# Patient Record
Sex: MALE | Race: OTHER | Employment: UNEMPLOYED | URBAN - METROPOLITAN AREA
[De-identification: names, ages, dates, MRNs, and addresses within clinical notes are randomized per-mention and may not be internally consistent; named-entity substitution may affect disease eponyms.]

---

## 2017-08-28 ENCOUNTER — OFFICE VISIT (OUTPATIENT)
Dept: SURGERY | Age: 52
End: 2017-08-28

## 2017-08-28 VITALS
WEIGHT: 205 LBS | HEIGHT: 67 IN | HEART RATE: 77 BPM | BODY MASS INDEX: 32.18 KG/M2 | SYSTOLIC BLOOD PRESSURE: 132 MMHG | DIASTOLIC BLOOD PRESSURE: 86 MMHG

## 2017-08-28 LAB
GLUCOSE BLD-MCNC: 153 MG/DL
HBA1C MFR BLD: 8.5 %

## 2017-08-28 PROCEDURE — 82962 GLUCOSE BLOOD TEST: CPT | Performed by: INTERNAL MEDICINE

## 2017-08-28 PROCEDURE — 99203 OFFICE O/P NEW LOW 30 MIN: CPT | Performed by: INTERNAL MEDICINE

## 2017-08-28 PROCEDURE — 83036 HEMOGLOBIN GLYCOSYLATED A1C: CPT | Performed by: INTERNAL MEDICINE

## 2017-08-28 RX ORDER — SIMVASTATIN 20 MG
20 TABLET ORAL NIGHTLY
Status: ON HOLD | COMMUNITY
End: 2020-05-27 | Stop reason: HOSPADM

## 2017-08-28 RX ORDER — LOSARTAN POTASSIUM 50 MG/1
50 TABLET ORAL DAILY
Status: ON HOLD | COMMUNITY
End: 2020-05-26

## 2017-08-28 RX ORDER — ALBUTEROL SULFATE 90 UG/1
2 AEROSOL, METERED RESPIRATORY (INHALATION) EVERY 6 HOURS PRN
COMMUNITY

## 2017-08-28 RX ORDER — FLUTICASONE PROPIONATE 50 MCG
1 SPRAY, SUSPENSION (ML) NASAL DAILY
Status: ON HOLD | COMMUNITY
End: 2022-06-01

## 2017-08-28 RX ORDER — GLUCOSAMINE HCL/CHONDROITIN SU 500-400 MG
CAPSULE ORAL
Qty: 50 STRIP | Refills: 6 | Status: SHIPPED | OUTPATIENT
Start: 2017-08-28 | End: 2019-02-26 | Stop reason: SDUPTHER

## 2017-08-28 RX ORDER — FLUOXETINE 10 MG/1
10 TABLET, FILM COATED ORAL 3 TIMES DAILY
COMMUNITY
End: 2021-10-07 | Stop reason: SDUPTHER

## 2017-08-28 RX ORDER — BLOOD-GLUCOSE METER
1 KIT MISCELLANEOUS DAILY PRN
Qty: 1 DEVICE | Refills: 0 | Status: SHIPPED | OUTPATIENT
Start: 2017-08-28 | End: 2017-08-28 | Stop reason: CLARIF

## 2017-08-28 RX ORDER — LANCETS 28 GAUGE
1 EACH MISCELLANEOUS DAILY
Qty: 100 EACH | Refills: 3 | Status: SHIPPED | OUTPATIENT
Start: 2017-08-28 | End: 2017-08-28 | Stop reason: CLARIF

## 2017-08-28 RX ORDER — CHOLECALCIFEROL (VITAMIN D3) 1250 MCG
CAPSULE ORAL WEEKLY
Status: ON HOLD | COMMUNITY
End: 2020-05-26

## 2017-08-28 RX ORDER — ALOGLIPTIN 25 MG/1
25 TABLET, FILM COATED ORAL DAILY
Qty: 30 TABLET | Refills: 3 | Status: SHIPPED | OUTPATIENT
Start: 2017-08-28 | End: 2017-12-29 | Stop reason: SDUPTHER

## 2017-08-28 RX ORDER — TIZANIDINE 4 MG/1
4 TABLET ORAL EVERY 6 HOURS PRN
COMMUNITY
End: 2020-09-10 | Stop reason: ALTCHOICE

## 2017-08-28 RX ORDER — LANCETS 30 GAUGE
EACH MISCELLANEOUS
Qty: 50 EACH | Refills: 6 | Status: SHIPPED | OUTPATIENT
Start: 2017-08-28 | End: 2019-02-26 | Stop reason: SDUPTHER

## 2017-09-04 ASSESSMENT — ENCOUNTER SYMPTOMS: EYES NEGATIVE: 1

## 2017-11-22 LAB
ANION GAP SERPL CALCULATED.3IONS-SCNC: 16 MEQ/L (ref 7–13)
BUN BLDV-MCNC: 16 MG/DL (ref 6–20)
CALCIUM SERPL-MCNC: 9.4 MG/DL (ref 8.6–10.2)
CHLORIDE BLD-SCNC: 98 MEQ/L (ref 98–107)
CO2: 24 MEQ/L (ref 22–29)
CREAT SERPL-MCNC: 0.69 MG/DL (ref 0.7–1.2)
CREATININE URINE: 106.2 MG/DL
GFR AFRICAN AMERICAN: >60
GFR NON-AFRICAN AMERICAN: >60
GLUCOSE BLD-MCNC: 99 MG/DL (ref 74–109)
HBA1C MFR BLD: 7.5 % (ref 4.8–5.9)
MICROALBUMIN UR-MCNC: <1.2 MG/DL
MICROALBUMIN/CREAT UR-RTO: NORMAL MG/G (ref 0–30)
POTASSIUM SERPL-SCNC: 4.3 MEQ/L (ref 3.5–5.1)
SODIUM BLD-SCNC: 138 MEQ/L (ref 132–144)

## 2017-11-28 ENCOUNTER — OFFICE VISIT (OUTPATIENT)
Dept: SURGERY | Age: 52
End: 2017-11-28

## 2017-11-28 VITALS
SYSTOLIC BLOOD PRESSURE: 124 MMHG | HEIGHT: 66 IN | DIASTOLIC BLOOD PRESSURE: 86 MMHG | WEIGHT: 213 LBS | BODY MASS INDEX: 34.23 KG/M2 | HEART RATE: 73 BPM

## 2017-11-28 DIAGNOSIS — E11.69 TYPE 2 DIABETES MELLITUS WITH OTHER SPECIFIED COMPLICATION, UNSPECIFIED LONG TERM INSULIN USE STATUS: Primary | Chronic | ICD-10-CM

## 2017-11-28 LAB — GLUCOSE BLD-MCNC: 137 MG/DL

## 2017-11-28 PROCEDURE — 3045F PR MOST RECENT HEMOGLOBIN A1C LEVEL 7.0-9.0%: CPT | Performed by: INTERNAL MEDICINE

## 2017-11-28 PROCEDURE — 3017F COLORECTAL CA SCREEN DOC REV: CPT | Performed by: INTERNAL MEDICINE

## 2017-11-28 PROCEDURE — 99213 OFFICE O/P EST LOW 20 MIN: CPT | Performed by: INTERNAL MEDICINE

## 2017-11-28 PROCEDURE — G8417 CALC BMI ABV UP PARAM F/U: HCPCS | Performed by: INTERNAL MEDICINE

## 2017-11-28 PROCEDURE — G8484 FLU IMMUNIZE NO ADMIN: HCPCS | Performed by: INTERNAL MEDICINE

## 2017-11-28 PROCEDURE — 4004F PT TOBACCO SCREEN RCVD TLK: CPT | Performed by: INTERNAL MEDICINE

## 2017-11-28 PROCEDURE — G8427 DOCREV CUR MEDS BY ELIG CLIN: HCPCS | Performed by: INTERNAL MEDICINE

## 2017-11-28 PROCEDURE — 82962 GLUCOSE BLOOD TEST: CPT | Performed by: INTERNAL MEDICINE

## 2017-11-28 NOTE — PROGRESS NOTES
(before breakfast), Disp: 30 tablet, Rfl: 5    Blood Glucose Monitoring Suppl SONDRA, check BG once daily, DX: E11.9, NIDDM, Disp: 1 Device, Rfl: 0    Glucose Blood (BLOOD GLUCOSE TEST STRIPS) STRP, Check BG once daily, DX: E11.65, NIDDM, Disp: 50 strip, Rfl: 6    Lancets MISC, Check BG once daily, DX: E11.65, NIDDM, Disp: 50 each, Rfl: 6    omeprazole (PRILOSEC) 20 MG capsule, Take 1 capsule by mouth daily. , Disp: 30 capsule, Rfl: 11    Review of Systems   Constitutional: Positive for fatigue. All other systems reviewed and are negative. Vitals:    11/28/17 0914   BP: 124/86   Site: Left Arm   Position: Sitting   Cuff Size: Large Adult   Pulse: 73   Weight: 213 lb (96.6 kg)   Height: 5' 6\" (1.676 m)       Objective:   Physical Exam   Constitutional: He appears well-developed and well-nourished. HENT:   Head: Normocephalic and atraumatic. Cardiovascular: Normal rate and normal heart sounds. Pulmonary/Chest: Effort normal and breath sounds normal. He has no wheezes. He has no rales. Abdominal:   Obese    Neurological: He is alert. Skin: Skin is warm and dry. Psychiatric: He has a normal mood and affect. Lab Results   Component Value Date     11/22/2017    K 4.3 11/22/2017    CL 98 11/22/2017    CO2 24 11/22/2017    BUN 16 11/22/2017    CREATININE 0.69 (L) 11/22/2017    GLUCOSE 137 11/28/2017    CALCIUM 9.4 11/22/2017    PROT 7.0 03/31/2015    LABALBU 4.2 03/31/2015    BILITOT 0.2 03/31/2015    ALKPHOS 62 03/31/2015    AST 16 03/31/2015    ALT 32 03/31/2015    LABGLOM >60.0 11/22/2017    GFRAA >60.0 11/22/2017    GLOB 2.8 03/31/2015         Assessment:      1.  Type 2 diabetes mellitus with other specified complication, unspecified long term insulin use status [facility]  POCT Glucose           Plan:      Orders Placed This Encounter   Procedures    Basic Metabolic Panel     Standing Status:   Future     Standing Expiration Date:   11/28/2018    Hemoglobin A1C     Standing Status:   Future

## 2017-12-29 RX ORDER — ALOGLIPTIN 25 MG/1
25 TABLET, FILM COATED ORAL DAILY
Qty: 30 TABLET | Refills: 3 | Status: SHIPPED | OUTPATIENT
Start: 2017-12-29 | End: 2018-03-26 | Stop reason: SDUPTHER

## 2018-02-22 DIAGNOSIS — E11.69 TYPE 2 DIABETES MELLITUS WITH OTHER SPECIFIED COMPLICATION, UNSPECIFIED LONG TERM INSULIN USE STATUS: Chronic | ICD-10-CM

## 2018-02-22 LAB
ANION GAP SERPL CALCULATED.3IONS-SCNC: 12 MEQ/L (ref 7–13)
BUN BLDV-MCNC: 13 MG/DL (ref 6–20)
CALCIUM SERPL-MCNC: 9.4 MG/DL (ref 8.6–10.2)
CHLORIDE BLD-SCNC: 97 MEQ/L (ref 98–107)
CO2: 29 MEQ/L (ref 22–29)
CREAT SERPL-MCNC: 0.66 MG/DL (ref 0.7–1.2)
GFR AFRICAN AMERICAN: >60
GFR NON-AFRICAN AMERICAN: >60
GLUCOSE BLD-MCNC: 132 MG/DL (ref 74–109)
HBA1C MFR BLD: 7.1 % (ref 4.8–5.9)
POTASSIUM SERPL-SCNC: 4.8 MEQ/L (ref 3.5–5.1)
SODIUM BLD-SCNC: 138 MEQ/L (ref 132–144)

## 2018-03-26 ENCOUNTER — OFFICE VISIT (OUTPATIENT)
Dept: ENDOCRINOLOGY | Age: 53
End: 2018-03-26
Payer: COMMERCIAL

## 2018-03-26 VITALS
WEIGHT: 221 LBS | SYSTOLIC BLOOD PRESSURE: 128 MMHG | HEART RATE: 80 BPM | DIASTOLIC BLOOD PRESSURE: 82 MMHG | HEIGHT: 66 IN | BODY MASS INDEX: 35.52 KG/M2

## 2018-03-26 DIAGNOSIS — E11.69 TYPE 2 DIABETES MELLITUS WITH OTHER SPECIFIED COMPLICATION, UNSPECIFIED LONG TERM INSULIN USE STATUS: Primary | Chronic | ICD-10-CM

## 2018-03-26 LAB — GLUCOSE BLD-MCNC: 189 MG/DL

## 2018-03-26 PROCEDURE — 99213 OFFICE O/P EST LOW 20 MIN: CPT | Performed by: INTERNAL MEDICINE

## 2018-03-26 PROCEDURE — G8484 FLU IMMUNIZE NO ADMIN: HCPCS | Performed by: INTERNAL MEDICINE

## 2018-03-26 PROCEDURE — G8427 DOCREV CUR MEDS BY ELIG CLIN: HCPCS | Performed by: INTERNAL MEDICINE

## 2018-03-26 PROCEDURE — 82962 GLUCOSE BLOOD TEST: CPT | Performed by: INTERNAL MEDICINE

## 2018-03-26 PROCEDURE — G8417 CALC BMI ABV UP PARAM F/U: HCPCS | Performed by: INTERNAL MEDICINE

## 2018-03-26 PROCEDURE — 4004F PT TOBACCO SCREEN RCVD TLK: CPT | Performed by: INTERNAL MEDICINE

## 2018-03-26 PROCEDURE — 3045F PR MOST RECENT HEMOGLOBIN A1C LEVEL 7.0-9.0%: CPT | Performed by: INTERNAL MEDICINE

## 2018-03-26 PROCEDURE — 3017F COLORECTAL CA SCREEN DOC REV: CPT | Performed by: INTERNAL MEDICINE

## 2018-03-26 RX ORDER — ALOGLIPTIN 25 MG/1
25 TABLET, FILM COATED ORAL DAILY
Qty: 30 TABLET | Refills: 3 | Status: SHIPPED | OUTPATIENT
Start: 2018-03-26 | End: 2018-06-25 | Stop reason: SDUPTHER

## 2018-03-26 NOTE — PROGRESS NOTES
Subjective:      Patient ID: Darius Sal is a 46 y.o. male. Diabetes   He presents for his follow-up diabetic visit. He has type 2 diabetes mellitus. His disease course has been improving. Diabetic complications include peripheral neuropathy. Risk factors for coronary artery disease include diabetes mellitus, obesity and male sex. Current diabetic treatment includes oral agent (dual therapy) (alogliptin plus invokana). He is compliant with treatment most of the time. He is following a generally healthy diet. His home blood glucose trend is fluctuating minimally. His overall blood glucose range is 130-140 mg/dl. (Lab Results       Component                Value               Date                       LABA1C                   7.1 (H)             02/22/2018              ) An ACE inhibitor/angiotensin II receptor blocker is being taken. Eye exam is current ([facility] ).        Patient Active Problem List   Diagnosis    Diabetes mellitus (HCC)       Allergies   Allergen Reactions    Metformin And Related      Diarrhea        Current Outpatient Prescriptions:     alogliptin (NESINA) 25 MG TABS tablet, Take 1 tablet by mouth daily, Disp: 30 tablet, Rfl: 3    canagliflozin (INVOKANA) 100 MG TABS tablet, Take 1 tablet by mouth every morning (before breakfast), Disp: 30 tablet, Rfl: 3    FLUoxetine (PROZAC) 10 MG tablet, Take 10 mg by mouth 3 times daily, Disp: , Rfl:     fluticasone (FLONASE) 50 MCG/ACT nasal spray, 1 spray by Nasal route daily, Disp: , Rfl:     tiZANidine (ZANAFLEX) 4 MG tablet, Take 4 mg by mouth every 6 hours as needed, Disp: , Rfl:     albuterol sulfate  (90 Base) MCG/ACT inhaler, Inhale 2 puffs into the lungs every 6 hours as needed for Wheezing, Disp: , Rfl:     Cholecalciferol (VITAMIN D3) 22408 units CAPS, Take by mouth once a week, Disp: , Rfl:     simvastatin (ZOCOR) 20 MG tablet, Take 20 mg by mouth nightly, Disp: , Rfl:     losartan (COZAAR) 50 MG tablet, Take 50 Future     Standing Expiration Date:   3/26/2019    Hemoglobin A1C     Standing Status:   Future     Standing Expiration Date:   3/26/2019    Microalbumin / Creatinine Urine Ratio     Standing Status:   Future     Standing Expiration Date:   3/26/2019    POCT Glucose     Orders Placed This Encounter   Medications    alogliptin (NESINA) 25 MG TABS tablet     Sig: Take 1 tablet by mouth daily     Dispense:  30 tablet     Refill:  3    canagliflozin (INVOKANA) 100 MG TABS tablet     Sig: Take 1 tablet by mouth every morning (before breakfast)     Dispense:  30 tablet     Refill:  3     f/u in 3-6 months

## 2018-06-01 DIAGNOSIS — E11.69 TYPE 2 DIABETES MELLITUS WITH OTHER SPECIFIED COMPLICATION, UNSPECIFIED LONG TERM INSULIN USE STATUS: Chronic | ICD-10-CM

## 2018-06-01 LAB
ANION GAP SERPL CALCULATED.3IONS-SCNC: 17 MEQ/L (ref 7–13)
BUN BLDV-MCNC: 13 MG/DL (ref 6–20)
CALCIUM SERPL-MCNC: 9.4 MG/DL (ref 8.6–10.2)
CHLORIDE BLD-SCNC: 98 MEQ/L (ref 98–107)
CO2: 21 MEQ/L (ref 22–29)
CREAT SERPL-MCNC: 0.69 MG/DL (ref 0.7–1.2)
CREATININE URINE: 63.1 MG/DL
GFR AFRICAN AMERICAN: >60
GFR NON-AFRICAN AMERICAN: >60
GLUCOSE BLD-MCNC: 243 MG/DL (ref 74–109)
HBA1C MFR BLD: 8.1 % (ref 4.8–5.9)
MICROALBUMIN UR-MCNC: <1.2 MG/DL
MICROALBUMIN/CREAT UR-RTO: NORMAL MG/G (ref 0–30)
POTASSIUM SERPL-SCNC: 4.3 MEQ/L (ref 3.5–5.1)
SODIUM BLD-SCNC: 136 MEQ/L (ref 132–144)

## 2018-06-25 ENCOUNTER — OFFICE VISIT (OUTPATIENT)
Dept: ENDOCRINOLOGY | Age: 53
End: 2018-06-25
Payer: COMMERCIAL

## 2018-06-25 VITALS
HEART RATE: 66 BPM | WEIGHT: 219 LBS | SYSTOLIC BLOOD PRESSURE: 126 MMHG | DIASTOLIC BLOOD PRESSURE: 86 MMHG | HEIGHT: 66 IN | BODY MASS INDEX: 35.2 KG/M2

## 2018-06-25 DIAGNOSIS — E66.9 OBESITY (BMI 30-39.9): ICD-10-CM

## 2018-06-25 DIAGNOSIS — E11.69 TYPE 2 DIABETES MELLITUS WITH OTHER SPECIFIED COMPLICATION, UNSPECIFIED LONG TERM INSULIN USE STATUS: Primary | Chronic | ICD-10-CM

## 2018-06-25 LAB — GLUCOSE BLD-MCNC: 173 MG/DL

## 2018-06-25 PROCEDURE — 3017F COLORECTAL CA SCREEN DOC REV: CPT | Performed by: INTERNAL MEDICINE

## 2018-06-25 PROCEDURE — G8427 DOCREV CUR MEDS BY ELIG CLIN: HCPCS | Performed by: INTERNAL MEDICINE

## 2018-06-25 PROCEDURE — 3045F PR MOST RECENT HEMOGLOBIN A1C LEVEL 7.0-9.0%: CPT | Performed by: INTERNAL MEDICINE

## 2018-06-25 PROCEDURE — 99213 OFFICE O/P EST LOW 20 MIN: CPT | Performed by: INTERNAL MEDICINE

## 2018-06-25 PROCEDURE — 82962 GLUCOSE BLOOD TEST: CPT | Performed by: INTERNAL MEDICINE

## 2018-06-25 PROCEDURE — G8417 CALC BMI ABV UP PARAM F/U: HCPCS | Performed by: INTERNAL MEDICINE

## 2018-06-25 PROCEDURE — 4004F PT TOBACCO SCREEN RCVD TLK: CPT | Performed by: INTERNAL MEDICINE

## 2018-06-25 PROCEDURE — 2022F DILAT RTA XM EVC RTNOPTHY: CPT | Performed by: INTERNAL MEDICINE

## 2018-06-25 RX ORDER — CELECOXIB 200 MG/1
200 CAPSULE ORAL 2 TIMES DAILY
Status: ON HOLD | COMMUNITY
End: 2020-05-26

## 2018-06-25 RX ORDER — ALOGLIPTIN 25 MG/1
25 TABLET, FILM COATED ORAL DAILY
Qty: 30 TABLET | Refills: 3 | Status: SHIPPED | OUTPATIENT
Start: 2018-06-25 | End: 2018-09-04 | Stop reason: SDUPTHER

## 2018-06-25 RX ORDER — GLIMEPIRIDE 2 MG/1
TABLET ORAL
Qty: 30 TABLET | Refills: 3 | Status: SHIPPED | OUTPATIENT
Start: 2018-06-25 | End: 2018-09-18 | Stop reason: SDUPTHER

## 2018-06-25 RX ORDER — SENNOSIDES 8.6 MG
650 CAPSULE ORAL 2 TIMES DAILY
Status: ON HOLD | COMMUNITY
End: 2022-06-01

## 2018-09-04 RX ORDER — ALOGLIPTIN 25 MG/1
25 TABLET, FILM COATED ORAL DAILY
Qty: 30 TABLET | Refills: 3 | Status: SHIPPED | OUTPATIENT
Start: 2018-09-04 | End: 2019-05-03 | Stop reason: SDUPTHER

## 2018-09-18 RX ORDER — GLIMEPIRIDE 2 MG/1
TABLET ORAL
Qty: 30 TABLET | Refills: 3 | Status: SHIPPED | OUTPATIENT
Start: 2018-09-18 | End: 2019-02-02 | Stop reason: SDUPTHER

## 2018-09-28 DIAGNOSIS — E11.69 TYPE 2 DIABETES MELLITUS WITH OTHER SPECIFIED COMPLICATION (HCC): Chronic | ICD-10-CM

## 2018-09-28 LAB
ANION GAP SERPL CALCULATED.3IONS-SCNC: 14 MEQ/L (ref 7–13)
BUN BLDV-MCNC: 11 MG/DL (ref 6–20)
CALCIUM SERPL-MCNC: 9.9 MG/DL (ref 8.6–10.2)
CHLORIDE BLD-SCNC: 100 MEQ/L (ref 98–107)
CHOLESTEROL, TOTAL: 146 MG/DL (ref 0–199)
CO2: 28 MEQ/L (ref 22–29)
CREAT SERPL-MCNC: 0.72 MG/DL (ref 0.7–1.2)
GFR AFRICAN AMERICAN: >60
GFR NON-AFRICAN AMERICAN: >60
GLUCOSE BLD-MCNC: 112 MG/DL (ref 74–109)
HBA1C MFR BLD: 7.5 % (ref 4.8–5.9)
HDLC SERPL-MCNC: 34 MG/DL (ref 40–59)
LDL CHOLESTEROL CALCULATED: 90 MG/DL (ref 0–129)
POTASSIUM SERPL-SCNC: 4.6 MEQ/L (ref 3.5–5.1)
SODIUM BLD-SCNC: 142 MEQ/L (ref 132–144)
TRIGL SERPL-MCNC: 109 MG/DL (ref 0–200)

## 2018-10-02 ENCOUNTER — OFFICE VISIT (OUTPATIENT)
Dept: ENDOCRINOLOGY | Age: 53
End: 2018-10-02
Payer: COMMERCIAL

## 2018-10-02 VITALS
SYSTOLIC BLOOD PRESSURE: 131 MMHG | DIASTOLIC BLOOD PRESSURE: 87 MMHG | HEIGHT: 66 IN | WEIGHT: 225 LBS | HEART RATE: 73 BPM | BODY MASS INDEX: 36.16 KG/M2

## 2018-10-02 DIAGNOSIS — E66.9 OBESITY (BMI 30-39.9): ICD-10-CM

## 2018-10-02 DIAGNOSIS — E11.69 TYPE 2 DIABETES MELLITUS WITH OTHER SPECIFIED COMPLICATION, UNSPECIFIED WHETHER LONG TERM INSULIN USE (HCC): Primary | Chronic | ICD-10-CM

## 2018-10-02 LAB — GLUCOSE BLD-MCNC: 148 MG/DL

## 2018-10-02 PROCEDURE — 2022F DILAT RTA XM EVC RTNOPTHY: CPT | Performed by: INTERNAL MEDICINE

## 2018-10-02 PROCEDURE — 82962 GLUCOSE BLOOD TEST: CPT | Performed by: INTERNAL MEDICINE

## 2018-10-02 PROCEDURE — 4004F PT TOBACCO SCREEN RCVD TLK: CPT | Performed by: INTERNAL MEDICINE

## 2018-10-02 PROCEDURE — 3045F PR MOST RECENT HEMOGLOBIN A1C LEVEL 7.0-9.0%: CPT | Performed by: INTERNAL MEDICINE

## 2018-10-02 PROCEDURE — G8484 FLU IMMUNIZE NO ADMIN: HCPCS | Performed by: INTERNAL MEDICINE

## 2018-10-02 PROCEDURE — G8417 CALC BMI ABV UP PARAM F/U: HCPCS | Performed by: INTERNAL MEDICINE

## 2018-10-02 PROCEDURE — 99213 OFFICE O/P EST LOW 20 MIN: CPT | Performed by: INTERNAL MEDICINE

## 2018-10-02 PROCEDURE — G8427 DOCREV CUR MEDS BY ELIG CLIN: HCPCS | Performed by: INTERNAL MEDICINE

## 2018-10-02 PROCEDURE — 3017F COLORECTAL CA SCREEN DOC REV: CPT | Performed by: INTERNAL MEDICINE

## 2018-10-08 NOTE — PROGRESS NOTES
Range: 40 - 59 mg/dL 34 (L)   LDL Calculated Latest Ref Range: 0 - 129 mg/dL 90   Triglycerides Latest Ref Range: 0 - 200 mg/dL 109   Hemoglobin A1C Latest Ref Range: 4.8 - 5.9 % 7.5 (H)     Assessment:       Diagnosis Orders   1. Type 2 diabetes mellitus with other specified complication, unspecified whether long term insulin use ([facility])  POCT Glucose    Basic Metabolic Panel    Hemoglobin A1C    Microalbumin / Creatinine Urine Ratio   2. Obesity (BMI 30-39.9)     3.  Uncontrolled type 2 diabetes mellitus with complication, without long-term current use of insulin ([facility])  canagliflozin (INVOKANA) 100 MG TABS tablet           Plan:      Orders Placed This Encounter   Procedures    Basic Metabolic Panel     Standing Status:   Future     Standing Expiration Date:   10/2/2019    Hemoglobin A1C     Standing Status:   Future     Standing Expiration Date:   10/2/2019    Microalbumin / Creatinine Urine Ratio     Standing Status:   Future     Standing Expiration Date:   10/2/2019    POCT Glucose     Orders Placed This Encounter   Medications    canagliflozin (INVOKANA) 100 MG TABS tablet     Sig: Take 1 tablet by mouth every morning (before breakfast)     Dispense:  30 tablet     Refill:  3     Continue Amaryl and nesina at current dose   F/u in 3 months          Luann Yee MD

## 2018-10-15 RX ORDER — CANAGLIFLOZIN 100 MG/1
100 TABLET, FILM COATED ORAL
Qty: 30 TABLET | Refills: 3 | Status: SHIPPED | OUTPATIENT
Start: 2018-10-15 | End: 2019-07-24 | Stop reason: SDUPTHER

## 2018-11-28 RX ORDER — ALOGLIPTIN 25 MG/1
25 TABLET, FILM COATED ORAL DAILY
Qty: 30 TABLET | Refills: 3 | Status: SHIPPED | OUTPATIENT
Start: 2018-11-28 | End: 2019-04-24 | Stop reason: SDUPTHER

## 2018-12-03 ENCOUNTER — TELEPHONE (OUTPATIENT)
Dept: ENDOCRINOLOGY | Age: 53
End: 2018-12-03

## 2019-01-04 DIAGNOSIS — E11.69 TYPE 2 DIABETES MELLITUS WITH OTHER SPECIFIED COMPLICATION, UNSPECIFIED WHETHER LONG TERM INSULIN USE (HCC): Chronic | ICD-10-CM

## 2019-01-04 LAB
ANION GAP SERPL CALCULATED.3IONS-SCNC: 14 MEQ/L (ref 7–13)
BUN BLDV-MCNC: 15 MG/DL (ref 6–20)
CALCIUM SERPL-MCNC: 9.4 MG/DL (ref 8.6–10.2)
CHLORIDE BLD-SCNC: 96 MEQ/L (ref 98–107)
CO2: 25 MEQ/L (ref 22–29)
CREAT SERPL-MCNC: 0.69 MG/DL (ref 0.7–1.2)
GFR AFRICAN AMERICAN: >60
GFR NON-AFRICAN AMERICAN: >60
GLUCOSE BLD-MCNC: 180 MG/DL (ref 74–109)
HBA1C MFR BLD: 7.7 % (ref 4.8–5.9)
POTASSIUM SERPL-SCNC: 4 MEQ/L (ref 3.5–5.1)
SODIUM BLD-SCNC: 135 MEQ/L (ref 132–144)

## 2019-01-18 ENCOUNTER — OFFICE VISIT (OUTPATIENT)
Dept: ENDOCRINOLOGY | Age: 54
End: 2019-01-18
Payer: COMMERCIAL

## 2019-01-18 VITALS
HEART RATE: 80 BPM | OXYGEN SATURATION: 92 % | BODY MASS INDEX: 36.1 KG/M2 | HEIGHT: 67 IN | DIASTOLIC BLOOD PRESSURE: 82 MMHG | SYSTOLIC BLOOD PRESSURE: 121 MMHG | WEIGHT: 230 LBS

## 2019-01-18 DIAGNOSIS — E66.9 OBESITY (BMI 30-39.9): ICD-10-CM

## 2019-01-18 DIAGNOSIS — E08.00 DIABETES MELLITUS DUE TO UNDERLYING CONDITION WITH HYPEROSMOLARITY WITHOUT COMA, WITH LONG-TERM CURRENT USE OF INSULIN (HCC): Primary | Chronic | ICD-10-CM

## 2019-01-18 DIAGNOSIS — Z79.4 DIABETES MELLITUS DUE TO UNDERLYING CONDITION WITH HYPEROSMOLARITY WITHOUT COMA, WITH LONG-TERM CURRENT USE OF INSULIN (HCC): Primary | Chronic | ICD-10-CM

## 2019-01-18 LAB — GLUCOSE BLD-MCNC: 150 MG/DL

## 2019-01-18 PROCEDURE — 4004F PT TOBACCO SCREEN RCVD TLK: CPT | Performed by: INTERNAL MEDICINE

## 2019-01-18 PROCEDURE — 99213 OFFICE O/P EST LOW 20 MIN: CPT | Performed by: INTERNAL MEDICINE

## 2019-01-18 PROCEDURE — 82962 GLUCOSE BLOOD TEST: CPT | Performed by: INTERNAL MEDICINE

## 2019-01-18 PROCEDURE — G8484 FLU IMMUNIZE NO ADMIN: HCPCS | Performed by: INTERNAL MEDICINE

## 2019-01-18 PROCEDURE — 3017F COLORECTAL CA SCREEN DOC REV: CPT | Performed by: INTERNAL MEDICINE

## 2019-01-18 PROCEDURE — G8427 DOCREV CUR MEDS BY ELIG CLIN: HCPCS | Performed by: INTERNAL MEDICINE

## 2019-01-18 PROCEDURE — G8417 CALC BMI ABV UP PARAM F/U: HCPCS | Performed by: INTERNAL MEDICINE

## 2019-02-04 RX ORDER — GLIMEPIRIDE 2 MG/1
TABLET ORAL
Qty: 30 TABLET | Refills: 3 | Status: SHIPPED | OUTPATIENT
Start: 2019-02-04 | End: 2019-04-24 | Stop reason: SDUPTHER

## 2019-02-09 ENCOUNTER — HOSPITAL ENCOUNTER (EMERGENCY)
Age: 54
Discharge: HOME | End: 2019-02-09
Payer: COMMERCIAL

## 2019-02-09 ENCOUNTER — APPOINTMENT (OUTPATIENT)
Dept: GENERAL RADIOLOGY | Age: 54
End: 2019-02-09
Payer: COMMERCIAL

## 2019-02-09 VITALS
TEMPERATURE: 97.7 F | OXYGEN SATURATION: 98 % | RESPIRATION RATE: 18 BRPM | SYSTOLIC BLOOD PRESSURE: 130 MMHG | HEIGHT: 66 IN | DIASTOLIC BLOOD PRESSURE: 77 MMHG | WEIGHT: 230 LBS | HEART RATE: 92 BPM | BODY MASS INDEX: 36.96 KG/M2

## 2019-02-09 DIAGNOSIS — R11.2 NON-INTRACTABLE VOMITING WITH NAUSEA, UNSPECIFIED VOMITING TYPE: Primary | ICD-10-CM

## 2019-02-09 DIAGNOSIS — R19.7 DIARRHEA, UNSPECIFIED TYPE: ICD-10-CM

## 2019-02-09 LAB
ALBUMIN SERPL-MCNC: 3.9 G/DL (ref 3.5–4.6)
ALP BLD-CCNC: 59 U/L (ref 35–104)
ALT SERPL-CCNC: 32 U/L (ref 0–41)
AMPHETAMINE SCREEN, URINE: ABNORMAL
AMYLASE: 71 U/L (ref 22–93)
ANION GAP SERPL CALCULATED.3IONS-SCNC: 18 MEQ/L (ref 9–15)
AST SERPL-CCNC: 17 U/L (ref 0–40)
BARBITURATE SCREEN URINE: ABNORMAL
BASOPHILS ABSOLUTE: 0 K/UL (ref 0–0.2)
BASOPHILS RELATIVE PERCENT: 0.1 %
BENZODIAZEPINE SCREEN, URINE: ABNORMAL
BILIRUB SERPL-MCNC: 0.4 MG/DL (ref 0.2–0.7)
BILIRUBIN URINE: NEGATIVE
BLOOD, URINE: NEGATIVE
BUN BLDV-MCNC: 10 MG/DL (ref 6–20)
CALCIUM SERPL-MCNC: 8.8 MG/DL (ref 8.5–9.9)
CANNABINOID SCREEN URINE: ABNORMAL
CHLORIDE BLD-SCNC: 94 MEQ/L (ref 95–107)
CLARITY: CLEAR
CO2: 22 MEQ/L (ref 20–31)
COCAINE METABOLITE SCREEN URINE: POSITIVE
COLOR: YELLOW
CREAT SERPL-MCNC: 0.69 MG/DL (ref 0.7–1.2)
EKG ATRIAL RATE: 86 BPM
EKG P AXIS: 37 DEGREES
EKG P-R INTERVAL: 148 MS
EKG Q-T INTERVAL: 392 MS
EKG QRS DURATION: 94 MS
EKG QTC CALCULATION (BAZETT): 469 MS
EKG R AXIS: -10 DEGREES
EKG T AXIS: 48 DEGREES
EKG VENTRICULAR RATE: 86 BPM
EOSINOPHILS ABSOLUTE: 0.1 K/UL (ref 0–0.7)
EOSINOPHILS RELATIVE PERCENT: 0.7 %
GFR AFRICAN AMERICAN: >60
GFR NON-AFRICAN AMERICAN: >60
GLOBULIN: 3.1 G/DL (ref 2.3–3.5)
GLUCOSE BLD-MCNC: 131 MG/DL (ref 70–99)
GLUCOSE URINE: 500 MG/DL
HCT VFR BLD CALC: 49.9 % (ref 42–52)
HEMOGLOBIN: 17 G/DL (ref 14–18)
KETONES, URINE: 15 MG/DL
LACTIC ACID: 3.4 MMOL/L (ref 0.5–2.2)
LEUKOCYTE ESTERASE, URINE: NEGATIVE
LIPASE: 34 U/L (ref 12–95)
LYMPHOCYTES ABSOLUTE: 0.7 K/UL (ref 1–4.8)
LYMPHOCYTES RELATIVE PERCENT: 7.8 %
Lab: ABNORMAL
MCH RBC QN AUTO: 31.7 PG (ref 27–31.3)
MCHC RBC AUTO-ENTMCNC: 34 % (ref 33–37)
MCV RBC AUTO: 93.3 FL (ref 80–100)
MONOCYTES ABSOLUTE: 0.6 K/UL (ref 0.2–0.8)
MONOCYTES RELATIVE PERCENT: 6.8 %
NEUTROPHILS ABSOLUTE: 7.2 K/UL (ref 1.4–6.5)
NEUTROPHILS RELATIVE PERCENT: 84.6 %
NITRITE, URINE: NEGATIVE
OPIATE SCREEN URINE: ABNORMAL
PDW BLD-RTO: 14.5 % (ref 11.5–14.5)
PH UA: 5.5 (ref 5–9)
PHENCYCLIDINE SCREEN URINE: ABNORMAL
PLATELET # BLD: 168 K/UL (ref 130–400)
POTASSIUM SERPL-SCNC: 4.4 MEQ/L (ref 3.4–4.9)
PROTEIN UA: NEGATIVE MG/DL
RBC # BLD: 5.35 M/UL (ref 4.7–6.1)
SODIUM BLD-SCNC: 134 MEQ/L (ref 135–144)
SPECIFIC GRAVITY UA: 1 (ref 1–1.03)
TOTAL CK: 84 U/L (ref 0–190)
TOTAL PROTEIN: 7 G/DL (ref 6.3–8)
TROPONIN: <0.01 NG/ML (ref 0–0.01)
URINE REFLEX TO CULTURE: ABNORMAL
UROBILINOGEN, URINE: 0.2 E.U./DL
WBC # BLD: 8.5 K/UL (ref 4.8–10.8)

## 2019-02-09 PROCEDURE — 80307 DRUG TEST PRSMV CHEM ANLYZR: CPT

## 2019-02-09 PROCEDURE — 93005 ELECTROCARDIOGRAM TRACING: CPT

## 2019-02-09 PROCEDURE — 83690 ASSAY OF LIPASE: CPT

## 2019-02-09 PROCEDURE — 84484 ASSAY OF TROPONIN QUANT: CPT

## 2019-02-09 PROCEDURE — 80053 COMPREHEN METABOLIC PANEL: CPT

## 2019-02-09 PROCEDURE — 81003 URINALYSIS AUTO W/O SCOPE: CPT

## 2019-02-09 PROCEDURE — 2580000003 HC RX 258

## 2019-02-09 PROCEDURE — 36415 COLL VENOUS BLD VENIPUNCTURE: CPT

## 2019-02-09 PROCEDURE — 85025 COMPLETE CBC W/AUTO DIFF WBC: CPT

## 2019-02-09 PROCEDURE — 71045 X-RAY EXAM CHEST 1 VIEW: CPT

## 2019-02-09 PROCEDURE — 6360000002 HC RX W HCPCS: Performed by: NURSE PRACTITIONER

## 2019-02-09 PROCEDURE — 2580000003 HC RX 258: Performed by: NURSE PRACTITIONER

## 2019-02-09 PROCEDURE — 74018 RADEX ABDOMEN 1 VIEW: CPT

## 2019-02-09 PROCEDURE — 82150 ASSAY OF AMYLASE: CPT

## 2019-02-09 PROCEDURE — 83605 ASSAY OF LACTIC ACID: CPT

## 2019-02-09 PROCEDURE — 99284 EMERGENCY DEPT VISIT MOD MDM: CPT

## 2019-02-09 PROCEDURE — 82550 ASSAY OF CK (CPK): CPT

## 2019-02-09 PROCEDURE — 96374 THER/PROPH/DIAG INJ IV PUSH: CPT

## 2019-02-09 PROCEDURE — 6370000000 HC RX 637 (ALT 250 FOR IP): Performed by: NURSE PRACTITIONER

## 2019-02-09 RX ORDER — SODIUM CHLORIDE 9 MG/ML
INJECTION, SOLUTION INTRAVENOUS
Status: COMPLETED
Start: 2019-02-09 | End: 2019-02-09

## 2019-02-09 RX ORDER — ONDANSETRON 2 MG/ML
4 INJECTION INTRAMUSCULAR; INTRAVENOUS ONCE
Status: COMPLETED | OUTPATIENT
Start: 2019-02-09 | End: 2019-02-09

## 2019-02-09 RX ORDER — DICYCLOMINE HYDROCHLORIDE 10 MG/1
10 CAPSULE ORAL
Qty: 30 CAPSULE | Refills: 0 | Status: ON HOLD | OUTPATIENT
Start: 2019-02-09 | End: 2020-05-26

## 2019-02-09 RX ORDER — 0.9 % SODIUM CHLORIDE 0.9 %
1000 INTRAVENOUS SOLUTION INTRAVENOUS ONCE
Status: COMPLETED | OUTPATIENT
Start: 2019-02-09 | End: 2019-02-09

## 2019-02-09 RX ORDER — ONDANSETRON 4 MG/1
4-8 TABLET, ORALLY DISINTEGRATING ORAL EVERY 12 HOURS PRN
Qty: 12 TABLET | Refills: 0 | Status: ON HOLD | OUTPATIENT
Start: 2019-02-09 | End: 2020-05-26

## 2019-02-09 RX ORDER — ACETAMINOPHEN 500 MG
1000 TABLET ORAL ONCE
Status: COMPLETED | OUTPATIENT
Start: 2019-02-09 | End: 2019-02-09

## 2019-02-09 RX ADMIN — Medication 1000 ML: at 04:05

## 2019-02-09 RX ADMIN — SODIUM CHLORIDE 1000 ML: 9 INJECTION, SOLUTION INTRAVENOUS at 02:20

## 2019-02-09 RX ADMIN — ONDANSETRON 4 MG: 2 INJECTION INTRAMUSCULAR; INTRAVENOUS at 02:21

## 2019-02-09 RX ADMIN — ACETAMINOPHEN 1000 MG: 500 TABLET ORAL at 04:37

## 2019-02-09 RX ADMIN — SODIUM CHLORIDE 1000 ML: 9 INJECTION, SOLUTION INTRAVENOUS at 04:05

## 2019-02-09 ASSESSMENT — ENCOUNTER SYMPTOMS
BACK PAIN: 0
SORE THROAT: 0
EYE REDNESS: 0
DIARRHEA: 1
SHORTNESS OF BREATH: 0
COUGH: 0
RHINORRHEA: 0
ABDOMINAL PAIN: 1
BLOOD IN STOOL: 0
WHEEZING: 0
EYE PAIN: 0
ABDOMINAL DISTENTION: 0
NAUSEA: 1
EYE ITCHING: 0
VOICE CHANGE: 0
TROUBLE SWALLOWING: 0
COLOR CHANGE: 0
VOMITING: 1
CONSTIPATION: 0

## 2019-02-09 ASSESSMENT — PAIN DESCRIPTION - PAIN TYPE
TYPE: ACUTE PAIN
TYPE: ACUTE PAIN

## 2019-02-09 ASSESSMENT — PAIN DESCRIPTION - DESCRIPTORS: DESCRIPTORS: PATIENT UNABLE TO DESCRIBE

## 2019-02-09 ASSESSMENT — PAIN DESCRIPTION - ORIENTATION: ORIENTATION: LEFT

## 2019-02-09 ASSESSMENT — PAIN SCALES - GENERAL: PAINLEVEL_OUTOF10: 9

## 2019-02-09 ASSESSMENT — PAIN DESCRIPTION - FREQUENCY: FREQUENCY: CONTINUOUS

## 2019-02-09 ASSESSMENT — PAIN DESCRIPTION - LOCATION: LOCATION: ABDOMEN

## 2019-02-11 PROCEDURE — 93010 ELECTROCARDIOGRAM REPORT: CPT | Performed by: INTERNAL MEDICINE

## 2019-02-26 RX ORDER — GLUCOSAMINE HCL/CHONDROITIN SU 500-400 MG
CAPSULE ORAL
Qty: 50 STRIP | Refills: 6 | Status: SHIPPED | OUTPATIENT
Start: 2019-02-26 | End: 2019-10-31 | Stop reason: SDUPTHER

## 2019-02-26 RX ORDER — LANCETS 30 GAUGE
EACH MISCELLANEOUS
Qty: 50 EACH | Refills: 6 | Status: SHIPPED | OUTPATIENT
Start: 2019-02-26 | End: 2019-10-31 | Stop reason: SDUPTHER

## 2019-04-16 DIAGNOSIS — Z79.4 DIABETES MELLITUS DUE TO UNDERLYING CONDITION WITH HYPEROSMOLARITY WITHOUT COMA, WITH LONG-TERM CURRENT USE OF INSULIN (HCC): Chronic | ICD-10-CM

## 2019-04-16 DIAGNOSIS — E08.00 DIABETES MELLITUS DUE TO UNDERLYING CONDITION WITH HYPEROSMOLARITY WITHOUT COMA, WITH LONG-TERM CURRENT USE OF INSULIN (HCC): Chronic | ICD-10-CM

## 2019-04-16 LAB
ANION GAP SERPL CALCULATED.3IONS-SCNC: 15 MEQ/L (ref 9–15)
BUN BLDV-MCNC: 16 MG/DL (ref 6–20)
CALCIUM SERPL-MCNC: 9 MG/DL (ref 8.5–9.9)
CHLORIDE BLD-SCNC: 97 MEQ/L (ref 95–107)
CO2: 23 MEQ/L (ref 20–31)
CREAT SERPL-MCNC: 0.86 MG/DL (ref 0.7–1.2)
CREATININE URINE: 68.6 MG/DL
GFR AFRICAN AMERICAN: >60
GFR NON-AFRICAN AMERICAN: >60
GLUCOSE BLD-MCNC: 184 MG/DL (ref 70–99)
HBA1C MFR BLD: 7.3 % (ref 4.8–5.9)
MICROALBUMIN UR-MCNC: <1.2 MG/DL
MICROALBUMIN/CREAT UR-RTO: NORMAL MG/G (ref 0–30)
POTASSIUM SERPL-SCNC: 4.2 MEQ/L (ref 3.4–4.9)
SODIUM BLD-SCNC: 135 MEQ/L (ref 135–144)

## 2019-04-24 ENCOUNTER — OFFICE VISIT (OUTPATIENT)
Dept: ENDOCRINOLOGY | Age: 54
End: 2019-04-24
Payer: COMMERCIAL

## 2019-04-24 VITALS
WEIGHT: 231 LBS | DIASTOLIC BLOOD PRESSURE: 85 MMHG | BODY MASS INDEX: 37.12 KG/M2 | HEIGHT: 66 IN | HEART RATE: 73 BPM | SYSTOLIC BLOOD PRESSURE: 128 MMHG

## 2019-04-24 DIAGNOSIS — E08.00 DIABETES MELLITUS DUE TO UNDERLYING CONDITION WITH HYPEROSMOLARITY WITHOUT COMA, WITH LONG-TERM CURRENT USE OF INSULIN (HCC): Primary | ICD-10-CM

## 2019-04-24 DIAGNOSIS — Z79.4 DIABETES MELLITUS DUE TO UNDERLYING CONDITION WITH HYPEROSMOLARITY WITHOUT COMA, WITH LONG-TERM CURRENT USE OF INSULIN (HCC): Primary | ICD-10-CM

## 2019-04-24 LAB
CHP ED QC CHECK: NORMAL
GLUCOSE BLD-MCNC: 200 MG/DL

## 2019-04-24 PROCEDURE — 4004F PT TOBACCO SCREEN RCVD TLK: CPT | Performed by: INTERNAL MEDICINE

## 2019-04-24 PROCEDURE — 99213 OFFICE O/P EST LOW 20 MIN: CPT | Performed by: INTERNAL MEDICINE

## 2019-04-24 PROCEDURE — 3017F COLORECTAL CA SCREEN DOC REV: CPT | Performed by: INTERNAL MEDICINE

## 2019-04-24 PROCEDURE — 82962 GLUCOSE BLOOD TEST: CPT | Performed by: INTERNAL MEDICINE

## 2019-04-24 PROCEDURE — 3045F PR MOST RECENT HEMOGLOBIN A1C LEVEL 7.0-9.0%: CPT | Performed by: INTERNAL MEDICINE

## 2019-04-24 PROCEDURE — 2022F DILAT RTA XM EVC RTNOPTHY: CPT | Performed by: INTERNAL MEDICINE

## 2019-04-24 PROCEDURE — G8417 CALC BMI ABV UP PARAM F/U: HCPCS | Performed by: INTERNAL MEDICINE

## 2019-04-24 PROCEDURE — G8427 DOCREV CUR MEDS BY ELIG CLIN: HCPCS | Performed by: INTERNAL MEDICINE

## 2019-04-24 RX ORDER — ALOGLIPTIN 25 MG/1
25 TABLET, FILM COATED ORAL DAILY
Qty: 30 TABLET | Refills: 3 | Status: SHIPPED | OUTPATIENT
Start: 2019-04-24 | End: 2019-07-24 | Stop reason: SDUPTHER

## 2019-04-24 RX ORDER — GLIMEPIRIDE 2 MG/1
TABLET ORAL
Qty: 30 TABLET | Refills: 3 | Status: SHIPPED | OUTPATIENT
Start: 2019-04-24 | End: 2019-07-24 | Stop reason: SDUPTHER

## 2019-04-24 NOTE — PROGRESS NOTES
Subjective:      Patient ID: Suzy Hernandez is a 53 y.o. male. 3 month f/u on diabetes     Diabetes   He presents for his follow-up diabetic visit. He has type 2 diabetes mellitus. Risk factors for coronary artery disease include obesity. Current diabetic treatments: nesina invokana  amaryl plus trulicity  His overall blood glucose range is 140-180 mg/dl. (Lab Results       Component                Value               Date                       LABA1C                   7.3 (H)             04/16/2019              ) An ACE inhibitor/angiotensin II receptor blocker is being taken. result reviewed       Results for Genaro Don (MRN [id number]) as of 4/24/2019 09:39   Ref.  Range 4/16/2019 08:35 4/16/2019 12:14   Sodium Latest Ref Range: 135 - 144 mEq/L 135    Potassium Latest Ref Range: 3.4 - 4.9 mEq/L 4.2    Chloride Latest Ref Range: 95 - 107 mEq/L 97    CO2 Latest Ref Range: 20 - 31 mEq/L 23    BUN Latest Ref Range: 6 - 20 mg/dL 16    Creatinine Latest Ref Range: 0.70 - 1.20 mg/dL 0.86    Anion Gap Latest Ref Range: 9 - 15 mEq/L 15    GFR Non- Latest Ref Range: >60  >60.0    GFR African American Latest Ref Range: >60  >60.0    Glucose Latest Ref Range: 70 - 99 mg/dL 184 (H)    Calcium Latest Ref Range: 8.5 - 9.9 mg/dL 9.0    Hemoglobin A1C Latest Ref Range: 4.8 - 5.9 % 7.3 (H)    Creatinine, Ur Latest Ref Range: Not Established mg/dL  68.6   Microalbumin Creatinine Ratio Latest Ref Range: 0.0 - 30.0 mg/G  see below   Microalbumin, Random Urine Latest Ref Range: Not Established mg/dL  <1.20       Patient Active Problem List   Diagnosis    Diabetes mellitus (HCC)     Allergies   Allergen Reactions    Metformin And Related      Diarrhea        Current Outpatient Medications:     Dulaglutide (TRULICITY) 2.76 CP/5.9JA SOPN, ONCE A WEEK, Disp: 2 mL, Rfl: 3    blood glucose monitor strips, Check BG once daily, DX: E11.65, NIDDM, Disp: 50 strip, Rfl: 6    Lancets MISC, Check BG once daily, DX: E11.65, NIDDM, Disp: 50 each, Rfl: 6    ondansetron (ZOFRAN ODT) 4 MG disintegrating tablet, Take 1-2 tablets by mouth every 12 hours as needed for Nausea May Sub regular tablet (non-ODT) if insurance does not cover ODT., Disp: 12 tablet, Rfl: 0    dicyclomine (BENTYL) 10 MG capsule, Take 1 capsule by mouth 4 times daily (before meals and nightly), Disp: 30 capsule, Rfl: 0    glimepiride (AMARYL) 2 MG tablet, TAKE 1 TABLET BY MOUTH AT DINNER, Disp: 30 tablet, Rfl: 3    alogliptin (NESINA) 25 MG TABS tablet, TAKE 1 TABLET BY MOUTH DAILY, Disp: 30 tablet, Rfl: 3    INVOKANA 100 MG TABS tablet, TAKE 1 TABLET BY MOUTH EVERY MORNING (BEFORE BREAKFAST), Disp: 30 tablet, Rfl: 3    canagliflozin (INVOKANA) 100 MG TABS tablet, Take 1 tablet by mouth every morning (before breakfast), Disp: 30 tablet, Rfl: 3    alogliptin (NESINA) 25 MG TABS tablet, TAKE 1 TABLET BY MOUTH DAILY, Disp: 30 tablet, Rfl: 3    celecoxib (CELEBREX) 200 MG capsule, Take 200 mg by mouth 2 times daily, Disp: , Rfl:     acetaminophen (TYLENOL ARTHRITIS PAIN) 650 MG extended release tablet, Take 650 mg by mouth 2 times daily, Disp: , Rfl:     FLUoxetine (PROZAC) 10 MG tablet, Take 10 mg by mouth 3 times daily, Disp: , Rfl:     fluticasone (FLONASE) 50 MCG/ACT nasal spray, 1 spray by Nasal route daily, Disp: , Rfl:     tiZANidine (ZANAFLEX) 4 MG tablet, Take 4 mg by mouth every 6 hours as needed, Disp: , Rfl:     albuterol sulfate  (90 Base) MCG/ACT inhaler, Inhale 2 puffs into the lungs every 6 hours as needed for Wheezing, Disp: , Rfl:     Cholecalciferol (VITAMIN D3) 06696 units CAPS, Take by mouth once a week, Disp: , Rfl:     simvastatin (ZOCOR) 20 MG tablet, Take 20 mg by mouth nightly, Disp: , Rfl:     losartan (COZAAR) 50 MG tablet, Take 50 mg by mouth daily, Disp: , Rfl:     Blood Glucose Monitoring Suppl SONDRA, check BG once daily, DX: E11.9, NIDDM, Disp: 1 Device, Rfl: 0    omeprazole (PRILOSEC) 20 MG capsule, Take 1 capsule by mouth daily. , Disp: 30 capsule, Rfl: 11    Review of Systems   Endocrine: Negative. All other systems reviewed and are negative. Vitals:    04/24/19 0931   BP: 128/85   Pulse: 73   Weight: 231 lb (104.8 kg)   Height: 5' 6\" (1.676 m)       Objective:   Physical Exam   Constitutional: He appears well-developed and well-nourished. HENT:   Head: Normocephalic and atraumatic. Neck: Neck supple. Cardiovascular: Normal rate. Pulmonary/Chest: Breath sounds normal.   Abdominal:   Obese    Musculoskeletal: Normal range of motion. Feet:    Neurological: He is alert. Psychiatric: He has a normal mood and affect. Assessment:       Diagnosis Orders   1.  Diabetes mellitus due to underlying condition with hyperosmolarity without coma, with long-term current use of insulin ([facility])  POCT Glucose    Basic Metabolic Panel    Hemoglobin A1C    Microalbumin / Creatinine Urine Ratio           Plan:      Orders Placed This Encounter   Procedures    POCT Glucose     Orders Placed This Encounter   Procedures    Basic Metabolic Panel     Standing Status:   Future     Standing Expiration Date:   4/24/2020    Hemoglobin A1C     Standing Status:   Future     Standing Expiration Date:   4/24/2020    POCT Glucose       Continue Amaryl invokana  and nesina and trulicity  current dose   Advised weight loss       Orion Melgar MD

## 2019-04-30 RX ORDER — GLIMEPIRIDE 2 MG/1
TABLET ORAL
Qty: 30 TABLET | Refills: 3 | Status: SHIPPED | OUTPATIENT
Start: 2019-04-30 | End: 2019-10-31 | Stop reason: SDUPTHER

## 2019-05-03 RX ORDER — ALOGLIPTIN 25 MG/1
25 TABLET, FILM COATED ORAL DAILY
Qty: 30 TABLET | Refills: 3 | Status: SHIPPED | OUTPATIENT
Start: 2019-05-03 | End: 2019-07-24 | Stop reason: SDUPTHER

## 2019-06-01 PROBLEM — G56.03 BILATERAL CARPAL TUNNEL SYNDROME: Status: ACTIVE | Noted: 2019-06-01

## 2019-07-08 DIAGNOSIS — E11.69 TYPE 2 DIABETES MELLITUS WITH OTHER SPECIFIED COMPLICATION, UNSPECIFIED WHETHER LONG TERM INSULIN USE (HCC): Chronic | ICD-10-CM

## 2019-07-08 DIAGNOSIS — Z79.4 DIABETES MELLITUS DUE TO UNDERLYING CONDITION WITH HYPEROSMOLARITY WITHOUT COMA, WITH LONG-TERM CURRENT USE OF INSULIN (HCC): ICD-10-CM

## 2019-07-08 DIAGNOSIS — E08.00 DIABETES MELLITUS DUE TO UNDERLYING CONDITION WITH HYPEROSMOLARITY WITHOUT COMA, WITH LONG-TERM CURRENT USE OF INSULIN (HCC): ICD-10-CM

## 2019-07-08 LAB
ANION GAP SERPL CALCULATED.3IONS-SCNC: 11 MEQ/L (ref 9–15)
BUN BLDV-MCNC: 15 MG/DL (ref 6–20)
CALCIUM SERPL-MCNC: 9.1 MG/DL (ref 8.5–9.9)
CHLORIDE BLD-SCNC: 98 MEQ/L (ref 95–107)
CO2: 25 MEQ/L (ref 20–31)
CREAT SERPL-MCNC: 0.6 MG/DL (ref 0.7–1.2)
CREATININE URINE: 112.4 MG/DL
GFR AFRICAN AMERICAN: >60
GFR NON-AFRICAN AMERICAN: >60
GLUCOSE BLD-MCNC: 187 MG/DL (ref 70–99)
HBA1C MFR BLD: 8.4 % (ref 4.8–5.9)
MICROALBUMIN UR-MCNC: <1.2 MG/DL
MICROALBUMIN/CREAT UR-RTO: NORMAL MG/G (ref 0–30)
POTASSIUM SERPL-SCNC: 4.2 MEQ/L (ref 3.4–4.9)
SODIUM BLD-SCNC: 134 MEQ/L (ref 135–144)

## 2019-07-18 RX ORDER — CANAGLIFLOZIN 100 MG/1
TABLET, FILM COATED ORAL
Qty: 30 TABLET | Refills: 3 | Status: SHIPPED | OUTPATIENT
Start: 2019-07-18 | End: 2019-10-31 | Stop reason: SDUPTHER

## 2019-07-24 ENCOUNTER — OFFICE VISIT (OUTPATIENT)
Dept: ENDOCRINOLOGY | Age: 54
End: 2019-07-24
Payer: COMMERCIAL

## 2019-07-24 ENCOUNTER — TELEPHONE (OUTPATIENT)
Dept: ENDOCRINOLOGY | Age: 54
End: 2019-07-24

## 2019-07-24 VITALS
BODY MASS INDEX: 37.06 KG/M2 | HEART RATE: 72 BPM | WEIGHT: 229.6 LBS | SYSTOLIC BLOOD PRESSURE: 145 MMHG | DIASTOLIC BLOOD PRESSURE: 94 MMHG

## 2019-07-24 DIAGNOSIS — E11.65 UNCONTROLLED TYPE 2 DIABETES MELLITUS WITH HYPERGLYCEMIA (HCC): Primary | ICD-10-CM

## 2019-07-24 LAB
CHP ED QC CHECK: NORMAL
GLUCOSE BLD-MCNC: 166 MG/DL

## 2019-07-24 PROCEDURE — 3017F COLORECTAL CA SCREEN DOC REV: CPT | Performed by: INTERNAL MEDICINE

## 2019-07-24 PROCEDURE — 2022F DILAT RTA XM EVC RTNOPTHY: CPT | Performed by: INTERNAL MEDICINE

## 2019-07-24 PROCEDURE — G8427 DOCREV CUR MEDS BY ELIG CLIN: HCPCS | Performed by: INTERNAL MEDICINE

## 2019-07-24 PROCEDURE — 3045F PR MOST RECENT HEMOGLOBIN A1C LEVEL 7.0-9.0%: CPT | Performed by: INTERNAL MEDICINE

## 2019-07-24 PROCEDURE — 4004F PT TOBACCO SCREEN RCVD TLK: CPT | Performed by: INTERNAL MEDICINE

## 2019-07-24 PROCEDURE — G8417 CALC BMI ABV UP PARAM F/U: HCPCS | Performed by: INTERNAL MEDICINE

## 2019-07-24 PROCEDURE — 82962 GLUCOSE BLOOD TEST: CPT | Performed by: INTERNAL MEDICINE

## 2019-07-24 PROCEDURE — 99213 OFFICE O/P EST LOW 20 MIN: CPT | Performed by: INTERNAL MEDICINE

## 2019-07-24 RX ORDER — HYDROCHLOROTHIAZIDE 12.5 MG/1
12.5 TABLET ORAL DAILY
Refills: 0 | COMMUNITY
Start: 2019-07-22

## 2019-07-24 RX ORDER — DICLOFENAC SODIUM 75 MG/1
TABLET, DELAYED RELEASE ORAL
Refills: 1 | COMMUNITY
Start: 2019-07-15 | End: 2020-07-15 | Stop reason: ALTCHOICE

## 2019-07-24 RX ORDER — IBUPROFEN 600 MG/1
TABLET ORAL
Refills: 0 | COMMUNITY
Start: 2019-07-22 | End: 2020-06-10 | Stop reason: ALTCHOICE

## 2019-07-24 RX ORDER — CYCLOBENZAPRINE HCL 10 MG
10 TABLET ORAL DAILY
Refills: 1 | COMMUNITY
Start: 2019-07-15

## 2019-07-24 RX ORDER — ALOGLIPTIN 25 MG/1
25 TABLET, FILM COATED ORAL DAILY
Qty: 30 TABLET | Refills: 3 | Status: SHIPPED | OUTPATIENT
Start: 2019-07-24 | End: 2019-07-25 | Stop reason: SDUPTHER

## 2019-07-24 RX ORDER — MOMETASONE FUROATE AND FORMOTEROL FUMARATE DIHYDRATE 200; 5 UG/1; UG/1
2 AEROSOL RESPIRATORY (INHALATION) DAILY
Refills: 2 | COMMUNITY
Start: 2019-07-22

## 2019-07-24 RX ORDER — FLUOXETINE 10 MG/1
CAPSULE ORAL
Refills: 2 | COMMUNITY
Start: 2019-07-22 | End: 2019-07-24 | Stop reason: SDUPTHER

## 2019-07-24 NOTE — TELEPHONE ENCOUNTER
Medication has been on back order (alogliptin) for some time. Patient  is asking if there is another medication that can be prescribed.

## 2019-07-25 RX ORDER — ALOGLIPTIN 25 MG/1
25 TABLET, FILM COATED ORAL DAILY
Qty: 30 TABLET | Refills: 3 | Status: SHIPPED | OUTPATIENT
Start: 2019-07-25 | End: 2019-10-31 | Stop reason: SDUPTHER

## 2019-07-26 ENCOUNTER — TELEPHONE (OUTPATIENT)
Dept: CARDIOLOGY CLINIC | Age: 54
End: 2019-07-26

## 2019-08-01 NOTE — PROGRESS NOTES
tiZANidine (ZANAFLEX) 4 MG tablet, Take 4 mg by mouth every 6 hours as needed, Disp: , Rfl:     albuterol sulfate  (90 Base) MCG/ACT inhaler, Inhale 2 puffs into the lungs every 6 hours as needed for Wheezing, Disp: , Rfl:     Cholecalciferol (VITAMIN D3) 30663 units CAPS, Take by mouth once a week, Disp: , Rfl:     simvastatin (ZOCOR) 20 MG tablet, Take 20 mg by mouth nightly, Disp: , Rfl:     losartan (COZAAR) 50 MG tablet, Take 50 mg by mouth daily, Disp: , Rfl:     Blood Glucose Monitoring Suppl SONDRA, check BG once daily, DX: E11.9, NIDDM, Disp: 1 Device, Rfl: 0    omeprazole (PRILOSEC) 20 MG capsule, Take 1 capsule by mouth daily. , Disp: 30 capsule, Rfl: 11    Review of Systems   Endocrine: Negative. All other systems reviewed and are negative. Vitals:    07/24/19 1033   BP: (!) 145/94   Site: Left Upper Arm   Position: Sitting   Cuff Size: Large Adult   Pulse: 72   Weight: 229 lb 9.6 oz (104.1 kg)       Objective:   Physical Exam   Constitutional: He appears well-developed and well-nourished. HENT:   Head: Normocephalic and atraumatic. Neck: Neck supple. Cardiovascular: Normal rate, regular rhythm and normal heart sounds. Pulmonary/Chest: Breath sounds normal.   Musculoskeletal: Normal range of motion. Feet:    Neurological: He is alert. Psychiatric: He has a normal mood and affect. Assessment:       Diagnosis Orders   1. Uncontrolled type 2 diabetes mellitus with hyperglycemia (HCC)  POCT Glucose    Basic Metabolic Panel    Hemoglobin A1C   2.  Uncontrolled type 2 diabetes mellitus with complication, without long-term current use of insulin (HCC)  DISCONTINUED: alogliptin (NESINA) 25 MG TABS tablet           Plan:      Orders Placed This Encounter   Procedures    Basic Metabolic Panel     Standing Status:   Future     Standing Expiration Date:   7/24/2020    Hemoglobin A1C     Standing Status:   Future     Standing Expiration Date:   7/24/2020    POCT Glucose

## 2019-08-09 ENCOUNTER — HOSPITAL ENCOUNTER (OUTPATIENT)
Dept: ULTRASOUND IMAGING | Age: 54
Discharge: HOME | End: 2019-08-11
Payer: COMMERCIAL

## 2019-08-09 DIAGNOSIS — I83.813 VARICOSE VEINS OF LEG WITH PAIN, BILATERAL: ICD-10-CM

## 2019-08-09 PROCEDURE — 93970 EXTREMITY STUDY: CPT

## 2019-08-16 ENCOUNTER — HOSPITAL ENCOUNTER (OUTPATIENT)
Dept: NEUROLOGY | Age: 54
Discharge: HOME | End: 2019-08-16
Payer: COMMERCIAL

## 2019-08-16 PROCEDURE — 95910 NRV CNDJ TEST 7-8 STUDIES: CPT

## 2019-08-16 PROCEDURE — 95886 MUSC TEST DONE W/N TEST COMP: CPT

## 2019-08-16 NOTE — PROCEDURES
[address]                      [address], [address] [facility]                             ELECTROMYOGRAM REPORT    PATIENT NAME: Aleena Isaacs                    :        1965  MED REC NO:   [id number]                            ROOM:  ACCOUNT NO:   [de-identified]                           ADMIT DATE: 2019  PROVIDER:     Ora Orr MD    DATE OF EM2019    REFERRING PROVIDER:  Ms. Harris Weston. She is with [facility]. REASON FOR STUDY:  The patient was having numbness in the left hand  mainly. He has a history of diabetes mellitus type 2 insulin dependent. Motor nerve conduction velocities and F-wave latencies are normal in all  the nerves are tested. Distal motor latencies are normal in all the nerves are tested. Distal sensory latencies are borderline in the left median nerve and  normal in all other nerves are tested. On the concentric needle electrode examination, mild denervation changes  are present in the thenar muscles. CLINICAL INTERPRETATION:  EMG studies are showing mild bilateral median  nerve compression neuropathy at the wrist consistent with diagnosis of  mild bilateral carpal tunnel syndrome, being symptomatic only on the  left side. This is mainly apparent by the concentric needle electrode examination. The patient could be tried on conservative management with hand therapy,  wrist splints, etc.    If clinically indicated, we could repeat the study in a year. Thank you Ms. Key Owen for allowing me to see the patient. Please feel  free to call me if I can be of any further assistance regarding this  patient's evaluation.       Lolis Paredes MD    D: 2019 14:09:43       T: 2019 15:32:48     [id number]  Job#: [id number]     Doc#: [id number]    CC:

## 2019-09-18 RX ORDER — GLIMEPIRIDE 2 MG/1
TABLET ORAL
Qty: 30 TABLET | Refills: 3 | Status: SHIPPED
Start: 2019-09-18 | End: 2020-04-27 | Stop reason: SDUPTHER

## 2019-10-29 DIAGNOSIS — E11.65 UNCONTROLLED TYPE 2 DIABETES MELLITUS WITH HYPERGLYCEMIA (HCC): ICD-10-CM

## 2019-10-29 LAB
ANION GAP SERPL CALCULATED.3IONS-SCNC: 11 MEQ/L (ref 9–15)
BUN BLDV-MCNC: 18 MG/DL (ref 6–20)
CALCIUM SERPL-MCNC: 9.2 MG/DL (ref 8.5–9.9)
CHLORIDE BLD-SCNC: 96 MEQ/L (ref 95–107)
CO2: 29 MEQ/L (ref 20–31)
CREAT SERPL-MCNC: 0.81 MG/DL (ref 0.7–1.2)
GFR AFRICAN AMERICAN: >60
GFR NON-AFRICAN AMERICAN: >60
GLUCOSE BLD-MCNC: 217 MG/DL (ref 70–99)
HBA1C MFR BLD: 7.6 % (ref 4.8–5.9)
POTASSIUM SERPL-SCNC: 4 MEQ/L (ref 3.4–4.9)
SODIUM BLD-SCNC: 136 MEQ/L (ref 135–144)

## 2019-10-31 ENCOUNTER — OFFICE VISIT (OUTPATIENT)
Dept: ENDOCRINOLOGY | Age: 54
End: 2019-10-31
Payer: COMMERCIAL

## 2019-10-31 VITALS
WEIGHT: 225.4 LBS | HEIGHT: 67 IN | OXYGEN SATURATION: 92 % | HEART RATE: 84 BPM | SYSTOLIC BLOOD PRESSURE: 122 MMHG | RESPIRATION RATE: 18 BRPM | BODY MASS INDEX: 35.38 KG/M2 | DIASTOLIC BLOOD PRESSURE: 77 MMHG

## 2019-10-31 DIAGNOSIS — E11.65 UNCONTROLLED TYPE 2 DIABETES MELLITUS WITH HYPERGLYCEMIA (HCC): Primary | ICD-10-CM

## 2019-10-31 LAB
CHP ED QC CHECK: NORMAL
GLUCOSE BLD-MCNC: 210 MG/DL

## 2019-10-31 PROCEDURE — G8484 FLU IMMUNIZE NO ADMIN: HCPCS | Performed by: INTERNAL MEDICINE

## 2019-10-31 PROCEDURE — G8417 CALC BMI ABV UP PARAM F/U: HCPCS | Performed by: INTERNAL MEDICINE

## 2019-10-31 PROCEDURE — G8427 DOCREV CUR MEDS BY ELIG CLIN: HCPCS | Performed by: INTERNAL MEDICINE

## 2019-10-31 PROCEDURE — 3017F COLORECTAL CA SCREEN DOC REV: CPT | Performed by: INTERNAL MEDICINE

## 2019-10-31 PROCEDURE — 2022F DILAT RTA XM EVC RTNOPTHY: CPT | Performed by: INTERNAL MEDICINE

## 2019-10-31 PROCEDURE — 82962 GLUCOSE BLOOD TEST: CPT | Performed by: INTERNAL MEDICINE

## 2019-10-31 PROCEDURE — 99213 OFFICE O/P EST LOW 20 MIN: CPT | Performed by: INTERNAL MEDICINE

## 2019-10-31 PROCEDURE — 4004F PT TOBACCO SCREEN RCVD TLK: CPT | Performed by: INTERNAL MEDICINE

## 2019-10-31 RX ORDER — LANCETS 30 GAUGE
EACH MISCELLANEOUS
Qty: 50 EACH | Refills: 6 | Status: SHIPPED | OUTPATIENT
Start: 2019-10-31 | End: 2020-06-08

## 2019-10-31 RX ORDER — VALSARTAN 160 MG/1
TABLET ORAL DAILY
Refills: 2 | COMMUNITY
Start: 2019-10-15

## 2019-10-31 RX ORDER — ALOGLIPTIN 25 MG/1
25 TABLET, FILM COATED ORAL DAILY
Qty: 30 TABLET | Refills: 3 | Status: SHIPPED | OUTPATIENT
Start: 2019-10-31 | End: 2020-02-17

## 2019-10-31 RX ORDER — GLIMEPIRIDE 2 MG/1
TABLET ORAL
Qty: 30 TABLET | Refills: 3 | Status: SHIPPED | OUTPATIENT
Start: 2019-10-31 | End: 2020-02-24

## 2019-10-31 RX ORDER — GLIMEPIRIDE 2 MG/1
TABLET ORAL
Qty: 30 TABLET | Refills: 3 | Status: CANCELLED | OUTPATIENT
Start: 2019-10-31

## 2019-10-31 RX ORDER — GLUCOSAMINE HCL/CHONDROITIN SU 500-400 MG
CAPSULE ORAL
Qty: 50 STRIP | Refills: 6 | Status: SHIPPED | OUTPATIENT
Start: 2019-10-31 | End: 2020-01-31 | Stop reason: SDUPTHER

## 2019-11-11 RX ORDER — LINAGLIPTIN 5 MG/1
TABLET, FILM COATED ORAL
Qty: 30 TABLET | Refills: 3 | Status: SHIPPED | OUTPATIENT
Start: 2019-11-11 | End: 2020-03-17

## 2020-01-24 LAB
ANION GAP SERPL CALCULATED.3IONS-SCNC: 12 MEQ/L (ref 9–15)
BUN BLDV-MCNC: 17 MG/DL (ref 6–20)
CALCIUM SERPL-MCNC: 9.7 MG/DL (ref 8.5–9.9)
CHLORIDE BLD-SCNC: 99 MEQ/L (ref 95–107)
CO2: 26 MEQ/L (ref 20–31)
CREAT SERPL-MCNC: 0.76 MG/DL (ref 0.7–1.2)
CREATININE URINE: 40 MG/DL
GFR AFRICAN AMERICAN: >60
GFR NON-AFRICAN AMERICAN: >60
GLUCOSE BLD-MCNC: 153 MG/DL (ref 70–99)
HBA1C MFR BLD: 8.3 % (ref 4.8–5.9)
MICROALBUMIN UR-MCNC: <1.2 MG/DL
MICROALBUMIN/CREAT UR-RTO: NORMAL MG/G (ref 0–30)
POTASSIUM SERPL-SCNC: 4.8 MEQ/L (ref 3.4–4.9)
SODIUM BLD-SCNC: 137 MEQ/L (ref 135–144)

## 2020-01-31 ENCOUNTER — OFFICE VISIT (OUTPATIENT)
Dept: ENDOCRINOLOGY | Age: 55
End: 2020-01-31
Payer: COMMERCIAL

## 2020-01-31 VITALS
SYSTOLIC BLOOD PRESSURE: 129 MMHG | HEIGHT: 67 IN | BODY MASS INDEX: 35.79 KG/M2 | HEART RATE: 74 BPM | WEIGHT: 228 LBS | DIASTOLIC BLOOD PRESSURE: 88 MMHG

## 2020-01-31 LAB
CHP ED QC CHECK: NORMAL
GLUCOSE BLD-MCNC: 116 MG/DL

## 2020-01-31 PROCEDURE — G8484 FLU IMMUNIZE NO ADMIN: HCPCS | Performed by: INTERNAL MEDICINE

## 2020-01-31 PROCEDURE — 82962 GLUCOSE BLOOD TEST: CPT | Performed by: INTERNAL MEDICINE

## 2020-01-31 PROCEDURE — 3017F COLORECTAL CA SCREEN DOC REV: CPT | Performed by: INTERNAL MEDICINE

## 2020-01-31 PROCEDURE — 4004F PT TOBACCO SCREEN RCVD TLK: CPT | Performed by: INTERNAL MEDICINE

## 2020-01-31 PROCEDURE — G8417 CALC BMI ABV UP PARAM F/U: HCPCS | Performed by: INTERNAL MEDICINE

## 2020-01-31 PROCEDURE — G8427 DOCREV CUR MEDS BY ELIG CLIN: HCPCS | Performed by: INTERNAL MEDICINE

## 2020-01-31 PROCEDURE — 99213 OFFICE O/P EST LOW 20 MIN: CPT | Performed by: INTERNAL MEDICINE

## 2020-01-31 RX ORDER — GLUCOSAMINE HCL/CHONDROITIN SU 500-400 MG
CAPSULE ORAL
Qty: 50 STRIP | Refills: 6 | Status: SHIPPED | OUTPATIENT
Start: 2020-01-31 | End: 2020-05-29 | Stop reason: SDUPTHER

## 2020-02-09 NOTE — PROGRESS NOTES
Nocturia    SDH (subdural hematoma) (HCC)    Urinary frequency    Bilateral carpal tunnel syndrome     Allergies   Allergen Reactions    Metformin And Related      Diarrhea        Current Outpatient Medications:     insulin glargine (BASAGLAR KWIKPEN) 100 UNIT/ML injection pen, 30 units at bedtime, Disp: 5 pen, Rfl: 3    blood glucose monitor strips, Check BG once daily, DX: E11.65, NIDDM, Disp: 50 strip, Rfl: 6    TRADJENTA 5 MG tablet, TAKE 1 TABLET BY MOUTH DAILY, Disp: 30 tablet, Rfl: 3    valsartan (DIOVAN) 160 MG tablet, , Disp: , Rfl: 2    Dulaglutide (TRULICITY) 0.70 WO/3.2QC SOPN, INJECT ONCE PER WEEK, Disp: 2 mL, Rfl: 3    linagliptin (TRADJENTA) 5 MG tablet, Take 1 tablet by mouth daily, Disp: 30 tablet, Rfl: 3    alogliptin (NESINA) 25 MG TABS tablet, Take 1 tablet by mouth daily, Disp: 30 tablet, Rfl: 3    Insulin Pen Needle (NOVOFINE) 32G X 6 MM MISC, qd, Disp: 100 each, Rfl: 3    canagliflozin (INVOKANA) 100 MG TABS tablet, TAKE 1 TABLET BY MOUTH EVERY MORNING (BEFORE BREAKFAST), Disp: 30 tablet, Rfl: 3    glimepiride (AMARYL) 2 MG tablet, TAKE 1 TABLET BY MOUTH AT DINNER, Disp: 30 tablet, Rfl: 3    Lancets MISC, Check BG once daily, DX: E11.65, NIDDM, Disp: 50 each, Rfl: 6    glimepiride (AMARYL) 2 MG tablet, TAKE 1 TABLET BY MOUTH AT DINNER, Disp: 30 tablet, Rfl: 3    cyclobenzaprine (FLEXERIL) 10 MG tablet, , Disp: , Rfl: 1    diclofenac (VOLTAREN) 75 MG EC tablet, , Disp: , Rfl: 1    hydrochlorothiazide (HYDRODIURIL) 12.5 MG tablet, , Disp: , Rfl: 0    ibuprofen (ADVIL;MOTRIN) 600 MG tablet, , Disp: , Rfl: 0    DULERA 200-5 MCG/ACT inhaler, , Disp: , Rfl: 2    ondansetron (ZOFRAN ODT) 4 MG disintegrating tablet, Take 1-2 tablets by mouth every 12 hours as needed for Nausea May Sub regular tablet (non-ODT) if insurance does not cover ODT., Disp: 12 tablet, Rfl: 0    dicyclomine (BENTYL) 10 MG capsule, Take 1 capsule by mouth 4 times daily (before meals and nightly), Disp: 30 capsule, Rfl: 0    celecoxib (CELEBREX) 200 MG capsule, Take 200 mg by mouth 2 times daily, Disp: , Rfl:     acetaminophen (TYLENOL ARTHRITIS PAIN) 650 MG extended release tablet, Take 650 mg by mouth 2 times daily, Disp: , Rfl:     FLUoxetine (PROZAC) 10 MG tablet, Take 10 mg by mouth 3 times daily, Disp: , Rfl:     fluticasone (FLONASE) 50 MCG/ACT nasal spray, 1 spray by Nasal route daily, Disp: , Rfl:     tiZANidine (ZANAFLEX) 4 MG tablet, Take 4 mg by mouth every 6 hours as needed, Disp: , Rfl:     albuterol sulfate  (90 Base) MCG/ACT inhaler, Inhale 2 puffs into the lungs every 6 hours as needed for Wheezing, Disp: , Rfl:     Cholecalciferol (VITAMIN D3) 43621 units CAPS, Take by mouth once a week, Disp: , Rfl:     simvastatin (ZOCOR) 20 MG tablet, Take 20 mg by mouth nightly, Disp: , Rfl:     losartan (COZAAR) 50 MG tablet, Take 50 mg by mouth daily, Disp: , Rfl:     Blood Glucose Monitoring Suppl SONDRA, check BG once daily, DX: E11.9, NIDDM, Disp: 1 Device, Rfl: 0    omeprazole (PRILOSEC) 20 MG capsule, Take 1 capsule by mouth daily. , Disp: 30 capsule, Rfl: 11        Review of Systems    Vitals:    01/31/20 1047   BP: 129/88   Pulse: 74   Weight: 228 lb (103.4 kg)   Height: 5' 7\" (1.702 m)       Objective:   Physical Exam  Constitutional:       Appearance: Normal appearance. He is well-developed. He is obese. HENT:      Head: Normocephalic and atraumatic. Neck:      Musculoskeletal: Normal range of motion and neck supple. Cardiovascular:      Rate and Rhythm: Normal rate. Musculoskeletal: Normal range of motion. Neurological:      Mental Status: He is alert. Assessment:       Diagnosis Orders   1.  Uncontrolled type 2 diabetes mellitus with complication, without long-term current use of insulin (HCC)  POCT Glucose    Basic Metabolic Panel    Hemoglobin A1C    Microalbumin / Creatinine Urine Ratio           Plan:      Orders Placed This Encounter   Procedures    Basic Metabolic Panel     Standing Status:   Future     Standing Expiration Date:   2021    Hemoglobin A1C     Standing Status:   Future     Standing Expiration Date:   2021    Microalbumin / Creatinine Urine Ratio     Standing Status:   Future     Standing Expiration Date:   2021    POCT Glucose     Increase basaglar dose   Orders Placed This Encounter   Medications    insulin glargine (BASAGLAR KWIKPEN) 100 UNIT/ML injection pen     Si units at bedtime     Dispense:  5 pen     Refill:  3    blood glucose monitor strips     Sig: Check BG once daily, DX: E11.65, NIDDM     Dispense:  50 strip     Refill:  6     Continue  nesina trulicity amaryl and invokana at current dose           Jo Springer MD

## 2020-02-14 ENCOUNTER — TELEPHONE (OUTPATIENT)
Dept: ENDOCRINOLOGY | Age: 55
End: 2020-02-14

## 2020-02-17 RX ORDER — ALOGLIPTIN 25 MG/1
TABLET, FILM COATED ORAL
Qty: 30 TABLET | Refills: 3 | Status: SHIPPED | OUTPATIENT
Start: 2020-02-17 | End: 2020-02-24

## 2020-02-24 RX ORDER — GLIMEPIRIDE 2 MG/1
TABLET ORAL
Qty: 30 TABLET | Refills: 3 | Status: SHIPPED | OUTPATIENT
Start: 2020-02-24 | End: 2020-03-02

## 2020-02-24 RX ORDER — ALOGLIPTIN 25 MG/1
TABLET, FILM COATED ORAL
Qty: 30 TABLET | Refills: 3 | Status: SHIPPED
Start: 2020-02-24 | End: 2020-04-27 | Stop reason: CLARIF

## 2020-03-02 RX ORDER — GLIMEPIRIDE 2 MG/1
TABLET ORAL
Qty: 30 TABLET | Refills: 3 | Status: SHIPPED | OUTPATIENT
Start: 2020-03-02 | End: 2020-08-17

## 2020-03-17 RX ORDER — LINAGLIPTIN 5 MG/1
TABLET, FILM COATED ORAL
Qty: 30 TABLET | Refills: 3 | Status: ON HOLD
Start: 2020-03-17 | End: 2020-05-26 | Stop reason: HOSPADM

## 2020-04-27 RX ORDER — GLIMEPIRIDE 2 MG/1
TABLET ORAL
Qty: 30 TABLET | Refills: 3 | Status: ON HOLD | OUTPATIENT
Start: 2020-04-27 | End: 2020-05-26

## 2020-05-26 ENCOUNTER — APPOINTMENT (OUTPATIENT)
Dept: CARDIAC CATH/INVASIVE PROCEDURES | Age: 55
DRG: 174 | End: 2020-05-26
Payer: COMMERCIAL

## 2020-05-26 ENCOUNTER — APPOINTMENT (OUTPATIENT)
Dept: GENERAL RADIOLOGY | Age: 55
DRG: 174 | End: 2020-05-26
Payer: COMMERCIAL

## 2020-05-26 ENCOUNTER — HOSPITAL ENCOUNTER (INPATIENT)
Age: 55
LOS: 1 days | Discharge: HOME | DRG: 174 | End: 2020-05-27
Attending: INTERNAL MEDICINE | Admitting: INTERNAL MEDICINE
Payer: COMMERCIAL

## 2020-05-26 PROBLEM — R07.9 CHEST PAIN: Status: ACTIVE | Noted: 2020-05-26

## 2020-05-26 LAB
ALBUMIN SERPL-MCNC: 3.7 G/DL (ref 3.5–4.6)
ALP BLD-CCNC: 68 U/L (ref 35–104)
ALT SERPL-CCNC: 27 U/L (ref 0–41)
ANION GAP SERPL CALCULATED.3IONS-SCNC: 13 MEQ/L (ref 9–15)
AST SERPL-CCNC: 17 U/L (ref 0–40)
BACTERIA: NEGATIVE /HPF
BASOPHILS ABSOLUTE: 0.1 K/UL (ref 0–0.2)
BASOPHILS RELATIVE PERCENT: 0.8 %
BILIRUB SERPL-MCNC: <0.2 MG/DL (ref 0.2–0.7)
BILIRUBIN URINE: NEGATIVE
BLOOD, URINE: ABNORMAL
BUN BLDV-MCNC: 13 MG/DL (ref 6–20)
CALCIUM SERPL-MCNC: 8.9 MG/DL (ref 8.5–9.9)
CHLORIDE BLD-SCNC: 97 MEQ/L (ref 95–107)
CLARITY: CLEAR
CO2: 23 MEQ/L (ref 20–31)
COLOR: YELLOW
CREAT SERPL-MCNC: 0.68 MG/DL (ref 0.7–1.2)
EKG ATRIAL RATE: 69 BPM
EKG ATRIAL RATE: 71 BPM
EKG P AXIS: 30 DEGREES
EKG P AXIS: 37 DEGREES
EKG P-R INTERVAL: 138 MS
EKG P-R INTERVAL: 144 MS
EKG Q-T INTERVAL: 414 MS
EKG Q-T INTERVAL: 418 MS
EKG QRS DURATION: 92 MS
EKG QRS DURATION: 92 MS
EKG QTC CALCULATION (BAZETT): 443 MS
EKG QTC CALCULATION (BAZETT): 454 MS
EKG R AXIS: 5 DEGREES
EKG R AXIS: 8 DEGREES
EKG T AXIS: 48 DEGREES
EKG T AXIS: 51 DEGREES
EKG VENTRICULAR RATE: 69 BPM
EKG VENTRICULAR RATE: 71 BPM
EOSINOPHILS ABSOLUTE: 0.1 K/UL (ref 0–0.7)
EOSINOPHILS RELATIVE PERCENT: 1.5 %
EPITHELIAL CELLS, UA: NORMAL /HPF (ref 0–5)
GFR AFRICAN AMERICAN: >60
GFR NON-AFRICAN AMERICAN: >60
GLOBULIN: 3 G/DL (ref 2.3–3.5)
GLUCOSE BLD-MCNC: 138 MG/DL (ref 60–115)
GLUCOSE BLD-MCNC: 151 MG/DL (ref 60–115)
GLUCOSE BLD-MCNC: 169 MG/DL (ref 60–115)
GLUCOSE BLD-MCNC: 183 MG/DL (ref 60–115)
GLUCOSE BLD-MCNC: 242 MG/DL (ref 70–99)
GLUCOSE BLD-MCNC: 243 MG/DL (ref 60–115)
GLUCOSE URINE: 250 MG/DL
HBA1C MFR BLD: 8 % (ref 4.8–5.9)
HCT VFR BLD CALC: 45.6 % (ref 42–52)
HEMOGLOBIN: 15.8 G/DL (ref 14–18)
HYALINE CASTS: NORMAL /HPF (ref 0–5)
KETONES, URINE: NEGATIVE MG/DL
LEUKOCYTE ESTERASE, URINE: NEGATIVE
LYMPHOCYTES ABSOLUTE: 2 K/UL (ref 1–4.8)
LYMPHOCYTES RELATIVE PERCENT: 27.3 %
MCH RBC QN AUTO: 31.5 PG (ref 27–31.3)
MCHC RBC AUTO-ENTMCNC: 34.6 % (ref 33–37)
MCV RBC AUTO: 90.9 FL (ref 80–100)
MONOCYTES ABSOLUTE: 0.5 K/UL (ref 0.2–0.8)
MONOCYTES RELATIVE PERCENT: 6.5 %
NEUTROPHILS ABSOLUTE: 4.7 K/UL (ref 1.4–6.5)
NEUTROPHILS RELATIVE PERCENT: 63.9 %
NITRITE, URINE: NEGATIVE
PDW BLD-RTO: 14.1 % (ref 11.5–14.5)
PERFORMED ON: ABNORMAL
PH UA: 7 (ref 5–9)
PLATELET # BLD: 154 K/UL (ref 130–400)
POC ACTIVATED CLOTTING TIME KAOLIN: 241 SEC (ref 82–152)
POC SAMPLE TYPE: ABNORMAL
POTASSIUM SERPL-SCNC: 4 MEQ/L (ref 3.4–4.9)
PROTEIN UA: NEGATIVE MG/DL
RBC # BLD: 5.02 M/UL (ref 4.7–6.1)
RBC UA: NORMAL /HPF (ref 0–5)
SODIUM BLD-SCNC: 133 MEQ/L (ref 135–144)
SPECIFIC GRAVITY UA: 1.03 (ref 1–1.03)
TOTAL PROTEIN: 6.7 G/DL (ref 6.3–8)
TROPONIN: <0.01 NG/ML (ref 0–0.01)
UROBILINOGEN, URINE: 0.2 E.U./DL
WBC # BLD: 7.3 K/UL (ref 4.8–10.8)
WBC UA: NORMAL /HPF (ref 0–5)

## 2020-05-26 PROCEDURE — 82948 REAGENT STRIP/BLOOD GLUCOSE: CPT

## 2020-05-26 PROCEDURE — 6370000000 HC RX 637 (ALT 250 FOR IP): Performed by: PERSONAL EMERGENCY RESPONSE ATTENDANT

## 2020-05-26 PROCEDURE — 81001 URINALYSIS AUTO W/SCOPE: CPT

## 2020-05-26 PROCEDURE — 99220 PR INITIAL OBSERVATION CARE/DAY 70 MINUTES: CPT | Performed by: INTERNAL MEDICINE

## 2020-05-26 PROCEDURE — 80053 COMPREHEN METABOLIC PANEL: CPT

## 2020-05-26 PROCEDURE — 6370000000 HC RX 637 (ALT 250 FOR IP): Performed by: INTERNAL MEDICINE

## 2020-05-26 PROCEDURE — C1894 INTRO/SHEATH, NON-LASER: HCPCS

## 2020-05-26 PROCEDURE — G0378 HOSPITAL OBSERVATION PER HR: HCPCS

## 2020-05-26 PROCEDURE — C1769 GUIDE WIRE: HCPCS

## 2020-05-26 PROCEDURE — 027034Z DILATION OF CORONARY ARTERY, ONE ARTERY WITH DRUG-ELUTING INTRALUMINAL DEVICE, PERCUTANEOUS APPROACH: ICD-10-PCS | Performed by: INTERNAL MEDICINE

## 2020-05-26 PROCEDURE — 71045 X-RAY EXAM CHEST 1 VIEW: CPT

## 2020-05-26 PROCEDURE — 2060000000 HC ICU INTERMEDIATE R&B

## 2020-05-26 PROCEDURE — 83036 HEMOGLOBIN GLYCOSYLATED A1C: CPT

## 2020-05-26 PROCEDURE — B2111ZZ FLUOROSCOPY OF MULTIPLE CORONARY ARTERIES USING LOW OSMOLAR CONTRAST: ICD-10-PCS | Performed by: INTERNAL MEDICINE

## 2020-05-26 PROCEDURE — 93005 ELECTROCARDIOGRAM TRACING: CPT | Performed by: EMERGENCY MEDICINE

## 2020-05-26 PROCEDURE — 85347 COAGULATION TIME ACTIVATED: CPT

## 2020-05-26 PROCEDURE — C1887 CATHETER, GUIDING: HCPCS

## 2020-05-26 PROCEDURE — 93010 ELECTROCARDIOGRAM REPORT: CPT | Performed by: INTERNAL MEDICINE

## 2020-05-26 PROCEDURE — 84484 ASSAY OF TROPONIN QUANT: CPT

## 2020-05-26 PROCEDURE — 2580000003 HC RX 258: Performed by: PERSONAL EMERGENCY RESPONSE ATTENDANT

## 2020-05-26 PROCEDURE — 99285 EMERGENCY DEPT VISIT HI MDM: CPT

## 2020-05-26 PROCEDURE — 2709999900 HC NON-CHARGEABLE SUPPLY

## 2020-05-26 PROCEDURE — 92928 PRQ TCAT PLMT NTRAC ST 1 LES: CPT | Performed by: INTERNAL MEDICINE

## 2020-05-26 PROCEDURE — 4A023N7 MEASUREMENT OF CARDIAC SAMPLING AND PRESSURE, LEFT HEART, PERCUTANEOUS APPROACH: ICD-10-PCS | Performed by: INTERNAL MEDICINE

## 2020-05-26 PROCEDURE — 93005 ELECTROCARDIOGRAM TRACING: CPT | Performed by: INTERNAL MEDICINE

## 2020-05-26 PROCEDURE — 2580000003 HC RX 258: Performed by: INTERNAL MEDICINE

## 2020-05-26 PROCEDURE — 93458 L HRT ARTERY/VENTRICLE ANGIO: CPT | Performed by: INTERNAL MEDICINE

## 2020-05-26 PROCEDURE — 94640 AIRWAY INHALATION TREATMENT: CPT

## 2020-05-26 PROCEDURE — 2580000003 HC RX 258

## 2020-05-26 PROCEDURE — 6370000000 HC RX 637 (ALT 250 FOR IP)

## 2020-05-26 PROCEDURE — C1874 STENT, COATED/COV W/DEL SYS: HCPCS

## 2020-05-26 PROCEDURE — 99222 1ST HOSP IP/OBS MODERATE 55: CPT | Performed by: INTERNAL MEDICINE

## 2020-05-26 PROCEDURE — 2500000003 HC RX 250 WO HCPCS

## 2020-05-26 PROCEDURE — 94761 N-INVAS EAR/PLS OXIMETRY MLT: CPT

## 2020-05-26 PROCEDURE — B41F1ZZ FLUOROSCOPY OF RIGHT LOWER EXTREMITY ARTERIES USING LOW OSMOLAR CONTRAST: ICD-10-PCS | Performed by: INTERNAL MEDICINE

## 2020-05-26 PROCEDURE — C1725 CATH, TRANSLUMIN NON-LASER: HCPCS

## 2020-05-26 PROCEDURE — 6360000002 HC RX W HCPCS

## 2020-05-26 PROCEDURE — C1760 CLOSURE DEV, VASC: HCPCS

## 2020-05-26 PROCEDURE — 85025 COMPLETE CBC W/AUTO DIFF WBC: CPT

## 2020-05-26 PROCEDURE — 36415 COLL VENOUS BLD VENIPUNCTURE: CPT

## 2020-05-26 PROCEDURE — 6360000004 HC RX CONTRAST MEDICATION: Performed by: INTERNAL MEDICINE

## 2020-05-26 PROCEDURE — 96374 THER/PROPH/DIAG INJ IV PUSH: CPT

## 2020-05-26 PROCEDURE — B2151ZZ FLUOROSCOPY OF LEFT HEART USING LOW OSMOLAR CONTRAST: ICD-10-PCS | Performed by: INTERNAL MEDICINE

## 2020-05-26 RX ORDER — HYDROCHLOROTHIAZIDE 12.5 MG/1
12.5 TABLET ORAL DAILY
Status: DISCONTINUED | OUTPATIENT
Start: 2020-05-26 | End: 2020-05-27 | Stop reason: HOSPADM

## 2020-05-26 RX ORDER — SODIUM CHLORIDE 0.9 % (FLUSH) 0.9 %
10 SYRINGE (ML) INJECTION EVERY 12 HOURS SCHEDULED
Status: DISCONTINUED | OUTPATIENT
Start: 2020-05-26 | End: 2020-05-26

## 2020-05-26 RX ORDER — ASPIRIN 81 MG/1
81 TABLET, CHEWABLE ORAL DAILY
Status: DISCONTINUED | OUTPATIENT
Start: 2020-05-26 | End: 2020-05-27 | Stop reason: HOSPADM

## 2020-05-26 RX ORDER — PROMETHAZINE HYDROCHLORIDE 12.5 MG/1
12.5 TABLET ORAL EVERY 6 HOURS PRN
Status: DISCONTINUED | OUTPATIENT
Start: 2020-05-26 | End: 2020-05-27 | Stop reason: HOSPADM

## 2020-05-26 RX ORDER — TIZANIDINE 4 MG/1
4 TABLET ORAL EVERY 6 HOURS PRN
Status: DISCONTINUED | OUTPATIENT
Start: 2020-05-26 | End: 2020-05-27 | Stop reason: HOSPADM

## 2020-05-26 RX ORDER — HEPARIN SODIUM 5000 [USP'U]/ML
4000 INJECTION, SOLUTION INTRAVENOUS; SUBCUTANEOUS ONCE
Status: DISCONTINUED | OUTPATIENT
Start: 2020-05-26 | End: 2020-05-27 | Stop reason: HOSPADM

## 2020-05-26 RX ORDER — DEXTROSE MONOHYDRATE 25 G/50ML
12.5 INJECTION, SOLUTION INTRAVENOUS PRN
Status: DISCONTINUED | OUTPATIENT
Start: 2020-05-26 | End: 2020-05-27 | Stop reason: HOSPADM

## 2020-05-26 RX ORDER — ACETAMINOPHEN 325 MG/1
650 TABLET ORAL EVERY 6 HOURS PRN
Status: DISCONTINUED | OUTPATIENT
Start: 2020-05-26 | End: 2020-05-27 | Stop reason: HOSPADM

## 2020-05-26 RX ORDER — VALSARTAN 80 MG/1
160 TABLET ORAL DAILY
Status: DISCONTINUED | OUTPATIENT
Start: 2020-05-26 | End: 2020-05-27 | Stop reason: HOSPADM

## 2020-05-26 RX ORDER — NITROGLYCERIN 0.4 MG/1
0.4 TABLET SUBLINGUAL EVERY 5 MIN PRN
Status: DISCONTINUED | OUTPATIENT
Start: 2020-05-26 | End: 2020-05-27 | Stop reason: HOSPADM

## 2020-05-26 RX ORDER — ALOGLIPTIN 6.25 MG/1
6.25 TABLET, FILM COATED ORAL DAILY
Status: DISCONTINUED | OUTPATIENT
Start: 2020-05-26 | End: 2020-05-27 | Stop reason: HOSPADM

## 2020-05-26 RX ORDER — CYCLOBENZAPRINE HCL 10 MG
10 TABLET ORAL 2 TIMES DAILY PRN
Status: DISCONTINUED | OUTPATIENT
Start: 2020-05-26 | End: 2020-05-27 | Stop reason: HOSPADM

## 2020-05-26 RX ORDER — ONDANSETRON 2 MG/ML
4 INJECTION INTRAMUSCULAR; INTRAVENOUS EVERY 6 HOURS PRN
Status: DISCONTINUED | OUTPATIENT
Start: 2020-05-26 | End: 2020-05-27 | Stop reason: HOSPADM

## 2020-05-26 RX ORDER — BUDESONIDE AND FORMOTEROL FUMARATE DIHYDRATE 80; 4.5 UG/1; UG/1
2 AEROSOL RESPIRATORY (INHALATION) 2 TIMES DAILY
Status: DISCONTINUED | OUTPATIENT
Start: 2020-05-26 | End: 2020-05-27 | Stop reason: HOSPADM

## 2020-05-26 RX ORDER — POLYETHYLENE GLYCOL 3350 17 G/17G
17 POWDER, FOR SOLUTION ORAL DAILY PRN
Status: DISCONTINUED | OUTPATIENT
Start: 2020-05-26 | End: 2020-05-27 | Stop reason: HOSPADM

## 2020-05-26 RX ORDER — ATORVASTATIN CALCIUM 20 MG/1
20 TABLET, FILM COATED ORAL NIGHTLY
Status: DISCONTINUED | OUTPATIENT
Start: 2020-05-26 | End: 2020-05-27 | Stop reason: HOSPADM

## 2020-05-26 RX ORDER — GLIMEPIRIDE 2 MG/1
TABLET ORAL
Qty: 30 TABLET | Refills: 3 | Status: SHIPPED
Start: 2020-05-26 | End: 2020-05-26 | Stop reason: HOSPADM

## 2020-05-26 RX ORDER — GLIMEPIRIDE 2 MG/1
2 TABLET ORAL
Status: DISCONTINUED | OUTPATIENT
Start: 2020-05-26 | End: 2020-05-26

## 2020-05-26 RX ORDER — FLUOXETINE 10 MG/1
10 CAPSULE ORAL 3 TIMES DAILY
Status: DISCONTINUED | OUTPATIENT
Start: 2020-05-26 | End: 2020-05-27 | Stop reason: HOSPADM

## 2020-05-26 RX ORDER — SODIUM CHLORIDE 9 MG/ML
INJECTION, SOLUTION INTRAVENOUS CONTINUOUS
Status: ACTIVE | OUTPATIENT
Start: 2020-05-26 | End: 2020-05-26

## 2020-05-26 RX ORDER — DEXTROSE MONOHYDRATE 50 MG/ML
100 INJECTION, SOLUTION INTRAVENOUS PRN
Status: DISCONTINUED | OUTPATIENT
Start: 2020-05-26 | End: 2020-05-27 | Stop reason: HOSPADM

## 2020-05-26 RX ORDER — ALBUTEROL SULFATE 90 UG/1
2 AEROSOL, METERED RESPIRATORY (INHALATION) EVERY 6 HOURS PRN
Status: DISCONTINUED | OUTPATIENT
Start: 2020-05-26 | End: 2020-05-27 | Stop reason: HOSPADM

## 2020-05-26 RX ORDER — INSULIN GLARGINE 100 [IU]/ML
30 INJECTION, SOLUTION SUBCUTANEOUS NIGHTLY
Status: DISCONTINUED | OUTPATIENT
Start: 2020-05-26 | End: 2020-05-27 | Stop reason: HOSPADM

## 2020-05-26 RX ORDER — ACETAMINOPHEN 650 MG/1
650 SUPPOSITORY RECTAL EVERY 6 HOURS PRN
Status: DISCONTINUED | OUTPATIENT
Start: 2020-05-26 | End: 2020-05-27 | Stop reason: HOSPADM

## 2020-05-26 RX ORDER — SODIUM CHLORIDE 0.9 % (FLUSH) 0.9 %
10 SYRINGE (ML) INJECTION PRN
Status: DISCONTINUED | OUTPATIENT
Start: 2020-05-26 | End: 2020-05-26

## 2020-05-26 RX ORDER — FLUTICASONE PROPIONATE 50 MCG
1 SPRAY, SUSPENSION (ML) NASAL DAILY
Status: DISCONTINUED | OUTPATIENT
Start: 2020-05-26 | End: 2020-05-27 | Stop reason: HOSPADM

## 2020-05-26 RX ORDER — NICOTINE POLACRILEX 4 MG
15 LOZENGE BUCCAL PRN
Status: DISCONTINUED | OUTPATIENT
Start: 2020-05-26 | End: 2020-05-27 | Stop reason: HOSPADM

## 2020-05-26 RX ADMIN — NITROGLYCERIN 0.4 MG: 0.4 TABLET, ORALLY DISINTEGRATING SUBLINGUAL at 12:15

## 2020-05-26 RX ADMIN — HYDROCHLOROTHIAZIDE 12.5 MG: 12.5 TABLET ORAL at 09:00

## 2020-05-26 RX ADMIN — FLUOXETINE 10 MG: 10 CAPSULE ORAL at 15:22

## 2020-05-26 RX ADMIN — BUDESONIDE AND FORMOTEROL FUMARATE DIHYDRATE 2 PUFF: 80; 4.5 AEROSOL RESPIRATORY (INHALATION) at 20:13

## 2020-05-26 RX ADMIN — VALSARTAN 160 MG: 80 TABLET, FILM COATED ORAL at 09:00

## 2020-05-26 RX ADMIN — ATORVASTATIN CALCIUM 20 MG: 20 TABLET, FILM COATED ORAL at 21:25

## 2020-05-26 RX ADMIN — ALOGLIPTIN 6.25 MG: 6.25 TABLET, FILM COATED ORAL at 15:22

## 2020-05-26 RX ADMIN — SODIUM CHLORIDE: 9 INJECTION, SOLUTION INTRAVENOUS at 15:22

## 2020-05-26 RX ADMIN — ASPIRIN 81 MG 81 MG: 81 TABLET ORAL at 09:00

## 2020-05-26 RX ADMIN — IOPAMIDOL 130 ML: 612 INJECTION, SOLUTION INTRATHECAL at 13:18

## 2020-05-26 RX ADMIN — ACETAMINOPHEN 650 MG: 325 TABLET ORAL at 21:25

## 2020-05-26 RX ADMIN — NITROGLYCERIN 0.4 MG: 0.4 TABLET, ORALLY DISINTEGRATING SUBLINGUAL at 12:23

## 2020-05-26 RX ADMIN — INSULIN LISPRO 2 UNITS: 100 INJECTION, SOLUTION INTRAVENOUS; SUBCUTANEOUS at 18:24

## 2020-05-26 RX ADMIN — INSULIN GLARGINE 30 UNITS: 100 INJECTION, SOLUTION SUBCUTANEOUS at 21:26

## 2020-05-26 RX ADMIN — FLUOXETINE 10 MG: 10 CAPSULE ORAL at 21:25

## 2020-05-26 RX ADMIN — NITROGLYCERIN 1 INCH: 20 OINTMENT TOPICAL at 03:35

## 2020-05-26 RX ADMIN — ACETAMINOPHEN 650 MG: 325 TABLET ORAL at 08:59

## 2020-05-26 RX ADMIN — FLUOXETINE 10 MG: 10 CAPSULE ORAL at 09:00

## 2020-05-26 RX ADMIN — INSULIN HUMAN 5 UNITS: 100 INJECTION, SOLUTION PARENTERAL at 03:35

## 2020-05-26 RX ADMIN — Medication 10 ML: at 09:00

## 2020-05-26 ASSESSMENT — ENCOUNTER SYMPTOMS
NAUSEA: 0
DIARRHEA: 0
SORE THROAT: 0
RHINORRHEA: 0
STRIDOR: 0
TROUBLE SWALLOWING: 0
BLOOD IN STOOL: 0
COLOR CHANGE: 0
WHEEZING: 0
EYE DISCHARGE: 0
COUGH: 0
ABDOMINAL DISTENTION: 0
APNEA: 0
EYE PAIN: 0
VOMITING: 0
ABDOMINAL PAIN: 0
SHORTNESS OF BREATH: 1
CHEST TIGHTNESS: 1
CHOKING: 0

## 2020-05-26 ASSESSMENT — PAIN DESCRIPTION - PAIN TYPE
TYPE: ACUTE PAIN
TYPE: ACUTE PAIN

## 2020-05-26 ASSESSMENT — PAIN SCALES - GENERAL
PAINLEVEL_OUTOF10: 5
PAINLEVEL_OUTOF10: 0
PAINLEVEL_OUTOF10: 5

## 2020-05-26 ASSESSMENT — PAIN DESCRIPTION - LOCATION
LOCATION: GROIN
LOCATION: CHEST

## 2020-05-26 ASSESSMENT — PAIN DESCRIPTION - DESCRIPTORS: DESCRIPTORS: CRUSHING

## 2020-05-26 NOTE — ED PROVIDER NOTES
for the following components:       Result Value    Sodium 133 (*)     Glucose 242 (*)     CREATININE 0.68 (*)     All other components within normal limits   CBC WITH AUTO DIFFERENTIAL - Abnormal; Notable for the following components:    MCH 31.5 (*)     All other components within normal limits   TROPONIN       All other labs were within normal range or not returned as of this dictation. EMERGENCY DEPARTMENT COURSE and DIFFERENTIAL DIAGNOSIS/MDM:   Vitals:    Vitals:    05/26/20 0156 05/26/20 0323   BP: (!) 139/92 122/86   Pulse: 75 69   Resp: 23 18   Temp: 98.7 °F (37.1 °C)    TempSrc: Oral    SpO2: 95% 96%   Weight: 225 lb (102.1 kg)    Height: 5' 7\" (1.702 m)          MDM    Chest x-ray shows no acute process. Blood work reveals sodium 133, glucose 242. Patient was given aspirin via ambulance and 1 sublingual nitro with some relief. I gave additional nitro which is further helped with his chest pain. He is no longer short of breath. Nitropaste will be applied and he will be admitted observation for repeat EKG and troponins. Patient was made aware that he needs to stop using cocaine as this can cause chest pain. CRITICAL CARE TIME   Total Critical Caretime was 0 minutes, excluding separately reportable procedures. There was a high probability of clinically significant/life threatening deterioration in the patient's condition which required my urgent intervention. Procedures    FINAL IMPRESSION      1. Chest pain, unspecified type    2.  Cocaine abuse [facility])          DISPOSITION/PLAN   DISPOSITION Admitted 05/26/2020 03:25:56 AM      PATIENT REFERRED TO:  Adalid Pitt, [facility]  [id number]  [place] [address]  [phone]            DISCHARGE MEDICATIONS:  New Prescriptions    No medications on file          (Please notethat portions of this note were completed with a voice recognition program.  Efforts were made to edit the dictations but occasionally words are

## 2020-05-26 NOTE — ED TRIAGE NOTES
Pt to Er with c/o chest pain and shortness of breath. Pt states he was sleeping and he woke up with \"crushing\" chest pain. Pt states the pain is in the center of his chest and does not radiate anywhere. Pt received 324mg of Asprin and 1 SL nitro tab in ambulance. Pt states the pain was around 9 before receiving nitro and after it went to 5/10. Pt denies feeling nauseated but states he was diaphoretic during event.

## 2020-05-26 NOTE — H&P
Active member of club or organization: None     Attends meetings of clubs or organizations: None     Relationship status: None    Intimate partner violence     Fear of current or ex partner: None     Emotionally abused: None     Physically abused: None     Forced sexual activity: None   Other Topics Concern    None   Social History Narrative    None       Family Hx:  History reviewed. No pertinent family history. Review of Systems:   Review of Systems   Constitutional: Negative. HENT: Negative for congestion and trouble swallowing. Eyes: Negative for pain, discharge and visual disturbance. Respiratory: Positive for chest tightness and shortness of breath. Negative for apnea, cough, choking, wheezing and stridor. Cardiovascular: Positive for chest pain. Negative for palpitations and leg swelling. Gastrointestinal: Negative for abdominal distention, abdominal pain and nausea. Endocrine: Negative for cold intolerance and heat intolerance. Genitourinary: Negative for difficulty urinating. Musculoskeletal: Negative for arthralgias and joint swelling. Skin: Negative for color change and pallor. Allergic/Immunologic: Negative for immunocompromised state. Neurological: Negative for dizziness, seizures, syncope, facial asymmetry, speech difficulty, weakness, light-headedness, numbness and headaches. Hematological: Negative for adenopathy. Psychiatric/Behavioral: Negative for agitation, behavioral problems and confusion. Allergies:   Allergies   Allergen Reactions    Metformin And Related      Diarrhea        Medications:  Current Facility-Administered Medications   Medication Dose Route Frequency Provider Last Rate Last Dose    nitroGLYCERIN (NITROSTAT) SL tablet 0.4 mg  0.4 mg Sublingual Q5 Min PRN SIVAN Mooney   0.4 mg at 05/26/20 1223    sodium chloride flush 0.9 % injection 10 mL  10 mL Intravenous 2 times per day SIVAN Mooney   10 mL at 05/26/20 0900    sodium g  15 g Oral PRN Renetta Lockhart MD        dextrose 50 % IV solution  12.5 g Intravenous PRN Renetta Lockhart MD        glucagon (rDNA) injection 1 mg  1 mg Intramuscular PRN Renetta Lockhart MD        dextrose 5 % solution  100 mL/hr Intravenous PRN Renetta Lockhart MD        heparin (porcine) injection 4,000 Units  4,000 Units Intravenous Once Joshua Bob MD        [facility]) tablet 180 mg  180 mg Oral Once Joshua Bob MD        insulin lispro (HUMALOG) injection vial 6 Units  6 Units Subcutaneous TID  Reuben Redman MD        insulin lispro (HUMALOG) injection vial 0-12 Units  0-12 Units Subcutaneous TID  Reuben Redman MD        insulin lispro (HUMALOG) injection vial 0-6 Units  0-6 Units Subcutaneous Nightly Reuben Redman MD           Physical Examination:    /71   Pulse 71   Temp 97.3 °F (36.3 °C) (Oral)   Resp 18   Ht 5' 7\" (1.702 m)   Wt 225 lb (102.1 kg)   SpO2 96%   BMI 35.24 kg/m²    Physical Exam   Constitutional: He appears healthy. No distress. HENT:   Mouth/Throat: Oropharynx is clear. Eyes: Pupils are equal, round, and reactive to light. Neck: Normal range of motion. No JVD present. Cardiovascular: Regular rhythm, S1 normal, S2 normal, normal heart sounds and intact distal pulses. Exam reveals no gallop. No murmur heard. Pulses:       Radial pulses are 2+ on the right side. Dorsalis pedis pulses are 2+ on the right side. Pulmonary/Chest: Effort normal and breath sounds normal. He has no wheezes. He has no rales. He exhibits no tenderness. Abdominal: Soft. Bowel sounds are normal.   Musculoskeletal: Normal range of motion. General: No edema. Neurological: He is alert and oriented to person, place, and time. He has intact cranial nerves. Skin: Skin is warm and dry. No rash noted.          LABS:  CBC:   Lab Results   Component Value Date    WBC 7.3 05/26/2020    RBC 5.02 05/26/2020    HGB 15.8 05/26/2020    HCT 45.6 05/26/2020    MCV 90.9 05/26/2020    MCH 31.5 05/26/2020    MCHC 34.6 05/26/2020    RDW 14.1 05/26/2020     05/26/2020    MPV 9.9 03/31/2015     CBC with Differential:    Lab Results   Component Value Date    WBC 7.3 05/26/2020    RBC 5.02 05/26/2020    HGB 15.8 05/26/2020    HCT 45.6 05/26/2020     05/26/2020    MCV 90.9 05/26/2020    MCH 31.5 05/26/2020    MCHC 34.6 05/26/2020    RDW 14.1 05/26/2020    LYMPHOPCT 27.3 05/26/2020    MONOPCT 6.5 05/26/2020    BASOPCT 0.8 05/26/2020    MONOSABS 0.5 05/26/2020    LYMPHSABS 2.0 05/26/2020    EOSABS 0.1 05/26/2020    BASOSABS 0.1 05/26/2020     CMP:    Lab Results   Component Value Date     05/26/2020    K 4.0 05/26/2020    CL 97 05/26/2020    CO2 23 05/26/2020    BUN 13 05/26/2020    CREATININE 0.68 05/26/2020    GFRAA >60.0 05/26/2020    LABGLOM >60.0 05/26/2020    GLUCOSE 242 05/26/2020    PROT 6.7 05/26/2020    LABALBU 3.7 05/26/2020    CALCIUM 8.9 05/26/2020    BILITOT <0.2 05/26/2020    ALKPHOS 68 05/26/2020    AST 17 05/26/2020    ALT 27 05/26/2020     BMP:    Lab Results   Component Value Date     05/26/2020    K 4.0 05/26/2020    CL 97 05/26/2020    CO2 23 05/26/2020    BUN 13 05/26/2020    LABALBU 3.7 05/26/2020    CREATININE 0.68 05/26/2020    CALCIUM 8.9 05/26/2020    GFRAA >60.0 05/26/2020    LABGLOM >60.0 05/26/2020    GLUCOSE 242 05/26/2020     Magnesium:    Lab Results   Component Value Date    MG 2.1 07/22/2014     Troponin:    Lab Results   Component Value Date    TROPONINI <0.010 05/26/2020       EKG: EKG: normal sinus rhythm, ST elevation in II, III, AVF. Assessment/Plan:    Active Hospital Problems    Diagnosis Date Noted    Chest pain [R07.9] 05/26/2020           Acute inferior MI with EKG changes, and chest pain. S/p PCI RCA but lesion was not completely occlusive, normal LV. DAPT, statin, b-blocker and RF modification        Electronically signed by Diallo Stack.  Samosn Milton MD [facility] Director of Cardiology

## 2020-05-27 VITALS
SYSTOLIC BLOOD PRESSURE: 144 MMHG | HEIGHT: 67 IN | TEMPERATURE: 97.7 F | WEIGHT: 225 LBS | RESPIRATION RATE: 18 BRPM | DIASTOLIC BLOOD PRESSURE: 95 MMHG | OXYGEN SATURATION: 97 % | BODY MASS INDEX: 35.31 KG/M2 | HEART RATE: 74 BPM

## 2020-05-27 PROBLEM — I24.9 ACS (ACUTE CORONARY SYNDROME) (HCC): Status: ACTIVE | Noted: 2020-05-27

## 2020-05-27 LAB
ANION GAP SERPL CALCULATED.3IONS-SCNC: 12 MEQ/L (ref 9–15)
BUN BLDV-MCNC: 10 MG/DL (ref 6–20)
CALCIUM SERPL-MCNC: 9.7 MG/DL (ref 8.5–9.9)
CHLORIDE BLD-SCNC: 99 MEQ/L (ref 95–107)
CO2: 27 MEQ/L (ref 20–31)
CREAT SERPL-MCNC: 0.61 MG/DL (ref 0.7–1.2)
GFR AFRICAN AMERICAN: >60
GFR NON-AFRICAN AMERICAN: >60
GLUCOSE BLD-MCNC: 151 MG/DL (ref 70–99)
GLUCOSE BLD-MCNC: 162 MG/DL (ref 60–115)
GLUCOSE BLD-MCNC: 306 MG/DL (ref 60–115)
HCT VFR BLD CALC: 46.2 % (ref 42–52)
HEMOGLOBIN: 15.8 G/DL (ref 14–18)
LV EF: 60 %
LVEF MODALITY: NORMAL
MCH RBC QN AUTO: 31.2 PG (ref 27–31.3)
MCHC RBC AUTO-ENTMCNC: 34.1 % (ref 33–37)
MCV RBC AUTO: 91.7 FL (ref 80–100)
PDW BLD-RTO: 14.5 % (ref 11.5–14.5)
PERFORMED ON: ABNORMAL
PERFORMED ON: ABNORMAL
PLATELET # BLD: 158 K/UL (ref 130–400)
POTASSIUM SERPL-SCNC: 4.2 MEQ/L (ref 3.4–4.9)
RBC # BLD: 5.04 M/UL (ref 4.7–6.1)
SODIUM BLD-SCNC: 138 MEQ/L (ref 135–144)
WBC # BLD: 8 K/UL (ref 4.8–10.8)

## 2020-05-27 PROCEDURE — 94640 AIRWAY INHALATION TREATMENT: CPT

## 2020-05-27 PROCEDURE — 93306 TTE W/DOPPLER COMPLETE: CPT

## 2020-05-27 PROCEDURE — 6370000000 HC RX 637 (ALT 250 FOR IP): Performed by: INTERNAL MEDICINE

## 2020-05-27 PROCEDURE — 94760 N-INVAS EAR/PLS OXIMETRY 1: CPT

## 2020-05-27 PROCEDURE — 80048 BASIC METABOLIC PNL TOTAL CA: CPT

## 2020-05-27 PROCEDURE — 99238 HOSP IP/OBS DSCHRG MGMT 30/<: CPT | Performed by: INTERNAL MEDICINE

## 2020-05-27 PROCEDURE — 2060000000 HC ICU INTERMEDIATE R&B

## 2020-05-27 PROCEDURE — 85027 COMPLETE CBC AUTOMATED: CPT

## 2020-05-27 PROCEDURE — 36415 COLL VENOUS BLD VENIPUNCTURE: CPT

## 2020-05-27 PROCEDURE — APPNB30 APP NON BILLABLE TIME 0-30 MINS: Performed by: PHYSICIAN ASSISTANT

## 2020-05-27 PROCEDURE — 6370000000 HC RX 637 (ALT 250 FOR IP): Performed by: PERSONAL EMERGENCY RESPONSE ATTENDANT

## 2020-05-27 PROCEDURE — 2700000000 HC OXYGEN THERAPY PER DAY

## 2020-05-27 RX ORDER — NITROGLYCERIN 0.4 MG/1
TABLET SUBLINGUAL
Qty: 25 TABLET | Refills: 3 | Status: SHIPPED | OUTPATIENT
Start: 2020-05-27 | End: 2020-06-10 | Stop reason: SDUPTHER

## 2020-05-27 RX ORDER — ATORVASTATIN CALCIUM 40 MG/1
40 TABLET, FILM COATED ORAL NIGHTLY
Qty: 30 TABLET | Refills: 3 | Status: SHIPPED | OUTPATIENT
Start: 2020-05-27 | End: 2020-09-29

## 2020-05-27 RX ORDER — ASPIRIN 81 MG/1
81 TABLET, CHEWABLE ORAL DAILY
Qty: 30 TABLET | Refills: 3 | Status: SHIPPED | OUTPATIENT
Start: 2020-05-27 | End: 2020-09-29

## 2020-05-27 RX ADMIN — ASPIRIN 81 MG 81 MG: 81 TABLET ORAL at 10:20

## 2020-05-27 RX ADMIN — HYDROCHLOROTHIAZIDE 12.5 MG: 12.5 TABLET ORAL at 10:42

## 2020-05-27 RX ADMIN — VALSARTAN 160 MG: 80 TABLET, FILM COATED ORAL at 10:20

## 2020-05-27 RX ADMIN — FLUOXETINE 10 MG: 10 CAPSULE ORAL at 13:17

## 2020-05-27 RX ADMIN — INSULIN LISPRO 2 UNITS: 100 INJECTION, SOLUTION INTRAVENOUS; SUBCUTANEOUS at 10:23

## 2020-05-27 RX ADMIN — ALOGLIPTIN 6.25 MG: 6.25 TABLET, FILM COATED ORAL at 10:20

## 2020-05-27 RX ADMIN — FLUOXETINE 10 MG: 10 CAPSULE ORAL at 13:18

## 2020-05-27 RX ADMIN — INSULIN LISPRO 8 UNITS: 100 INJECTION, SOLUTION INTRAVENOUS; SUBCUTANEOUS at 12:41

## 2020-05-27 RX ADMIN — BUDESONIDE AND FORMOTEROL FUMARATE DIHYDRATE 2 PUFF: 80; 4.5 AEROSOL RESPIRATORY (INHALATION) at 07:42

## 2020-05-27 RX ADMIN — TICAGRELOR 90 MG: 90 TABLET ORAL at 10:20

## 2020-05-27 RX ADMIN — FLUOXETINE 10 MG: 10 CAPSULE ORAL at 10:20

## 2020-05-27 ASSESSMENT — PAIN SCALES - GENERAL: PAINLEVEL_OUTOF10: 0

## 2020-05-27 NOTE — CONSULTS
Hospital Medicine  History and Physical    Patient:  Bulmaro Desai  MRN: [id number]    CHIEF COMPLAINT:    Chief Complaint   Patient presents with    Chest Pain     woke pt up out of sleep        History Obtained From:  Patient, EMR  Primary Care Physician: Batsheva Seo MD    HISTORY OF PRESENT ILLNESS:   The patient is a 63 y.o. male who presents with chest pain. Duration of symptoms: 1 hour. Timing: Acute in onset. Had some relief with nitroglycerin. Associated with diaphoresis and shortness of breath. Past Medical History:      Diagnosis Date    COPD (chronic obstructive pulmonary disease) (HCC)     SDH (subdural hematoma) (HCC)     syncope, coughing    Type 2 diabetes mellitus without complication ([facility] [address]        Past Surgical History:      Procedure Laterality Date    COLONOSCOPY  10/17/2014       Medications Prior to Admission:    Prior to Admission medications    Medication Sig Start Date End Date Taking?  Authorizing Provider   TRADJENTA 5 MG tablet NAKE 1 TABLET BY MOUTH DAILY 3/17/20  Yes Sarah Hebert MD   glimepiride (AMARYL) 2 MG tablet TAKE 1 TABLET BY MOUTH AT [facility] 3/2/20  Yes Sarah Hebert MD   Dulaglutide (TRULICITY) 9.81 MZ/3.7GM SOPN INJECT SUBCUTANEOSLY ONCE PER WEEK 2/24/20  Yes Sarah Hebert MD   insulin glargine (LANTUS SOLOSTAR) 100 UNIT/ML injection pen 30 units at bedtime 2/20/20  Yes Sarah Hebert MD   Ertugliflozin L-PyroglutamicAc (STEGLATRO) 5 MG TABS Once a day 2/20/20  Yes Sarah Hebert MD   valsartan (DIOVAN) 160 MG tablet  10/15/19  Yes Historical Provider, MD   cyclobenzaprine (FLEXERIL) 10 MG tablet  7/15/19  Yes Historical Provider, MD Antwan Elmore 200-5 MCG/ACT inhaler  7/22/19  Yes Historical Provider, MD   FLUoxetine (PROZAC) 10 MG tablet Take 10 mg by mouth 3 times daily   Yes Historical Provider, MD   fluticasone (FLONASE) 50 MCG/ACT nasal spray 1 spray by Nasal route daily   Yes Historical Provider, MD   tiZANidine (ZANAFLEX) 4 MG tablet Take 4 mg by mouth every 6 hours
pulse rate was 73, respiratory rate  was 20, temperature was 97.9. HEENT:  Normocephalic atraumatic. Pupils equal and reacting to light. Oral mucosa was moist.  NECK:  Supple. Trachea in the midline. CHEST:  Lungs were clear to auscultation bilaterally. No wheezing or  crackles were heard. CARDIOVASCULAR:  Heart sounds were normal.  No murmurs or thrills were  present. ABDOMEN:  Soft, slightly obese. Bowel sounds are present. No  organomegaly or tenderness. EXTREMITIES:  Lower extremities reveal no edema. MUSCULOSKELETAL:  No joint swelling. SKIN:  Intact. NEUROLOGIC:  Cranial nerves I through XII was intact. LABORATORY DATA:  As above. ASSESSMENT:  Type 2 diabetes with fair control. Acute inferior wall MI  with EKG changes status post PCI. Obesity, substance abuse, cocaine. PLAN:  We would continue the patient on Steglatro 5 mg daily, Nesina  6.25 mg daily. Hold glimepiride. Continue Lantus 30 at night. Humalog  6 with each meals. Okay to discharge the patient from Endocrine service  tomorrow. The patient is to resume his home medications and I advised  compliance with diet. A1c goal of 6.5. Advised to not use cocaine in  view of recent MI, verbalized understanding. The patient to follow up  in the office in the next month or so. Thank you for the consult.         Kaelyn Franco MD    D: 05/26/2020 21:33:18       T: 05/26/2020 22:32:04     [email or web address]  Job#: [id number]     Doc#: [id number]    CC:

## 2020-05-27 NOTE — DISCHARGE SUMMARY
30 tablet, Refills: 3         CONTINUE these medications which have CHANGED    Details   linagliptin (TRADJENTA) 5 MG tablet Take 1 tablet by mouth daily  Qty: 30 tablet, Refills: 3         CONTINUE these medications which have NOT CHANGED    Details   glimepiride (AMARYL) 2 MG tablet TAKE 1 TABLET BY MOUTH AT DINNER  Qty: 30 tablet, Refills: 3      Dulaglutide (TRULICITY) 0.13 GS/2.7YV SOPN INJECT SUBCUTANEOSLY ONCE PER WEEK  Qty: 2 mL, Refills: 3      insulin glargine (LANTUS SOLOSTAR) 100 UNIT/ML injection pen 30 units at bedtime  Qty: 5 pen, Refills: 3      Ertugliflozin L-PyroglutamicAc (STEGLATRO) 5 MG TABS Once a day  Qty: 30 tablet, Refills: 3      valsartan (DIOVAN) 160 MG tablet Refills: 2      cyclobenzaprine (FLEXERIL) 10 MG tablet Refills: 1      DULERA 200-5 MCG/ACT inhaler Refills: 2      FLUoxetine (PROZAC) 10 MG tablet Take 10 mg by mouth 3 times daily    Associated Diagnoses: Uncontrolled type 2 diabetes mellitus with complication, without long-term current use of insulin ([facility])      fluticasone (FLONASE) 50 MCG/ACT nasal spray 1 spray by Nasal route daily    Associated Diagnoses: Uncontrolled type 2 diabetes mellitus with complication, without long-term current use of insulin ([facility])      tiZANidine (ZANAFLEX) 4 MG tablet Take 4 mg by mouth every 6 hours as needed    Associated Diagnoses: Uncontrolled type 2 diabetes mellitus with complication, without long-term current use of insulin ([facility])      albuterol sulfate  (90 Base) MCG/ACT inhaler Inhale 2 puffs into the lungs every 6 hours as needed for Wheezing    Associated Diagnoses: Uncontrolled type 2 diabetes mellitus with complication, without long-term current use of insulin ([facility])      omeprazole (PRILOSEC) 20 MG capsule Take 1 capsule by mouth daily.   Qty: 30 capsule, Refills: 11    Associated Diagnoses: GERD (gastroesophageal reflux disease)      blood glucose monitor strips Check BG once daily, DX: E11.65, NIDDM  Qty: 50 strip, Refills: 6 60 - 115 mg/dl    Performed on ACCU-CHEK    Troponin    Collection Time: 05/26/20  9:10 AM   Result Value Ref Range    Troponin <0.010 0.000 - 0.010 ng/mL   POCT Glucose    Collection Time: 05/26/20 11:16 AM   Result Value Ref Range    POC Glucose 243 (H) 60 - 115 mg/dl    Performed on ACCU-CHEK    EKG 12 Lead    Collection Time: 05/26/20 12:16 PM   Result Value Ref Range    Ventricular Rate 69 BPM    Atrial Rate 69 BPM    P-R Interval 144 ms    QRS Duration 92 ms    Q-T Interval 414 ms    QTc Calculation (Bazett) 443 ms    P Axis 37 degrees    R Axis 8 degrees    T Axis 51 degrees   POCT Arterial    Collection Time: 05/26/20  1:15 PM   Result Value Ref Range    ACT-K 241 (H) 82 - 152 sec    Sample Type ART     Performed on SEE BELOW    Urinalysis    Collection Time: 05/26/20  3:12 PM   Result Value Ref Range    Color, UA Yellow Straw/Yellow    Clarity, UA Clear Clear    Glucose, Ur 250 (A) Negative mg/dL    Bilirubin Urine Negative Negative    Ketones, Urine Negative Negative mg/dL    Specific Gravity, UA 1.030 1.005 - 1.030    Blood, Urine TRACE (A) Negative    pH, UA 7.0 5.0 - 9.0    Protein, UA Negative Negative mg/dL    Urobilinogen, Urine 0.2 <2.0 E.U./dL    Nitrite, Urine Negative Negative    Leukocyte Esterase, Urine Negative Negative   Microscopic Urinalysis    Collection Time: 05/26/20  3:12 PM   Result Value Ref Range    Bacteria, UA Negative Negative /HPF    Hyaline Casts, UA 0-1 0 - 5 /HPF    WBC, UA 0-2 0 - 5 /HPF    RBC, UA 0-2 0 - 5 /HPF    Epithelial Cells, UA 0-2 0 - 5 /HPF   POCT Glucose    Collection Time: 05/26/20  3:24 PM   Result Value Ref Range    POC Glucose 169 (H) 60 - 115 mg/dl    Performed on ACCU-CHEK    POCT Glucose    Collection Time: 05/26/20  5:03 PM   Result Value Ref Range    POC Glucose 151 (H) 60 - 115 mg/dl    Performed on ACCU-CHEK    POCT Glucose    Collection Time: 05/26/20  8:46 PM   Result Value Ref Range    POC Glucose 138 (H) 60 - 115 mg/dl    Performed on ACCU-CHEK in future as outpatient if patient abstains from cocaine, discontinue Zocor and start Lipitor 40 mg tablet p.o. daily at at bedtime, valsartan 160 mg p.o. daily, chlorothiazide 12.5 mg p.o. daily, sublingual nitroglycerin PRN for chest pain  2. Diabetic medications per endocrinology  3. Cardiac/diabetic diet recommended  4. Educated patient on abstaining from future cocaine use  5. Maintain potassium greater than 4, magnesium greater than 2  6. Stable for discharge home today once echocardiogram completed.   Follow-up with Dr. Ras Fairchild in 2 weeks        Electronically signed by Luis Alberto Collins 5/27/2020 at 12:45 PM

## 2020-05-27 NOTE — PROGRESS NOTES
[facility] Respiratory Therapy Evaluation   Current Order:  Albuterol q4 prn     Home Regimen: prn     Ordering Physician:   Re-evaluation Date:       Diagnosis: chest pain    Patient Status: Stable    The following MDI Criteria must be met in order to convert aerosol to MDI with spacer. If unable to meet, MDI will be converted to aerosol:  []  Patient able to demonstrate the ability to use MDI effectively  []  Patient alert and cooperative  []  Patient able to take deep breath with 5-10 second hold  []  Medication(s) available in this delivery method   []  Peak flow greater than or equal to 200 ml/min            Current Order Substituted To  (same drug, same frequency)   Aerosol to MDI [] Albuterol Sulfate 0.083% unit dose by aerosol Albuterol Sulfate MDI 2 puffs by inhalation with spacer    [] Levalbuterol 1.25 mg unit dose by aerosol Levalbuterol MDI 2 puffs by inhalation with spacer    [] Levalbuterol 0.63 mg unit dose by aerosol Levalbuterol MDI 2 puffs by inhalation with spacer    [] Ipratropium Bromide 0.02% unit dose by aerosol Ipratropium Bromide MDI 2 puffs by inhalation with spacer    [] Duoneb (Ipratropium + Albuterol) unit dose by aerosol Ipratropium MDI + Albuterol MDI 2 puffs by inhalation w/spacer   MDI to Aerosol [] Albuterol Sulfate MDI Albuterol Sulfate 0.083% unit dose by aerosol    [] Levalbuterol MDI 2 puffs by inhalation Levalbuterol 1.25 mg unit dose by aerosol    [] Ipratropium Bromide MDI by inhalation Ipratropium Bromide 0.02% unit dose by aerosol    [] Combivent (Ipratropium + Albuterol) MDI by inhalation Duoneb (Ipratropium + Albuterol) unit dose by aerosol   Treatment Assessment [Frequency/Schedule]:  Change frequency to: __no change________________________________________________per Protocol, P&T, MEC      Points 0 1 2 3 4   Pulmonary Status  Non-Smoker  []   Smoking history   < 20 pack years  []   Smoking history  ?  20 pack years  [x]   Pulmonary Disorder  (acute or chronic)  []
No results for input(s): INR in the last 72 hours. Recent Labs     05/26/20  0215 05/26/20  0545 05/26/20  0910   TROPONINI <0.010 <0.010 <0.010     Radiology:  XR CHEST PORTABLE   Final Result      No acute radiographic abnormality or significant interval change. Emphysema. Assessment/Plan:    acs with pci stent yesterday. Dm: now being addressed by endo consult    Htn: stable on current regimen.      25 minutes total care time, >1/2 in unit/floor time and care coordination     Electronically signed by Kylah Rogers MD on 5/27/2020 at 10:21 AM

## 2020-05-27 NOTE — DISCHARGE INSTR - DIET

## 2020-05-29 RX ORDER — GLUCOSAMINE HCL/CHONDROITIN SU 500-400 MG
CAPSULE ORAL
Qty: 150 STRIP | Refills: 6 | Status: SHIPPED | OUTPATIENT
Start: 2020-05-29 | End: 2021-10-07 | Stop reason: SDUPTHER

## 2020-05-29 NOTE — TELEPHONE ENCOUNTER
Requested Prescriptions     Pending Prescriptions Disp Refills    blood glucose monitor strips 150 strip 6     Sig: Check BG four times daily , DX: E11.65,

## 2020-06-02 NOTE — PROCEDURES
[address]                      [address], [address] [facility]                            CARDIAC CATHETERIZATION    PATIENT NAME: Leslie Perez                    :        1965  MED REC NO:   [id number]                            ROOM:       L905  ACCOUNT NO:   [de-identified]                           ADMIT DATE: 2020  PROVIDER:     Rafa Pruitt MD    DATE OF PROCEDURE:  2020    PROCEDURE PERFORMED:  1. Coronary angiography. 2.  Left ventriculography. 3.  Hemodynamic measurement of left ventricle. 4.  Percutaneous coronary intervention of the RCA with drug-eluting  stent. REFERRING PROVIDER:  Dr. Jesse Juan. INDICATIONS:  Inferior MI. PROCEDURE DETAILS:  Following full informed consent, the patient was  brought to the cath lab where sterile prep and drape were administered  in the usual fashion. Anesthesia was obtained in the right groin with  lidocaine after administration of conscious sedation. Right femoral  6-Tanzanian arterial sheath was placed without complication. Heparin was  given intravenously. Eventual ACT measured greater than 250.  6-Tanzanian  3DRC guide catheter was advanced and selectively engaged in the right  coronary artery following which multiple doses of contrast given in the  right coronary artery for cineangiography. This catheter was removed  and a 6-Tanzanian XB 3.5 guide catheter was advanced and selectively  engaged in the left main coronary artery following which multiple doses  of contrast given in the left main for cineangiography. This catheter  was removed and pigtail catheter was placed in the left ventricle where  a bolus dose of contrast was given for left ventriculography. Hemodynamic measurements were made and pullback gradient was obtained. This catheter was removed and the [facility] guide was re-advanced and  selectively engaged in the right coronary artery.   Wire was advanced  across the lesion of the

## 2020-06-08 RX ORDER — GLUCOSAM/CHON-MSM1/C/MANG/BOSW 500-416.6
TABLET ORAL
Qty: 100 EACH | Refills: 2 | Status: SHIPPED | OUTPATIENT
Start: 2020-06-08 | End: 2021-03-01

## 2020-06-10 ENCOUNTER — OFFICE VISIT (OUTPATIENT)
Dept: CARDIOLOGY CLINIC | Age: 55
End: 2020-06-10
Payer: COMMERCIAL

## 2020-06-10 ENCOUNTER — OFFICE VISIT (OUTPATIENT)
Dept: ENDOCRINOLOGY | Age: 55
End: 2020-06-10
Payer: COMMERCIAL

## 2020-06-10 VITALS
HEIGHT: 66 IN | HEART RATE: 88 BPM | WEIGHT: 221 LBS | BODY MASS INDEX: 35.52 KG/M2 | SYSTOLIC BLOOD PRESSURE: 129 MMHG | DIASTOLIC BLOOD PRESSURE: 87 MMHG

## 2020-06-10 VITALS
WEIGHT: 222 LBS | HEART RATE: 82 BPM | RESPIRATION RATE: 18 BRPM | OXYGEN SATURATION: 98 % | DIASTOLIC BLOOD PRESSURE: 83 MMHG | SYSTOLIC BLOOD PRESSURE: 117 MMHG | HEIGHT: 67 IN | BODY MASS INDEX: 34.84 KG/M2

## 2020-06-10 LAB
CHP ED QC CHECK: NORMAL
GLUCOSE BLD-MCNC: 148 MG/DL

## 2020-06-10 PROCEDURE — G8417 CALC BMI ABV UP PARAM F/U: HCPCS | Performed by: INTERNAL MEDICINE

## 2020-06-10 PROCEDURE — 3017F COLORECTAL CA SCREEN DOC REV: CPT | Performed by: PHYSICIAN ASSISTANT

## 2020-06-10 PROCEDURE — 3017F COLORECTAL CA SCREEN DOC REV: CPT | Performed by: INTERNAL MEDICINE

## 2020-06-10 PROCEDURE — 3052F HG A1C>EQUAL 8.0%<EQUAL 9.0%: CPT | Performed by: PHYSICIAN ASSISTANT

## 2020-06-10 PROCEDURE — G8427 DOCREV CUR MEDS BY ELIG CLIN: HCPCS | Performed by: PHYSICIAN ASSISTANT

## 2020-06-10 PROCEDURE — 1111F DSCHRG MED/CURRENT MED MERGE: CPT | Performed by: INTERNAL MEDICINE

## 2020-06-10 PROCEDURE — 4004F PT TOBACCO SCREEN RCVD TLK: CPT | Performed by: PHYSICIAN ASSISTANT

## 2020-06-10 PROCEDURE — 2022F DILAT RTA XM EVC RTNOPTHY: CPT | Performed by: PHYSICIAN ASSISTANT

## 2020-06-10 PROCEDURE — 99214 OFFICE O/P EST MOD 30 MIN: CPT | Performed by: PHYSICIAN ASSISTANT

## 2020-06-10 PROCEDURE — G8417 CALC BMI ABV UP PARAM F/U: HCPCS | Performed by: PHYSICIAN ASSISTANT

## 2020-06-10 PROCEDURE — 4004F PT TOBACCO SCREEN RCVD TLK: CPT | Performed by: INTERNAL MEDICINE

## 2020-06-10 PROCEDURE — 82962 GLUCOSE BLOOD TEST: CPT | Performed by: PHYSICIAN ASSISTANT

## 2020-06-10 PROCEDURE — 1111F DSCHRG MED/CURRENT MED MERGE: CPT | Performed by: PHYSICIAN ASSISTANT

## 2020-06-10 PROCEDURE — 99213 OFFICE O/P EST LOW 20 MIN: CPT | Performed by: INTERNAL MEDICINE

## 2020-06-10 PROCEDURE — G8427 DOCREV CUR MEDS BY ELIG CLIN: HCPCS | Performed by: INTERNAL MEDICINE

## 2020-06-10 RX ORDER — NITROGLYCERIN 0.4 MG/1
TABLET SUBLINGUAL
Qty: 25 TABLET | Refills: 3 | Status: SHIPPED | OUTPATIENT
Start: 2020-06-10

## 2020-06-10 RX ORDER — NITROGLYCERIN 0.4 MG/1
TABLET SUBLINGUAL
Qty: 25 TABLET | Refills: 3 | Status: SHIPPED | OUTPATIENT
Start: 2020-06-10 | End: 2020-06-10 | Stop reason: SDUPTHER

## 2020-06-10 RX ORDER — EMPAGLIFLOZIN 25 MG/1
25 TABLET, FILM COATED ORAL DAILY
Qty: 30 TABLET | Refills: 3 | Status: SHIPPED | OUTPATIENT
Start: 2020-06-10 | End: 2020-09-29

## 2020-06-10 RX ORDER — DULAGLUTIDE 1.5 MG/.5ML
1.5 INJECTION, SOLUTION SUBCUTANEOUS WEEKLY
Qty: 4 PEN | Refills: 3 | Status: SHIPPED | OUTPATIENT
Start: 2020-06-10 | End: 2020-06-10

## 2020-06-10 ASSESSMENT — ENCOUNTER SYMPTOMS
EYE PAIN: 0
VOMITING: 0
ABDOMINAL PAIN: 0
NAUSEA: 0
EYE REDNESS: 0
SHORTNESS OF BREATH: 0
SORE THROAT: 0
COUGH: 0
DIARRHEA: 0
RHINORRHEA: 0
WHEEZING: 0
SINUS PRESSURE: 0

## 2020-06-10 NOTE — PROGRESS NOTES
[facility] CARDIOLOGY OFFICE FOLLOW-UP      Patient: Inocente Alejandra  YOB: 1965  MRN: [id number]    Chief Complaint:  Chief Complaint   Patient presents with    Follow-Up from Hospital     POST PCI 5-    Coronary Artery Disease    Hypertension       Subjective/HPI    The patient is followed in the cardiology office chronically for the following problems: CAD, HTN, HPL    The last encounter focused on the following: NSTEMI, PCI    Testing/hospitalizations/procedures performed since last encounter: none    Since the last encounter, the patient has no pain, groin healed well, has not had new symptoms/events. Past Medical History:   Diagnosis Date    COPD (chronic obstructive pulmonary disease) ([facility] [address]     SDH (subdural hematoma) ([facility])     syncope, coughing    Type 2 diabetes mellitus without complication [facility])        Past Surgical History:   Procedure Laterality Date    COLONOSCOPY  10/17/2014       No family history on file.     Social History     Socioeconomic History    Marital status:      Spouse name: None    Number of children: None    Years of education: None    Highest education level: None   Occupational History    None   Social Needs    Financial resource strain: None    Food insecurity     Worry: None     Inability: None    Transportation needs     Medical: None     Non-medical: None   Tobacco Use    Smoking status: Current Some Day Smoker    Smokeless tobacco: Never Used    Tobacco comment: actively trying to quit   Substance and Sexual Activity    Alcohol use: Yes     Comment: weekends     Drug use: Yes     Frequency: 1.0 times per week     Types: Cocaine     Comment: last used 3 days ago     Sexual activity: None   Lifestyle    Physical activity     Days per week: None     Minutes per session: None    Stress: None   Relationships    Social connections     Talks on phone: None     Gets together: None     Attends Mormonism service: None     Active member of club or organization: None     Attends meetings of clubs or organizations: None     Relationship status: None    Intimate partner violence     Fear of current or ex partner: None     Emotionally abused: None     Physically abused: None     Forced sexual activity: None   Other Topics Concern    None   Social History Narrative    None       Allergies   Allergen Reactions    Metformin And Related      Diarrhea        Current Outpatient Medications   Medication Sig Dispense Refill    empagliflozin (JARDIANCE) 25 MG tablet Take 25 mg by mouth daily 30 tablet 3    nitroGLYCERIN (NITROSTAT) 0.4 MG SL tablet up to max of 3 total doses.  If no relief after 1 dose, call 911. 25 tablet 3    TRUEplus Lancets 33G MISC INJECT ONCE ADAY 100 each 2    blood glucose monitor strips Check BG four times daily , DX: E11.65, 150 strip 6    atorvastatin (LIPITOR) 40 MG tablet Take 1 tablet by mouth nightly 30 tablet 3    ticagrelor (BRILINTA) 90 MG TABS tablet Take 1 tablet by mouth 2 times daily 60 tablet 3    aspirin 81 MG chewable tablet Take 1 tablet by mouth daily 30 tablet 3    glimepiride (AMARYL) 2 MG tablet TAKE 1 TABLET BY MOUTH AT DINNER 30 tablet 3    insulin glargine (LANTUS SOLOSTAR) 100 UNIT/ML injection pen 30 units at bedtime 5 pen 3    Ertugliflozin L-PyroglutamicAc (STEGLATRO) 5 MG TABS Once a day 30 tablet 3    valsartan (DIOVAN) 160 MG tablet   2    Insulin Pen Needle (NOVOFINE) 32G X 6 MM MISC qd 100 each 3    cyclobenzaprine (FLEXERIL) 10 MG tablet   1    diclofenac (VOLTAREN) 75 MG EC tablet   1    hydrochlorothiazide (HYDRODIURIL) 12.5 MG tablet   0    DULERA 200-5 MCG/ACT inhaler   2    acetaminophen (TYLENOL ARTHRITIS PAIN) 650 MG extended release tablet Take 650 mg by mouth 2 times daily      FLUoxetine (PROZAC) 10 MG tablet Take 10 mg by mouth 3 times daily      fluticasone (FLONASE) 50 MCG/ACT nasal spray 1 spray by Nasal route daily      tiZANidine (ZANAFLEX) 4 MG tablet Take 4 mg by mouth every 6 hours as needed      albuterol sulfate  (90 Base) MCG/ACT inhaler Inhale 2 puffs into the lungs every 6 hours as needed for Wheezing      Blood Glucose Monitoring Suppl SONDRA check BG once daily, DX: E11.9, NIDDM 1 Device 0    omeprazole (PRILOSEC) 20 MG capsule Take 1 capsule by mouth daily. 30 capsule 11     No current facility-administered medications for this visit. Review of Systems:   Review of Systems   Constitutional: Negative for activity change and appetite change. HENT: Negative for congestion. Respiratory: Negative for apnea, choking and chest tightness. Cardiovascular: Negative for chest pain. Gastrointestinal: Negative for abdominal distention and abdominal pain. Endocrine: Negative for cold intolerance and heat intolerance. Genitourinary: Negative for dysuria and enuresis. Musculoskeletal: Negative for arthralgias and back pain. Skin: Negative for color change. Allergic/Immunologic: Negative. Neurological: Negative for dizziness, seizures, syncope and light-headedness. Psychiatric/Behavioral: Negative for agitation, behavioral problems and confusion. Physical Examination:    /83 (Site: Left Upper Arm, Position: Sitting, Cuff Size: Large Adult)   Pulse 82   Resp 18   Ht 5' 7\" (1.702 m)   Wt 222 lb (100.7 kg)   SpO2 98%   BMI 34.77 kg/m²    Physical Exam   Constitutional: The patient appears healthy. No distress. HENT: Mouth/Throat: Oropharynx is clear. Eyes: Pupils are equal, round, and reactive to light. Neck: Normal range of motion. No JVD present. Cardiovascular: Regular rhythm, S1 normal, S2 normal, normal heart sounds and intact distal pulses. Exam reveals no gallop. No murmur heard. Pulses:       Radial pulses are 2+ on the right side. Dorsalis pedis pulses are 2+ on the right side. Pulmonary/Chest: Effort normal and breath sounds normal. No wheezes. No rales. No tenderness. Abdominal: Soft. Bowel sounds are normal.   Musculoskeletal: Normal range of motion. No edema. Neurological: The patient is alert and oriented to person, place, and time. Intact cranial nerves. Skin: Skin is warm and dry. No rash noted.        LABS:  CBC:   Lab Results   Component Value Date    WBC 8.0 05/27/2020    RBC 5.04 05/27/2020    HGB 15.8 05/27/2020    HCT 46.2 05/27/2020    MCV 91.7 05/27/2020    MCH 31.2 05/27/2020    MCHC 34.1 05/27/2020    RDW 14.5 05/27/2020     05/27/2020    MPV 9.9 03/31/2015     Lipids:  Lab Results   Component Value Date    CHOL 146 09/28/2018    CHOL 170 03/31/2015    CHOL 140 07/22/2014     Lab Results   Component Value Date    TRIG 109 09/28/2018    TRIG 136 03/31/2015    TRIG 122 07/22/2014     Lab Results   Component Value Date    HDL 34 (L) 09/28/2018    HDL 31 (L) 03/31/2015    HDL 36 (L) 07/22/2014     Lab Results   Component Value Date    LDLCALC 90 09/28/2018    LDLCALC 112 03/31/2015    LDLCALC 80 07/22/2014     No results found for: LABVLDL, VLDL  No results found for: CHOLHDLRATIO  CMP:    Lab Results   Component Value Date     05/27/2020    K 4.2 05/27/2020    CL 99 05/27/2020    CO2 27 05/27/2020    BUN 10 05/27/2020    CREATININE 0.61 05/27/2020    GFRAA >60.0 05/27/2020    LABGLOM >60.0 05/27/2020    GLUCOSE 148 06/10/2020    PROT 6.7 05/26/2020    LABALBU 3.7 05/26/2020    CALCIUM 9.7 05/27/2020    BILITOT <0.2 05/26/2020    ALKPHOS 68 05/26/2020    AST 17 05/26/2020    ALT 27 05/26/2020     BMP:    Lab Results   Component Value Date     05/27/2020    K 4.2 05/27/2020    CL 99 05/27/2020    CO2 27 05/27/2020    BUN 10 05/27/2020    LABALBU 3.7 05/26/2020    CREATININE 0.61 05/27/2020    CALCIUM 9.7 05/27/2020    GFRAA >60.0 05/27/2020    LABGLOM >60.0 05/27/2020    GLUCOSE 148 06/10/2020     Magnesium:    Lab Results   Component Value Date    MG 2.1 07/22/2014     TSH:  Lab Results   Component Value Date    TSH 0.928 07/22/2014       Patient Active Problem List   Diagnosis    Diabetes mellitus ([facility] [address]    ED (erectile dysfunction)    Hypertrophy of prostate with urinary obstruction and other lower urinary tract symptoms (LUTS)    Incomplete bladder emptying    Nocturia    SD[facility]subdural hematoma) ([facility])    Urinary frequency    Bilateral carpal tunnel syndrome    Chest pain    Uncontrolled type 2 diabetes mellitus with hyperglycemia ([facility])    ACS (acute coronary syndrome) ([facility])       Medications:  Current Outpatient Medications   Medication Sig Dispense Refill    empagliflozin (JARDIANCE) 25 MG tablet Take 25 mg by mouth daily 30 tablet 3    nitroGLYCERIN (NITROSTAT) 0.4 MG SL tablet up to max of 3 total doses.  If no relief after 1 dose, call 911. 25 tablet 3    TRUEplus Lancets 33G MISC INJECT ONCE ADAY 100 each 2    blood glucose monitor strips Check BG four times daily , DX: E11.65, 150 strip 6    atorvastatin (LIPITOR) 40 MG tablet Take 1 tablet by mouth nightly 30 tablet 3    ticagrelor (BRILINTA) 90 MG TABS tablet Take 1 tablet by mouth 2 times daily 60 tablet 3    aspirin 81 MG chewable tablet Take 1 tablet by mouth daily 30 tablet 3    glimepiride (AMARYL) 2 MG tablet TAKE 1 TABLET BY MOUTH AT DINNER 30 tablet 3    insulin glargine (LANTUS SOLOSTAR) 100 UNIT/ML injection pen 30 units at bedtime 5 pen 3    Ertugliflozin L-PyroglutamicAc (STEGLATRO) 5 MG TABS Once a day 30 tablet 3    valsartan (DIOVAN) 160 MG tablet   2    Insulin Pen Needle (NOVOFINE) 32G X 6 MM MISC qd 100 each 3    cyclobenzaprine (FLEXERIL) 10 MG tablet   1    diclofenac (VOLTAREN) 75 MG EC tablet   1    hydrochlorothiazide (HYDRODIURIL) 12.5 MG tablet   0    DULERA 200-5 MCG/ACT inhaler   2    acetaminophen (TYLENOL ARTHRITIS PAIN) 650 MG extended release tablet Take 650 mg by mouth 2 times daily      FLUoxetine (PROZAC) 10 MG tablet Take 10 mg by mouth 3 times daily      fluticasone (FLONASE) 50 MCG/ACT nasal spray 1 spray by Nasal route daily      tiZANidine

## 2020-06-10 NOTE — PROGRESS NOTES
Not on file    Food insecurity     Worry: Not on file     Inability: Not on file    Transportation needs     Medical: Not on file     Non-medical: Not on file   Tobacco Use    Smoking status: Current Some Day Smoker    Smokeless tobacco: Never Used    Tobacco comment: actively trying to quit   Substance and Sexual Activity    Alcohol use: Yes     Comment: weekends     Drug use: Yes     Frequency: 1.0 times per week     Types: Cocaine     Comment: last used 3 days ago     Sexual activity: Not on file   Lifestyle    Physical activity     Days per week: Not on file     Minutes per session: Not on file    Stress: Not on file   Relationships    Social connections     Talks on phone: Not on file     Gets together: Not on file     Attends Jain service: Not on file     Active member of club or organization: Not on file     Attends meetings of clubs or organizations: Not on file     Relationship status: Not on file    Intimate partner violence     Fear of current or ex partner: Not on file     Emotionally abused: Not on file     Physically abused: Not on file     Forced sexual activity: Not on file   Other Topics Concern    Not on file   Social History Narrative    Not on file     History reviewed. No pertinent family history. Allergies   Allergen Reactions    Metformin And Related      Diarrhea        Current Outpatient Medications:     empagliflozin (JARDIANCE) 25 MG tablet, Take 25 mg by mouth daily, Disp: 30 tablet, Rfl: 3    TRUEplus Lancets 33G MISC, INJECT ONCE ADAY, Disp: 100 each, Rfl: 2    blood glucose monitor strips, Check BG four times daily , DX: E11.65,, Disp: 150 strip, Rfl: 6    nitroGLYCERIN (NITROSTAT) 0.4 MG SL tablet, up to max of 3 total doses.  If no relief after 1 dose, call 911., Disp: 25 tablet, Rfl: 3    atorvastatin (LIPITOR) 40 MG tablet, Take 1 tablet by mouth nightly, Disp: 30 tablet, Rfl: 3    ticagrelor (BRILINTA) 90 MG TABS tablet, Take 1 tablet by mouth 2 times Mouth/Throat:      Mouth: Mucous membranes are moist.   Eyes:      Conjunctiva/sclera: Conjunctivae normal.   Neck:      Musculoskeletal: Normal range of motion and neck supple. Cardiovascular:      Rate and Rhythm: Normal rate and regular rhythm. Heart sounds: Normal heart sounds. Pulmonary:      Effort: Pulmonary effort is normal.      Breath sounds: Normal breath sounds. Abdominal:      General: Bowel sounds are normal.      Palpations: Abdomen is soft. Musculoskeletal: Normal range of motion. Skin:     General: Skin is warm and dry. Neurological:      Mental Status: He is alert and oriented to person, place, and time.    Psychiatric:         Mood and Affect: Mood normal.

## 2020-06-15 ENCOUNTER — TELEPHONE (OUTPATIENT)
Dept: CARDIOLOGY CLINIC | Age: 55
End: 2020-06-15

## 2020-07-06 RX ORDER — LINAGLIPTIN 5 MG/1
TABLET, FILM COATED ORAL
Qty: 30 TABLET | Refills: 3 | OUTPATIENT
Start: 2020-07-06

## 2020-07-15 ENCOUNTER — OFFICE VISIT (OUTPATIENT)
Dept: CARDIOLOGY CLINIC | Age: 55
End: 2020-07-15
Payer: COMMERCIAL

## 2020-07-15 VITALS
DIASTOLIC BLOOD PRESSURE: 80 MMHG | OXYGEN SATURATION: 98 % | SYSTOLIC BLOOD PRESSURE: 110 MMHG | BODY MASS INDEX: 34.46 KG/M2 | HEART RATE: 80 BPM | WEIGHT: 220 LBS

## 2020-07-15 PROCEDURE — 4004F PT TOBACCO SCREEN RCVD TLK: CPT | Performed by: INTERNAL MEDICINE

## 2020-07-15 PROCEDURE — G8417 CALC BMI ABV UP PARAM F/U: HCPCS | Performed by: INTERNAL MEDICINE

## 2020-07-15 PROCEDURE — 99213 OFFICE O/P EST LOW 20 MIN: CPT | Performed by: INTERNAL MEDICINE

## 2020-07-15 PROCEDURE — G8427 DOCREV CUR MEDS BY ELIG CLIN: HCPCS | Performed by: INTERNAL MEDICINE

## 2020-07-15 PROCEDURE — 3017F COLORECTAL CA SCREEN DOC REV: CPT | Performed by: INTERNAL MEDICINE

## 2020-07-15 NOTE — PROGRESS NOTES
[facility] CARDIOLOGY OFFICE FOLLOW-UP      Patient: Ulises Younger  YOB: 1965  MRN: [id number]    Chief Complaint:  Chief Complaint   Patient presents with    Bleeding/Bruising     WIFE CONCERNED. PATIENT IS ON BLOOD THINNERS       Subjective/HPI    The patient is followed in the cardiology office chronically for the following problems: CAD, HTN, HPL    The last encounter focused on the following:     Testing/hospitalizations/procedures performed since last encounter:     Since the last encounter, the patient has bruising is concerned on arms. not had new symptoms/events. Past Medical History:   Diagnosis Date    COPD (chronic obstructive pulmonary disease) ([facility] [address]     SDH (subdural hematoma) ([facility])     syncope, coughing    Type 2 diabetes mellitus without complication [facility])        Past Surgical History:   Procedure Laterality Date    COLONOSCOPY  10/17/2014       No family history on file.     Social History     Socioeconomic History    Marital status:      Spouse name: Not on file    Number of children: Not on file    Years of education: Not on file    Highest education level: Not on file   Occupational History    Not on file   Social Needs    Financial resource strain: Not on file    Food insecurity     Worry: Not on file     Inability: Not on file    Transportation needs     Medical: Not on file     Non-medical: Not on file   Tobacco Use    Smoking status: Current Some Day Smoker    Smokeless tobacco: Never Used    Tobacco comment: actively trying to quit   Substance and Sexual Activity    Alcohol use: Yes     Comment: weekends     Drug use: Yes     Frequency: 1.0 times per week     Types: Cocaine     Comment: last used 3 days ago     Sexual activity: Not on file   Lifestyle    Physical activity     Days per week: Not on file     Minutes per session: Not on file    Stress: Not on file   Relationships    Social connections     Talks on phone: Not on file     Gets together: Not on file     Attends Latter day service: Not on file     Active member of club or organization: Not on file     Attends meetings of clubs or organizations: Not on file     Relationship status: Not on file    Intimate partner violence     Fear of current or ex partner: Not on file     Emotionally abused: Not on file     Physically abused: Not on file     Forced sexual activity: Not on file   Other Topics Concern    Not on file   Social History Narrative    Not on file       Allergies   Allergen Reactions    Metformin And Related      Diarrhea        Current Outpatient Medications   Medication Sig Dispense Refill    ticagrelor (BRILINTA) 90 MG TABS tablet Take 1 tablet by mouth 2 times daily 60 tablet 3    empagliflozin (JARDIANCE) 25 MG tablet Take 25 mg by mouth daily 30 tablet 3    nitroGLYCERIN (NITROSTAT) 0.4 MG SL tablet up to max of 3 total doses.  If no relief after 1 dose, call 911. 25 tablet 3    TRUEplus Lancets 33G MISC INJECT ONCE ADAY 100 each 2    blood glucose monitor strips Check BG four times daily , DX: E11.65, 150 strip 6    atorvastatin (LIPITOR) 40 MG tablet Take 1 tablet by mouth nightly 30 tablet 3    aspirin 81 MG chewable tablet Take 1 tablet by mouth daily 30 tablet 3    glimepiride (AMARYL) 2 MG tablet TAKE 1 TABLET BY MOUTH AT DINNER 30 tablet 3    insulin glargine (LANTUS SOLOSTAR) 100 UNIT/ML injection pen 30 units at bedtime 5 pen 3    Ertugliflozin L-PyroglutamicAc (STEGLATRO) 5 MG TABS Once a day 30 tablet 3    valsartan (DIOVAN) 160 MG tablet   2    Insulin Pen Needle (NOVOFINE) 32G X 6 MM MISC qd 100 each 3    cyclobenzaprine (FLEXERIL) 10 MG tablet   1    hydrochlorothiazide (HYDRODIURIL) 12.5 MG tablet   0    DULERA 200-5 MCG/ACT inhaler   2    acetaminophen (TYLENOL ARTHRITIS PAIN) 650 MG extended release tablet Take 650 mg by mouth 2 times daily      FLUoxetine (PROZAC) 10 MG tablet Take 10 mg by mouth 3 times daily      fluticasone (FLONASE) 50 MCG/ACT nasal spray 1 spray by Nasal route daily      tiZANidine (ZANAFLEX) 4 MG tablet Take 4 mg by mouth every 6 hours as needed      albuterol sulfate  (90 Base) MCG/ACT inhaler Inhale 2 puffs into the lungs every 6 hours as needed for Wheezing      Blood Glucose Monitoring Suppl SONDRA check BG once daily, DX: E11.9, NIDDM 1 Device 0    omeprazole (PRILOSEC) 20 MG capsule Take 1 capsule by mouth daily. 30 capsule 11     No current facility-administered medications for this visit. Review of Systems:   Review of Systems   Constitutional: Negative for activity change and appetite change. HENT: Negative for congestion. Respiratory: Negative for apnea, choking and chest tightness. Cardiovascular: Negative for chest pain. Gastrointestinal: Negative for abdominal distention and abdominal pain. Endocrine: Negative for cold intolerance and heat intolerance. Genitourinary: Negative for dysuria and enuresis. Musculoskeletal: Negative for arthralgias and back pain. Skin: Negative for color change. Allergic/Immunologic: Negative. Neurological: Negative for dizziness, seizures, syncope and light-headedness. Psychiatric/Behavioral: Negative for agitation, behavioral problems and confusion. Physical Examination:    /80 (Site: Left Upper Arm, Position: Sitting, Cuff Size: Large Adult)   Pulse 80   Wt 220 lb (99.8 kg)   SpO2 98%   BMI 34.46 kg/m²    Physical Exam   Constitutional: The patient appears healthy. No distress. HENT: Mouth/Throat: Oropharynx is clear. Eyes: Pupils are equal, round, and reactive to light. Neck: Normal range of motion. No JVD present. Cardiovascular: Regular rhythm, S1 normal, S2 normal, normal heart sounds and intact distal pulses. Exam reveals no gallop. No murmur heard. Pulses:       Radial pulses are 2+ on the right side. Dorsalis pedis pulses are 2+ on the right side.    Pulmonary/Chest: Effort normal and breath sounds normal. No wheezes. No rales. No tenderness. Abdominal: Soft. Bowel sounds are normal.   Musculoskeletal: Normal range of motion. No edema. Neurological: The patient is alert and oriented to person, place, and time. Intact cranial nerves. Skin: Skin is warm and dry. No rash noted.        LABS:  CBC:   Lab Results   Component Value Date    WBC 8.0 05/27/2020    RBC 5.04 05/27/2020    HGB 15.8 05/27/2020    HCT 46.2 05/27/2020    MCV 91.7 05/27/2020    MCH 31.2 05/27/2020    MCHC 34.1 05/27/2020    RDW 14.5 05/27/2020     05/27/2020    MPV 9.9 03/31/2015     Lipids:  Lab Results   Component Value Date    CHOL 146 09/28/2018    CHOL 170 03/31/2015    CHOL 140 07/22/2014     Lab Results   Component Value Date    TRIG 109 09/28/2018    TRIG 136 03/31/2015    TRIG 122 07/22/2014     Lab Results   Component Value Date    HDL 34 (L) 09/28/2018    HDL 31 (L) 03/31/2015    HDL 36 (L) 07/22/2014     Lab Results   Component Value Date    LDLCALC 90 09/28/2018    LDLCALC 112 03/31/2015    LDLCALC 80 07/22/2014     No results found for: LABVLDL, VLDL  No results found for: CHOLHDLRATIO  CMP:    Lab Results   Component Value Date     05/27/2020    K 4.2 05/27/2020    CL 99 05/27/2020    CO2 27 05/27/2020    BUN 10 05/27/2020    CREATININE 0.61 05/27/2020    GFRAA >60.0 05/27/2020    LABGLOM >60.0 05/27/2020    GLUCOSE 148 06/10/2020    PROT 6.7 05/26/2020    LABALBU 3.7 05/26/2020    CALCIUM 9.7 05/27/2020    BILITOT <0.2 05/26/2020    ALKPHOS 68 05/26/2020    AST 17 05/26/2020    ALT 27 05/26/2020     BMP:    Lab Results   Component Value Date     05/27/2020    K 4.2 05/27/2020    CL 99 05/27/2020    CO2 27 05/27/2020    BUN 10 05/27/2020    LABALBU 3.7 05/26/2020    CREATININE 0.61 05/27/2020    CALCIUM 9.7 05/27/2020    GFRAA >60.0 05/27/2020    LABGLOM >60.0 05/27/2020    GLUCOSE 148 06/10/2020     Magnesium:    Lab Results   Component Value Date    MG 2.1 07/22/2014     TSH:  Lab Results   Component Value Date    TSH 0.928 07/22/2014       Patient Active Problem List   Diagnosis    Diabetes mellitus ([facility] [address]    ED (erectile dysfunction)    Hypertrophy of prostate with urinary obstruction and other lower urinary tract symptoms (LUTS)    Incomplete bladder emptying    Nocturia    [facility]oma) ([facility])    Urinary frequency    Bilateral carpal tunnel syndrome    Chest pain    Uncontrolled type 2 diabetes mellitus with hyperglycemia ([facility])    ACS (acute coronary syndrome) ([facility])       Medications:  Current Outpatient Medications   Medication Sig Dispense Refill    ticagrelor (BRILINTA) 90 MG TABS tablet Take 1 tablet by mouth 2 times daily 60 tablet 3    empagliflozin (JARDIANCE) 25 MG tablet Take 25 mg by mouth daily 30 tablet 3    nitroGLYCERIN (NITROSTAT) 0.4 MG SL tablet up to max of 3 total doses.  If no relief after 1 dose, call 911. 25 tablet 3    TRUEplus Lancets 33G MISC INJECT ONCE ADAY 100 each 2    blood glucose monitor strips Check BG four times daily , DX: E11.65, 150 strip 6    atorvastatin (LIPITOR) 40 MG tablet Take 1 tablet by mouth nightly 30 tablet 3    aspirin 81 MG chewable tablet Take 1 tablet by mouth daily 30 tablet 3    glimepiride (AMARYL) 2 MG tablet TAKE 1 TABLET BY MOUTH AT DINNER 30 tablet 3    insulin glargine (LANTUS SOLOSTAR) 100 UNIT/ML injection pen 30 units at bedtime 5 pen 3    Ertugliflozin L-PyroglutamicAc (STEGLATRO) 5 MG TABS Once a day 30 tablet 3    valsartan (DIOVAN) 160 MG tablet   2    Insulin Pen Needle (NOVOFINE) 32G X 6 MM MISC qd 100 each 3    cyclobenzaprine (FLEXERIL) 10 MG tablet   1    hydrochlorothiazide (HYDRODIURIL) 12.5 MG tablet   0    DULERA 200-5 MCG/ACT inhaler   2    acetaminophen (TYLENOL ARTHRITIS PAIN) 650 MG extended release tablet Take 650 mg by mouth 2 times daily      FLUoxetine (PROZAC) 10 MG tablet Take 10 mg by mouth 3 times daily      fluticasone (FLONASE) 50 MCG/ACT nasal spray 1 spray by Nasal route daily  tiZANidine (ZANAFLEX) 4 MG tablet Take 4 mg by mouth every 6 hours as needed      albuterol sulfate  (90 Base) MCG/ACT inhaler Inhale 2 puffs into the lungs every 6 hours as needed for Wheezing      Blood Glucose Monitoring Suppl SONDRA check BG once daily, DX: E11.9, NIDDM 1 Device 0    omeprazole (PRILOSEC) 20 MG capsule Take 1 capsule by mouth daily. 30 capsule 11     No current facility-administered medications for this visit. Assessment/Plan:    1. Coronary artery disease involving native coronary artery of native heart without angina pectoris  No significant bleeding continue antiplatelet    2. Mixed hyperlipidemia      3. Essential hypertension         Counseling: the patient was counseled regarding diet, exercise, weight control and heart healthy lifestyle. Return in about 3 months (around 10/15/2020). Electronically signed by   Jag Meneses.  Peyton Zarco MD [facility] Director of Cardiology Services and Cardiac Catheterization Laboratory  [facility], [place]    on 7/27/2020 at 8:45 AM

## 2020-08-07 DIAGNOSIS — Z79.4 DIABETES MELLITUS DUE TO UNDERLYING CONDITION WITH HYPEROSMOLARITY WITHOUT COMA, WITH LONG-TERM CURRENT USE OF INSULIN (HCC): Chronic | ICD-10-CM

## 2020-08-07 DIAGNOSIS — E08.00 DIABETES MELLITUS DUE TO UNDERLYING CONDITION WITH HYPEROSMOLARITY WITHOUT COMA, WITH LONG-TERM CURRENT USE OF INSULIN (HCC): Chronic | ICD-10-CM

## 2020-08-07 DIAGNOSIS — E11.65 UNCONTROLLED TYPE 2 DIABETES MELLITUS WITH HYPERGLYCEMIA (HCC): ICD-10-CM

## 2020-08-07 LAB
ALBUMIN SERPL-MCNC: 3.7 G/DL (ref 3.5–4.6)
ALP BLD-CCNC: 69 U/L (ref 35–104)
ALT SERPL-CCNC: 56 U/L (ref 0–41)
ANION GAP SERPL CALCULATED.3IONS-SCNC: 13 MEQ/L (ref 9–15)
AST SERPL-CCNC: 30 U/L (ref 0–40)
BILIRUB SERPL-MCNC: <0.2 MG/DL (ref 0.2–0.7)
BUN BLDV-MCNC: 14 MG/DL (ref 6–20)
CALCIUM SERPL-MCNC: 9.4 MG/DL (ref 8.5–9.9)
CHLORIDE BLD-SCNC: 99 MEQ/L (ref 95–107)
CHOLESTEROL, TOTAL: 100 MG/DL (ref 0–199)
CO2: 25 MEQ/L (ref 20–31)
CREAT SERPL-MCNC: 0.88 MG/DL (ref 0.7–1.2)
GFR AFRICAN AMERICAN: >60
GFR NON-AFRICAN AMERICAN: >60
GLOBULIN: 3.6 G/DL (ref 2.3–3.5)
GLUCOSE BLD-MCNC: 154 MG/DL (ref 70–99)
HBA1C MFR BLD: 8.4 % (ref 4.8–5.9)
HDLC SERPL-MCNC: 25 MG/DL (ref 40–59)
LDL CHOLESTEROL CALCULATED: 50 MG/DL (ref 0–129)
POTASSIUM SERPL-SCNC: 4.4 MEQ/L (ref 3.4–4.9)
SODIUM BLD-SCNC: 137 MEQ/L (ref 135–144)
TOTAL PROTEIN: 7.3 G/DL (ref 6.3–8)
TRIGL SERPL-MCNC: 123 MG/DL (ref 0–150)

## 2020-08-17 RX ORDER — GLIMEPIRIDE 2 MG/1
TABLET ORAL
Qty: 30 TABLET | Refills: 2 | Status: SHIPPED | OUTPATIENT
Start: 2020-08-17 | End: 2021-01-13

## 2020-08-17 RX ORDER — INSULIN GLARGINE 100 [IU]/ML
INJECTION, SOLUTION SUBCUTANEOUS
Qty: 5 PEN | Refills: 3 | Status: SHIPPED | OUTPATIENT
Start: 2020-08-17 | End: 2021-01-13

## 2020-09-10 ENCOUNTER — OFFICE VISIT (OUTPATIENT)
Dept: ENDOCRINOLOGY | Age: 55
End: 2020-09-10
Payer: COMMERCIAL

## 2020-09-10 VITALS
BODY MASS INDEX: 34.53 KG/M2 | SYSTOLIC BLOOD PRESSURE: 119 MMHG | HEART RATE: 87 BPM | DIASTOLIC BLOOD PRESSURE: 80 MMHG | HEIGHT: 67 IN | WEIGHT: 220 LBS

## 2020-09-10 LAB
CHP ED QC CHECK: NORMAL
GLUCOSE BLD-MCNC: 235 MG/DL

## 2020-09-10 PROCEDURE — G8427 DOCREV CUR MEDS BY ELIG CLIN: HCPCS | Performed by: PHYSICIAN ASSISTANT

## 2020-09-10 PROCEDURE — 2022F DILAT RTA XM EVC RTNOPTHY: CPT | Performed by: PHYSICIAN ASSISTANT

## 2020-09-10 PROCEDURE — 3052F HG A1C>EQUAL 8.0%<EQUAL 9.0%: CPT | Performed by: PHYSICIAN ASSISTANT

## 2020-09-10 PROCEDURE — G8417 CALC BMI ABV UP PARAM F/U: HCPCS | Performed by: PHYSICIAN ASSISTANT

## 2020-09-10 PROCEDURE — 99214 OFFICE O/P EST MOD 30 MIN: CPT | Performed by: PHYSICIAN ASSISTANT

## 2020-09-10 PROCEDURE — 3017F COLORECTAL CA SCREEN DOC REV: CPT | Performed by: PHYSICIAN ASSISTANT

## 2020-09-10 PROCEDURE — 82962 GLUCOSE BLOOD TEST: CPT | Performed by: PHYSICIAN ASSISTANT

## 2020-09-10 PROCEDURE — 4004F PT TOBACCO SCREEN RCVD TLK: CPT | Performed by: PHYSICIAN ASSISTANT

## 2020-09-10 RX ORDER — DULAGLUTIDE 1.5 MG/.5ML
1.5 INJECTION, SOLUTION SUBCUTANEOUS WEEKLY
Qty: 4 PEN | Refills: 3 | Status: SHIPPED | OUTPATIENT
Start: 2020-09-10 | End: 2021-01-06 | Stop reason: ALTCHOICE

## 2020-09-10 RX ORDER — GABAPENTIN 100 MG/1
100 CAPSULE ORAL 3 TIMES DAILY
Status: ON HOLD | COMMUNITY
End: 2022-06-01

## 2020-09-10 ASSESSMENT — ENCOUNTER SYMPTOMS
SHORTNESS OF BREATH: 0
RHINORRHEA: 0
VOMITING: 0
SORE THROAT: 0
NAUSEA: 0
EYE REDNESS: 0
SINUS PRESSURE: 0
COUGH: 0
WHEEZING: 0
EYE PAIN: 0
DIARRHEA: 0
ABDOMINAL PAIN: 0

## 2020-09-10 NOTE — PROGRESS NOTES
There are no hypoglycemic complications. Diabetic complications include heart disease. Risk factors for coronary artery disease include dyslipidemia, diabetes mellitus, male sex and obesity. Current diabetic treatment includes insulin injections and oral agent (triple therapy). He is compliant with treatment all of the time. He is currently taking insulin at bedtime. Rotation sites for injection include the abdominal wall. He is following a generally healthy diet. Meal planning includes avoidance of concentrated sweets. He participates in exercise three times a week. He monitors blood glucose at home 5+ x per day. His overall blood glucose range is 180-200 mg/dl. An ACE inhibitor/angiotensin II receptor blocker is being taken. He does not see a podiatrist.Eye exam is current.      Past Medical History:   Diagnosis Date    COPD (chronic obstructive pulmonary disease) (HCC)     SDH (subdural hematoma) (HCC)     syncope, coughing    Type 2 diabetes mellitus without complication (HCC)      Past Surgical History:   Procedure Laterality Date    COLONOSCOPY  10/17/2014     Social History     Socioeconomic History    Marital status:      Spouse name: Not on file    Number of children: Not on file    Years of education: Not on file    Highest education level: Not on file   Occupational History    Not on file   Social Needs    Financial resource strain: Not on file    Food insecurity     Worry: Not on file     Inability: Not on file    Transportation needs     Medical: Not on file     Non-medical: Not on file   Tobacco Use    Smoking status: Current Some Day Smoker    Smokeless tobacco: Never Used    Tobacco comment: actively trying to quit   Substance and Sexual Activity    Alcohol use: Yes     Comment: weekends     Drug use: Yes     Frequency: 1.0 times per week     Types: Cocaine     Comment: last used 3 days ago     Sexual activity: Not on file   Lifestyle    Physical activity     Days per week: Not on file     Minutes per session: Not on file    Stress: Not on file   Relationships    Social connections     Talks on phone: Not on file     Gets together: Not on file     Attends [employer] service: Not on file     Active member of club or organization: Not on file     Attends meetings of clubs or organizations: Not on file     Relationship status: Not on file    Intimate partner violence     Fear of current or ex partner: Not on file     Emotionally abused: Not on file     Physically abused: Not on file     Forced sexual activity: Not on file   Other Topics Concern    Not on file   Social History Narrative    Not on file     No family history on file. Allergies   Allergen Reactions    Metformin And Related      Diarrhea        Current Outpatient Medications:     gabapentin (NEURONTIN) 100 MG capsule, Take 100 mg by mouth 3 times daily. Take 2 caps #x daily, Disp: , Rfl:     Dulaglutide (TRULICITY) 1.5 NR/9.3HT SOPN, Inject 1.5 mg into the skin once a week, Disp: 4 pen, Rfl: 3    glimepiride (AMARYL) 2 MG tablet, TAKE 1 TABLET BY MOUTH AT DINNER, Disp: 30 tablet, Rfl: 2    insulin glargine (LANTUS SOLOSTAR) 100 UNIT/ML injection pen, 30 units at bedtime, Disp: 5 pen, Rfl: 3    ticagrelor (BRILINTA) 90 MG TABS tablet, Take 1 tablet by mouth 2 times daily, Disp: 60 tablet, Rfl: 3    empagliflozin (JARDIANCE) 25 MG tablet, Take 25 mg by mouth daily, Disp: 30 tablet, Rfl: 3    nitroGLYCERIN (NITROSTAT) 0.4 MG SL tablet, up to max of 3 total doses.  If no relief after 1 dose, call 911., Disp: 25 tablet, Rfl: 3    TRUEplus Lancets 33G MISC, INJECT ONCE ADAY, Disp: 100 each, Rfl: 2    blood glucose monitor strips, Check BG four times daily , DX: E11.65,, Disp: 150 strip, Rfl: 6    atorvastatin (LIPITOR) 40 MG tablet, Take 1 tablet by mouth nightly, Disp: 30 tablet, Rfl: 3    aspirin 81 MG chewable tablet, Take 1 tablet by mouth daily, Disp: 30 tablet, Rfl: 3    valsartan (DIOVAN) 160 MG tablet, , Disp: , Rfl: 2    Insulin Pen Needle (NOVOFINE) 32G X 6 MM MISC, qd, Disp: 100 each, Rfl: 3    cyclobenzaprine (FLEXERIL) 10 MG tablet, , Disp: , Rfl: 1    hydrochlorothiazide (HYDRODIURIL) 12.5 MG tablet, , Disp: , Rfl: 0    DULERA 200-5 MCG/ACT inhaler, , Disp: , Rfl: 2    acetaminophen (TYLENOL ARTHRITIS PAIN) 650 MG extended release tablet, Take 650 mg by mouth 2 times daily, Disp: , Rfl:     FLUoxetine (PROZAC) 10 MG tablet, Take 10 mg by mouth 3 times daily, Disp: , Rfl:     fluticasone (FLONASE) 50 MCG/ACT nasal spray, 1 spray by Nasal route daily, Disp: , Rfl:     albuterol sulfate  (90 Base) MCG/ACT inhaler, Inhale 2 puffs into the lungs every 6 hours as needed for Wheezing, Disp: , Rfl:     Blood Glucose Monitoring Suppl SONDRA, check BG once daily, DX: E11.9, NIDDM, Disp: 1 Device, Rfl: 0    omeprazole (PRILOSEC) 20 MG capsule, Take 1 capsule by mouth daily. , Disp: 30 capsule, Rfl: 11  Lab Results   Component Value Date     08/07/2020    K 4.4 08/07/2020    CL 99 08/07/2020    CO2 25 08/07/2020    BUN 14 08/07/2020    CREATININE 0.88 08/07/2020    GLUCOSE 235 09/10/2020    CALCIUM 9.4 08/07/2020    PROT 7.3 08/07/2020    LABALBU 3.7 08/07/2020    BILITOT <0.2 08/07/2020    ALKPHOS 69 08/07/2020    AST 30 08/07/2020    ALT 56 (H) 08/07/2020    LABGLOM >60.0 08/07/2020    GFRAA >60.0 08/07/2020    GLOB 3.6 (H) 08/07/2020     Lab Results   Component Value Date    WBC 8.0 05/27/2020    HGB 15.8 05/27/2020    HCT 46.2 05/27/2020    MCV 91.7 05/27/2020     05/27/2020     Lab Results   Component Value Date    LABA1C 8.4 (H) 08/07/2020    LABA1C 8.0 (H) 05/26/2020    LABA1C 8.3 (H) 01/24/2020     Lab Results   Component Value Date    CHOL 100 08/07/2020    CHOL 146 09/28/2018    CHOL 170 03/31/2015     Lab Results   Component Value Date    TRIG 123 08/07/2020    TRIG 109 09/28/2018    TRIG 136 03/31/2015     Lab Results   Component Value Date    HDL 25 (L) 08/07/2020    HDL 34 (L) and oriented to person, place, and time.    Psychiatric:         Mood and Affect: Mood normal.

## 2020-09-23 ENCOUNTER — OFFICE VISIT (OUTPATIENT)
Dept: CARDIOLOGY CLINIC | Age: 55
End: 2020-09-23
Payer: COMMERCIAL

## 2020-09-23 VITALS
SYSTOLIC BLOOD PRESSURE: 123 MMHG | WEIGHT: 223 LBS | HEART RATE: 74 BPM | HEIGHT: 67 IN | RESPIRATION RATE: 18 BRPM | DIASTOLIC BLOOD PRESSURE: 89 MMHG | BODY MASS INDEX: 35 KG/M2 | OXYGEN SATURATION: 98 %

## 2020-09-23 PROCEDURE — G8427 DOCREV CUR MEDS BY ELIG CLIN: HCPCS | Performed by: INTERNAL MEDICINE

## 2020-09-23 PROCEDURE — 4004F PT TOBACCO SCREEN RCVD TLK: CPT | Performed by: INTERNAL MEDICINE

## 2020-09-23 PROCEDURE — 3017F COLORECTAL CA SCREEN DOC REV: CPT | Performed by: INTERNAL MEDICINE

## 2020-09-23 PROCEDURE — 99213 OFFICE O/P EST LOW 20 MIN: CPT | Performed by: INTERNAL MEDICINE

## 2020-09-23 PROCEDURE — G8417 CALC BMI ABV UP PARAM F/U: HCPCS | Performed by: INTERNAL MEDICINE

## 2020-09-23 NOTE — PROGRESS NOTES
[facility] CARDIOLOGY OFFICE FOLLOW-UP      Patient: Atiya Tabares  YOB: 1965  MRN: [id number]    Chief Complaint:  Chief Complaint   Patient presents with    3 Month Follow-Up    Coronary Artery Disease    Hypertension       Subjective/HPI    The patient is followed in the cardiology office chronically for the following problems: CAD, HTn, HPL, PCI    The last encounter focused on the following: followup    Testing/hospitalizations/procedures performed since last encounter: none    Since the last encounter, the patient has not had new symptoms/events. Past Medical History:   Diagnosis Date    CAD (coronary artery disease)     COPD (chronic obstructive pulmonary disease) ([address]     Hypertension     [facility]oma) ([facility])     syncope, coughing    Type 2 diabetes mellitus without complication [facility])        Past Surgical History:   Procedure Laterality Date    COLONOSCOPY  10/17/2014    CORONARY ANGIOPLASTY WITH STENT PLACEMENT  05/26/2020    PTCA         No family history on file.     Social History     Socioeconomic History    Marital status:      Spouse name: None    Number of children: None    Years of education: None    Highest education level: None   Occupational History    None   Social Needs    Financial resource strain: None    Food insecurity     Worry: None     Inability: None    Transportation needs     Medical: None     Non-medical: None   Tobacco Use    Smoking status: Current Some Day Smoker    Smokeless tobacco: Never Used    Tobacco comment: actively trying to quit   Substance and Sexual Activity    Alcohol use: Yes     Comment: weekends     Drug use: Yes     Frequency: 1.0 times per week     Types: Cocaine     Comment: last used 3 days ago     Sexual activity: None   Lifestyle    Physical activity     Days per week: None     Minutes per session: None    Stress: None   Relationships    Social connections     Talks on phone: None     Gets together: None     Attends Jew service: None     Active member of club or organization: None     Attends meetings of clubs or organizations: None     Relationship status: None    Intimate partner violence     Fear of current or ex partner: None     Emotionally abused: None     Physically abused: None     Forced sexual activity: None   Other Topics Concern    None   Social History Narrative    None       Allergies   Allergen Reactions    Metformin And Related      Diarrhea        Current Outpatient Medications   Medication Sig Dispense Refill    gabapentin (NEURONTIN) 100 MG capsule Take 100 mg by mouth 3 times daily. Take 2 caps #x daily      Dulaglutide (TRULICITY) 1.5 TE/1.4ZR SOPN Inject 1.5 mg into the skin once a week (Patient taking differently: Inject 1.5 mg into the skin Twice a Week ) 4 pen 3    glimepiride (AMARYL) 2 MG tablet TAKE 1 TABLET BY MOUTH AT DINNER 30 tablet 2    insulin glargine (LANTUS SOLOSTAR) 100 UNIT/ML injection pen 30 units at bedtime 5 pen 3    ticagrelor (BRILINTA) 90 MG TABS tablet Take 1 tablet by mouth 2 times daily 60 tablet 3    empagliflozin (JARDIANCE) 25 MG tablet Take 25 mg by mouth daily 30 tablet 3    nitroGLYCERIN (NITROSTAT) 0.4 MG SL tablet up to max of 3 total doses.  If no relief after 1 dose, call 911. 25 tablet 3    TRUEplus Lancets 33G MISC INJECT ONCE ADAY 100 each 2    blood glucose monitor strips Check BG four times daily , DX: E11.65, 150 strip 6    atorvastatin (LIPITOR) 40 MG tablet Take 1 tablet by mouth nightly 30 tablet 3    aspirin 81 MG chewable tablet Take 1 tablet by mouth daily 30 tablet 3    valsartan (DIOVAN) 160 MG tablet Take by mouth daily   2    Insulin Pen Needle (NOVOFINE) 32G X 6 MM MISC qd 100 each 3    cyclobenzaprine (FLEXERIL) 10 MG tablet   1    hydrochlorothiazide (HYDRODIURIL) 12.5 MG tablet daily   0    DULERA 200-5 MCG/ACT inhaler   2    acetaminophen (TYLENOL ARTHRITIS PAIN) 650 MG extended release tablet Take 650 mg by mouth 2 times daily      FLUoxetine (PROZAC) 10 MG tablet Take 10 mg by mouth 3 times daily      fluticasone (FLONASE) 50 MCG/ACT nasal spray 1 spray by Nasal route daily      albuterol sulfate  (90 Base) MCG/ACT inhaler Inhale 2 puffs into the lungs every 6 hours as needed for Wheezing      Blood Glucose Monitoring Suppl SONDRA check BG once daily, DX: E11.9, NIDDM 1 Device 0    omeprazole (PRILOSEC) 20 MG capsule Take 1 capsule by mouth daily. 30 capsule 11     No current facility-administered medications for this visit. Review of Systems:   Review of Systems   Constitutional: Negative for activity change and appetite change. HENT: Negative for congestion. Respiratory: Negative for apnea, choking and chest tightness. Cardiovascular: Negative for chest pain. Gastrointestinal: Negative for abdominal distention and abdominal pain. Endocrine: Negative for cold intolerance and heat intolerance. Genitourinary: Negative for dysuria and enuresis. Musculoskeletal: Negative for arthralgias and back pain. Skin: Negative for color change. Allergic/Immunologic: Negative. Neurological: Negative for dizziness, seizures, syncope and light-headedness. Psychiatric/Behavioral: Negative for agitation, behavioral problems and confusion. Physical Examination:    /89 (Site: Left Upper Arm, Position: Sitting, Cuff Size: Large Adult)   Pulse 74   Resp 18   Ht 5' 7\" (1.702 m)   Wt 223 lb (101.2 kg)   SpO2 98%   BMI 34.93 kg/m²    Physical Exam   Constitutional: The patient appears healthy. No distress. HENT: Mouth/Throat: Oropharynx is clear. Eyes: Pupils are equal, round, and reactive to light. Neck: Normal range of motion. No JVD present. Cardiovascular: Regular rhythm, S1 normal, S2 normal, normal heart sounds and intact distal pulses. Exam reveals no gallop. No murmur heard. Pulses:       Radial pulses are 2+ on the right side.         Dorsalis pedis Results   Component Value Date    MG 2.1 07/22/2014     TSH:  Lab Results   Component Value Date    TSH 0.928 07/22/2014       Patient Active Problem List   Diagnosis    Diabetes mellitus ([facility]    ED (erectile dysfunction)    Hypertrophy of prostate with urinary obstruction and other lower urinary tract symptoms (LUTS)    Incomplete bladder emptying    Nocturia    [facility]    Urinary frequency    Bilateral carpal tunnel syndrome    Chest pain    Uncontrolled type 2 diabetes mellitus with hyperglycemia ([facility])    ACS (acute coronary syndrome) ([facility])       Medications:  Current Outpatient Medications   Medication Sig Dispense Refill    gabapentin (NEURONTIN) 100 MG capsule Take 100 mg by mouth 3 times daily. Take 2 caps #x daily      Dulaglutide (TRULICITY) 1.5 NW/3.9YW SOPN Inject 1.5 mg into the skin once a week (Patient taking differently: Inject 1.5 mg into the skin Twice a Week ) 4 pen 3    glimepiride (AMARYL) 2 MG tablet TAKE 1 TABLET BY MOUTH AT DINNER 30 tablet 2    insulin glargine (LANTUS SOLOSTAR) 100 UNIT/ML injection pen 30 units at bedtime 5 pen 3    ticagrelor (BRILINTA) 90 MG TABS tablet Take 1 tablet by mouth 2 times daily 60 tablet 3    empagliflozin (JARDIANCE) 25 MG tablet Take 25 mg by mouth daily 30 tablet 3    nitroGLYCERIN (NITROSTAT) 0.4 MG SL tablet up to max of 3 total doses.  If no relief after 1 dose, call 911. 25 tablet 3    TRUEplus Lancets 33G MISC INJECT ONCE ADAY 100 each 2    blood glucose monitor strips Check BG four times daily , DX: E11.65, 150 strip 6    atorvastatin (LIPITOR) 40 MG tablet Take 1 tablet by mouth nightly 30 tablet 3    aspirin 81 MG chewable tablet Take 1 tablet by mouth daily 30 tablet 3    valsartan (DIOVAN) 160 MG tablet Take by mouth daily   2    Insulin Pen Needle (NOVOFINE) 32G X 6 MM MISC qd 100 each 3    cyclobenzaprine (FLEXERIL) 10 MG tablet   1    hydrochlorothiazide (HYDRODIURIL) 12.5 MG tablet daily   0    DULERA 200-5 MCG/ACT inhaler   2    acetaminophen (TYLENOL ARTHRITIS PAIN) 650 MG extended release tablet Take 650 mg by mouth 2 times daily      FLUoxetine (PROZAC) 10 MG tablet Take 10 mg by mouth 3 times daily      fluticasone (FLONASE) 50 MCG/ACT nasal spray 1 spray by Nasal route daily      albuterol sulfate  (90 Base) MCG/ACT inhaler Inhale 2 puffs into the lungs every 6 hours as needed for Wheezing      Blood Glucose Monitoring Suppl SONDRA check BG once daily, DX: E11.9, NIDDM 1 Device 0    omeprazole (PRILOSEC) 20 MG capsule Take 1 capsule by mouth daily. 30 capsule 11     No current facility-administered medications for this visit. Assessment/Plan:    1. Coronary artery disease involving native coronary artery of native heart without angina pectoris  DAPT, statin, b-blocker and RF modification      2. Essential hypertension  Patient has essential hypertension on BP medications. The guideline-directed target for BP in this patient is <130/80. In order to reach our target BP we will continue current BP medications, increasing the dose of current meds or adding new to reach target. Counseling: the patient was counseled regarding diet, exercise, weight control and heart healthy lifestyle. Return in about 6 months (around 3/23/2021) for followup cv disease. Electronically signed by   Anuj Aguilar.  Ijeoma Mercer MD [facility] Director of Cardiology Services and Cardiac Catheterization Laboratory  [facility], [place]    on 9/23/2020 at 12:22 PM

## 2020-09-28 RX ORDER — PEN NEEDLE, DIABETIC 29 G X1/2"
NEEDLE, DISPOSABLE MISCELLANEOUS
Qty: 100 EACH | Refills: 3 | Status: SHIPPED | OUTPATIENT
Start: 2020-09-28 | End: 2020-10-05

## 2020-09-29 RX ORDER — EMPAGLIFLOZIN 25 MG/1
TABLET, FILM COATED ORAL
Qty: 2 TABLET | Refills: 3 | Status: SHIPPED | OUTPATIENT
Start: 2020-09-29 | End: 2021-04-12

## 2020-10-05 RX ORDER — PEN NEEDLE, DIABETIC 29 G X1/2"
NEEDLE, DISPOSABLE MISCELLANEOUS
Qty: 100 EACH | Refills: 3 | Status: SHIPPED | OUTPATIENT
Start: 2020-10-05 | End: 2021-04-07 | Stop reason: SDUPTHER

## 2020-11-23 RX ORDER — TICAGRELOR 90 MG/1
TABLET ORAL
Qty: 60 TABLET | Refills: 5 | Status: SHIPPED | OUTPATIENT
Start: 2020-11-23 | End: 2021-07-07 | Stop reason: ALTCHOICE

## 2020-12-04 RX ORDER — TICAGRELOR 90 MG/1
TABLET ORAL
Qty: 12 TABLET | Refills: 3 | OUTPATIENT
Start: 2020-12-04

## 2020-12-10 DIAGNOSIS — E11.65 UNCONTROLLED TYPE 2 DIABETES MELLITUS WITH HYPERGLYCEMIA (HCC): ICD-10-CM

## 2020-12-10 LAB
ALBUMIN SERPL-MCNC: 4.3 G/DL (ref 3.5–4.6)
ALP BLD-CCNC: 74 U/L (ref 35–104)
ALT SERPL-CCNC: 29 U/L (ref 0–41)
ANION GAP SERPL CALCULATED.3IONS-SCNC: 14 MEQ/L (ref 9–15)
AST SERPL-CCNC: 18 U/L (ref 0–40)
BILIRUB SERPL-MCNC: 0.4 MG/DL (ref 0.2–0.7)
BUN BLDV-MCNC: 18 MG/DL (ref 6–20)
CALCIUM SERPL-MCNC: 9.4 MG/DL (ref 8.5–9.9)
CHLORIDE BLD-SCNC: 100 MEQ/L (ref 95–107)
CO2: 23 MEQ/L (ref 20–31)
CREAT SERPL-MCNC: 0.8 MG/DL (ref 0.7–1.2)
GFR AFRICAN AMERICAN: >60
GFR NON-AFRICAN AMERICAN: >60
GLOBULIN: 2.9 G/DL (ref 2.3–3.5)
GLUCOSE BLD-MCNC: 222 MG/DL (ref 70–99)
HBA1C MFR BLD: 7.6 % (ref 4.8–5.9)
POTASSIUM SERPL-SCNC: 4.1 MEQ/L (ref 3.4–4.9)
SODIUM BLD-SCNC: 137 MEQ/L (ref 135–144)
TOTAL PROTEIN: 7.2 G/DL (ref 6.3–8)

## 2021-01-04 RX ORDER — DULAGLUTIDE 0.75 MG/.5ML
INJECTION, SOLUTION SUBCUTANEOUS
Qty: 2 ML | Refills: 3 | OUTPATIENT
Start: 2021-01-04

## 2021-01-06 ENCOUNTER — OFFICE VISIT (OUTPATIENT)
Dept: ENDOCRINOLOGY | Age: 56
End: 2021-01-06
Payer: COMMERCIAL

## 2021-01-06 VITALS
SYSTOLIC BLOOD PRESSURE: 122 MMHG | HEIGHT: 67 IN | OXYGEN SATURATION: 94 % | HEART RATE: 76 BPM | TEMPERATURE: 96.4 F | DIASTOLIC BLOOD PRESSURE: 85 MMHG | BODY MASS INDEX: 35 KG/M2 | WEIGHT: 223 LBS

## 2021-01-06 DIAGNOSIS — E11.65 UNCONTROLLED TYPE 2 DIABETES MELLITUS WITH HYPERGLYCEMIA (HCC): Primary | ICD-10-CM

## 2021-01-06 LAB
CHP ED QC CHECK: NORMAL
GLUCOSE BLD-MCNC: 152 MG/DL

## 2021-01-06 PROCEDURE — 82962 GLUCOSE BLOOD TEST: CPT | Performed by: PHYSICIAN ASSISTANT

## 2021-01-06 PROCEDURE — G8484 FLU IMMUNIZE NO ADMIN: HCPCS | Performed by: PHYSICIAN ASSISTANT

## 2021-01-06 PROCEDURE — 2022F DILAT RTA XM EVC RTNOPTHY: CPT | Performed by: PHYSICIAN ASSISTANT

## 2021-01-06 PROCEDURE — G8427 DOCREV CUR MEDS BY ELIG CLIN: HCPCS | Performed by: PHYSICIAN ASSISTANT

## 2021-01-06 PROCEDURE — 4004F PT TOBACCO SCREEN RCVD TLK: CPT | Performed by: PHYSICIAN ASSISTANT

## 2021-01-06 PROCEDURE — 99213 OFFICE O/P EST LOW 20 MIN: CPT | Performed by: PHYSICIAN ASSISTANT

## 2021-01-06 PROCEDURE — 3017F COLORECTAL CA SCREEN DOC REV: CPT | Performed by: PHYSICIAN ASSISTANT

## 2021-01-06 PROCEDURE — G8417 CALC BMI ABV UP PARAM F/U: HCPCS | Performed by: PHYSICIAN ASSISTANT

## 2021-01-06 PROCEDURE — 3046F HEMOGLOBIN A1C LEVEL >9.0%: CPT | Performed by: PHYSICIAN ASSISTANT

## 2021-01-06 RX ORDER — FLASH GLUCOSE SENSOR
2 KIT MISCELLANEOUS
Qty: 2 EACH | Refills: 3 | Status: SHIPPED | OUTPATIENT
Start: 2021-01-06 | End: 2021-04-07

## 2021-01-06 RX ORDER — FLASH GLUCOSE SCANNING READER
1 EACH MISCELLANEOUS
Qty: 1 DEVICE | Refills: 0 | Status: SHIPPED | OUTPATIENT
Start: 2021-01-06 | End: 2021-04-07

## 2021-01-06 RX ORDER — DULAGLUTIDE 3 MG/.5ML
3 INJECTION, SOLUTION SUBCUTANEOUS WEEKLY
Qty: 4 PEN | Refills: 3 | Status: SHIPPED | OUTPATIENT
Start: 2021-01-06 | End: 2021-04-07 | Stop reason: ALTCHOICE

## 2021-01-06 ASSESSMENT — ENCOUNTER SYMPTOMS
WHEEZING: 0
VOMITING: 0
ABDOMINAL PAIN: 0
NAUSEA: 0
COUGH: 0
SHORTNESS OF BREATH: 0
RHINORRHEA: 0
SINUS PRESSURE: 0
SORE THROAT: 0
EYE PAIN: 0
EYE REDNESS: 0
DIARRHEA: 0

## 2021-01-06 NOTE — PATIENT INSTRUCTIONS
Endocrinology-diabetes    1. Check your blood sugars 4 times a day, before meals and at night  2. Document these numbers and a blood glucose log and bring them with you to your follow-up appointment. 3. Do not take your mealtime insulin if your blood sugars less than 100  4. Call our office if you have blood sugars less than 80 or greater then 200 on two or more occasions  5. Call our office if you have any questions regarding your blood sugars or insulin dosing regiment  6. Signs of low blood sugar include sweating , heart racing, dizziness and weakness. Check your blood sugar if you have any of these symptoms. Medications-  1. Continue Lantus 30 units at bedtime  2. Increase Trulicity 3.0 mg weekly  3. Continue Glimepiride 2 mg p.o. daily at dinner  4. Continue Jardiance 25 mg daily  5. Freestyle Chioma ordered  6.  Follow-up in 3 months

## 2021-01-06 NOTE — PROGRESS NOTES
healthy diet. Meal planning includes avoidance of concentrated sweets. He participates in exercise three times a week. He monitors blood glucose at home 5+ x per day. His overall blood glucose range is 180-200 mg/dl. An ACE inhibitor/angiotensin II receptor blocker is being taken. He does not see a podiatrist.Eye exam is current.      Past Medical History:   Diagnosis Date    CAD (coronary artery disease)     COPD [facility]chronic obstructive pulmonary disease) ([facility] [address]     Hypertension     [facility]     syncope, coughing    Type 2 diabetes mellitus without complication ([facility])      Past Surgical History:   Procedure Laterality Date    COLONOSCOPY  10/17/2014    CORONARY ANGIOPLASTY WITH STENT PLACEMENT  05/26/2020    PTCA       Social History     Socioeconomic History    Marital status:      Spouse name: Not on file    Number of children: Not on file    Years of education: Not on file    Highest education level: Not on file   Occupational History    Not on file   Social Needs    Financial resource strain: Not on file    Food insecurity     Worry: Not on file     Inability: Not on file    Transportation needs     Medical: Not on file     Non-medical: Not on file   Tobacco Use    Smoking status: Current Some Day Smoker    Smokeless tobacco: Never Used    Tobacco comment: actively trying to quit   Substance and Sexual Activity    Alcohol use: Yes     Comment: weekends     Drug use: Yes     Frequency: 1.0 times per week     Types: Cocaine     Comment: last used 3 days ago     Sexual activity: Not on file   Lifestyle    Physical activity     Days per week: Not on file     Minutes per session: Not on file    Stress: Not on file   Relationships    Social connections     Talks on phone: Not on file     Gets together: Not on file     Attends Alevism service: Not on file     Active member of club or organization: Not on file     Attends meetings of clubs or organizations: Not on file Relationship status: Not on file    Intimate partner violence     Fear of current or ex partner: Not on file     Emotionally abused: Not on file     Physically abused: Not on file     Forced sexual activity: Not on file   Other Topics Concern    Not on file   Social History Narrative    Not on file     No family history on file. Allergies   Allergen Reactions    Metformin And Related      Diarrhea        Current Outpatient Medications:     BRILINTA 90 MG TABS tablet, TAKE 1 TABLET BY MOUTH 2 TIMES DAILY (AM,PM), Disp: 60 tablet, Rfl: 5    ULTICARE MINI PEN NEEDLES 31G X 6 MM MISC, USE ONCE A DAY AS DIRECTED, Disp: 100 each, Rfl: 3    atorvastatin (LIPITOR) 40 MG tablet, TAKE 1 TABLET BY MOUTH NIGHTLY (HS), Disp: 30 tablet, Rfl: 3    aspirin 81 MG EC tablet, TAKE 1 TABLET BY MOUTH DAILY (AM), Disp: 30 tablet, Rfl: 3    JARDIANCE 25 MG tablet, TAKE 1 TABLET BY MOUTH ONCE A DAY (AM), Disp: 2 tablet, Rfl: 3    gabapentin (NEURONTIN) 100 MG capsule, Take 100 mg by mouth 3 times daily. Take 2 caps #x daily, Disp: , Rfl:     Dulaglutide (TRULICITY) 1.5 TA/9.8CQ SOPN, Inject 1.5 mg into the skin once a week (Patient taking differently: Inject 1.5 mg into the skin Twice a Week ), Disp: 4 pen, Rfl: 3    glimepiride (AMARYL) 2 MG tablet, TAKE 1 TABLET BY MOUTH AT DINNER, Disp: 30 tablet, Rfl: 2    insulin glargine (LANTUS SOLOSTAR) 100 UNIT/ML injection pen, 30 units at bedtime, Disp: 5 pen, Rfl: 3    nitroGLYCERIN (NITROSTAT) 0.4 MG SL tablet, up to max of 3 total doses.  If no relief after 1 dose, call 911., Disp: 25 tablet, Rfl: 3    TRUEplus Lancets 33G MISC, INJECT ONCE ADAY, Disp: 100 each, Rfl: 2    blood glucose monitor strips, Check BG four times daily , DX: E11.65,, Disp: 150 strip, Rfl: 6    valsartan (DIOVAN) 160 MG tablet, Take by mouth daily , Disp: , Rfl: 2    Insulin Pen Needle (NOVOFINE) 32G X 6 MM MISC, qd, Disp: 100 each, Rfl: 3    cyclobenzaprine (FLEXERIL) 10 MG tablet, , Disp: , Rfl: 1    hydrochlorothiazide (HYDRODIURIL) 12.5 MG tablet, daily , Disp: , Rfl: 0    DULERA 200-5 MCG/ACT inhaler, , Disp: , Rfl: 2    acetaminophen (TYLENOL ARTHRITIS PAIN) 650 MG extended release tablet, Take 650 mg by mouth 2 times daily, Disp: , Rfl:     FLUoxetine (PROZAC) 10 MG tablet, Take 10 mg by mouth 3 times daily, Disp: , Rfl:     fluticasone (FLONASE) 50 MCG/ACT nasal spray, 1 spray by Nasal route daily, Disp: , Rfl:     albuterol sulfate  (90 Base) MCG/ACT inhaler, Inhale 2 puffs into the lungs every 6 hours as needed for Wheezing, Disp: , Rfl:     Blood Glucose Monitoring Suppl SONDRA, check BG once daily, DX: E11.9, NIDDM, Disp: 1 Device, Rfl: 0    omeprazole (PRILOSEC) 20 MG capsule, Take 1 capsule by mouth daily. , Disp: 30 capsule, Rfl: 11  Lab Results   Component Value Date     12/10/2020    K 4.1 12/10/2020     12/10/2020    CO2 23 12/10/2020    BUN 18 12/10/2020    CREATININE 0.80 12/10/2020    GLUCOSE 152 01/06/2021    CALCIUM 9.4 12/10/2020    PROT 7.2 12/10/2020    LABALBU 4.3 12/10/2020    BILITOT 0.4 12/10/2020    ALKPHOS 74 12/10/2020    AST 18 12/10/2020    ALT 29 12/10/2020    LABGLOM >60.0 12/10/2020    GFRAA >60.0 12/10/2020    GLOB 2.9 12/10/2020     Lab Results   Component Value Date    WBC 8.0 05/27/2020    HGB 15.8 05/27/2020    HCT 46.2 05/27/2020    MCV 91.7 05/27/2020     05/27/2020     Lab Results   Component Value Date    LABA1C 7.6 (H) 12/10/2020    LABA1C 8.4 (H) 08/07/2020    LABA1C 8.0 (H) 05/26/2020     Lab Results   Component Value Date    CHOL 100 08/07/2020    CHOL 146 09/28/2018    CHOL 170 03/31/2015     Lab Results   Component Value Date    TRIG 123 08/07/2020    TRIG 109 09/28/2018    TRIG 136 03/31/2015     Lab Results   Component Value Date    HDL 25 (L) 08/07/2020    HDL 34 (L) 09/28/2018    HDL 31 (L) 03/31/2015     Lab Results   Component Value Date    LDLCALC 50 08/07/2020    LDLCALC 90 09/28/2018    LDLCALC 112 03/31/2015     No results found for: LABVLDL, VLDL  No results found for: CHOLHDLRATIO  No results found for: TESTM  Lab Results   Component Value Date    TSH 0.928 07/22/2014       Review of Systems   Constitutional: Negative for chills, fatigue and fever. HENT: Negative for congestion, ear pain, postnasal drip, rhinorrhea, sinus pressure and sore throat. Eyes: Negative for pain and redness. Respiratory: Negative for cough, shortness of breath and wheezing. Cardiovascular: Negative for chest pain, palpitations and leg swelling. Gastrointestinal: Negative for abdominal pain, diarrhea, nausea and vomiting. Endocrine: Negative for polydipsia and polyuria. Genitourinary: Negative for difficulty urinating. Musculoskeletal: Negative for arthralgias. Skin: Negative for rash. Allergic/Immunologic: Negative for environmental allergies. Neurological: Negative for dizziness and headaches. Hematological: Negative for adenopathy. Psychiatric/Behavioral: Negative for confusion. Objective:   Physical Exam  Vitals signs reviewed. Constitutional:       Appearance: He is well-developed. HENT:      Head: Normocephalic and atraumatic. Nose: No congestion. Mouth/Throat:      Mouth: Mucous membranes are moist.   Eyes:      Conjunctiva/sclera: Conjunctivae normal.   Neck:      Musculoskeletal: Normal range of motion and neck supple. Cardiovascular:      Rate and Rhythm: Normal rate and regular rhythm. Heart sounds: Normal heart sounds. Pulmonary:      Effort: Pulmonary effort is normal.      Breath sounds: Normal breath sounds. Abdominal:      General: Bowel sounds are normal.      Palpations: Abdomen is soft. Musculoskeletal: Normal range of motion. Skin:     General: Skin is warm and dry. Neurological:      Mental Status: He is alert and oriented to person, place, and time.    Psychiatric:         Mood and Affect: Mood normal.

## 2021-01-13 RX ORDER — GLIMEPIRIDE 2 MG/1
TABLET ORAL
Qty: 8 TABLET | Refills: 3 | Status: SHIPPED | OUTPATIENT
Start: 2021-01-13 | End: 2021-07-07 | Stop reason: ALTCHOICE

## 2021-01-13 RX ORDER — INSULIN GLARGINE 100 [IU]/ML
INJECTION, SOLUTION SUBCUTANEOUS
Qty: 15 ML | Refills: 3 | Status: SHIPPED | OUTPATIENT
Start: 2021-01-13 | End: 2021-02-23

## 2021-01-22 RX ORDER — ASPIRIN 81 MG/1
TABLET ORAL
Qty: 30 TABLET | Refills: 5 | Status: SHIPPED | OUTPATIENT
Start: 2021-01-22 | End: 2021-08-09

## 2021-02-08 RX ORDER — INSULIN GLARGINE 100 [IU]/ML
INJECTION, SOLUTION SUBCUTANEOUS
Qty: 15 ML | Refills: 3 | OUTPATIENT
Start: 2021-02-08

## 2021-03-01 RX ORDER — GLUCOSAM/CHON-MSM1/C/MANG/BOSW 500-416.6
TABLET ORAL
Qty: 100 EACH | Refills: 5 | Status: SHIPPED | OUTPATIENT
Start: 2021-03-01 | End: 2021-10-07 | Stop reason: SDUPTHER

## 2021-04-05 DIAGNOSIS — E11.65 UNCONTROLLED TYPE 2 DIABETES MELLITUS WITH HYPERGLYCEMIA (HCC): ICD-10-CM

## 2021-04-05 LAB
ALBUMIN SERPL-MCNC: 4.1 G/DL (ref 3.5–4.6)
ALP BLD-CCNC: 77 U/L (ref 35–104)
ALT SERPL-CCNC: 36 U/L (ref 0–41)
ANION GAP SERPL CALCULATED.3IONS-SCNC: 12 MEQ/L (ref 9–15)
AST SERPL-CCNC: 21 U/L (ref 0–40)
BILIRUB SERPL-MCNC: 0.4 MG/DL (ref 0.2–0.7)
BUN BLDV-MCNC: 16 MG/DL (ref 6–20)
CALCIUM SERPL-MCNC: 9.7 MG/DL (ref 8.5–9.9)
CHLORIDE BLD-SCNC: 98 MEQ/L (ref 95–107)
CO2: 26 MEQ/L (ref 20–31)
CREAT SERPL-MCNC: 0.67 MG/DL (ref 0.7–1.2)
GFR AFRICAN AMERICAN: >60
GFR NON-AFRICAN AMERICAN: >60
GLOBULIN: 3.4 G/DL (ref 2.3–3.5)
GLUCOSE BLD-MCNC: 158 MG/DL (ref 70–99)
HBA1C MFR BLD: 7.1 % (ref 4.8–5.9)
POTASSIUM SERPL-SCNC: 3.7 MEQ/L (ref 3.4–4.9)
SODIUM BLD-SCNC: 136 MEQ/L (ref 135–144)
TOTAL PROTEIN: 7.5 G/DL (ref 6.3–8)

## 2021-04-07 ENCOUNTER — OFFICE VISIT (OUTPATIENT)
Dept: ENDOCRINOLOGY | Age: 56
End: 2021-04-07
Payer: COMMERCIAL

## 2021-04-07 VITALS
DIASTOLIC BLOOD PRESSURE: 81 MMHG | HEART RATE: 92 BPM | SYSTOLIC BLOOD PRESSURE: 119 MMHG | BODY MASS INDEX: 35.16 KG/M2 | HEIGHT: 67 IN | WEIGHT: 224 LBS

## 2021-04-07 DIAGNOSIS — E11.65 UNCONTROLLED TYPE 2 DIABETES MELLITUS WITH HYPERGLYCEMIA (HCC): Primary | ICD-10-CM

## 2021-04-07 LAB
CHP ED QC CHECK: NORMAL
GLUCOSE BLD-MCNC: 127 MG/DL

## 2021-04-07 PROCEDURE — 99213 OFFICE O/P EST LOW 20 MIN: CPT | Performed by: PHYSICIAN ASSISTANT

## 2021-04-07 PROCEDURE — 3051F HG A1C>EQUAL 7.0%<8.0%: CPT | Performed by: PHYSICIAN ASSISTANT

## 2021-04-07 PROCEDURE — G8427 DOCREV CUR MEDS BY ELIG CLIN: HCPCS | Performed by: PHYSICIAN ASSISTANT

## 2021-04-07 PROCEDURE — G8417 CALC BMI ABV UP PARAM F/U: HCPCS | Performed by: PHYSICIAN ASSISTANT

## 2021-04-07 PROCEDURE — 4004F PT TOBACCO SCREEN RCVD TLK: CPT | Performed by: PHYSICIAN ASSISTANT

## 2021-04-07 PROCEDURE — 2022F DILAT RTA XM EVC RTNOPTHY: CPT | Performed by: PHYSICIAN ASSISTANT

## 2021-04-07 PROCEDURE — 3017F COLORECTAL CA SCREEN DOC REV: CPT | Performed by: PHYSICIAN ASSISTANT

## 2021-04-07 PROCEDURE — 82962 GLUCOSE BLOOD TEST: CPT | Performed by: PHYSICIAN ASSISTANT

## 2021-04-07 RX ORDER — FLASH GLUCOSE SCANNING READER
1 EACH MISCELLANEOUS
Qty: 1 DEVICE | Refills: 0 | Status: SHIPPED | OUTPATIENT
Start: 2021-04-07 | End: 2021-07-07 | Stop reason: SDUPTHER

## 2021-04-07 RX ORDER — DULAGLUTIDE 4.5 MG/.5ML
4.5 INJECTION, SOLUTION SUBCUTANEOUS WEEKLY
Qty: 4 PEN | Refills: 3 | Status: SHIPPED | OUTPATIENT
Start: 2021-04-07 | End: 2021-07-26

## 2021-04-07 RX ORDER — FLASH GLUCOSE SENSOR
2 KIT MISCELLANEOUS
Qty: 2 EACH | Refills: 3 | Status: SHIPPED | OUTPATIENT
Start: 2021-04-07 | End: 2021-07-07 | Stop reason: SDUPTHER

## 2021-04-07 ASSESSMENT — ENCOUNTER SYMPTOMS
EYE PAIN: 0
WHEEZING: 0
COUGH: 0
SORE THROAT: 0
RHINORRHEA: 0
DIARRHEA: 0
SINUS PRESSURE: 0
VOMITING: 0
NAUSEA: 0
SHORTNESS OF BREATH: 0
EYE REDNESS: 0
ABDOMINAL PAIN: 0

## 2021-04-07 NOTE — PROGRESS NOTES
4/7/2021    Assessment:       Diagnosis Orders   1. Uncontrolled type 2 diabetes mellitus with hyperglycemia (HCC)  POCT Glucose    Hemoglobin A1C    Comprehensive Metabolic Panel    Microalbumin / Creatinine Urine Ratio     DIABETES FOOT EXAM     AVG am glucose 110-140     PLAN:     1. Continue Lantus 30 units at bedtime  2. Increase Trulicity 4.5 mg weekly  3. Stop Glimepiride 2 mg p.o. daily at dinner  4. Continue Jardiance 25 mg daily  5. Freestyle Chi[place] ordered  6. Follow-up in 3 months    Orders Placed This Encounter   Procedures    POCT Glucose     No orders of the defined types were placed in this encounter. No follow-ups on file. Subjective:     Chief Complaint   Patient presents with    Diabetes     Vitals:    04/07/21 1352   BP: 119/81   Site: Left Upper Arm   Position: Sitting   Cuff Size: Large Adult   Pulse: 92   Weight: 224 lb (101.6 kg)   Height: 5' 7\" (1.702 m)     Wt Readings from Last 3 Encounters:   04/07/21 224 lb (101.6 kg)   01/06/21 223 lb (101.2 kg)   09/23/20 223 lb (101.2 kg)     BP Readings from Last 3 Encounters:   04/07/21 119/81   01/06/21 122/85   09/23/20 123/89     Jose M Dye is a 54-year-old  man with hospital admission 6/2020, for chest pain. Status post PCI's. Has a history of COPD and type 2 insulin-dependent diabetes. Diabetes  He presents for his follow-up diabetic visit. He has type 2 diabetes mellitus. The initial diagnosis of diabetes was made 5 years ago. Pertinent negatives for hypoglycemia include no confusion, dizziness or headaches. Pertinent negatives for diabetes include no chest pain, no fatigue, no polydipsia and no polyuria. There are no hypoglycemic complications. Diabetic complications include heart disease. Risk factors for coronary artery disease include dyslipidemia, diabetes mellitus, male sex and obesity. Current diabetic treatment includes insulin injections and oral agent (triple therapy). He is compliant with treatment all of the time.  He is currently taking insulin at bedtime. Rotation sites for injection include the abdominal wall. He is following a generally healthy diet. Meal planning includes avoidance of concentrated sweets. He participates in exercise three times a week. He monitors blood glucose at home 5+ x per day. His overall blood glucose range is 180-200 mg/dl. An ACE inhibitor/angiotensin II receptor blocker is being taken. He does not see a podiatrist.Eye exam is current.      Past Medical History:   Diagnosis Date    CAD (coronary artery disease)     [facility]ch[facility] obstructive [facility] disease) ([facility] [address]     Hypertension     [facility]oma[facility] ([facility])     syncope, coughing    Type 2 diabetes mellitus without complication ([facility])      Past Surgical History:   Procedure Laterality Date    COLONOSCOPY  10/17/2014    CORONARY ANGIOPLASTY WITH STENT PLACEMENT  05/26/2020    PTCA       Social History     Socioeconomic History    Marital status:      Spouse name: Not on file    Number of children: Not on file    Years of education: Not on file    Highest education level: Not on file   Occupational History    Not on file   Social Needs    Financial resource strain: Not on file    Food insecurity     Worry: Not on file     Inability: Not on file    Transportation needs     Medical: Not on file     Non-medical: Not on file   Tobacco Use    Smoking status: Current Some Day Smoker    Smokeless tobacco: Never Used    Tobacco comment: actively trying to quit   Substance and Sexual Activity    Alcohol use: Yes     Comment: weekends     Drug use: Yes     Frequency: 1.0 times per week     Types: Cocaine     Comment: last used 3 days ago     Sexual activity: Not on file   Lifestyle    Physical activity     Days per week: Not on file     Minutes per session: Not on file    Stress: Not on file   Relationships    Social connections     Talks on phone: Not on file     Gets together: Not on file     Attends Temple service: Not on file     Active member of club or organization: Not on file     Attends meetings of clubs or organizations: Not on file     Relationship status: Not on file    Intimate partner violence     Fear of current or ex partner: Not on file     Emotionally abused: Not on file     Physically abused: Not on file     Forced sexual activity: Not on file   Other Topics Concern    Not on file   Social History Narrative    Not on file     History reviewed. No pertinent family history. Allergies   Allergen Reactions    Metformin And Related      Diarrhea        Current Outpatient Medications:     TRUEplus Lancets 33G MISC, INJECT ONCE ADAY, Disp: 100 each, Rfl: 5    LANTUS SOLOSTAR 100 UNIT/ML injection pen, 30 UNITS AT BEDTIME, Disp: 15 mL, Rfl: 0    aspirin 81 MG EC tablet, TAKE 1 TABLET BY MOUTH DAILY (AM), Disp: 30 tablet, Rfl: 5    glimepiride (AMARYL) 2 MG tablet, TAKE 1 TABLET BY MOUTH AT DINNER (PM), Disp: 8 tablet, Rfl: 3    Dulaglutide (TRULICITY) 3 ZR/7.9JY SOPN, Inject 3 mg into the skin once a week, Disp: 4 pen, Rfl: 3    BRILINTA 90 MG TABS tablet, TAKE 1 TABLET BY MOUTH 2 TIMES DAILY (AM,PM), Disp: 60 tablet, Rfl: 5    atorvastatin (LIPITOR) 40 MG tablet, TAKE 1 TABLET BY MOUTH NIGHTLY (HS), Disp: 30 tablet, Rfl: 3    JARDIANCE 25 MG tablet, TAKE 1 TABLET BY MOUTH ONCE A DAY (AM), Disp: 2 tablet, Rfl: 3    gabapentin (NEURONTIN) 100 MG capsule, Take 100 mg by mouth 3 times daily. Take 2 caps #x daily, Disp: , Rfl:     nitroGLYCERIN (NITROSTAT) 0.4 MG SL tablet, up to max of 3 total doses.  If no relief after 1 dose, call 911., Disp: 25 tablet, Rfl: 3    blood glucose monitor strips, Check BG four times daily , DX: E11.65,, Disp: 150 strip, Rfl: 6    valsartan (DIOVAN) 160 MG tablet, Take by mouth daily , Disp: , Rfl: 2    Insulin Pen Needle (NOVOFINE) 32G X 6 MM MISC, qd, Disp: 100 each, Rfl: 3    cyclobenzaprine (FLEXERIL) 10 MG tablet, , Disp: , Rfl: 1    hydrochlorothiazide (HYDRODIURIL) results found for: CHOLHDLRATIO  No results found for: TESTM  Lab Results   Component Value Date    TSH 0.928 07/22/2014       Review of Systems   Constitutional: Negative for chills, fatigue and fever. HENT: Negative for congestion, ear pain, postnasal drip, rhinorrhea, sinus pressure and sore throat. Eyes: Negative for pain and redness. Respiratory: Negative for cough, shortness of breath and wheezing. Cardiovascular: Negative for chest pain, palpitations and leg swelling. Gastrointestinal: Negative for abdominal pain, diarrhea, nausea and vomiting. Endocrine: Negative for polydipsia and polyuria. Genitourinary: Negative for difficulty urinating. Musculoskeletal: Negative for arthralgias. Skin: Negative for rash. Allergic/Immunologic: Negative for environmental allergies. Neurological: Negative for dizziness and headaches. Hematological: Negative for adenopathy. Psychiatric/Behavioral: Negative for confusion. Objective:   Physical Exam  Vitals signs reviewed. Constitutional:       Appearance: He is well-developed. HENT:      Head: Normocephalic and atraumatic. Nose: No congestion. Mouth/Throat:      Mouth: Mucous membranes are moist.   Eyes:      Conjunctiva/sclera: Conjunctivae normal.   Neck:      Musculoskeletal: Normal range of motion and neck supple. Cardiovascular:      Rate and Rhythm: Normal rate and regular rhythm. Heart sounds: Normal heart sounds. Pulmonary:      Effort: Pulmonary effort is normal.      Breath sounds: Normal breath sounds. Abdominal:      General: Bowel sounds are normal.      Palpations: Abdomen is soft. Musculoskeletal: Normal range of motion. Skin:     General: Skin is warm and dry. Neurological:      Mental Status: He is alert and oriented to person, place, and time.    Psychiatric:         Mood and Affect: Mood normal.

## 2021-04-12 RX ORDER — EMPAGLIFLOZIN 25 MG/1
TABLET, FILM COATED ORAL
Qty: 28 TABLET | Refills: 3 | Status: SHIPPED | OUTPATIENT
Start: 2021-04-12 | End: 2021-08-25

## 2021-04-27 ENCOUNTER — OFFICE VISIT (OUTPATIENT)
Dept: CARDIOLOGY CLINIC | Age: 56
End: 2021-04-27
Payer: COMMERCIAL

## 2021-04-27 VITALS
DIASTOLIC BLOOD PRESSURE: 82 MMHG | BODY MASS INDEX: 35.79 KG/M2 | HEART RATE: 82 BPM | OXYGEN SATURATION: 97 % | SYSTOLIC BLOOD PRESSURE: 132 MMHG | WEIGHT: 228 LBS | HEIGHT: 67 IN | RESPIRATION RATE: 16 BRPM

## 2021-04-27 DIAGNOSIS — I10 ESSENTIAL HYPERTENSION: ICD-10-CM

## 2021-04-27 DIAGNOSIS — I25.10 CORONARY ARTERY DISEASE INVOLVING NATIVE CORONARY ARTERY OF NATIVE HEART WITHOUT ANGINA PECTORIS: Primary | ICD-10-CM

## 2021-04-27 DIAGNOSIS — E78.2 MIXED HYPERLIPIDEMIA: ICD-10-CM

## 2021-04-27 PROCEDURE — G8427 DOCREV CUR MEDS BY ELIG CLIN: HCPCS | Performed by: INTERNAL MEDICINE

## 2021-04-27 PROCEDURE — G8417 CALC BMI ABV UP PARAM F/U: HCPCS | Performed by: INTERNAL MEDICINE

## 2021-04-27 PROCEDURE — 99214 OFFICE O/P EST MOD 30 MIN: CPT | Performed by: INTERNAL MEDICINE

## 2021-04-27 PROCEDURE — 4004F PT TOBACCO SCREEN RCVD TLK: CPT | Performed by: INTERNAL MEDICINE

## 2021-04-27 PROCEDURE — 3017F COLORECTAL CA SCREEN DOC REV: CPT | Performed by: INTERNAL MEDICINE

## 2021-04-27 ASSESSMENT — ENCOUNTER SYMPTOMS
EYES NEGATIVE: 1
SHORTNESS OF BREATH: 0
GASTROINTESTINAL NEGATIVE: 1
COUGH: 0
NAUSEA: 0
CHEST TIGHTNESS: 0
WHEEZING: 0
BLOOD IN STOOL: 0
STRIDOR: 0
RESPIRATORY NEGATIVE: 1

## 2021-04-27 NOTE — PROGRESS NOTES
Subsequent Progress Note  Patient: Jan Solorio  YOB: 1965  MRN: [id number]    Chief Complaint:  CAD HTN HPL   Chief Complaint   Patient presents with    6 Month Follow-Up    Coronary Artery Disease    Hypertension       CV Data:  5/26/20 RCA ASHWIN  5/21 Echo 60    Subjective/HPI: no cp no sob no flaa no bleed takes meds. occ smker  occ etoh  Work- disabled. +FH      EKG:    Past Medical History:   Diagnosis Date    CAD (coronary artery disease)     COPD (chronic obstructive pulmonary disease) ([address]     Hypertension     [facility]H [facility]oma) ([facility])     syncope, coughing    Type 2 diabetes mellitus without complication [facility])        Past Surgical History:   Procedure Laterality Date    COLONOSCOPY  10/17/2014    CORONARY ANGIOPLASTY WITH STENT PLACEMENT  05/26/2020    PTCA         History reviewed. No pertinent family history.     Social History     Socioeconomic History    Marital status:      Spouse name: None    Number of children: None    Years of education: None    Highest education level: None   Occupational History    None   Social Needs    Financial resource strain: None    Food insecurity     Worry: None     Inability: None    Transportation needs     Medical: None     Non-medical: None   Tobacco Use    Smoking status: Current Some Day Smoker    Smokeless tobacco: Never Used    Tobacco comment: actively trying to quit   Substance and Sexual Activity    Alcohol use: Yes     Comment: weekends     Drug use: Yes     Frequency: 1.0 times per week     Types: Cocaine     Comment: last used 3 days ago     Sexual activity: None   Lifestyle    Physical activity     Days per week: None     Minutes per session: None    Stress: None   Relationships    Social connections     Talks on phone: None     Gets together: None     Attends Faith service: None     Active member of club or organization: None     Attends meetings of clubs or organizations: None Relationship status: None    Intimate partner violence     Fear of current or ex partner: None     Emotionally abused: None     Physically abused: None     Forced sexual activity: None   Other Topics Concern    None   Social History Narrative    None       Allergies   Allergen Reactions    Metformin And Related      Diarrhea        Current Outpatient Medications   Medication Sig Dispense Refill    JARDIANCE 25 MG tablet TAKE 1 TABLET BY MOUTH ONCE A DAY (AM) 28 tablet 3    Continuous Blood Gluc Sensor (FREESTYLE CHEIKH 14 DAY SENSOR) MISC 2 Devices by Does not apply route every 14 days 2 each 3    Continuous Blood Gluc  (FREESTYLE CHEIKH 14 DAY READER) SONDRA 1 Device by Does not apply route 4 times daily (before meals and nightly) 1 Device 0    Dulaglutide (TRULICITY) 4.5 KZ/3.8NI SOPN Inject 4.5 mg into the skin once a week 4 pen 3    TRUEplus Lancets 33G MISC INJECT ONCE ADAY 100 each 5    LANTUS SOLOSTAR 100 UNIT/ML injection pen 30 UNITS AT BEDTIME 15 mL 0    aspirin 81 MG EC tablet TAKE 1 TABLET BY MOUTH DAILY (AM) 30 tablet 5    glimepiride (AMARYL) 2 MG tablet TAKE 1 TABLET BY MOUTH AT DINNER (PM) 8 tablet 3    atorvastatin (LIPITOR) 40 MG tablet TAKE 1 TABLET BY MOUTH NIGHTLY (HS) 30 tablet 3    gabapentin (NEURONTIN) 100 MG capsule Take 100 mg by mouth 3 times daily. Take 2 caps #x daily      nitroGLYCERIN (NITROSTAT) 0.4 MG SL tablet up to max of 3 total doses.  If no relief after 1 dose, call 911. 25 tablet 3    blood glucose monitor strips Check BG four times daily , DX: E11.65, 150 strip 6    valsartan (DIOVAN) 160 MG tablet Take by mouth daily   2    Insulin Pen Needle (NOVOFINE) 32G X 6 MM MISC qd 100 each 3    cyclobenzaprine (FLEXERIL) 10 MG tablet   1    hydrochlorothiazide (HYDRODIURIL) 12.5 MG tablet daily   0    DULERA 200-5 MCG/ACT inhaler   2    acetaminophen (TYLENOL ARTHRITIS PAIN) 650 MG extended release tablet Take 650 mg by mouth 2 times daily      FLUoxetine (PROZAC) 10 MG tablet Take 10 mg by mouth 3 times daily      fluticasone (FLONASE) 50 MCG/ACT nasal spray 1 spray by Nasal route daily      albuterol sulfate  (90 Base) MCG/ACT inhaler Inhale 2 puffs into the lungs every 6 hours as needed for Wheezing      Blood Glucose Monitoring Suppl SONDRA check BG once daily, DX: E11.9, NIDDM 1 Device 0    omeprazole (PRILOSEC) 20 MG capsule Take 1 capsule by mouth daily. 30 capsule 11    BRILINTA 90 MG TABS tablet TAKE 1 TABLET BY MOUTH 2 TIMES DAILY (AM,PM) 60 tablet 5     No current facility-administered medications for this visit. Review of Systems:   Review of Systems   Constitutional: Negative. Negative for diaphoresis and fatigue. HENT: Negative. Eyes: Negative. Respiratory: Negative. Negative for cough, chest tightness, shortness of breath, wheezing and stridor. Cardiovascular: Negative. Negative for chest pain, palpitations and leg swelling. Gastrointestinal: Negative. Negative for blood in stool and nausea. Genitourinary: Negative. Musculoskeletal: Negative. Skin: Negative. Neurological: Negative. Negative for dizziness, syncope, weakness and light-headedness. Hematological: Negative. Psychiatric/Behavioral: Negative. Physical Examination:    /82 (Site: Right Upper Arm, Position: Sitting, Cuff Size: Large Adult)   Pulse 82   Resp 16   Ht 5' 7\" (1.702 m)   Wt 228 lb (103.4 kg)   SpO2 97%   BMI 35.71 kg/m²    Physical Exam   Constitutional: He appears healthy. No distress. HENT:   Normal cephalic and Atraumatic   Eyes: Pupils are equal, round, and reactive to light. Neck: Normal range of motion and thyroid normal. Neck supple. No JVD present. No neck adenopathy. No thyromegaly present. Cardiovascular: Normal rate, regular rhythm, normal heart sounds, intact distal pulses and normal pulses. Pulmonary/Chest: Effort normal and breath sounds normal. He has no wheezes. He has no rales.  He exhibits no tenderness. Abdominal: Soft. Bowel sounds are normal. There is no abdominal tenderness. Musculoskeletal: Normal range of motion. General: No tenderness or edema. Neurological: He is alert and oriented to person, place, and time. Skin: Skin is warm. No cyanosis. Nails show no clubbing.        LABS:  CBC:   Lab Results   Component Value Date    WBC 8.0 05/27/2020    RBC 5.04 05/27/2020    HGB 15.8 05/27/2020    HCT 46.2 05/27/2020    MCV 91.7 05/27/2020    MCH 31.2 05/27/2020    MCHC 34.1 05/27/2020    RDW 14.5 05/27/2020     05/27/2020    MPV 9.9 03/31/2015     Lipids:  Lab Results   Component Value Date    CHOL 100 08/07/2020    CHOL 146 09/28/2018    CHOL 170 03/31/2015     Lab Results   Component Value Date    TRIG 123 08/07/2020    TRIG 109 09/28/2018    TRIG 136 03/31/2015     Lab Results   Component Value Date    HDL 25 (L) 08/07/2020    HDL 34 (L) 09/28/2018    HDL 31 (L) 03/31/2015     Lab Results   Component Value Date    LDLCALC 50 08/07/2020    LDLCALC 90 09/28/2018    LDLCALC 112 03/31/2015     No results found for: LABVLDL, VLDL  No results found for: CHOLHDLRATIO  CMP:    Lab Results   Component Value Date     04/05/2021    K 3.7 04/05/2021    CL 98 04/05/2021    CO2 26 04/05/2021    BUN 16 04/05/2021    CREATININE 0.67 04/05/2021    GFRAA >60.0 04/05/2021    LABGLOM >60.0 04/05/2021    GLUCOSE 127 04/07/2021    PROT 7.5 04/05/2021    LABALBU 4.1 04/05/2021    CALCIUM 9.7 04/05/2021    BILITOT 0.4 04/05/2021    ALKPHOS 77 04/05/2021    AST 21 04/05/2021    ALT 36 04/05/2021     BMP:    Lab Results   Component Value Date     04/05/2021    K 3.7 04/05/2021    CL 98 04/05/2021    CO2 26 04/05/2021    BUN 16 04/05/2021    LABALBU 4.1 04/05/2021    CREATININE 0.67 04/05/2021    CALCIUM 9.7 04/05/2021    GFRAA >60.0 04/05/2021    LABGLOM >60.0 04/05/2021    GLUCOSE 127 04/07/2021     Magnesium:    Lab Results   Component Value Date    MG 2.1 07/22/2014

## 2021-05-10 RX ORDER — EMPAGLIFLOZIN 25 MG/1
TABLET, FILM COATED ORAL
Qty: 28 TABLET | Refills: 3 | OUTPATIENT
Start: 2021-05-10

## 2021-06-28 DIAGNOSIS — E11.65 UNCONTROLLED TYPE 2 DIABETES MELLITUS WITH HYPERGLYCEMIA (HCC): ICD-10-CM

## 2021-06-28 LAB
ALBUMIN SERPL-MCNC: 4 G/DL (ref 3.5–4.6)
ALP BLD-CCNC: 69 U/L (ref 35–104)
ALT SERPL-CCNC: 27 U/L (ref 0–41)
ANION GAP SERPL CALCULATED.3IONS-SCNC: 11 MEQ/L (ref 9–15)
AST SERPL-CCNC: 15 U/L (ref 0–40)
BILIRUB SERPL-MCNC: <0.2 MG/DL (ref 0.2–0.7)
BUN BLDV-MCNC: 13 MG/DL (ref 6–20)
CALCIUM SERPL-MCNC: 9.6 MG/DL (ref 8.5–9.9)
CHLORIDE BLD-SCNC: 100 MEQ/L (ref 95–107)
CO2: 26 MEQ/L (ref 20–31)
CREAT SERPL-MCNC: 0.8 MG/DL (ref 0.7–1.2)
GFR AFRICAN AMERICAN: >60
GFR NON-AFRICAN AMERICAN: >60
GLOBULIN: 3.5 G/DL (ref 2.3–3.5)
GLUCOSE BLD-MCNC: 140 MG/DL (ref 70–99)
HBA1C MFR BLD: 7 % (ref 4.8–5.9)
POTASSIUM SERPL-SCNC: 4.4 MEQ/L (ref 3.4–4.9)
SODIUM BLD-SCNC: 137 MEQ/L (ref 135–144)
TOTAL PROTEIN: 7.5 G/DL (ref 6.3–8)

## 2021-07-07 ENCOUNTER — OFFICE VISIT (OUTPATIENT)
Dept: ENDOCRINOLOGY | Age: 56
End: 2021-07-07
Payer: COMMERCIAL

## 2021-07-07 VITALS
BODY MASS INDEX: 34.93 KG/M2 | DIASTOLIC BLOOD PRESSURE: 89 MMHG | SYSTOLIC BLOOD PRESSURE: 138 MMHG | WEIGHT: 223 LBS | OXYGEN SATURATION: 93 % | HEART RATE: 78 BPM

## 2021-07-07 DIAGNOSIS — J30.2 SEASONAL ALLERGIES: ICD-10-CM

## 2021-07-07 DIAGNOSIS — E11.65 UNCONTROLLED TYPE 2 DIABETES MELLITUS WITH HYPERGLYCEMIA (HCC): Primary | ICD-10-CM

## 2021-07-07 LAB
CHP ED QC CHECK: NORMAL
GLUCOSE BLD-MCNC: 115 MG/DL

## 2021-07-07 PROCEDURE — 4004F PT TOBACCO SCREEN RCVD TLK: CPT | Performed by: PHYSICIAN ASSISTANT

## 2021-07-07 PROCEDURE — 3017F COLORECTAL CA SCREEN DOC REV: CPT | Performed by: PHYSICIAN ASSISTANT

## 2021-07-07 PROCEDURE — 3051F HG A1C>EQUAL 7.0%<8.0%: CPT | Performed by: PHYSICIAN ASSISTANT

## 2021-07-07 PROCEDURE — G8417 CALC BMI ABV UP PARAM F/U: HCPCS | Performed by: PHYSICIAN ASSISTANT

## 2021-07-07 PROCEDURE — 2022F DILAT RTA XM EVC RTNOPTHY: CPT | Performed by: PHYSICIAN ASSISTANT

## 2021-07-07 PROCEDURE — 82962 GLUCOSE BLOOD TEST: CPT | Performed by: PHYSICIAN ASSISTANT

## 2021-07-07 PROCEDURE — G8427 DOCREV CUR MEDS BY ELIG CLIN: HCPCS | Performed by: PHYSICIAN ASSISTANT

## 2021-07-07 PROCEDURE — 99213 OFFICE O/P EST LOW 20 MIN: CPT | Performed by: PHYSICIAN ASSISTANT

## 2021-07-07 RX ORDER — FLASH GLUCOSE SCANNING READER
1 EACH MISCELLANEOUS
Qty: 1 DEVICE | Refills: 0 | Status: SHIPPED | OUTPATIENT
Start: 2021-07-07 | End: 2021-10-07 | Stop reason: ALTCHOICE

## 2021-07-07 RX ORDER — LORATADINE 10 MG/1
10 TABLET ORAL DAILY
Qty: 30 TABLET | Refills: 5 | Status: SHIPPED | OUTPATIENT
Start: 2021-07-07 | End: 2021-12-29

## 2021-07-07 RX ORDER — FLASH GLUCOSE SENSOR
2 KIT MISCELLANEOUS
Qty: 2 EACH | Refills: 3 | Status: SHIPPED | OUTPATIENT
Start: 2021-07-07 | End: 2021-10-07 | Stop reason: ALTCHOICE

## 2021-07-07 ASSESSMENT — ENCOUNTER SYMPTOMS
SORE THROAT: 0
VOMITING: 0
EYE REDNESS: 0
SHORTNESS OF BREATH: 0
SINUS PRESSURE: 0
EYE PAIN: 0
WHEEZING: 0
RHINORRHEA: 0
COUGH: 0
ABDOMINAL PAIN: 0
NAUSEA: 0
DIARRHEA: 0

## 2021-07-07 NOTE — PROGRESS NOTES
7/7/2021    Assessment:       Diagnosis Orders   1. Uncontrolled type 2 diabetes mellitus with hyperglycemia (HCC)  POCT Glucose    Comprehensive Metabolic Panel    Hemoglobin A1C   2. Seasonal allergies       AVG am glucose 110-140     PLAN:     1. Continue Lantus 30 units at bedtime  2. Increase Trulicity 4.5 mg weekly  3. Continue Jardiance 25 mg daily  4. Start loratadine 10 mg daily   5. Freestyle Chioma ordered  6. Follow-up in 3 months    Orders Placed This Encounter   Procedures    POCT Glucose     No orders of the defined types were placed in this encounter. Return in about 3 months (around 10/7/2021) for Diabetes. Subjective:     Chief Complaint   Patient presents with    Diabetes     Vitals:    07/07/21 1422 07/07/21 1425   BP: (!) 143/94 138/89   Pulse: 78    SpO2: 93%    Weight: 223 lb (101.2 kg)      Wt Readings from Last 3 Encounters:   07/07/21 223 lb (101.2 kg)   04/27/21 228 lb (103.4 kg)   04/07/21 224 lb (101.6 kg)     BP Readings from Last 3 Encounters:   07/07/21 138/89   04/27/21 132/82   04/07/21 119/81     Thalia Noble is a 13-year-old  man with hospital admission 6/2020, for chest pain. Status post PCI's. Has a history of COPD and type 2 insulin-dependent diabetes. Diabetes  He presents for his follow-up diabetic visit. He has type 2 diabetes mellitus. The initial diagnosis of diabetes was made 5 years ago. Pertinent negatives for hypoglycemia include no confusion, dizziness or headaches. Pertinent negatives for diabetes include no chest pain, no fatigue, no polydipsia and no polyuria. There are no hypoglycemic complications. Diabetic complications include heart disease. Risk factors for coronary artery disease include dyslipidemia, diabetes mellitus, male sex and obesity. Current diabetic treatment includes insulin injections and oral agent (triple therapy). He is compliant with treatment all of the time. He is currently taking insulin at bedtime.  Rotation sites for injection include the abdominal wall. He is following a generally healthy diet. Meal planning includes avoidance of concentrated sweets. He participates in exercise three times a week. He monitors blood glucose at home 5+ x per day. His overall blood glucose range is 180-200 mg/dl. An ACE inhibitor/angiotensin II receptor blocker is being taken. He does not see a podiatrist.Eye exam is current. Past Medical History:   Diagnosis Date    CAD (coronary artery disease)     COPD [facility]chronic obstructive pulmonary disease) [facility] [address]     Hypertension     SDH [facility]subdural hematoma) ([facility])     syncope, coughing    Type 2 diabetes mellitus without complication ([facility])      Past Surgical History:   Procedure Laterality Date    COLONOSCOPY  10/17/2014    CORONARY ANGIOPLASTY WITH STENT PLACEMENT  05/26/2020    PTCA       Social History     Socioeconomic History    Marital status:      Spouse name: Not on file    Number of children: Not on file    Years of education: Not on file    Highest education level: Not on file   Occupational History    Not on file   Tobacco Use    Smoking status: Current Some Day Smoker    Smokeless tobacco: Never Used    Tobacco comment: actively trying to quit   Substance and Sexual Activity    Alcohol use: Yes     Comment: weekends     Drug use: Yes     Frequency: 1.0 times per week     Types: Cocaine     Comment: last used 3 days ago     Sexual activity: Not on file   Other Topics Concern    Not on file   Social History Narrative    Not on file     Social Determinants of Health     Financial Resource Strain:     Difficulty of Paying Living Expenses:    Food Insecurity:     Worried About Running Out of Food in the Last Year:     Ran Out of Food in the Last Year:    Transportation Needs:     Lack of Transportation (Medical):      Lack of Transportation (Non-Medical):    Physical Activity:     Days of Exercise per Week:     Minutes of Exercise per Session:    Stress:     Feeling of Stress :    Social Connections:     Frequency of Communication with Friends and Family:     Frequency of Social Gatherings with Friends and Family:     Attends Mu-ism Services:     Active Member of Clubs or Organizations:     Attends Club or Organization Meetings:     Marital Status:    Intimate Partner Violence:     Fear of Current or Ex-Partner:     Emotionally Abused:     Physically Abused:     Sexually Abused:      No family history on file. Allergies   Allergen Reactions    Metformin And Related      Diarrhea        Current Outpatient Medications:     JARDIANCE 25 MG tablet, TAKE 1 TABLET BY MOUTH ONCE A DAY (AM), Disp: 28 tablet, Rfl: 3    Continuous Blood Gluc Sensor (FREESTYLE CHEIKH 14 DAY SENSOR) MISC, 2 Devices by Does not apply route every 14 days, Disp: 2 each, Rfl: 3    Continuous Blood Gluc  (FREESTYLE CHEIKH 14 DAY READER) SONDRA, 1 Device by Does not apply route 4 times daily (before meals and nightly), Disp: 1 Device, Rfl: 0    Dulaglutide (TRULICITY) 4.5 SK/6.4ES SOPN, Inject 4.5 mg into the skin once a week, Disp: 4 pen, Rfl: 3    TRUEplus Lancets 33G MISC, INJECT ONCE ADAY, Disp: 100 each, Rfl: 5    LANTUS SOLOSTAR 100 UNIT/ML injection pen, 30 UNITS AT BEDTIME, Disp: 15 mL, Rfl: 0    aspirin 81 MG EC tablet, TAKE 1 TABLET BY MOUTH DAILY (AM), Disp: 30 tablet, Rfl: 5    atorvastatin (LIPITOR) 40 MG tablet, TAKE 1 TABLET BY MOUTH NIGHTLY (HS), Disp: 30 tablet, Rfl: 3    gabapentin (NEURONTIN) 100 MG capsule, Take 100 mg by mouth 3 times daily. Take 2 caps #x daily, Disp: , Rfl:     nitroGLYCERIN (NITROSTAT) 0.4 MG SL tablet, up to max of 3 total doses.  If no relief after 1 dose, call 911., Disp: 25 tablet, Rfl: 3    blood glucose monitor strips, Check BG four times daily , DX: E11.65,, Disp: 150 strip, Rfl: 6    valsartan (DIOVAN) 160 MG tablet, Take by mouth daily , Disp: , Rfl: 2    Insulin Pen Needle (NOVOFINE) 32G X 6 MM MISC, qd, Disp: 100 each, Rfl: 3   [address] 09/28/2018    LDLCALC 112 03/31/2015     No results found for: LABVLDL, VLDL  No results found for: CHOLHDLRATIO  No results found for: TESTM  Lab Results   Component Value Date    TSH 0.928 07/22/2014       Review of Systems   Constitutional: Negative for chills, fatigue and fever. HENT: Negative for congestion, ear pain, postnasal drip, rhinorrhea, sinus pressure and sore throat. Eyes: Negative for pain and redness. Respiratory: Negative for cough, shortness of breath and wheezing. Cardiovascular: Negative for chest pain, palpitations and leg swelling. Gastrointestinal: Negative for abdominal pain, diarrhea, nausea and vomiting. Endocrine: Negative for polydipsia and polyuria. Genitourinary: Negative for difficulty urinating. Musculoskeletal: Negative for arthralgias. Skin: Negative for rash. Allergic/Immunologic: Negative for environmental allergies. Neurological: Negative for dizziness and headaches. Hematological: Negative for adenopathy. Psychiatric/Behavioral: Negative for confusion. Objective:   Physical Exam  Vitals reviewed. Constitutional:       Appearance: He is well-developed. HENT:      Head: Normocephalic and atraumatic. Nose: No congestion. Mouth/Throat:      Mouth: Mucous membranes are moist.   Eyes:      Conjunctiva/sclera: Conjunctivae normal.   Cardiovascular:      Rate and Rhythm: Normal rate and regular rhythm. Heart sounds: Normal heart sounds. Pulmonary:      Effort: Pulmonary effort is normal.      Breath sounds: Normal breath sounds. Abdominal:      General: Bowel sounds are normal.      Palpations: Abdomen is soft. Musculoskeletal:         General: Normal range of motion. Cervical back: Normal range of motion and neck supple. Skin:     General: Skin is warm and dry. Neurological:      Mental Status: He is alert and oriented to person, place, and time.    Psychiatric:         Mood and Affect: Mood normal.

## 2021-07-07 NOTE — PATIENT INSTRUCTIONS
Endocrinology-diabetes    1. Check your blood sugars 4 times a day, before meals and at night  2. Document these numbers and a blood glucose log and bring them with you to your follow-up appointment. 3. Do not take your mealtime insulin if your blood sugars less than 100  4. Call our office if you have blood sugars less than 80 or greater then 200 on two or more occasions  5. Call our office if you have any questions regarding your blood sugars or insulin dosing regiment  6. Signs of low blood sugar include sweating , heart racing, dizziness and weakness. Check your blood sugar if you have any of these symptoms. Medications-  1. Continue Lantus 30 units at bedtime  2. Increase Trulicity 4.5 mg weekly  3. Continue Jardiance 25 mg daily  4. Freestyle Chioma ordered  5.  Follow-up in 3 months

## 2021-07-26 RX ORDER — DULAGLUTIDE 4.5 MG/.5ML
4.5 INJECTION, SOLUTION SUBCUTANEOUS WEEKLY
Qty: 2 ML | Refills: 3 | Status: SHIPPED | OUTPATIENT
Start: 2021-07-26 | End: 2021-08-04

## 2021-08-04 RX ORDER — DULAGLUTIDE 4.5 MG/.5ML
4.5 INJECTION, SOLUTION SUBCUTANEOUS WEEKLY
Qty: 2 ML | Refills: 0 | Status: SHIPPED | OUTPATIENT
Start: 2021-08-04 | End: 2021-09-07

## 2021-08-07 DIAGNOSIS — I24.9 ACS (ACUTE CORONARY SYNDROME) (HCC): Primary | ICD-10-CM

## 2021-08-09 RX ORDER — ASPIRIN 81 MG/1
TABLET ORAL
Qty: 90 TABLET | Refills: 3 | Status: SHIPPED | OUTPATIENT
Start: 2021-08-09 | End: 2022-08-11 | Stop reason: SDUPTHER

## 2021-08-24 RX ORDER — GLIMEPIRIDE 2 MG/1
TABLET ORAL
Qty: 28 TABLET | Refills: 3 | OUTPATIENT
Start: 2021-08-24

## 2021-08-25 RX ORDER — EMPAGLIFLOZIN 25 MG/1
TABLET, FILM COATED ORAL
Qty: 28 TABLET | Refills: 3 | Status: SHIPPED | OUTPATIENT
Start: 2021-08-25 | End: 2022-01-14 | Stop reason: SDUPTHER

## 2021-09-07 RX ORDER — DULAGLUTIDE 4.5 MG/.5ML
4.5 INJECTION, SOLUTION SUBCUTANEOUS WEEKLY
Qty: 2 ML | Refills: 3 | Status: SHIPPED | OUTPATIENT
Start: 2021-09-07 | End: 2022-01-14 | Stop reason: SDUPTHER

## 2021-09-08 ENCOUNTER — HOSPITAL ENCOUNTER (OUTPATIENT)
Dept: GENERAL RADIOLOGY | Age: 56
Discharge: HOME | End: 2021-09-10
Payer: COMMERCIAL

## 2021-09-08 DIAGNOSIS — M54.50 LOW BACK PAIN, UNSPECIFIED BACK PAIN LATERALITY, UNSPECIFIED CHRONICITY, UNSPECIFIED WHETHER SCIATICA PRESENT: ICD-10-CM

## 2021-09-08 PROCEDURE — 72110 X-RAY EXAM L-2 SPINE 4/>VWS: CPT

## 2021-09-14 RX ORDER — GLIMEPIRIDE 2 MG/1
TABLET ORAL
Qty: 28 TABLET | Refills: 3 | Status: SHIPPED | OUTPATIENT
Start: 2021-09-14 | End: 2022-01-04

## 2021-10-07 ENCOUNTER — OFFICE VISIT (OUTPATIENT)
Dept: ENDOCRINOLOGY | Age: 56
End: 2021-10-07
Payer: COMMERCIAL

## 2021-10-07 VITALS
SYSTOLIC BLOOD PRESSURE: 122 MMHG | HEART RATE: 83 BPM | DIASTOLIC BLOOD PRESSURE: 85 MMHG | WEIGHT: 222 LBS | HEIGHT: 67 IN | OXYGEN SATURATION: 94 % | BODY MASS INDEX: 34.84 KG/M2

## 2021-10-07 DIAGNOSIS — Z79.4 DIABETES MELLITUS DUE TO UNDERLYING CONDITION WITH HYPEROSMOLARITY WITHOUT COMA, WITH LONG-TERM CURRENT USE OF INSULIN (HCC): ICD-10-CM

## 2021-10-07 DIAGNOSIS — E11.65 UNCONTROLLED TYPE 2 DIABETES MELLITUS WITH HYPERGLYCEMIA (HCC): Primary | ICD-10-CM

## 2021-10-07 DIAGNOSIS — E08.00 DIABETES MELLITUS DUE TO UNDERLYING CONDITION WITH HYPEROSMOLARITY WITHOUT COMA, WITH LONG-TERM CURRENT USE OF INSULIN (HCC): ICD-10-CM

## 2021-10-07 LAB
CHP ED QC CHECK: NORMAL
GLUCOSE BLD-MCNC: 128 MG/DL

## 2021-10-07 PROCEDURE — 82962 GLUCOSE BLOOD TEST: CPT | Performed by: PHYSICIAN ASSISTANT

## 2021-10-07 PROCEDURE — 3051F HG A1C>EQUAL 7.0%<8.0%: CPT | Performed by: PHYSICIAN ASSISTANT

## 2021-10-07 PROCEDURE — G8484 FLU IMMUNIZE NO ADMIN: HCPCS | Performed by: PHYSICIAN ASSISTANT

## 2021-10-07 PROCEDURE — G8427 DOCREV CUR MEDS BY ELIG CLIN: HCPCS | Performed by: PHYSICIAN ASSISTANT

## 2021-10-07 PROCEDURE — 3017F COLORECTAL CA SCREEN DOC REV: CPT | Performed by: PHYSICIAN ASSISTANT

## 2021-10-07 PROCEDURE — 4004F PT TOBACCO SCREEN RCVD TLK: CPT | Performed by: PHYSICIAN ASSISTANT

## 2021-10-07 PROCEDURE — G8417 CALC BMI ABV UP PARAM F/U: HCPCS | Performed by: PHYSICIAN ASSISTANT

## 2021-10-07 PROCEDURE — 99213 OFFICE O/P EST LOW 20 MIN: CPT | Performed by: PHYSICIAN ASSISTANT

## 2021-10-07 PROCEDURE — 2022F DILAT RTA XM EVC RTNOPTHY: CPT | Performed by: PHYSICIAN ASSISTANT

## 2021-10-07 RX ORDER — PREGABALIN 50 MG/1
CAPSULE ORAL
Status: ON HOLD | COMMUNITY
Start: 2021-09-30 | End: 2022-06-01

## 2021-10-07 RX ORDER — GLUCOSAMINE HCL/CHONDROITIN SU 500-400 MG
CAPSULE ORAL
Qty: 150 STRIP | Refills: 6 | Status: SHIPPED | OUTPATIENT
Start: 2021-10-07 | End: 2022-04-18 | Stop reason: SDUPTHER

## 2021-10-07 RX ORDER — FLUOXETINE 10 MG/1
10 CAPSULE ORAL DAILY
COMMUNITY
Start: 2021-09-18

## 2021-10-07 RX ORDER — FLASH GLUCOSE SENSOR
1 KIT MISCELLANEOUS
Qty: 2 EACH | Refills: 3 | Status: SHIPPED | OUTPATIENT
Start: 2021-10-07 | End: 2021-11-29

## 2021-10-07 RX ORDER — GLUCOSAM/CHON-MSM1/C/MANG/BOSW 500-416.6
1 TABLET ORAL
Qty: 100 EACH | Refills: 5 | Status: SHIPPED | OUTPATIENT
Start: 2021-10-07 | End: 2022-03-29

## 2021-10-07 RX ORDER — INSULIN GLARGINE 100 [IU]/ML
INJECTION, SOLUTION SUBCUTANEOUS
Qty: 15 ML | Refills: 3 | Status: SHIPPED | OUTPATIENT
Start: 2021-10-07 | End: 2022-05-17

## 2021-10-07 RX ORDER — CARBOXYMETHYLCELLULOSE SODIUM 5 MG/ML
SOLUTION/ DROPS OPHTHALMIC
Status: ON HOLD | COMMUNITY
Start: 2021-09-27 | End: 2022-06-01

## 2021-10-07 RX ORDER — FLASH GLUCOSE SCANNING READER
1 EACH MISCELLANEOUS
Qty: 1 EACH | Refills: 0 | Status: SHIPPED | OUTPATIENT
Start: 2021-10-07

## 2021-10-07 ASSESSMENT — ENCOUNTER SYMPTOMS
NAUSEA: 0
WHEEZING: 0
VOMITING: 0
ABDOMINAL PAIN: 0
SINUS PRESSURE: 0
RHINORRHEA: 0
SHORTNESS OF BREATH: 0
EYE PAIN: 0
DIARRHEA: 0
SORE THROAT: 0
EYE REDNESS: 0
COUGH: 0

## 2021-10-07 NOTE — PROGRESS NOTES
10/7/2021    Assessment:       Diagnosis Orders   1. Uncontrolled type 2 diabetes mellitus with hyperglycemia ([facility])  POCT Glucose    Comprehensive Metabolic Panel    Hemoglobin A1C    Lipid Panel   2. Diabetes mellitus due to underlying condition with hyperosmolarity without coma, with long-term current use of insulin ([facility])  blood glucose monitor strips     AVG am glucose 110-140     PLAN:     1. Continue Lantus 30 units at bedtime  2. Increase Trulicity 4.5 mg weekly  3. Continue Jardiance 25 mg daily  4. Freestyle Chioma 2 ordered  5. Follow-up in 3 months    Orders Placed This Encounter   Procedures    POCT Glucose     No orders of the defined types were placed in this encounter. No follow-ups on file. Subjective:     Chief Complaint   Patient presents with    Diabetes     Vitals:    10/07/21 1436   BP: 122/85   Pulse: 83   SpO2: 94%   Weight: 222 lb (100.7 kg)   Height: 5' 7\" (1.702 m)     Wt Readings from Last 3 Encounters:   10/07/21 222 lb (100.7 kg)   07/07/21 223 lb (101.2 kg)   04/27/21 228 lb (103.4 kg)     BP Readings from Last 3 Encounters:   10/07/21 122/85   07/07/21 138/89   04/27/21 132/82     Rusty Muñiz is a 58-year-old  man with hospital admission 6/2020, for chest pain. Status post PCI's. Has a history of COPD and type 2 insulin-dependent diabetes. Diabetes  He presents for his follow-up diabetic visit. He has type 2 diabetes mellitus. The initial diagnosis of diabetes was made 5 years ago. Pertinent negatives for hypoglycemia include no confusion, dizziness or headaches. Pertinent negatives for diabetes include no chest pain, no fatigue, no polydipsia and no polyuria. There are no hypoglycemic complications. Diabetic complications include heart disease. Risk factors for coronary artery disease include dyslipidemia, diabetes mellitus, male sex and obesity. Current diabetic treatment includes insulin injections and oral agent (triple therapy).  He is compliant with treatment all of the time. He is currently taking insulin at bedtime. Rotation sites for injection include the abdominal wall. He is following a generally healthy diet. Meal planning includes avoidance of concentrated sweets. He participates in exercise three times a week. He monitors blood glucose at home 5+ x per day. His overall blood glucose range is 180-200 mg/dl. An ACE inhibitor/angiotensin II receptor blocker is being taken. He does not see a podiatrist.Eye exam is current. Past Medical History:   Diagnosis Date    CAD (coronary artery disease)     [facility] obstructive [facility]) [facility] [address]     Hypertension     SDH (subdural hematoma) ([facility])     syncope, coughing    Type 2 diabetes mellitus without complication ([facility])      Past Surgical History:   Procedure Laterality Date    COLONOSCOPY  10/17/2014    CORONARY ANGIOPLASTY WITH STENT PLACEMENT  05/26/2020    PTCA       Social History     Socioeconomic History    Marital status:      Spouse name: Not on file    Number of children: Not on file    Years of education: Not on file    Highest education level: Not on file   Occupational History    Not on file   Tobacco Use    Smoking status: Current Some Day Smoker    Smokeless tobacco: Never Used    Tobacco comment: actively trying to quit   Substance and Sexual Activity    Alcohol use: Yes     Comment: weekends     Drug use: Yes     Frequency: 1.0 times per week     Types: Cocaine     Comment: last used 3 days ago     Sexual activity: Not on file   Other Topics Concern    Not on file   Social History Narrative    Not on file     Social Determinants of Health     Financial Resource Strain:     Difficulty of Paying Living Expenses:    Food Insecurity:     Worried About Running Out of Food in the Last Year:     Ran Out of Food in the Last Year:    Transportation Needs:     Lack of Transportation (Medical):      Lack of Transportation (Non-Medical):    Physical Activity:     Days of Exercise per Week:     Minutes of Exercise per Session:    Stress:     Feeling of Stress :    Social Connections:     Frequency of Communication with Friends and Family:     Frequency of Social Gatherings with Friends and Family:     Attends Adventism Services:     Active Member of Clubs or Organizations:     Attends Club or Organization Meetings:     Marital Status:    Intimate Partner Violence:     Fear of Current or Ex-Partner:     Emotionally Abused:     Physically Abused:     Sexually Abused:      No family history on file. Allergies   Allergen Reactions    Metformin And Related      Diarrhea        Current Outpatient Medications:     pregabalin (LYRICA) 50 MG capsule, , Disp: , Rfl:     FLUoxetine (PROZAC) 10 MG capsule, , Disp: , Rfl:     REFRESH TEARS 0.5 % SOLN ophthalmic solution, , Disp: , Rfl:     glimepiride (AMARYL) 2 MG tablet, TAKE 1 TABLET BY MOUTH AT DINNER (PM), Disp: 28 tablet, Rfl: 3    TRULICITY 4.5 BD/3.5YO SOPN, INJECT 4.5 MG INTO THE SKIN ONCE A WEEK, Disp: 2 mL, Rfl: 3    JARDIANCE 25 MG tablet, TAKE 1 TABLET BY MOUTH ONCE A DAY (AM), Disp: 28 tablet, Rfl: 3    aspirin (ASPIRIN LOW DOSE) 81 MG EC tablet, TAKE 1 TABLET BY MOUTH DAILY (AM), Disp: 90 tablet, Rfl: 3    loratadine (CLARITIN) 10 MG tablet, Take 1 tablet by mouth daily, Disp: 30 tablet, Rfl: 5    TRUEplus Lancets 33G MISC, INJECT ONCE ADAY, Disp: 100 each, Rfl: 5    LANTUS SOLOSTAR 100 UNIT/ML injection pen, 30 UNITS AT BEDTIME, Disp: 15 mL, Rfl: 0    atorvastatin (LIPITOR) 40 MG tablet, TAKE 1 TABLET BY MOUTH NIGHTLY (HS), Disp: 30 tablet, Rfl: 3    gabapentin (NEURONTIN) 100 MG capsule, Take 100 mg by mouth 3 times daily. Take 2 caps #x daily, Disp: , Rfl:     nitroGLYCERIN (NITROSTAT) 0.4 MG SL tablet, up to max of 3 total doses.  If no relief after 1 dose, call 911., Disp: 25 tablet, Rfl: 3    blood glucose monitor strips, Check BG four times daily , DX: E11.65,, Disp: 150 strip, Rfl: 6    valsartan (DIOVAN) 160 MG tablet, Take by mouth daily , Disp: , Rfl: 2    Insulin Pen Needle (NOVOFINE) 32G X 6 MM MISC, qd, Disp: 100 each, Rfl: 3    cyclobenzaprine (FLEXERIL) 10 MG tablet, , Disp: , Rfl: 1    hydrochlorothiazide (HYDRODIURIL) 12.5 MG tablet, daily , Disp: , Rfl: 0    DULERA 200-5 MCG/ACT inhaler, , Disp: , Rfl: 2    acetaminophen (TYLENOL ARTHRITIS PAIN) 650 MG extended release tablet, Take 650 mg by mouth 2 times daily, Disp: , Rfl:     fluticasone (FLONASE) 50 MCG/ACT nasal spray, 1 spray by Nasal route daily, Disp: , Rfl:     albuterol sulfate  (90 Base) MCG/ACT inhaler, Inhale 2 puffs into the lungs every 6 hours as needed for Wheezing, Disp: , Rfl:     Blood Glucose Monitoring Suppl SONDRA, check BG once daily, DX: E11.9, NIDDM, Disp: 1 Device, Rfl: 0    omeprazole (PRILOSEC) 20 MG capsule, Take 1 capsule by mouth daily. , Disp: 30 capsule, Rfl: 11    Continuous Blood Gluc  (FREESTYLE CHEIKH 14 DAY READER) SONDRA, 1 Device by Does not apply route 4 times daily (before meals and nightly) (Patient not taking: Reported on 10/7/2021), Disp: 1 Device, Rfl: 0    Continuous Blood Gluc Sensor (FREESTYLE CHEIKH 14 DAY SENSOR) MISC, 2 Devices by Does not apply route every 14 days (Patient not taking: Reported on 10/7/2021), Disp: 2 each, Rfl: 3  Lab Results   Component Value Date     10/05/2021    K 4.0 10/05/2021    CL 98 10/05/2021    CO2 24 10/05/2021    BUN 15 10/05/2021    CREATININE 0.64 (L) 10/05/2021    GLUCOSE 128 10/07/2021    CALCIUM 9.8 10/05/2021    PROT 7.6 10/05/2021    LABALBU 4.5 10/05/2021    BILITOT 0.4 10/05/2021    ALKPHOS 81 10/05/2021    AST 20 10/05/2021    ALT 30 10/05/2021    LABGLOM >60.0 10/05/2021    GFRAA >60.0 10/05/2021    GLOB 3.1 10/05/2021     Lab Results   Component Value Date    WBC 8.0 05/27/2020    HGB 15.8 05/27/2020    HCT 46.2 05/27/2020    MCV 91.7 05/27/2020     05/27/2020     Lab Results   Component Value Date    LABA1C 7.3 (H) 10/05/2021 LABA1C 7.0 (H) 06/28/2021    LABA1C 7.1 (H) 04/05/2021     Lab Results   Component Value Date    CHOL 100 08/07/2020    CHOL 146 09/28/2018    CHOL 170 03/31/2015     Lab Results   Component Value Date    TRIG 123 08/07/2020    TRIG 109 09/28/2018    TRIG 136 03/31/2015     Lab Results   Component Value Date    HDL 25 (L) 08/07/2020    HDL 34 (L) 09/28/2018    HDL 31 (L) 03/31/2015     Lab Results   Component Value Date    LDLCALC 50 08/07/2020    [address] 90 09/28/2018    [address] 112 03/31/2015     No results found for: LABVLDL, VLDL  No results found for: CHOLHDLRATIO  No results found for: TESTM  Lab Results   Component Value Date    TSH 0.928 07/22/2014       Review of Systems   Constitutional: Negative for chills, fatigue and fever. HENT: Negative for congestion, ear pain, postnasal drip, rhinorrhea, sinus pressure and sore throat. Eyes: Negative for pain and redness. Respiratory: Negative for cough, shortness of breath and wheezing. Cardiovascular: Negative for chest pain, palpitations and leg swelling. Gastrointestinal: Negative for abdominal pain, diarrhea, nausea and vomiting. Endocrine: Negative for polydipsia and polyuria. Genitourinary: Negative for difficulty urinating. Musculoskeletal: Negative for arthralgias. Skin: Negative for rash. Allergic/Immunologic: Negative for environmental allergies. Neurological: Negative for dizziness and headaches. Hematological: Negative for adenopathy. Psychiatric/Behavioral: Negative for confusion. Objective:   Physical Exam  Vitals reviewed. Constitutional:       Appearance: He is well-developed. HENT:      Head: Normocephalic and atraumatic. Nose: No congestion. Mouth/Throat:      Mouth: Mucous membranes are moist.   Eyes:      Conjunctiva/sclera: Conjunctivae normal.   Cardiovascular:      Rate and Rhythm: Normal rate and regular rhythm. Heart sounds: Normal heart sounds.    Pulmonary:      Effort: Pulmonary effort is normal.      Breath sounds: Normal breath sounds. Abdominal:      General: Bowel sounds are normal.      Palpations: Abdomen is soft. Musculoskeletal:         General: Normal range of motion. Cervical back: Normal range of motion and neck supple. Skin:     General: Skin is warm and dry. Neurological:      Mental Status: He is alert and oriented to person, place, and time.    Psychiatric:         Mood and Affect: Mood normal.

## 2021-10-27 ENCOUNTER — OFFICE VISIT (OUTPATIENT)
Dept: CARDIOLOGY CLINIC | Age: 56
End: 2021-10-27
Payer: COMMERCIAL

## 2021-10-27 VITALS
BODY MASS INDEX: 35.19 KG/M2 | HEART RATE: 80 BPM | OXYGEN SATURATION: 97 % | RESPIRATION RATE: 16 BRPM | SYSTOLIC BLOOD PRESSURE: 128 MMHG | WEIGHT: 224.2 LBS | DIASTOLIC BLOOD PRESSURE: 70 MMHG | HEIGHT: 67 IN

## 2021-10-27 DIAGNOSIS — R07.9 CHEST PAIN, UNSPECIFIED TYPE: ICD-10-CM

## 2021-10-27 DIAGNOSIS — I25.10 CORONARY ARTERY DISEASE INVOLVING NATIVE CORONARY ARTERY OF NATIVE HEART WITHOUT ANGINA PECTORIS: Primary | ICD-10-CM

## 2021-10-27 DIAGNOSIS — E78.2 MIXED HYPERLIPIDEMIA: ICD-10-CM

## 2021-10-27 DIAGNOSIS — I10 ESSENTIAL HYPERTENSION: ICD-10-CM

## 2021-10-27 PROCEDURE — 3017F COLORECTAL CA SCREEN DOC REV: CPT | Performed by: INTERNAL MEDICINE

## 2021-10-27 PROCEDURE — 99214 OFFICE O/P EST MOD 30 MIN: CPT | Performed by: INTERNAL MEDICINE

## 2021-10-27 PROCEDURE — G8427 DOCREV CUR MEDS BY ELIG CLIN: HCPCS | Performed by: INTERNAL MEDICINE

## 2021-10-27 PROCEDURE — 4004F PT TOBACCO SCREEN RCVD TLK: CPT | Performed by: INTERNAL MEDICINE

## 2021-10-27 PROCEDURE — G8484 FLU IMMUNIZE NO ADMIN: HCPCS | Performed by: INTERNAL MEDICINE

## 2021-10-27 PROCEDURE — 93000 ELECTROCARDIOGRAM COMPLETE: CPT | Performed by: INTERNAL MEDICINE

## 2021-10-27 PROCEDURE — G8417 CALC BMI ABV UP PARAM F/U: HCPCS | Performed by: INTERNAL MEDICINE

## 2021-10-27 RX ORDER — METOPROLOL SUCCINATE 25 MG/1
25 TABLET, EXTENDED RELEASE ORAL DAILY
Qty: 90 TABLET | Refills: 3 | Status: ON HOLD | OUTPATIENT
Start: 2021-10-27 | End: 2022-06-03 | Stop reason: HOSPADM

## 2021-10-27 ASSESSMENT — ENCOUNTER SYMPTOMS
GASTROINTESTINAL NEGATIVE: 1
BLOOD IN STOOL: 0
WHEEZING: 0
STRIDOR: 0
SHORTNESS OF BREATH: 0
EYES NEGATIVE: 1
CHEST TIGHTNESS: 0
COUGH: 0
NAUSEA: 0
RESPIRATORY NEGATIVE: 1

## 2021-10-27 NOTE — PROGRESS NOTES
Subsequent Progress Note  Patient: Manasa Yanes  YOB: 1965  MRN: [id number]    Chief Complaint:  CAD HTN HPL   Chief Complaint   Patient presents with    6 Month Follow-Up    Coronary Artery Disease    Hypertension    Chest Pain     tightness, constant        CV Data:  5/26/20 RCA ASHWIN  5/21 Echo 60    Subjective/HPI: no cp no sob no flaa no bleed takes meds. 10/27/21 had couple chest pains relieved with NTG some SOB involved. Takes meds. Otherwise active. No bleed. occ smker  occ etoh  Work- disabled. +FH      EKG: SR 66    Past Medical History:   Diagnosis Date    CAD (coronary artery disease)     COPD (chronic obstructive pulmonary disease) (HCC)     Hypertension     SDH (subdural hematoma) ([facility])     syncope, coughing    Type 2 diabetes mellitus without complication [facility])        Past Surgical History:   Procedure Laterality Date    COLONOSCOPY  10/17/2014    CORONARY ANGIOPLASTY WITH STENT PLACEMENT  05/26/2020    PTCA         History reviewed. No pertinent family history.     Social History     Socioeconomic History    Marital status:      Spouse name: None    Number of children: None    Years of education: None    Highest education level: None   Occupational History    None   Tobacco Use    Smoking status: Current Some Day Smoker    Smokeless tobacco: Never Used    Tobacco comment: actively trying to quit   Substance and Sexual Activity    Alcohol use: Yes     Comment: weekends     Drug use: Yes     Frequency: 1.0 times per week     Types: Cocaine     Comment: last used 3 days ago     Sexual activity: None   Other Topics Concern    None   Social History Narrative    None     Social Determinants of Health     Financial Resource Strain:     Difficulty of Paying Living Expenses:    Food Insecurity:     Worried About Running Out of Food in the Last Year:     Ran Out of Food in the Last Year:    Transportation Needs:     Lack of Transportation (Medical):  Lack of Transportation (Non-Medical):    Physical Activity:     Days of Exercise per Week:     Minutes of Exercise per Session:    Stress:     Feeling of Stress :    Social Connections:     Frequency of Communication with Friends and Family:     Frequency of Social Gatherings with Friends and Family:     Attends Yarsani Services:     Active Member of Clubs or Organizations:     Attends Club or Organization Meetings:     Marital Status:    Intimate Partner Violence:     Fear of Current or Ex-Partner:     Emotionally Abused:     Physically Abused:     Sexually Abused:         Allergies   Allergen Reactions    Metformin And Related      Diarrhea        Current Outpatient Medications   Medication Sig Dispense Refill    metoprolol succinate (TOPROL XL) 25 MG extended release tablet Take 1 tablet by mouth daily 90 tablet 3    pregabalin (LYRICA) 50 MG capsule       FLUoxetine (PROZAC) 10 MG capsule       REFRESH TEARS 0.5 % SOLN ophthalmic solution       blood glucose monitor strips Check BG four times daily , DX: E11.65, 150 strip 6    Insulin Pen Needle (NOVOFINE) 32G X 6 MM MISC qd 100 each 3    insulin glargine (LANTUS SOLOSTAR) 100 UNIT/ML injection pen 30 UNITS AT BEDTIME 15 mL 3    TRUEplus Lancets 33G MISC 1 Device by Does not apply route 4 times daily (before meals and nightly) 100 each 5    Continuous Blood Gluc Sensor (FREESTYLE CHEIKH 2 SENSOR) MISC 1 Device by Does not apply route every 14 days 2 each 3    Continuous Blood Gluc  (FREESTYLE CHEIKH 2 READER) SONDRA 1 Device by Does not apply route 4 times daily (before meals and nightly) 1 each 0    glimepiride (AMARYL) 2 MG tablet TAKE 1 TABLET BY MOUTH AT DINNER (PM) 28 tablet 3    TRULICITY 4.5 QJ/1.5XA SOPN INJECT 4.5 MG INTO THE SKIN ONCE A WEEK 2 mL 3    JARDIANCE 25 MG tablet TAKE 1 TABLET BY MOUTH ONCE A DAY (AM) 28 tablet 3    aspirin (ASPIRIN LOW DOSE) 81 MG EC tablet TAKE 1 TABLET BY MOUTH DAILY (AM) 90 tablet 3  loratadine (CLARITIN) 10 MG tablet Take 1 tablet by mouth daily 30 tablet 5    atorvastatin (LIPITOR) 40 MG tablet TAKE 1 TABLET BY MOUTH NIGHTLY (HS) 30 tablet 3    gabapentin (NEURONTIN) 100 MG capsule Take 100 mg by mouth 3 times daily. Take 2 caps #x daily      nitroGLYCERIN (NITROSTAT) 0.4 MG SL tablet up to max of 3 total doses. If no relief after 1 dose, call 911. 25 tablet 3    valsartan (DIOVAN) 160 MG tablet Take by mouth daily   2    cyclobenzaprine (FLEXERIL) 10 MG tablet   1    hydrochlorothiazide (HYDRODIURIL) 12.5 MG tablet daily   0    DULERA 200-5 MCG/ACT inhaler   2    acetaminophen (TYLENOL ARTHRITIS PAIN) 650 MG extended release tablet Take 650 mg by mouth 2 times daily      fluticasone (FLONASE) 50 MCG/ACT nasal spray 1 spray by Nasal route daily      albuterol sulfate  (90 Base) MCG/ACT inhaler Inhale 2 puffs into the lungs every 6 hours as needed for Wheezing      Blood Glucose Monitoring Suppl SONDRA check BG once daily, DX: E11.9, NIDDM 1 Device 0    omeprazole (PRILOSEC) 20 MG capsule Take 1 capsule by mouth daily. 30 capsule 11     No current facility-administered medications for this visit. Review of Systems:   Review of Systems   Constitutional: Negative. Negative for diaphoresis and fatigue. HENT: Negative. Eyes: Negative. Respiratory: Negative. Negative for cough, chest tightness, shortness of breath, wheezing and stridor. Cardiovascular: Negative. Negative for chest pain, palpitations and leg swelling. Gastrointestinal: Negative. Negative for blood in stool and nausea. Genitourinary: Negative. Musculoskeletal: Negative. Skin: Negative. Neurological: Negative. Negative for dizziness, syncope, weakness and light-headedness. Hematological: Negative. Psychiatric/Behavioral: Negative.           Physical Examination:    /70 (Site: Left Upper Arm, Position: Sitting, Cuff Size: Large Adult)   Pulse 80   Resp 16   Ht 5' 7\" (1.702 m)   Wt 224 lb 3.2 oz (101.7 kg)   SpO2 97%   BMI 35.11 kg/m²    Physical Exam   Constitutional: He appears healthy. No distress. HENT:   Normal cephalic and Atraumatic   Eyes: Pupils are equal, round, and reactive to light. Neck: Thyroid normal. No JVD present. No neck adenopathy. No thyromegaly present. Cardiovascular: Normal rate, regular rhythm, normal heart sounds, intact distal pulses and normal pulses. Pulmonary/Chest: Effort normal and breath sounds normal. He has no wheezes. He has no rales. He exhibits no tenderness. Abdominal: Soft. Bowel sounds are normal. There is no abdominal tenderness. Musculoskeletal:         General: No tenderness or edema. Normal range of motion. Cervical back: Normal range of motion and neck supple. Neurological: He is alert and oriented to person, place, and time. Skin: Skin is warm. No cyanosis. Nails show no clubbing.        LABS:  CBC:   Lab Results   Component Value Date    WBC 8.0 05/27/2020    RBC 5.04 05/27/2020    HGB 15.8 05/27/2020    HCT 46.2 05/27/2020    MCV 91.7 05/27/2020    MCH 31.2 05/27/2020    MCHC 34.1 05/27/2020    RDW 14.5 05/27/2020     05/27/2020    MPV 9.9 03/31/2015     Lipids:  Lab Results   Component Value Date    CHOL 100 08/07/2020    CHOL 146 09/28/2018    CHOL 170 03/31/2015     Lab Results   Component Value Date    TRIG 123 08/07/2020    TRIG 109 09/28/2018    TRIG 136 03/31/2015     Lab Results   Component Value Date    HDL 25 (L) 08/07/2020    HDL 34 (L) 09/28/2018    HDL 31 (L) 03/31/2015     Lab Results   Component Value Date    LDLCALC 50 08/07/2020    LDLCALC 90 09/28/2018    LDLCALC 112 03/31/2015     No results found for: LABVLDL, VLDL  No results found for: CHOLHDLRATIO  CMP:    Lab Results   Component Value Date     10/05/2021    K 4.0 10/05/2021    CL 98 10/05/2021    CO2 24 10/05/2021    BUN 15 10/05/2021    CREATININE 0.64 10/05/2021    GFRAA >60.0 10/05/2021    LABGLOM >60.0 10/05/2021 GLUCOSE 128 10/07/2021    PROT 7.6 10/05/2021    LABALBU 4.5 10/05/2021    CALCIUM 9.8 10/05/2021    BILITOT 0.4 10/05/2021    ALKPHOS 81 10/05/2021    AST 20 10/05/2021    ALT 30 10/05/2021     BMP:    Lab Results   Component Value Date     10/05/2021    K 4.0 10/05/2021    CL 98 10/05/2021    CO2 24 10/05/2021    BUN 15 10/05/2021    LABALBU 4.5 10/05/2021    CREATININE 0.64 10/05/2021    CALCIUM 9.8 10/05/2021    GFRAA >60.0 10/05/2021    LABGLOM >60.0 10/05/2021    GLUCOSE 128 10/07/2021     Magnesium:    Lab Results   Component Value Date    MG 2.1 07/22/2014     TSH:  Lab Results   Component Value Date    TSH 0.928 07/22/2014       Patient Active Problem List   Diagnosis    [facility]    ED (erectile dysfunction)    Hypertrophy of prostate with urinary obstruction and other lower urinary tract symptoms (LUTS)    Incomplete bladder emptying    Nocturia    [facility]    Urinary frequency    Bilateral carpal tunnel syndrome    Chest pain    Uncontrolled type 2 diabetes mellitus with hyperglycemia ([facility])    ACS [facility]acute coronary syndrome) ([facility]    Seasonal allergies       There are no discontinued medications. Modified Medications    No medications on file       Orders Placed This Encounter   Medications    metoprolol succinate (TOPROL XL) 25 MG extended release tablet     Sig: Take 1 tablet by mouth daily     Dispense:  90 tablet     Refill:  3       Assessment/Plan:    1. Coronary artery disease involving native coronary artery of native heart without angina pectoris    CP - SPect then RTO. Start Toprol XL/    2. Essential hypertension  Stable - continue meds. Low salt diet. 3. Mixed hyperlipidemia  Statin - continue meds. Low fat diet. Fasting labs. Counseling:  Heart Healthy Lifestyle, Improve BMI, Stop Smoking, Low Salt Diet, Take Precautions to Prevent Falls and Walk Daily    Return for AFTER TESTS.       Electronically signed by Sherry Zazueta MD on 10/27/2021 at 1:07 PM

## 2021-11-30 RX ORDER — FLASH GLUCOSE SENSOR
KIT MISCELLANEOUS
Qty: 2 EACH | Refills: 3 | Status: SHIPPED | OUTPATIENT
Start: 2021-11-30 | End: 2022-04-18 | Stop reason: SDUPTHER

## 2021-12-02 ENCOUNTER — HOSPITAL ENCOUNTER (OUTPATIENT)
Dept: NUCLEAR MEDICINE | Age: 56
Discharge: HOME | End: 2021-12-04
Payer: COMMERCIAL

## 2021-12-02 ENCOUNTER — HOSPITAL ENCOUNTER (OUTPATIENT)
Dept: NON INVASIVE DIAGNOSTICS | Age: 56
Discharge: HOME | End: 2021-12-02
Payer: COMMERCIAL

## 2021-12-02 DIAGNOSIS — R07.9 CHEST PAIN, UNSPECIFIED TYPE: ICD-10-CM

## 2021-12-02 PROCEDURE — 78452 HT MUSCLE IMAGE SPECT MULT: CPT

## 2021-12-02 PROCEDURE — A9502 TC99M TETROFOSMIN: HCPCS | Performed by: INTERNAL MEDICINE

## 2021-12-02 PROCEDURE — 93017 CV STRESS TEST TRACING ONLY: CPT

## 2021-12-02 PROCEDURE — 3430000000 HC RX DIAGNOSTIC RADIOPHARMACEUTICAL: Performed by: INTERNAL MEDICINE

## 2021-12-02 PROCEDURE — 6360000002 HC RX W HCPCS: Performed by: INTERNAL MEDICINE

## 2021-12-02 PROCEDURE — 2580000003 HC RX 258: Performed by: INTERNAL MEDICINE

## 2021-12-02 RX ORDER — SODIUM CHLORIDE 0.9 % (FLUSH) 0.9 %
10 SYRINGE (ML) INJECTION PRN
Status: COMPLETED | OUTPATIENT
Start: 2021-12-02 | End: 2021-12-02

## 2021-12-02 RX ADMIN — Medication 10 ML: at 09:27

## 2021-12-02 RX ADMIN — Medication 10 ML: at 08:10

## 2021-12-02 RX ADMIN — REGADENOSON 0.4 MG: 0.08 INJECTION, SOLUTION INTRAVENOUS at 09:26

## 2021-12-02 RX ADMIN — TETROFOSMIN 32.6 MILLICURIE: 1.38 INJECTION, POWDER, LYOPHILIZED, FOR SOLUTION INTRAVENOUS at 09:26

## 2021-12-02 RX ADMIN — Medication 10 ML: at 09:26

## 2021-12-02 RX ADMIN — TETROFOSMIN 11.8 MILLICURIE: 1.38 INJECTION, POWDER, LYOPHILIZED, FOR SOLUTION INTRAVENOUS at 08:10

## 2021-12-02 NOTE — PROGRESS NOTES
Reviewed history, allergies, and medications. Patient held his home medications prior to testing. Consent confirmed. Lexiscan exam explained. Placed patient on monitor. @ [phone] Nuclear Medicine tech here to inject Ye Supriya. SOB noted during recovery phase. Denied chest pain. No ectopy noted. Patient off monitor and instructed to eat, will have last part of exam in 1 hour.

## 2021-12-07 NOTE — PROCEDURES
[address]                      [address], [address] [facility]                              CARDIAC STRESS TEST    PATIENT NAME: Cy Greenfield                    :        1965  MED REC NO:   [id number]                            ROOM:  ACCOUNT NO:   [de-identified]                           ADMIT DATE: 2021  PROVIDER:     Kianna Belcher DO    CARDIOVASCULAR DIAGNOSTIC DEPARTMENT    DATE OF STUDY:  2021    Danica Magana MYOCARDIAL PERFUSION STRESS TEST    ORDERING PROVIDER:  Smith Serrano MD    EXAM TYPE:  Stress Myocardial Perfusion Regadenosin 1-day. REASON FOR EXAM:  Chest pain. PROCEDURE DESCRIPTION:  The patient was injected intravenously at rest  with technetium-99m tetrofosmin followed by resting SPECT myocardial  perfusion imaging and then underwent stress protocol using regadenoson  0.4 mg injected intravenously. At that time, technetium-99m tetrofosmin  stress dose was injected intravenously and SPECT myocardial perfusion  and gated imaging were repeated. Rest dose:  11.8 mCi  Stress dose:  32.6 mCi    FINDINGS:  The stress and rest images exhibit homogeneous uptake of  tracer throughout the left ventricular myocardium. There is no evidence  of stress-induced reversible perfusion abnormalities to suggest  myocardial ischemia. Gated imaging exhibits normal left ventricular  size and normal wall motion and myocardial thickening. EKG analysis did  not demonstrate ST-segment changes nor arrhythmias concerning for  myocardial ischemia. LVEF:  65%  TID ratio:  1.04    IMPRESSION:  1. No evidence of stress-induced myocardial ischemia. 2.  Normal left ventricular systolic function.         Rahel Marie DO    D: 2021 #7:44:35       T: 2021 8:37:57     [id number]  Job#: [id number]     Doc#: [id number]    CC:

## 2021-12-15 RX ORDER — POLYETHYLENE GLYCOL 3350, SODIUM CHLORIDE, SODIUM BICARBONATE, POTASSIUM CHLORIDE 420; 11.2; 5.72; 1.48 G/4L; G/4L; G/4L; G/4L
4000 POWDER, FOR SOLUTION ORAL ONCE
Qty: 4000 ML | Refills: 0 | Status: SHIPPED | OUTPATIENT
Start: 2021-12-15 | End: 2021-12-15

## 2021-12-29 RX ORDER — LORATADINE 10 MG/1
TABLET ORAL
Qty: 28 TABLET | Refills: 5 | Status: SHIPPED | OUTPATIENT
Start: 2021-12-29 | End: 2022-06-27

## 2021-12-31 NOTE — BRIEF OP NOTE
INTERIM HISTORY:  Mr. Fajardo follows up 4 month(s) post op.  He is having quite a bit of pain and feels like his motion is limited.  Main area of pain is the posterior tuberosity region as well as at the musculotendinous junction region where he points.  This pain is with activity and particularly with push-off.  He denies numbness or repeat injury.    He has been weight-bearing as tolerated in regular shoes and is back to going to classes and work.  He has a job that is mostly sit-down.    Date of Service:   8/27/2021     SURGEON:   Xiang Velasco MD.      SURGICAL ASSISTANT:   Wilian Glynn SA     PREOPERATIVE DIAGNOSIS:   Right Achilles tendon rupture.      POSTOPERATIVE DIAGNOSIS:   Right Achilles tendon rupture.      PROCEDURE:   Right Achilles tendon repair    PHYSICAL EXAMINATION:  Resp. rate 16, height 6' 1\" (1.854 m), weight 93.9 kg (207 lb 0.2 oz).  Incision is Healed  Passive dorsiflexion 0 plantar flexion 35.  He has tenderness to palpation at the repair site as well as the tuberosity and some degree the musculotendinous junction as well.    IMAGING  None.    PLAN:  He does not have any signs of rerupture such as abnormally high degree of dorsiflexion or palpable defect.  I would not expect his walking to be normal at this point in time 4 months out from the surgery and a somewhat wonder this will just resolve with time but he is having more pain than I would expect for this point is recovery.  I definitely think ongoing therapy is recommended.  He feels his plantar flexion is somewhat limited.  Given the locations of his pain I wonder if the load is being transmitted from the gastroc muscle to the anchors through the suture rather than through the tendon.  We will obtain an MRI given the degree of pain he is having.  Another possibility would be an AFO in the short term to make sure activity transition is gradual.             Brief OP Note [facility]    Access: R femoral 5/6 sheath  Anticoagulation: heparin IV ACT   Hemostasis: angioseal  Indications: inferior MI  Referring: Zaheer Gonsales    Findings    LMT: normal  LAD: normal  LCX: normal  RCA: 80% prox/mid, TRINH 3 flow    LV: 60%  LVEDP: 10    Dominance: right    PCI note: Successful PCI prox/mid RCA 80 TRINH 3 flow reduced to 0% TRINH 3 flow with resolute kennedy 4.5/22 postdil 5.0 NC. Conclusions: DAPT, maximal medical therapy      Juanito Flores MD [facility] Director of Cardiology Services and Cardiac Catheterization Laboratory  [facility], [facility]

## 2022-01-04 RX ORDER — GLIMEPIRIDE 2 MG/1
TABLET ORAL
Qty: 28 TABLET | Refills: 3 | Status: SHIPPED | OUTPATIENT
Start: 2022-01-04 | End: 2022-04-18 | Stop reason: ALTCHOICE

## 2022-01-06 ENCOUNTER — ANESTHESIA EVENT (OUTPATIENT)
Dept: ENDOSCOPY | Age: 57
End: 2022-01-06
Payer: COMMERCIAL

## 2022-01-06 ENCOUNTER — ANCILLARY PROCEDURE (OUTPATIENT)
Dept: ENDOSCOPY | Age: 57
End: 2022-01-06
Attending: SPECIALIST
Payer: COMMERCIAL

## 2022-01-06 ENCOUNTER — HOSPITAL ENCOUNTER (OUTPATIENT)
Age: 57
Setting detail: OUTPATIENT SURGERY
Discharge: HOME | End: 2022-01-06
Attending: SPECIALIST | Admitting: SPECIALIST
Payer: COMMERCIAL

## 2022-01-06 ENCOUNTER — ANESTHESIA (OUTPATIENT)
Dept: ENDOSCOPY | Age: 57
End: 2022-01-06
Payer: COMMERCIAL

## 2022-01-06 VITALS
BODY MASS INDEX: 35.31 KG/M2 | TEMPERATURE: 97.1 F | DIASTOLIC BLOOD PRESSURE: 83 MMHG | HEIGHT: 67 IN | SYSTOLIC BLOOD PRESSURE: 150 MMHG | WEIGHT: 225 LBS | RESPIRATION RATE: 18 BRPM | OXYGEN SATURATION: 98 % | HEART RATE: 75 BPM

## 2022-01-06 VITALS
RESPIRATION RATE: 16 BRPM | SYSTOLIC BLOOD PRESSURE: 116 MMHG | DIASTOLIC BLOOD PRESSURE: 66 MMHG | OXYGEN SATURATION: 96 %

## 2022-01-06 LAB
GLUCOSE BLD-MCNC: 122 MG/DL (ref 60–115)
PERFORMED ON: ABNORMAL

## 2022-01-06 PROCEDURE — 2580000003 HC RX 258: Performed by: SPECIALIST

## 2022-01-06 PROCEDURE — 3609027000 HC COLONOSCOPY: Performed by: SPECIALIST

## 2022-01-06 PROCEDURE — 2580000003 HC RX 258

## 2022-01-06 PROCEDURE — 45380 COLONOSCOPY AND BIOPSY: CPT | Performed by: SPECIALIST

## 2022-01-06 PROCEDURE — 7100000011 HC PHASE II RECOVERY - ADDTL 15 MIN: Performed by: SPECIALIST

## 2022-01-06 PROCEDURE — 45385 COLONOSCOPY W/LESION REMOVAL: CPT | Performed by: SPECIALIST

## 2022-01-06 PROCEDURE — 7100000010 HC PHASE II RECOVERY - FIRST 15 MIN: Performed by: SPECIALIST

## 2022-01-06 PROCEDURE — 3700000000 HC ANESTHESIA ATTENDED CARE: Performed by: SPECIALIST

## 2022-01-06 PROCEDURE — 3700000001 HC ADD 15 MINUTES (ANESTHESIA): Performed by: SPECIALIST

## 2022-01-06 PROCEDURE — 2709999900 HC NON-CHARGEABLE SUPPLY: Performed by: SPECIALIST

## 2022-01-06 PROCEDURE — 6360000002 HC RX W HCPCS: Performed by: NURSE ANESTHETIST, CERTIFIED REGISTERED

## 2022-01-06 PROCEDURE — 6370000000 HC RX 637 (ALT 250 FOR IP): Performed by: SPECIALIST

## 2022-01-06 PROCEDURE — 88305 TISSUE EXAM BY PATHOLOGIST: CPT

## 2022-01-06 RX ORDER — SODIUM CHLORIDE 9 MG/ML
INJECTION, SOLUTION INTRAVENOUS CONTINUOUS
Status: DISCONTINUED | OUTPATIENT
Start: 2022-01-06 | End: 2022-01-06 | Stop reason: HOSPADM

## 2022-01-06 RX ORDER — SODIUM CHLORIDE 9 MG/ML
INJECTION, SOLUTION INTRAVENOUS
Status: COMPLETED
Start: 2022-01-06 | End: 2022-01-06

## 2022-01-06 RX ORDER — MAGNESIUM HYDROXIDE 1200 MG/15ML
LIQUID ORAL PRN
Status: DISCONTINUED | OUTPATIENT
Start: 2022-01-06 | End: 2022-01-06 | Stop reason: ALTCHOICE

## 2022-01-06 RX ORDER — SIMETHICONE 20 MG/.3ML
EMULSION ORAL PRN
Status: DISCONTINUED | OUTPATIENT
Start: 2022-01-06 | End: 2022-01-06 | Stop reason: ALTCHOICE

## 2022-01-06 RX ORDER — PROPOFOL 10 MG/ML
INJECTION, EMULSION INTRAVENOUS PRN
Status: DISCONTINUED | OUTPATIENT
Start: 2022-01-06 | End: 2022-01-06 | Stop reason: SDUPTHER

## 2022-01-06 RX ADMIN — SODIUM CHLORIDE: 9 INJECTION, SOLUTION INTRAVENOUS at 09:40

## 2022-01-06 RX ADMIN — PROPOFOL 200 MG: 10 INJECTION, EMULSION INTRAVENOUS at 10:48

## 2022-01-06 RX ADMIN — PROPOFOL 200 MG: 10 INJECTION, EMULSION INTRAVENOUS at 10:34

## 2022-01-06 RX ADMIN — PROPOFOL 200 MG: 10 INJECTION, EMULSION INTRAVENOUS at 10:37

## 2022-01-06 ASSESSMENT — PAIN - FUNCTIONAL ASSESSMENT: PAIN_FUNCTIONAL_ASSESSMENT: 0-10

## 2022-01-06 NOTE — H&P
Patient Name: Nelly Rosenbaum  : 1965  MRN: [id number]  DATE: 22      ENDOSCOPY  History and Physical    Procedure:    [] Diagnostic Colonoscopy       [x] Screening Colonoscopy  [] EGD      [] ERCP      [] EUS       [] Other    [x] Previous office notes/History and Physical reviewed from the patients chart. Please see EMR for further details of HPI. I have examined the patient's status immediately prior to the procedure and:      Indications/HPI:    []Abdominal Pain  []Cancer- GI/Lung  []Fhx of colon CA/polyps  []History of Polyps  []Sharmas   []Melena  []Abnormal Imaging  []Dysphagia    []Persistent Pneumonia  []Anemia  []Food Impaction  []History of Polyps  []GI Bleed  []Pulmonary nodule/Mass  []Change in bowel habits []Heartburn/Reflux  []Rectal Bleed (BRBPR)  []Chest Pain - Non Cardiac []Heme (+) Stoo  l[]Ulcers  []Constipation  []Hemoptysis   []Varices  []Diarrhea  []Hypoxemia  []Nausea/Vomiting  []Screening   []Crohns/Colitis  []Other:   Anesthesia:   [x] MAC [] Moderate Sedation   [] General   [] None     ROS: 12 pt Review of Symptoms was negative unless mentioned above    Medications:   Prior to Admission medications    Medication Sig Start Date End Date Taking?  Authorizing Provider   glimepiride (AMARYL) 2 MG tablet TAKE 1 TABLET BY MOUTH AT DINNER (PM) 22  Yes SIVAN Wang   metoprolol succinate (TOPROL XL) 25 MG extended release tablet Take 1 tablet by mouth daily 10/27/21  Yes Nerissa Holliday MD   FLUoxetine (PROZAC) 10 MG capsule  21  Yes Historical Provider, MD   TRULICITY 4.5 EV/0.7BC SOPN INJECT 4.5 MG INTO THE SKIN ONCE A WEEK 21  Yes Vicki Lopez MD   JARDIANCE 25 MG tablet TAKE 1 TABLET BY MOUTH ONCE A DAY (AM) 21  Yes SIVAN aWng   aspirin (ASPIRIN LOW DOSE) 81 MG EC tablet TAKE 1 TABLET BY MOUTH DAILY (AM) 21  Yes Nerissa Holliday MD   atorvastatin (LIPITOR) 40 MG tablet TAKE 1 TABLET BY MOUTH NIGHTLY (HS) 20  Yes Amber Baron MD valsartan (DIOVAN) 160 MG tablet Take by mouth daily  10/15/19  Yes Historical Provider, MD   cyclobenzaprine (FLEXERIL) 10 MG tablet  7/15/19  Yes Historical Provider, MD   hydrochlorothiazide (HYDRODIURIL) 12.5 MG tablet daily  7/22/19  Yes Historical Provider, MD Dionne Simms 200-5 MCG/ACT inhaler  7/22/19  Yes Historical Provider, MD   albuterol sulfate  (90 Base) MCG/ACT inhaler Inhale 2 puffs into the lungs every 6 hours as needed for Wheezing   Yes Historical Provider, MD   omeprazole (PRILOSEC) 20 MG capsule Take 1 capsule by mouth daily. 12/23/14  Yes Jacqueline Cevallos MD   ALLERGY RELIEF 10 MG tablet TAKE 1 TABLET BY MOUTH DAILY (AM) 12/29/21   SIVAN Barillas   Continuous Blood Gluc Sensor (FREESTYLE CHEIKH 2 SENSOR) MISC USE AS DIRECTED FO BLOOD GLUCOSE TESTING FOUR TIMES A DAY 11/30/21   SIVAN Barillas   pregabalin (LYRICA) 50 MG capsule  9/30/21   Historical Provider, MD   REFRESH TEARS 0.5 % SOLN ophthalmic solution  9/27/21   Historical Provider, MD   blood glucose monitor strips Check BG four times daily , DX: E11.65, 10/7/21   SIVAN Barillas   Insulin Pen Needle (NOVOFINE) 32G X 6 MM MISC qd 10/7/21   SIVAN Barillas   insulin glargine (LANTUS SOLOSTAR) 100 UNIT/ML injection pen 30 UNITS AT BEDTIME 10/7/21   SIVAN Barillas   TRUEplus Lancets 33G MISC 1 Device by Does not apply route 4 times daily (before meals and nightly) 10/7/21   SIVAN Barillas   Continuous Blood Gluc  (FREESTYLE CHEIKH 2 READER) SONDRA 1 Device by Does not apply route 4 times daily (before meals and nightly) 10/7/21   SIVAN Barillas   gabapentin (NEURONTIN) 100 MG capsule Take 100 mg by mouth 3 times daily. Take 2 caps #x daily    Historical Provider, MD   nitroGLYCERIN (NITROSTAT) 0.4 MG SL tablet up to max of 3 total doses.  If no relief after 1 dose, call 911. 6/10/20   Jael Zafar MD   acetaminophen (TYLENOL ARTHRITIS PAIN) 650 MG extended release tablet Take 650 mg by mouth 2 times daily    Historical Provider, MD   fluticasone (FLONASE) 50 MCG/ACT nasal spray 1 spray by Nasal route daily    Historical Provider, MD   Blood Glucose Monitoring Suppl SONDRA check BG once daily, DX: E11.9, NIDDM 8/28/17   Hung Fink MD       Allergies: Allergies   Allergen Reactions    Metformin And Related      Diarrhea         History of allergic reaction to anesthesia:  No    Past Medical History:  Past Medical History:   Diagnosis Date    CAD (coronary artery disease)     [facility]     Hyperlipidemia     Hypertension     [facility]oma[facility] ([facility])     syncope, coughing    Type 2 diabetes mellitus without complication ([facility])        Past Surgical History:  Past Surgical History:   Procedure Laterality Date    COLONOSCOPY  10/17/2014    CORONARY ANGIOPLASTY WITH STENT PLACEMENT  05/26/2020    PTCA         Social History:  Social History     Tobacco Use    Smoking status: Current Some Day Smoker    Smokeless tobacco: Never Used    Tobacco comment: actively trying to quit   Substance Use Topics    Alcohol use: Yes     Comment: weekends     Drug use: Yes     Frequency: 1.0 times per week     Types: Cocaine     Comment: last used 3 days ago        Vital Signs:   Vitals:    01/06/22 0939   BP: (!) 157/83   Pulse: 71   Resp: 18   Temp: 97.1 °F (36.2 °C)   SpO2: 95%        Physical Exam:  Cardiac:  [x]WNL  []Comments:  Pulmonary:  [x]WNL   []Comments:   Neuro/Mental Status:  [x]WNL  []Comments:  Abdominal:  [x]WNL    []Comments:  Other:   []WNL  []Comments:    Informed Consent:  The risks and benefits of the procedure have been discussed with either the patient or if they cannot consent, their representative. Assessment:  Patient examined and appropriate for planned sedation and procedure. Plan:  Proceed with planned sedation and procedure as above.     Delmi Kumar MD  10:29 AM

## 2022-01-06 NOTE — ANESTHESIA POSTPROCEDURE EVALUATION
Department of Anesthesiology  Postprocedure Note    Patient: Lisa Delcid  MRN: [id number]  YOB: 1965  Date of evaluation: 1/6/2022  Time:  10:59 AM     Procedure Summary     Date: 01/06/22 Room / Location: [facility] OR  / [facility]    Anesthesia Start: 0695 Anesthesia Stop: 0405    Procedure: COLONOSCOPY WITH POLYPECTOMY (N/A ) Diagnosis: (Colon cancer screening Z12.11)    Surgeons: Griselda Nguyễn MD Responsible Provider: BOYD Hurtado CRNA    Anesthesia Type: Not recorded ASA Status: Not recorded          Anesthesia Type: No value filed. Allison Phase I:      Allison Phase II:      Last vitals: Reviewed and per EMR flowsheets.        Anesthesia Post Evaluation    Patient location during evaluation: bedside  Patient participation: complete - patient participated  Level of consciousness: awake and awake and alert  Pain score: 0  Airway patency: patent  Nausea & Vomiting: no nausea and no vomiting  Complications: no  Cardiovascular status: blood pressure returned to baseline and hemodynamically stable  Respiratory status: acceptable  Hydration status: euvolemic

## 2022-01-06 NOTE — ANESTHESIA PRE PROCEDURE
obstruction and other lower urinary tract symptoms (LUTS) N40.1, N13.8    Incomplete bladder emptying R33.9    Nocturia R35.1    SDH [facility]subdural hematoma) ([facility] S06.5X9A    Urinary frequency R35.0    Bilateral carpal tunnel syndrome G56.03    Chest pain R07.9    Uncontrolled type 2 diabetes mellitus with hyperglycemia ([facility]) E11.65    ACS (acute coronary syndrome) ([facility]) I24.9    Seasonal allergies J30.2       Past Medical History:        Diagnosis Date    CAD (coronary artery disease)     COPD [facility]ronic obstructive [facility] disease) [facility]     Hyperlipidemia     Hypertension     SDH (subdural hematoma) ([facility])     syncope, coughing    Type 2 diabetes mellitus without complication ([facility]        Past Surgical History:        Procedure Laterality Date    COLONOSCOPY  10/17/2014    CORONARY ANGIOPLASTY WITH STENT PLACEMENT  05/26/2020    PTCA         Social History:    Social History     Tobacco Use    Smoking status: Current Some Day Smoker    Smokeless tobacco: Never Used    Tobacco comment: actively trying to quit   Substance Use Topics    Alcohol use: Yes     Comment: weekends                                 Ready to quit: Not Answered  Counseling given: Not Answered  Comment: actively trying to quit      Vital Signs (Current):   Vitals:    01/06/22 0939   BP: (!) 157/83   Pulse: 71   Resp: 18   Temp: 36.2 °C (97.1 °F)   TempSrc: Temporal   SpO2: 95%   Weight: 225 lb (102.1 kg)   Height: 5' 7\" (1.702 m)                                              BP Readings from Last 3 Encounters:   01/06/22 (!) 157/83   10/27/21 128/70   10/07/21 122/85       NPO Status: Time of last liquid consumption: 2100                        Time of last solid consumption: 0000                        Date of last liquid consumption: 01/05/22                        Date of last solid food consumption: 01/04/22    BMI:   Wt Readings from Last 3 Encounters:   01/06/22 225 lb (102.1 kg)   10/27/21 224 lb 3.2 oz (101.7 kg) 10/07/21 222 lb (100.7 kg)     Body mass index is 35.24 kg/m². CBC:   Lab Results   Component Value Date    WBC 8.0 05/27/2020    RBC 5.04 05/27/2020    HGB 15.8 05/27/2020    HCT 46.2 05/27/2020    MCV 91.7 05/27/2020    RDW 14.5 05/27/2020     05/27/2020       CMP:   Lab Results   Component Value Date     10/05/2021    K 4.0 10/05/2021    CL 98 10/05/2021    CO2 24 10/05/2021    BUN 15 10/05/2021    CREATININE 0.64 10/05/2021    GFRAA >60.0 10/05/2021    LABGLOM >60.0 10/05/2021    GLUCOSE 128 10/07/2021    PROT 7.6 10/05/2021    CALCIUM 9.8 10/05/2021    BILITOT 0.4 10/05/2021    ALKPHOS 81 10/05/2021    AST 20 10/05/2021    ALT 30 10/05/2021       POC Tests: No results for input(s): POCGLU, POCNA, POCK, POCCL, POCBUN, POCHEMO, POCHCT in the last 72 hours. Coags:   Lab Results   Component Value Date    PROTIME 10.3 07/22/2014    INR 1.0 07/22/2014    APTT 27.5 07/22/2014       HCG (If Applicable): No results found for: PREGTESTUR, PREGSERUM, HCG, HCGQUANT     ABGs: No results found for: PHART, PO2ART, OIL8XZN, FSI3VZU, BEART, S3LTRPSD     Type & Screen (If Applicable):  No results found for: LABABO, LABRH    Drug/Infectious Status (If Applicable):  No results found for: HIV, HEPCAB    COVID-19 Screening (If Applicable): No results found for: COVID19        Anesthesia Evaluation  Patient summary reviewed and Nursing notes reviewed  Airway: Mallampati: II        Dental:          Pulmonary: breath sounds clear to auscultation  (+) COPD:                             Cardiovascular:    (+) hypertension:, CAD:,         Rhythm: regular  Rate: normal                    Neuro/Psych:   (+) neuromuscular disease:,             GI/Hepatic/Renal:             Endo/Other:    (+) Diabetes, . Abdominal:       Abdomen: soft. Vascular: Other Findings:             Anesthesia Plan      MAC     ASA 3       Induction: intravenous.       Anesthetic plan and risks discussed with

## 2022-01-14 ENCOUNTER — OFFICE VISIT (OUTPATIENT)
Dept: ENDOCRINOLOGY | Age: 57
End: 2022-01-14
Payer: COMMERCIAL

## 2022-01-14 VITALS
BODY MASS INDEX: 35.53 KG/M2 | HEART RATE: 82 BPM | RESPIRATION RATE: 12 BRPM | OXYGEN SATURATION: 94 % | DIASTOLIC BLOOD PRESSURE: 78 MMHG | WEIGHT: 226.4 LBS | HEIGHT: 67 IN | SYSTOLIC BLOOD PRESSURE: 124 MMHG

## 2022-01-14 DIAGNOSIS — E11.65 UNCONTROLLED TYPE 2 DIABETES MELLITUS WITH HYPERGLYCEMIA (HCC): Primary | ICD-10-CM

## 2022-01-14 LAB
CHP ED QC CHECK: ABNORMAL
GLUCOSE BLD-MCNC: 168 MG/DL

## 2022-01-14 PROCEDURE — G8417 CALC BMI ABV UP PARAM F/U: HCPCS | Performed by: PHYSICIAN ASSISTANT

## 2022-01-14 PROCEDURE — 2022F DILAT RTA XM EVC RTNOPTHY: CPT | Performed by: PHYSICIAN ASSISTANT

## 2022-01-14 PROCEDURE — 3017F COLORECTAL CA SCREEN DOC REV: CPT | Performed by: PHYSICIAN ASSISTANT

## 2022-01-14 PROCEDURE — G8484 FLU IMMUNIZE NO ADMIN: HCPCS | Performed by: PHYSICIAN ASSISTANT

## 2022-01-14 PROCEDURE — 99213 OFFICE O/P EST LOW 20 MIN: CPT | Performed by: PHYSICIAN ASSISTANT

## 2022-01-14 PROCEDURE — 3046F HEMOGLOBIN A1C LEVEL >9.0%: CPT | Performed by: PHYSICIAN ASSISTANT

## 2022-01-14 PROCEDURE — G8427 DOCREV CUR MEDS BY ELIG CLIN: HCPCS | Performed by: PHYSICIAN ASSISTANT

## 2022-01-14 PROCEDURE — 82962 GLUCOSE BLOOD TEST: CPT | Performed by: PHYSICIAN ASSISTANT

## 2022-01-14 PROCEDURE — 4004F PT TOBACCO SCREEN RCVD TLK: CPT | Performed by: PHYSICIAN ASSISTANT

## 2022-01-14 PROCEDURE — 83036 HEMOGLOBIN GLYCOSYLATED A1C: CPT | Performed by: PHYSICIAN ASSISTANT

## 2022-01-14 RX ORDER — ERGOCALCIFEROL 1.25 MG/1
50000 CAPSULE ORAL WEEKLY
COMMUNITY
Start: 2021-11-30

## 2022-01-14 RX ORDER — DULAGLUTIDE 4.5 MG/.5ML
4.5 INJECTION, SOLUTION SUBCUTANEOUS WEEKLY
Qty: 2 ML | Refills: 3 | Status: SHIPPED | OUTPATIENT
Start: 2022-01-14 | End: 2022-04-18 | Stop reason: SDUPTHER

## 2022-01-14 RX ORDER — EMPAGLIFLOZIN 25 MG/1
25 TABLET, FILM COATED ORAL EVERY MORNING
Qty: 30 TABLET | Refills: 3 | Status: SHIPPED | OUTPATIENT
Start: 2022-01-14 | End: 2022-04-18 | Stop reason: SDUPTHER

## 2022-01-14 RX ORDER — BLOOD-GLUCOSE METER
EACH MISCELLANEOUS
COMMUNITY
Start: 2021-12-30

## 2022-01-14 RX ORDER — HYDROCODONE BITARTRATE AND ACETAMINOPHEN 5; 325 MG/1; MG/1
TABLET ORAL
Status: ON HOLD | COMMUNITY
Start: 2021-10-12 | End: 2022-06-01

## 2022-01-14 RX ORDER — GLIMEPIRIDE 2 MG/1
TABLET ORAL
Qty: 28 TABLET | Refills: 3 | Status: CANCELLED | OUTPATIENT
Start: 2022-01-14

## 2022-01-14 RX ORDER — PREGABALIN 50 MG/1
50 CAPSULE ORAL 3 TIMES DAILY
Status: ON HOLD | COMMUNITY
End: 2022-06-01

## 2022-01-14 ASSESSMENT — ENCOUNTER SYMPTOMS
SORE THROAT: 0
RHINORRHEA: 0
WHEEZING: 0
COUGH: 0
DIARRHEA: 0
EYE REDNESS: 0
NAUSEA: 0
EYE PAIN: 0
VOMITING: 0
ABDOMINAL PAIN: 0
SHORTNESS OF BREATH: 0
SINUS PRESSURE: 0

## 2022-01-14 NOTE — PATIENT INSTRUCTIONS
Endocrinology-diabetes    1. Check your blood sugars 4 times a day, before meals and at night  2. Document these numbers and a blood glucose log and bring them with you to your follow-up appointment. 3. Do not take your mealtime insulin if your blood sugars less than 120  4. Call our office if you have blood sugars less than 80 or greater then 300 on two or more occasions  5. Call our office if you have any questions regarding your blood sugars or insulin dosing regiment  6. Signs of low blood sugar include sweating , heart racing, dizziness and weakness. Check your blood sugar if you have any of these symptoms. Medications-  1. Continue Lantus 30 units at bedtime  2. Increase Trulicity 4.5 mg weekly  3. Continue Jardiance 25 mg daily  4. Freestyle Chioma 2 being used  5.  Follow-up in 3 months

## 2022-01-14 NOTE — PROGRESS NOTES
1/14/2022    Assessment:       Diagnosis Orders   1. Uncontrolled type 2 diabetes mellitus with hyperglycemia (HCC)  POCT Glucose    POCT glycosylated hemoglobin (Hb A1C)     AVG am glucose 110-140     PLAN:     1. Continue Lantus 30 units at bedtime  2. Continue Trulicity 4.5 mg weekly  3. Continue Jardiance 25 mg daily  4. Freestyle Chioma 2 being used  5. Follow-up in 3 months    Orders Placed This Encounter   Procedures    POCT Glucose    POCT glycosylated hemoglobin (Hb A1C)     No orders of the defined types were placed in this encounter. No follow-ups on file. Subjective:     Chief Complaint   Patient presents with    Diabetes    3 Month Follow-Up     Vitals:    01/14/22 1500   BP: 124/78   Pulse: 82   Resp: 12   SpO2: 94%   Weight: 226 lb 6.4 oz (102.7 kg)   Height: 5' 7\" (1.702 m)     Wt Readings from Last 3 Encounters:   01/14/22 226 lb 6.4 oz (102.7 kg)   01/06/22 225 lb (102.1 kg)   10/27/21 224 lb 3.2 oz (101.7 kg)     BP Readings from Last 3 Encounters:   01/14/22 124/78   01/06/22 (!) 150/83   01/06/22 116/66     Missael Riggs is a 58-year-old  man with hospital admission 6/2020, for chest pain. Status post PCI's. Has a history of COPD and type 2 insulin-dependent diabetes. Diabetes  He presents for his follow-up diabetic visit. He has type 2 diabetes mellitus. The initial diagnosis of diabetes was made 5 years ago. Pertinent negatives for hypoglycemia include no confusion, dizziness or headaches. Pertinent negatives for diabetes include no chest pain, no fatigue, no polydipsia and no polyuria. There are no hypoglycemic complications. Diabetic complications include heart disease. Risk factors for coronary artery disease include dyslipidemia, diabetes mellitus, male sex and obesity. Current diabetic treatment includes insulin injections and oral agent (triple therapy). He is compliant with treatment all of the time. He is currently taking insulin at bedtime.  Rotation sites for injection include the abdominal wall. He is following a generally healthy diet. Meal planning includes avoidance of concentrated sweets. He participates in exercise three times a week. He monitors blood glucose at home 5+ x per day. His overall blood glucose range is 180-200 mg/dl. An ACE inhibitor/angiotensin II receptor blocker is being taken. He does not see a podiatrist.Eye exam is current.      Past Medical History:   Diagnosis Date    CAD (coronary artery disease)     [facility]     Hyperlipidemia     Hypertension     [facility]     syncope, coughing    Type 2 diabetes mellitus without complication [facility])      Past Surgical History:   Procedure Laterality Date    COLONOSCOPY  10/17/2014    COLONOSCOPY N/A 1/6/2022    COLONOSCOPY WITH POLYPECTOMY performed by Kalyani Badillo MD at [address] [place] [address]  05/26/2020    PTCA       Social History     Socioeconomic History    Marital status:      Spouse name: Not on file    Number of children: Not on file    Years of education: Not on file    Highest education level: Not on file   Occupational History    Not on file   Tobacco Use    Smoking status: Current Some Day Smoker     Start date: 1982    Smokeless tobacco: Never Used    Tobacco comment: actively trying to quit   Substance and Sexual Activity    Alcohol use: Yes     Comment: weekends     Drug use: Yes     Frequency: 1.0 times per week     Types: Cocaine     Comment: last used 3 days ago     Sexual activity: Not on file   Other Topics Concern    Not on file   Social History Narrative    Not on file     Social Determinants of Health     Financial Resource Strain:     Difficulty of Paying Living Expenses: Not on file   Food Insecurity:     Worried About Running Out of Food in the Last Year: Not on file    Parag of Food in the Last Year: Not on file   Transportation Needs:     Lack of Transportation (Medical): Not on file    Lack of Transportation (Non-Medical): Not on file   Physical Activity:     Days of Exercise per Week: Not on file    Minutes of Exercise per Session: Not on file   Stress:     Feeling of Stress : Not on file   Social Connections:     Frequency of Communication with Friends and Family: Not on file    Frequency of Social Gatherings with Friends and Family: Not on file    Attends [employer] Services: Not on file    Active Member of [address] [place] [address] or Organizations: Not on file    Attends Club or Organization Meetings: Not on file    Marital Status: Not on file   Intimate Partner Violence:     Fear of Current or Ex-Partner: Not on file    Emotionally Abused: Not on file    Physically Abused: Not on file    Sexually Abused: Not on file   Housing Stability:     Unable to Pay for Housing in the Last Year: Not on file    Number of [place] in the Last Year: Not on file    Unstable Housing in the Last Year: Not on file     Family History   Problem Relation Age of Onset    Cancer Neg Hx      Allergies   Allergen Reactions    Metformin And Related      Diarrhea        Current Outpatient Medications:     Easy Comfort Lancets MISC, , Disp: , Rfl:     vitamin D (ERGOCALCIFEROL) 1.25 MG (48599 UT) CAPS capsule, , Disp: , Rfl:     HYDROcodone-acetaminophen (NORCO) 5-325 MG per tablet, , Disp: , Rfl:     pregabalin (LYRICA) 50 MG capsule, Take 50 mg by mouth 3 times daily. , Disp: , Rfl:     glimepiride (AMARYL) 2 MG tablet, TAKE 1 TABLET BY MOUTH AT DINNER (PM), Disp: 28 tablet, Rfl: 3    ALLERGY RELIEF 10 MG tablet, TAKE 1 TABLET BY MOUTH DAILY (AM), Disp: 28 tablet, Rfl: 5    Continuous Blood Gluc Sensor (FREESTYLE CHEIKH 2 SENSOR) [facility], USE AS DIRECTED FO BLOOD GLUCOSE TESTING FOUR TIMES A DAY, Disp: 2 each, Rfl: 3    metoprolol succinate (TOPROL XL) 25 MG extended release tablet, Take 1 tablet by mouth daily, Disp: 90 tablet, Rfl: 3    pregabalin (LYRICA) 50 MG capsule, , Disp: , Rfl:     FLUoxetine (PROZAC) 10 MG capsule, , Disp: , Rfl:     REFRESH TEARS 0.5 % SOLN ophthalmic solution, , Disp: , Rfl:     blood glucose monitor strips, Check BG four times daily , DX: E11.65,, Disp: 150 strip, Rfl: 6    Insulin Pen Needle (NOVOFINE) 32G X 6 MM MISC, qd, Disp: 100 each, Rfl: 3    insulin glargine (LANTUS SOLOSTAR) 100 UNIT/ML injection pen, 30 UNITS AT BEDTIME, Disp: 15 mL, Rfl: 3    TRUEplus Lancets 33G MISC, 1 Device by Does not apply route 4 times daily (before meals and nightly), Disp: 100 each, Rfl: 5    Continuous Blood Gluc  (FREESTYLE CHEIKH 2 READER) SONDRA, 1 Device by Does not apply route 4 times daily (before meals and nightly), Disp: 1 each, Rfl: 0    TRULICITY 4.5 YC/8.3MW SOPN, INJECT 4.5 MG INTO THE SKIN ONCE A WEEK, Disp: 2 mL, Rfl: 3    JARDIANCE 25 MG tablet, TAKE 1 TABLET BY MOUTH ONCE A DAY (AM), Disp: 28 tablet, Rfl: 3    aspirin (ASPIRIN LOW DOSE) 81 MG EC tablet, TAKE 1 TABLET BY MOUTH DAILY (AM), Disp: 90 tablet, Rfl: 3    atorvastatin (LIPITOR) 40 MG tablet, TAKE 1 TABLET BY MOUTH NIGHTLY (HS), Disp: 30 tablet, Rfl: 3    gabapentin (NEURONTIN) 100 MG capsule, Take 100 mg by mouth 3 times daily. Take 2 caps #x daily, Disp: , Rfl:     nitroGLYCERIN (NITROSTAT) 0.4 MG SL tablet, up to max of 3 total doses.  If no relief after 1 dose, call 911., Disp: 25 tablet, Rfl: 3    valsartan (DIOVAN) 160 MG tablet, Take by mouth daily , Disp: , Rfl: 2    cyclobenzaprine (FLEXERIL) 10 MG tablet, , Disp: , Rfl: 1    hydrochlorothiazide (HYDRODIURIL) 12.5 MG tablet, daily , Disp: , Rfl: 0    DULERA 200-5 MCG/ACT inhaler, , Disp: , Rfl: 2    acetaminophen (TYLENOL ARTHRITIS PAIN) 650 MG extended release tablet, Take 650 mg by mouth 2 times daily, Disp: , Rfl:     fluticasone (FLONASE) 50 MCG/ACT nasal spray, 1 spray by Nasal route daily, Disp: , Rfl:     albuterol sulfate  (90 Base) MCG/ACT inhaler, Inhale 2 puffs into the lungs every 6 hours as needed for Wheezing, Disp: , Rfl:     Blood Glucose Monitoring Suppl SONDRA, check BG once daily, DX: E11.9, NIDDM, Disp: 1 Device, Rfl: 0    omeprazole (PRILOSEC) 20 MG capsule, Take 1 capsule by mouth daily. , Disp: 30 capsule, Rfl: 11  Lab Results   Component Value Date     10/05/2021    K 4.0 10/05/2021    CL 98 10/05/2021    CO2 24 10/05/2021    BUN 15 10/05/2021    CREATININE 0.64 (L) 10/05/2021    GLUCOSE 168 (A) 01/14/2022    CALCIUM 9.8 10/05/2021    PROT 7.6 10/05/2021    LABALBU 4.5 10/05/2021    BILITOT 0.4 10/05/2021    ALKPHOS 81 10/05/2021    AST 20 10/05/2021    ALT 30 10/05/2021    LABGLOM >60.0 10/05/2021    GFRAA >60.0 10/05/2021    GLOB 3.1 10/05/2021     Lab Results   Component Value Date    WBC 8.0 05/27/2020    HGB 15.8 05/27/2020    HCT 46.2 05/27/2020    MCV 91.7 05/27/2020     05/27/2020     Lab Results   Component Value Date    LABA1C 7.3 (H) 10/05/2021    LABA1C 7.0 (H) 06/28/2021    LABA1C 7.1 (H) 04/05/2021   Results for Darin Padilla (MRN [id number]) as of 1/14/2022 15:24   Ref.  Range 10/5/2021 12:38   Creatinine, Ur Latest Ref Range: Not Established mg/dL 40.8   Microalbumin Creatinine Ratio Latest Ref Range: 0.0 - 30.0 mg/G see below   Microalbumin, Random Urine Latest Ref Range: Not Established mg/dL <1.20         Lab Results   Component Value Date    CHOL 100 08/07/2020    CHOL 146 09/28/2018    CHOL 170 03/31/2015     Lab Results   Component Value Date    TRIG 123 08/07/2020    TRIG 109 09/28/2018    TRIG 136 03/31/2015     Lab Results   Component Value Date    HDL 25 (L) 08/07/2020    HDL 34 (L) 09/28/2018    HDL 31 (L) 03/31/2015     Lab Results   Component Value Date    LDLCALC 50 08/07/2020 [address] 09/28/2018 [address] 03/31/2015     No results found for: LABVLDL, VLDL  No results found for: CHOLHDLRATIO  No results found for: TESTM  Lab Results   Component Value Date    TSH 0.928 07/22/2014       Review of Systems   Constitutional: Negative

## 2022-02-09 DIAGNOSIS — E11.65 UNCONTROLLED TYPE 2 DIABETES MELLITUS WITH HYPERGLYCEMIA (HCC): ICD-10-CM

## 2022-02-09 LAB
ALBUMIN SERPL-MCNC: 4 G/DL (ref 3.5–4.6)
ALP BLD-CCNC: 67 U/L (ref 35–104)
ALT SERPL-CCNC: 33 U/L (ref 0–41)
ANION GAP SERPL CALCULATED.3IONS-SCNC: 12 MEQ/L (ref 9–15)
AST SERPL-CCNC: 19 U/L (ref 0–40)
BILIRUB SERPL-MCNC: 0.3 MG/DL (ref 0.2–0.7)
BUN BLDV-MCNC: 15 MG/DL (ref 6–20)
CALCIUM SERPL-MCNC: 9.2 MG/DL (ref 8.5–9.9)
CHLORIDE BLD-SCNC: 97 MEQ/L (ref 95–107)
CHOLESTEROL, FASTING: 119 MG/DL (ref 0–199)
CO2: 24 MEQ/L (ref 20–31)
CREAT SERPL-MCNC: 0.68 MG/DL (ref 0.7–1.2)
GFR AFRICAN AMERICAN: >60
GFR NON-AFRICAN AMERICAN: >60
GLOBULIN: 3.4 G/DL (ref 2.3–3.5)
GLUCOSE BLD-MCNC: 146 MG/DL (ref 70–99)
HBA1C MFR BLD: 7.1 % (ref 4.8–5.9)
HDLC SERPL-MCNC: 30 MG/DL (ref 40–59)
LDL CHOLESTEROL CALCULATED: 59 MG/DL (ref 0–129)
POTASSIUM SERPL-SCNC: 4 MEQ/L (ref 3.4–4.9)
SODIUM BLD-SCNC: 133 MEQ/L (ref 135–144)
TOTAL PROTEIN: 7.4 G/DL (ref 6.3–8)
TRIGLYCERIDE, FASTING: 152 MG/DL (ref 0–150)

## 2022-02-11 ENCOUNTER — OFFICE VISIT (OUTPATIENT)
Dept: CARDIOLOGY CLINIC | Age: 57
End: 2022-02-11
Payer: COMMERCIAL

## 2022-02-11 VITALS
SYSTOLIC BLOOD PRESSURE: 138 MMHG | BODY MASS INDEX: 35.79 KG/M2 | OXYGEN SATURATION: 99 % | HEART RATE: 76 BPM | HEIGHT: 67 IN | RESPIRATION RATE: 16 BRPM | DIASTOLIC BLOOD PRESSURE: 88 MMHG | WEIGHT: 228 LBS

## 2022-02-11 DIAGNOSIS — E78.2 MIXED HYPERLIPIDEMIA: ICD-10-CM

## 2022-02-11 DIAGNOSIS — I10 ESSENTIAL HYPERTENSION: ICD-10-CM

## 2022-02-11 DIAGNOSIS — I25.10 CORONARY ARTERY DISEASE INVOLVING NATIVE CORONARY ARTERY OF NATIVE HEART WITHOUT ANGINA PECTORIS: Primary | ICD-10-CM

## 2022-02-11 PROCEDURE — G8417 CALC BMI ABV UP PARAM F/U: HCPCS | Performed by: INTERNAL MEDICINE

## 2022-02-11 PROCEDURE — 3017F COLORECTAL CA SCREEN DOC REV: CPT | Performed by: INTERNAL MEDICINE

## 2022-02-11 PROCEDURE — G8484 FLU IMMUNIZE NO ADMIN: HCPCS | Performed by: INTERNAL MEDICINE

## 2022-02-11 PROCEDURE — G8427 DOCREV CUR MEDS BY ELIG CLIN: HCPCS | Performed by: INTERNAL MEDICINE

## 2022-02-11 PROCEDURE — 99214 OFFICE O/P EST MOD 30 MIN: CPT | Performed by: INTERNAL MEDICINE

## 2022-02-11 PROCEDURE — 4004F PT TOBACCO SCREEN RCVD TLK: CPT | Performed by: INTERNAL MEDICINE

## 2022-02-11 ASSESSMENT — ENCOUNTER SYMPTOMS
COUGH: 0
STRIDOR: 0
RESPIRATORY NEGATIVE: 1
BLOOD IN STOOL: 0
WHEEZING: 0
EYES NEGATIVE: 1
NAUSEA: 0
SHORTNESS OF BREATH: 0
CHEST TIGHTNESS: 0
GASTROINTESTINAL NEGATIVE: 1

## 2022-02-11 NOTE — PROGRESS NOTES
Subsequent Progress Note  Patient: Yesenia Villafana  YOB: 1965  MRN: [id number]    Chief Complaint:  CAD HTN HPL   Chief Complaint   Patient presents with    Results     Stress Test    Coronary Artery Disease       CV Data:  5/26/20 RCA ASHWIN  5/21 Echo 60  12/21 spect negative     Subjective/HPI: no cp no sob no flaa no bleed takes meds. 10/27/21 had couple chest pains relieved with NTG some SOB involved. Takes meds. Otherwise active. No bleed. 2/11/22 doing well no further cp sob. Take smeds. occ smker  occ etoh  Work- disabled.    +FH      EKG: SR 66    Past Medical History:   Diagnosis Date    CAD (coronary artery disease)     COPD (chronic obstructive pulmonary disease) (HCC)     Hyperlipidemia     Hypertension     SDH (subdural hematoma) (HCC)     syncope, coughing    Type 2 diabetes mellitus without complication [facility])        Past Surgical History:   Procedure Laterality Date    COLONOSCOPY  10/17/2014    COLONOSCOPY N/A 1/6/2022    COLONOSCOPY WITH POLYPECTOMY performed by Lupe Mari MD at [place] [address]  05/26/2020    PTCA         Family History   Problem Relation Age of Onset    Cancer Neg Hx        Social History     Socioeconomic History    Marital status:      Spouse name: None    Number of children: None    Years of education: None    Highest education level: None   Occupational History    None   Tobacco Use    Smoking status: Current Some Day Smoker     Start date: 17    Smokeless tobacco: Never Used    Tobacco comment: actively trying to quit   Substance and Sexual Activity    Alcohol use: Yes     Comment: weekends     Drug use: Yes     Frequency: 1.0 times per week     Types: Cocaine     Comment: last used 3 days ago     Sexual activity: None   Other Topics Concern    None   Social History Narrative    None     Social Determinants of Health     Financial Resource Strain:     Difficulty of (FREESTYLE CHEIKH 2 SENSOR) [facility] USE AS DIRECTED FO BLOOD GLUCOSE TESTING FOUR TIMES A DAY 2 each 3    metoprolol succinate (TOPROL XL) 25 MG extended release tablet Take 1 tablet by mouth daily 90 tablet 3    pregabalin (LYRICA) 50 MG capsule       FLUoxetine (PROZAC) 10 MG capsule       REFRESH TEARS 0.5 % SOLN ophthalmic solution       blood glucose monitor strips Check BG four times daily , DX: E11.65, 150 strip 6    Insulin Pen Needle (NOVOFINE) 32G X 6 MM MISC qd 100 each 3    insulin glargine (LANTUS SOLOSTAR) 100 UNIT/ML injection pen 30 UNITS AT BEDTIME 15 mL 3    TRUEplus Lancets 33G MISC 1 Device by Does not apply route 4 times daily (before meals and nightly) 100 each 5    Continuous Blood Gluc  (FREESTYLE CHEIKH 2 READER) SONDRA 1 Device by Does not apply route 4 times daily (before meals and nightly) 1 each 0    aspirin (ASPIRIN LOW DOSE) 81 MG EC tablet TAKE 1 TABLET BY MOUTH DAILY (AM) 90 tablet 3    atorvastatin (LIPITOR) 40 MG tablet TAKE 1 TABLET BY MOUTH NIGHTLY (HS) 30 tablet 3    gabapentin (NEURONTIN) 100 MG capsule Take 100 mg by mouth 3 times daily. Take 2 caps #x daily      nitroGLYCERIN (NITROSTAT) 0.4 MG SL tablet up to max of 3 total doses. If no relief after 1 dose, call 911. 25 tablet 3    valsartan (DIOVAN) 160 MG tablet Take by mouth daily   2    cyclobenzaprine (FLEXERIL) 10 MG tablet   1    hydrochlorothiazide (HYDRODIURIL) 12.5 MG tablet daily   0    DULERA 200-5 MCG/ACT inhaler   2    acetaminophen (TYLENOL ARTHRITIS PAIN) 650 MG extended release tablet Take 650 mg by mouth 2 times daily      fluticasone (FLONASE) 50 MCG/ACT nasal spray 1 spray by Nasal route daily      albuterol sulfate  (90 Base) MCG/ACT inhaler Inhale 2 puffs into the lungs every 6 hours as needed for Wheezing      Blood Glucose Monitoring Suppl SONDRA check BG once daily, DX: E11.9, NIDDM 1 Device 0    omeprazole (PRILOSEC) 20 MG capsule Take 1 capsule by mouth daily.  30 capsule 11     No current facility-administered medications for this visit. Review of Systems:   Review of Systems   Constitutional: Negative. Negative for diaphoresis and fatigue. HENT: Negative. Eyes: Negative. Respiratory: Negative. Negative for cough, chest tightness, shortness of breath, wheezing and stridor. Cardiovascular: Negative. Negative for chest pain, palpitations and leg swelling. Gastrointestinal: Negative. Negative for blood in stool and nausea. Genitourinary: Negative. Musculoskeletal: Negative. Skin: Negative. Neurological: Negative. Negative for dizziness, syncope, weakness and light-headedness. Hematological: Negative. Psychiatric/Behavioral: Negative. Physical Examination:    /88 (Site: Right Upper Arm, Position: Sitting, Cuff Size: Large Adult)   Pulse 76   Resp 16   Ht 5' 7\" (1.702 m)   Wt 228 lb (103.4 kg)   SpO2 99%   BMI 35.71 kg/m²    Physical Exam   Constitutional: He appears healthy. No distress. HENT:   Normal cephalic and Atraumatic   Eyes: Pupils are equal, round, and reactive to light. Neck: Thyroid normal. No JVD present. No neck adenopathy. No thyromegaly present. Cardiovascular: Normal rate, regular rhythm, normal heart sounds, intact distal pulses and normal pulses. Pulmonary/Chest: Effort normal and breath sounds normal. He has no wheezes. He has no rales. He exhibits no tenderness. Abdominal: Soft. Bowel sounds are normal. There is no abdominal tenderness. Musculoskeletal:         General: No tenderness or edema. Normal range of motion. Cervical back: Normal range of motion and neck supple. Neurological: He is alert and oriented to person, place, and time. Skin: Skin is warm. No cyanosis. Nails show no clubbing.        LABS:  CBC:   Lab Results   Component Value Date    WBC 8.0 05/27/2020    RBC 5.04 05/27/2020    HGB 15.8 05/27/2020    HCT 46.2 05/27/2020    MCV 91.7 05/27/2020    MCH 31.2 05/27/2020 MCHC 34.1 05/27/2020    RDW 14.5 05/27/2020     05/27/2020    MPV 9.9 03/31/2015     Lipids:  Lab Results   Component Value Date    CHOL 100 08/07/2020    CHOL 146 09/28/2018    CHOL 170 03/31/2015     Lab Results   Component Value Date    TRIG 123 08/07/2020    TRIG 109 09/28/2018    TRIG 136 03/31/2015     Lab Results   Component Value Date    HDL 30 (L) 02/09/2022    HDL 25 (L) 08/07/2020    HDL 34 (L) 09/28/2018     Lab Results   Component Value Date    LDLCALC 59 02/09/2022    LDLCALC 50 08/07/2020    LDLCALC 90 09/28/2018     No results found for: LABVLDL, VLDL  No results found for: CHOLHDLRATIO  CMP:    Lab Results   Component Value Date     02/09/2022    K 4.0 02/09/2022    CL 97 02/09/2022    CO2 24 02/09/2022    BUN 15 02/09/2022    CREATININE 0.68 02/09/2022    GFRAA >60.0 02/09/2022    LABGLOM >60.0 02/09/2022    GLUCOSE 146 02/09/2022    PROT 7.4 02/09/2022    LABALBU 4.0 02/09/2022    CALCIUM 9.2 02/09/2022    BILITOT 0.3 02/09/2022    ALKPHOS 67 02/09/2022    AST 19 02/09/2022    ALT 33 02/09/2022     BMP:    Lab Results   Component Value Date     02/09/2022    K 4.0 02/09/2022    CL 97 02/09/2022    CO2 24 02/09/2022    BUN 15 02/09/2022    LABALBU 4.0 02/09/2022    CREATININE 0.68 02/09/2022    CALCIUM 9.2 02/09/2022    GFRAA >60.0 02/09/2022    LABGLOM >60.0 02/09/2022    GLUCOSE 146 02/09/2022     Magnesium:    Lab Results   Component Value Date    MG 2.1 07/22/2014     TSH:  Lab Results   Component Value Date    TSH 0.928 07/22/2014       Patient Active Problem List   Diagnosis    Diabetes mellitus ([address]    ED (erectile dysfunction)    Hypertrophy of prostate with urinary obstruction and other lower urinary tract symptoms (LUTS)    Incomplete bladder emptying    Nocturia    SDH (subdural hematoma) (HCC)    Urinary frequency    Bilateral carpal tunnel syndrome    Chest pain    [facility]controlled type 2 diabetes mellitus with hypergly[facility]    [facility]acute [facility] syndrome) [facility]    Seasonal allergies    Adenomatous polyp of colon    Adenomatous polyp of descending colon    Adenomatous polyp of sigmoid colon       There are no discontinued medications. Modified Medications    No medications on file       No orders of the defined types were placed in this encounter. Assessment/Plan:    1. Coronary artery disease involving native coronary artery of native heart without angina pectoris    CP - SPect then RTO. Start Toprol XL/    2. Essential hypertension  Stable - continue meds. Low salt diet. - today little elevatged. Nervous about stress resutls. Last OV BP normal.     3. Mixed hyperlipidemia  Statin - continue meds. Low fat diet. Fasting labs. Counseling:  Heart Healthy Lifestyle, Improve BMI, Stop Smoking, Low Salt Diet, Take Precautions to Prevent Falls and Walk Daily    Return in about 6 months (around 8/11/2022).       Electronically signed by Sergio Sanches MD on 2/11/2022 at 3:35 PM

## 2022-03-29 RX ORDER — LANCETS 33 GAUGE
EACH MISCELLANEOUS
Qty: 100 EACH | Refills: 5 | Status: SHIPPED | OUTPATIENT
Start: 2022-03-29 | End: 2022-09-12

## 2022-04-18 ENCOUNTER — OFFICE VISIT (OUTPATIENT)
Dept: ENDOCRINOLOGY | Age: 57
End: 2022-04-18
Payer: COMMERCIAL

## 2022-04-18 VITALS
SYSTOLIC BLOOD PRESSURE: 134 MMHG | WEIGHT: 227 LBS | BODY MASS INDEX: 35.63 KG/M2 | HEART RATE: 82 BPM | OXYGEN SATURATION: 94 % | HEIGHT: 67 IN | DIASTOLIC BLOOD PRESSURE: 68 MMHG

## 2022-04-18 DIAGNOSIS — E11.65 UNCONTROLLED TYPE 2 DIABETES MELLITUS WITH HYPERGLYCEMIA (HCC): Primary | ICD-10-CM

## 2022-04-18 DIAGNOSIS — E08.00 DIABETES MELLITUS DUE TO UNDERLYING CONDITION WITH HYPEROSMOLARITY WITHOUT COMA, WITH LONG-TERM CURRENT USE OF INSULIN (HCC): ICD-10-CM

## 2022-04-18 DIAGNOSIS — Z79.4 DIABETES MELLITUS DUE TO UNDERLYING CONDITION WITH HYPEROSMOLARITY WITHOUT COMA, WITH LONG-TERM CURRENT USE OF INSULIN (HCC): ICD-10-CM

## 2022-04-18 LAB
CHP ED QC CHECK: NORMAL
GLUCOSE BLD-MCNC: 159 MG/DL

## 2022-04-18 PROCEDURE — 82962 GLUCOSE BLOOD TEST: CPT | Performed by: PHYSICIAN ASSISTANT

## 2022-04-18 PROCEDURE — 3051F HG A1C>EQUAL 7.0%<8.0%: CPT | Performed by: PHYSICIAN ASSISTANT

## 2022-04-18 PROCEDURE — 2022F DILAT RTA XM EVC RTNOPTHY: CPT | Performed by: PHYSICIAN ASSISTANT

## 2022-04-18 PROCEDURE — G8427 DOCREV CUR MEDS BY ELIG CLIN: HCPCS | Performed by: PHYSICIAN ASSISTANT

## 2022-04-18 PROCEDURE — G8417 CALC BMI ABV UP PARAM F/U: HCPCS | Performed by: PHYSICIAN ASSISTANT

## 2022-04-18 PROCEDURE — 4004F PT TOBACCO SCREEN RCVD TLK: CPT | Performed by: PHYSICIAN ASSISTANT

## 2022-04-18 PROCEDURE — 3017F COLORECTAL CA SCREEN DOC REV: CPT | Performed by: PHYSICIAN ASSISTANT

## 2022-04-18 PROCEDURE — 99213 OFFICE O/P EST LOW 20 MIN: CPT | Performed by: PHYSICIAN ASSISTANT

## 2022-04-18 RX ORDER — EMPAGLIFLOZIN 25 MG/1
25 TABLET, FILM COATED ORAL EVERY MORNING
Qty: 30 TABLET | Refills: 3 | Status: SHIPPED | OUTPATIENT
Start: 2022-04-18 | End: 2022-06-16 | Stop reason: SDUPTHER

## 2022-04-18 RX ORDER — TRIAMCINOLONE ACETONIDE 0.25 MG/G
CREAM TOPICAL
Status: ON HOLD | COMMUNITY
Start: 2022-03-08 | End: 2022-06-01

## 2022-04-18 RX ORDER — GLUCOSAMINE HCL/CHONDROITIN SU 500-400 MG
CAPSULE ORAL
Qty: 150 STRIP | Refills: 6 | Status: SHIPPED | OUTPATIENT
Start: 2022-04-18 | End: 2022-09-22

## 2022-04-18 RX ORDER — FLASH GLUCOSE SENSOR
KIT MISCELLANEOUS
Qty: 2 EACH | Refills: 3 | Status: SHIPPED | OUTPATIENT
Start: 2022-04-18 | End: 2022-05-09

## 2022-04-18 RX ORDER — GLIMEPIRIDE 2 MG/1
TABLET ORAL
Qty: 28 TABLET | Refills: 3 | Status: CANCELLED | OUTPATIENT
Start: 2022-04-18

## 2022-04-18 RX ORDER — DULAGLUTIDE 4.5 MG/.5ML
4.5 INJECTION, SOLUTION SUBCUTANEOUS WEEKLY
Qty: 2 ML | Refills: 3 | Status: ON HOLD | OUTPATIENT
Start: 2022-04-18 | End: 2022-06-01

## 2022-04-18 RX ORDER — PEN NEEDLE, DIABETIC
NEEDLE, DISPOSABLE MISCELLANEOUS
COMMUNITY
Start: 2022-04-14 | End: 2022-10-03

## 2022-04-18 RX ORDER — ACYCLOVIR 800 MG/1
TABLET ORAL
Status: ON HOLD | COMMUNITY
Start: 2022-03-08 | End: 2022-06-01

## 2022-04-18 RX ORDER — ALBUTEROL SULFATE 2.5 MG/3ML
2.5 SOLUTION RESPIRATORY (INHALATION)
COMMUNITY
Start: 2022-03-19

## 2022-04-18 ASSESSMENT — ENCOUNTER SYMPTOMS
VOMITING: 0
COUGH: 0
DIARRHEA: 0
EYE REDNESS: 0
RHINORRHEA: 0
EYE PAIN: 0
SORE THROAT: 0
SHORTNESS OF BREATH: 0
NAUSEA: 0
WHEEZING: 0
ABDOMINAL PAIN: 0
SINUS PRESSURE: 0

## 2022-04-18 NOTE — PATIENT INSTRUCTIONS
Endocrinology-diabetes    1. Check your blood sugars 4 times a day, before meals and at night  2. Document these numbers and a blood glucose log and bring them with you to your follow-up appointment. 3. If you are prescribed insulin, Do not take your mealtime insulin if your blood sugars less than 120   4. Call our office if you have blood sugars less than 80 or greater then 300 on two or more occasions  5. Call our office if you have any questions regarding your blood sugars or insulin dosing regiment  6. Signs of low blood sugar include sweating , heart racing, dizziness and weakness. Check your blood sugar if you have any of these symptoms.      Medications-

## 2022-04-18 NOTE — PROGRESS NOTES
4/18/2022    Assessment:       Diagnosis Orders   1. Uncontrolled type 2 diabetes mellitus with hyperglycemia ([facility])  POCT Glucose    Comprehensive Metabolic Panel    Hemoglobin A1C     DIABETES FOOT EXAM    Dulaglutide (TRULICITY) 4.5 LF/7.3PH SOPN    empagliflozin (JARDIANCE) 25 MG tablet   2. Diabetes mellitus due to underlying condition with hyperosmolarity without coma, with long-term current use of insulin ([facility])  blood glucose monitor strips     AVG am glucose 110-140     PLAN:     1. Continue Lantus 30 units at bedtime  2. Continue Trulicity 4.5 mg weekly  3. Continue Jardiance 25 mg daily  4. Freestyle Chioma 2 being used  5. Follow-up in 3 months    Orders Placed This Encounter   Procedures    Comprehensive Metabolic Panel     Standing Status:   Future     Standing Expiration Date:   4/18/2023    Hemoglobin A1C     Standing Status:   Future     Standing Expiration Date:   4/18/2023    POCT Glucose     DIABETES FOOT EXAM     Orders Placed This Encounter   Medications    blood glucose monitor strips     Sig: Check BG four times daily , DX: E11.65,     Dispense:  150 strip     Refill:  6    Dulaglutide (TRULICITY) 4.5 EN/2.0QE SOPN     Sig: Inject 4.5 mg into the skin once a week     Dispense:  2 mL     Refill:  3    empagliflozin (JARDIANCE) 25 MG tablet     Sig: Take 25 mg by mouth every morning     Dispense:  30 tablet     Refill:  3    Continuous Blood Gluc Sensor (FREESTYLE CHIOMA 2 SENSOR) [facility]     Sig: USE AS DIRECTED FO BLOOD GLUCOSE TESTING FOUR TIMES A DAY     Dispense:  2 each     Refill:  3     Return in about 3 months (around 7/18/2022) for Diabetes.   Subjective:     Chief Complaint   Patient presents with    Follow-up    Diabetes     Vitals:    04/18/22 1354   BP: 134/68   Pulse: 82   SpO2: 94%   Weight: 227 lb (103 kg)   Height: 5' 7\" (1.702 m)     Wt Readings from Last 3 Encounters:   04/18/22 227 lb (103 kg)   02/11/22 228 lb (103.4 kg)   01/14/22 226 lb 6.4 oz (102.7 kg)     BP Readings from Last 3 Encounters:   04/18/22 134/68   02/11/22 138/88   01/14/22 124/78     Valencia Noriega is a 77-year-old  man with hospital admission 6/2020, for chest pain. Status post PCI's. Has a history of COPD and type 2 insulin-dependent diabetes. Diabetes  He presents for his follow-up diabetic visit. He has type 2 diabetes mellitus. The initial diagnosis of diabetes was made 5 years ago. Pertinent negatives for hypoglycemia include no confusion, dizziness or headaches. Pertinent negatives for diabetes include no chest pain, no fatigue, no polydipsia and no polyuria. There are no hypoglycemic complications. Diabetic complications include heart disease. Risk factors for coronary artery disease include dyslipidemia, diabetes mellitus, male sex and obesity. Current diabetic treatment includes insulin injections and oral agent (triple therapy). He is compliant with treatment all of the time. He is currently taking insulin at bedtime. Rotation sites for injection include the abdominal wall. He is following a generally healthy diet. Meal planning includes avoidance of concentrated sweets. He participates in exercise three times a week. He monitors blood glucose at home 5+ x per day. His overall blood glucose range is 180-200 mg/dl. An ACE inhibitor/angiotensin II receptor blocker is being taken. He does not see a podiatrist.Eye exam is current.      Past Medical History:   Diagnosis Date    CAD (coronary artery disease)     COPD (chronic obstructive pulmonary disease) ([address]     Hyperlipidemia     Hypertension     SDH (subdural hematoma) ([facility])     syncope, coughing    Type 2 diabetes mellitus without complication [facility])      Past Surgical History:   Procedure Laterality Date    COLONOSCOPY  10/17/2014    COLONOSCOPY N/A 1/6/2022    COLONOSCOPY WITH POLYPECTOMY performed by Rola Dahl MD at [place] [address]  05/26/2020    PTCA       Social History Socioeconomic History    Marital status:      Spouse name: Not on file    Number of children: Not on file    Years of education: Not on file    Highest education level: Not on file   Occupational History    Not on file   Tobacco Use    Smoking status: Current Some Day Smoker     Start date: 17   [place] Smokeless tobacco: Never Used    Tobacco comment: actively trying to quit   Substance and Sexual Activity    Alcohol use: Yes     Comment: weekends     Drug use: Yes     Frequency: 1.0 times per week     Types: Cocaine     Comment: last used 3 days ago     Sexual activity: Not on file   Other Topics Concern    Not on file   Social History Narrative    Not on file     Social Determinants of Health     Financial Resource Strain:     Difficulty of Paying Living Expenses: Not on file   Food Insecurity:     Worried About Running Out of Food in the Last Year: Not on file    Parag of Food in the Last Year: Not on file   Transportation Needs:     Lack of Transportation (Medical): Not on file    Lack of Transportation (Non-Medical):  Not on file   Physical Activity:     Days of Exercise per Week: Not on file    Minutes of Exercise per Session: Not on file   Stress:     Feeling of Stress : Not on file   Social Connections:     Frequency of Communication with Friends and Family: Not on file    Frequency of Social Gatherings with Friends and Family: Not on file    Attends [employer] Services: Not on file    Active Member of Clubs or Organizations: Not on file    Attends Club or Organization Meetings: Not on file    Marital Status: Not on file   Intimate Partner Violence:     Fear of Current or Ex-Partner: Not on file    Emotionally Abused: Not on file    Physically Abused: Not on file    Sexually Abused: Not on file   Housing Stability:     Unable to Pay for Housing in the Last Year: Not on file    Number of [place] in the Last Year: Not on file    Unstable Housing in the Last Year: Not on file     Family History   Problem Relation Age of Onset    Cancer Neg Hx      Allergies   Allergen Reactions    Metformin And Related      Diarrhea        Current Outpatient Medications:     acyclovir (ZOVIRAX) 800 MG tablet, , Disp: , Rfl:     albuterol (PROVENTIL) (2.5 MG/3ML) 0.083% nebulizer solution, , Disp: , Rfl:     triamcinolone (KENALOG) 0.025 % cream, , Disp: , Rfl:     blood glucose monitor strips, Check BG four times daily , DX: E11.65,, Disp: 150 strip, Rfl: 6    Dulaglutide (TRULICITY) 4.5 JH/3.8GO SOPN, Inject 4.5 mg into the skin once a week, Disp: 2 mL, Rfl: 3    empagliflozin (JARDIANCE) 25 MG tablet, Take 25 mg by mouth every morning, Disp: 30 tablet, Rfl: 3    Continuous Blood Gluc Sensor (FREESTYLE CHEIKH 2 SENSOR) MISC, USE AS DIRECTED FO BLOOD GLUCOSE TESTING FOUR TIMES A DAY, Disp: 2 each, Rfl: 3    Lancets (ONETOUCH DELICA PLUS UXBYZO18B) MISC, 1 DEVICE BY DOES NOT APPLY ROUTE 4 TIMES DAILY (BEFORE MEALS AND NIGHTLY), Disp: 100 each, Rfl: 5    Easy Comfort Lancets MISC, , Disp: , Rfl:     vitamin D (ERGOCALCIFEROL) 1.25 MG (11467 UT) CAPS capsule, , Disp: , Rfl:     HYDROcodone-acetaminophen (NORCO) 5-325 MG per tablet, , Disp: , Rfl:     pregabalin (LYRICA) 50 MG capsule, Take 50 mg by mouth 3 times daily. , Disp: , Rfl:     ALLERGY RELIEF 10 MG tablet, TAKE 1 TABLET BY MOUTH DAILY (AM), Disp: 28 tablet, Rfl: 5    metoprolol succinate (TOPROL XL) 25 MG extended release tablet, Take 1 tablet by mouth daily, Disp: 90 tablet, Rfl: 3    pregabalin (LYRICA) 50 MG capsule, , Disp: , Rfl:     FLUoxetine (PROZAC) 10 MG capsule, , Disp: , Rfl:     REFRESH TEARS 0.5 % SOLN ophthalmic solution, , Disp: , Rfl:     insulin glargine (LANTUS SOLOSTAR) 100 UNIT/ML injection pen, 30 UNITS AT BEDTIME, Disp: 15 mL, Rfl: 3    Continuous Blood Gluc  (FREESTYLE CHEIKH 2 READER) SONDRA, 1 Device by Does not apply route 4 times daily (before meals and nightly), Disp: 1 each, Rfl: 0    aspirin (ASPIRIN LOW DOSE) 81 MG EC tablet, TAKE 1 TABLET BY MOUTH DAILY (AM), Disp: 90 tablet, Rfl: 3    atorvastatin (LIPITOR) 40 MG tablet, TAKE 1 TABLET BY MOUTH NIGHTLY (HS), Disp: 30 tablet, Rfl: 3    gabapentin (NEURONTIN) 100 MG capsule, Take 100 mg by mouth 3 times daily. Take 2 caps #x daily, Disp: , Rfl:     nitroGLYCERIN (NITROSTAT) 0.4 MG SL tablet, up to max of 3 total doses. If no relief after 1 dose, call 911., Disp: 25 tablet, Rfl: 3    valsartan (DIOVAN) 160 MG tablet, Take by mouth daily , Disp: , Rfl: 2    cyclobenzaprine (FLEXERIL) 10 MG tablet, , Disp: , Rfl: 1    hydrochlorothiazide (HYDRODIURIL) 12.5 MG tablet, daily , Disp: , Rfl: 0    DULERA 200-5 MCG/ACT inhaler, , Disp: , Rfl: 2    acetaminophen (TYLENOL ARTHRITIS PAIN) 650 MG extended release tablet, Take 650 mg by mouth 2 times daily, Disp: , Rfl:     fluticasone (FLONASE) 50 MCG/ACT nasal spray, 1 spray by Nasal route daily, Disp: , Rfl:     albuterol sulfate  (90 Base) MCG/ACT inhaler, Inhale 2 puffs into the lungs every 6 hours as needed for Wheezing, Disp: , Rfl:     Blood Glucose Monitoring Suppl SONDRA, check BG once daily, DX: E11.9, NIDDM, Disp: 1 Device, Rfl: 0    omeprazole (PRILOSEC) 20 MG capsule, Take 1 capsule by mouth daily. , Disp: 30 capsule, Rfl: 11    UNIFINE PENTIPS 31G X 6 MM MISC, , Disp: , Rfl:   Lab Results   Component Value Date     (L) 02/09/2022    K 4.0 02/09/2022    CL 97 02/09/2022    CO2 24 02/09/2022    BUN 15 02/09/2022    CREATININE 0.68 (L) 02/09/2022    GLUCOSE 159 04/18/2022    CALCIUM 9.2 02/09/2022    PROT 7.4 02/09/2022    LABALBU 4.0 02/09/2022    BILITOT 0.3 02/09/2022    ALKPHOS 67 02/09/2022    AST 19 02/09/2022    ALT 33 02/09/2022    LABGLOM >60.0 02/09/2022    GFRAA >60.0 02/09/2022    GLOB 3.4 02/09/2022     Lab Results   Component Value Date    WBC 8.0 05/27/2020    HGB 15.8 05/27/2020    HCT 46.2 05/27/2020    MCV 91.7 05/27/2020     05/27/2020     Lab Results   Component Value Date    LABA1C 7.1 (H) 02/09/2022    LABA1C 7.3 (H) 10/05/2021    LABA1C 7.0 (H) 06/28/2021   Results for Demetri Delgado (MRN [id number]) as of 1/14/2022 15:24   Ref. Range 10/5/2021 12:38   Creatinine, Ur Latest Ref Range: Not Established mg/dL 40.8   Microalbumin Creatinine Ratio Latest Ref Range: 0.0 - 30.0 mg/G see below   Microalbumin, Random Urine Latest Ref Range: Not Established mg/dL <1.20         Lab Results   Component Value Date    CHOL 100 08/07/2020    CHOL 146 09/28/2018    CHOL 170 03/31/2015     Lab Results   Component Value Date    TRIG 123 08/07/2020    TRIG 109 09/28/2018    TRIG 136 03/31/2015     Lab Results   Component Value Date    HDL 30 (L) 02/09/2022    HDL 25 (L) 08/07/2020    HDL 34 (L) 09/28/2018     Lab Results   Component Value Date    LDLCALC 59 02/09/2022    LDLCALC 50 08/07/2020    [address] 09/28/2018     No results found for: LABVLDL, VLDL  No results found for: CHOLHDLRATIO  No results found for: TESTM  Lab Results   Component Value Date    TSH 0.928 07/22/2014       Review of Systems   Constitutional: Negative for chills, fatigue and fever. HENT: Negative for congestion, ear pain, postnasal drip, rhinorrhea, sinus pressure and sore throat. Eyes: Negative for pain and redness. Respiratory: Negative for cough, shortness of breath and wheezing. Cardiovascular: Negative for chest pain, palpitations and leg swelling. Gastrointestinal: Negative for abdominal pain, diarrhea, nausea and vomiting. Endocrine: Negative for polydipsia and polyuria. Genitourinary: Negative for difficulty urinating. Musculoskeletal: Negative for arthralgias. Skin: Negative for rash. Allergic/Immunologic: Negative for environmental allergies. Neurological: Negative for dizziness and headaches. Hematological: Negative for adenopathy. Psychiatric/Behavioral: Negative for confusion. Objective:   Physical Exam  Vitals reviewed.    Constitutional: Appearance: He is well-developed. HENT:      Head: Normocephalic and atraumatic. Nose: No congestion. Mouth/Throat:      Mouth: Mucous membranes are moist.   Eyes:      Conjunctiva/sclera: Conjunctivae normal.   Cardiovascular:      Rate and Rhythm: Normal rate and regular rhythm. Heart sounds: Normal heart sounds. Pulmonary:      Effort: Pulmonary effort is normal.      Breath sounds: Normal breath sounds. Abdominal:      General: Bowel sounds are normal.      Palpations: Abdomen is soft. Musculoskeletal:         General: Normal range of motion. Cervical back: Normal range of motion and neck supple. Skin:     General: Skin is warm and dry. Neurological:      Mental Status: He is alert and oriented to person, place, and time.    Psychiatric:         Mood and Affect: Mood normal.

## 2022-04-25 RX ORDER — GLIMEPIRIDE 2 MG/1
TABLET ORAL
Qty: 28 TABLET | Refills: 3 | OUTPATIENT
Start: 2022-04-25

## 2022-04-27 RX ORDER — FLASH GLUCOSE SENSOR
KIT MISCELLANEOUS
Refills: 0 | OUTPATIENT
Start: 2022-04-27

## 2022-05-05 DIAGNOSIS — E11.65 UNCONTROLLED TYPE 2 DIABETES MELLITUS WITH HYPERGLYCEMIA (HCC): Primary | ICD-10-CM

## 2022-05-07 DIAGNOSIS — E11.65 UNCONTROLLED TYPE 2 DIABETES MELLITUS WITH HYPERGLYCEMIA (HCC): ICD-10-CM

## 2022-05-09 RX ORDER — FLASH GLUCOSE SENSOR
KIT MISCELLANEOUS
Qty: 2 EACH | Refills: 3 | Status: SHIPPED | OUTPATIENT
Start: 2022-05-09 | End: 2022-09-22 | Stop reason: SDUPTHER

## 2022-05-09 RX ORDER — DULAGLUTIDE 4.5 MG/.5ML
4.5 INJECTION, SOLUTION SUBCUTANEOUS WEEKLY
Qty: 2 ML | Refills: 3 | OUTPATIENT
Start: 2022-05-09

## 2022-05-09 NOTE — TELEPHONE ENCOUNTER
Pharamcy requesting medication refill.  Please approve or deny this request.    Rx requested:  Requested Prescriptions     Pending Prescriptions Disp Refills    Continuous Blood Gluc Sensor (FREESTYLE CHEIKH 2 SENSOR) MISC [Pharmacy Med Name: Burt Andrews 2 SENSOR 14DAY]  0     Sig: USE AS DIRECTED TO TEST FOUR TIMES A DAY         Last Office Visit:   4/18/2022      Next Visit Date:  Future Appointments   Date Time Provider Dolly English   7/25/2022  2:30 PM Adam Regan   8/11/2022  3:30 PM Micheline Rendon MD [facility]

## 2022-05-16 DIAGNOSIS — E11.65 UNCONTROLLED TYPE 2 DIABETES MELLITUS WITH HYPERGLYCEMIA (HCC): Primary | ICD-10-CM

## 2022-05-17 RX ORDER — INSULIN GLARGINE 100 [IU]/ML
INJECTION, SOLUTION SUBCUTANEOUS
Qty: 15 ML | Refills: 3 | Status: SHIPPED | OUTPATIENT
Start: 2022-05-17 | End: 2022-06-16 | Stop reason: SDUPTHER

## 2022-05-20 DIAGNOSIS — E11.65 UNCONTROLLED TYPE 2 DIABETES MELLITUS WITH HYPERGLYCEMIA (HCC): ICD-10-CM

## 2022-05-23 DIAGNOSIS — E11.65 UNCONTROLLED TYPE 2 DIABETES MELLITUS WITH HYPERGLYCEMIA (HCC): ICD-10-CM

## 2022-05-23 RX ORDER — EMPAGLIFLOZIN 25 MG/1
TABLET, FILM COATED ORAL
Qty: 28 TABLET | Refills: 3 | OUTPATIENT
Start: 2022-05-23

## 2022-06-01 ENCOUNTER — APPOINTMENT (OUTPATIENT)
Dept: GENERAL RADIOLOGY | Age: 57
DRG: 720 | End: 2022-06-01
Payer: COMMERCIAL

## 2022-06-01 ENCOUNTER — HOSPITAL ENCOUNTER (INPATIENT)
Age: 57
LOS: 4 days | Discharge: HOME | DRG: 720 | End: 2022-06-05
Attending: INTERNAL MEDICINE | Admitting: INTERNAL MEDICINE
Payer: COMMERCIAL

## 2022-06-01 ENCOUNTER — APPOINTMENT (OUTPATIENT)
Dept: CT IMAGING | Age: 57
DRG: 720 | End: 2022-06-01
Payer: COMMERCIAL

## 2022-06-01 DIAGNOSIS — E87.1 HYPONATREMIA: ICD-10-CM

## 2022-06-01 DIAGNOSIS — A41.9 SEPTICEMIA (HCC): Primary | ICD-10-CM

## 2022-06-01 LAB
ALBUMIN SERPL-MCNC: 3.6 G/DL (ref 3.5–4.6)
ALP BLD-CCNC: 87 U/L (ref 35–104)
ALT SERPL-CCNC: 52 U/L (ref 0–41)
ANION GAP SERPL CALCULATED.3IONS-SCNC: 18 MEQ/L (ref 9–15)
AST SERPL-CCNC: 55 U/L (ref 0–40)
BACTERIA: ABNORMAL /HPF
BASOPHILS ABSOLUTE: 0 K/UL (ref 0–0.2)
BASOPHILS RELATIVE PERCENT: 0.1 %
BILIRUB SERPL-MCNC: 0.8 MG/DL (ref 0.2–0.7)
BILIRUBIN URINE: NEGATIVE
BLOOD, URINE: ABNORMAL
BUN BLDV-MCNC: 10 MG/DL (ref 6–20)
CALCIUM SERPL-MCNC: 8.4 MG/DL (ref 8.5–9.9)
CHLORIDE BLD-SCNC: 88 MEQ/L (ref 95–107)
CLARITY: CLEAR
CO2: 18 MEQ/L (ref 20–31)
COLOR: YELLOW
COMMENT UA: ABNORMAL
CREAT SERPL-MCNC: 0.79 MG/DL (ref 0.7–1.2)
EKG ATRIAL RATE: 113 BPM
EKG P AXIS: 41 DEGREES
EKG P-R INTERVAL: 154 MS
EKG Q-T INTERVAL: 330 MS
EKG QRS DURATION: 90 MS
EKG QTC CALCULATION (BAZETT): 452 MS
EKG R AXIS: 29 DEGREES
EKG T AXIS: 29 DEGREES
EKG VENTRICULAR RATE: 113 BPM
EOSINOPHILS ABSOLUTE: 0 K/UL (ref 0–0.7)
EOSINOPHILS RELATIVE PERCENT: 0.1 %
EPITHELIAL CELLS, UA: ABNORMAL /HPF
GFR AFRICAN AMERICAN: >60
GFR NON-AFRICAN AMERICAN: >60
GLOBULIN: 3.5 G/DL (ref 2.3–3.5)
GLUCOSE BLD-MCNC: 110 MG/DL (ref 70–99)
GLUCOSE BLD-MCNC: 138 MG/DL (ref 70–99)
GLUCOSE BLD-MCNC: 165 MG/DL (ref 70–99)
GLUCOSE BLD-MCNC: 194 MG/DL (ref 70–99)
GLUCOSE URINE: >=1000 MG/DL
HCT VFR BLD CALC: 46.7 % (ref 42–52)
HEMOGLOBIN: 15.4 G/DL (ref 14–18)
KETONES, URINE: >=80 MG/DL
LACTIC ACID, SEPSIS: 1 MMOL/L (ref 0.5–1.9)
LACTIC ACID, SEPSIS: 1.2 MMOL/L (ref 0.5–1.9)
LACTIC ACID: 0.9 MMOL/L (ref 0.5–2.2)
LACTIC ACID: 1.2 MMOL/L (ref 0.5–2.2)
LEUKOCYTE ESTERASE, URINE: ABNORMAL
LIPASE: 125 U/L (ref 12–95)
LYMPHOCYTES ABSOLUTE: 0.3 K/UL (ref 1–4.8)
LYMPHOCYTES RELATIVE PERCENT: 4.7 %
MAGNESIUM: 2 MG/DL (ref 1.7–2.4)
MCH RBC QN AUTO: 29.9 PG (ref 27–31.3)
MCHC RBC AUTO-ENTMCNC: 33.1 % (ref 33–37)
MCV RBC AUTO: 90.2 FL (ref 80–100)
MONOCYTES ABSOLUTE: 0.6 K/UL (ref 0.2–0.8)
MONOCYTES RELATIVE PERCENT: 7.6 %
NEUTROPHILS ABSOLUTE: 6.5 K/UL (ref 1.4–6.5)
NEUTROPHILS RELATIVE PERCENT: 87.5 %
NITRITE, URINE: NEGATIVE
PDW BLD-RTO: 15.5 % (ref 11.5–14.5)
PERFORMED ON: ABNORMAL
PH UA: 6.5 (ref 5–9)
PLATELET # BLD: 105 K/UL (ref 130–400)
POC CREATININE WHOLE BLOOD: 0.7
POTASSIUM SERPL-SCNC: 3.8 MEQ/L (ref 3.4–4.9)
PROCALCITONIN: 1.54 NG/ML (ref 0–0.15)
PROTEIN UA: NEGATIVE MG/DL
RBC # BLD: 5.17 M/UL (ref 4.7–6.1)
RBC UA: ABNORMAL /HPF (ref 0–2)
SARS-COV-2, NAAT: NOT DETECTED
SODIUM BLD-SCNC: 124 MEQ/L (ref 135–144)
SPECIFIC GRAVITY UA: 1.02 (ref 1–1.03)
TOTAL PROTEIN: 7.1 G/DL (ref 6.3–8)
URINE REFLEX TO CULTURE: ABNORMAL
UROBILINOGEN, URINE: 1 E.U./DL
WBC # BLD: 7.5 K/UL (ref 4.8–10.8)
WBC UA: ABNORMAL /HPF (ref 0–5)

## 2022-06-01 PROCEDURE — 87150 DNA/RNA AMPLIFIED PROBE: CPT

## 2022-06-01 PROCEDURE — 93010 ELECTROCARDIOGRAM REPORT: CPT | Performed by: INTERNAL MEDICINE

## 2022-06-01 PROCEDURE — 71045 X-RAY EXAM CHEST 1 VIEW: CPT

## 2022-06-01 PROCEDURE — 83735 ASSAY OF MAGNESIUM: CPT

## 2022-06-01 PROCEDURE — 2580000003 HC RX 258: Performed by: INTERNAL MEDICINE

## 2022-06-01 PROCEDURE — 94664 DEMO&/EVAL PT USE INHALER: CPT

## 2022-06-01 PROCEDURE — 51798 US URINE CAPACITY MEASURE: CPT

## 2022-06-01 PROCEDURE — 93005 ELECTROCARDIOGRAM TRACING: CPT | Performed by: STUDENT IN AN ORGANIZED HEALTH CARE EDUCATION/TRAINING PROGRAM

## 2022-06-01 PROCEDURE — 6360000002 HC RX W HCPCS: Performed by: STUDENT IN AN ORGANIZED HEALTH CARE EDUCATION/TRAINING PROGRAM

## 2022-06-01 PROCEDURE — 1210000000 HC MED SURG R&B

## 2022-06-01 PROCEDURE — 80053 COMPREHEN METABOLIC PANEL: CPT

## 2022-06-01 PROCEDURE — 81001 URINALYSIS AUTO W/SCOPE: CPT

## 2022-06-01 PROCEDURE — 87635 SARS-COV-2 COVID-19 AMP PRB: CPT

## 2022-06-01 PROCEDURE — 87040 BLOOD CULTURE FOR BACTERIA: CPT

## 2022-06-01 PROCEDURE — 6360000002 HC RX W HCPCS: Performed by: INTERNAL MEDICINE

## 2022-06-01 PROCEDURE — 6360000004 HC RX CONTRAST MEDICATION: Performed by: STUDENT IN AN ORGANIZED HEALTH CARE EDUCATION/TRAINING PROGRAM

## 2022-06-01 PROCEDURE — 96361 HYDRATE IV INFUSION ADD-ON: CPT

## 2022-06-01 PROCEDURE — 36415 COLL VENOUS BLD VENIPUNCTURE: CPT

## 2022-06-01 PROCEDURE — 2580000003 HC RX 258: Performed by: STUDENT IN AN ORGANIZED HEALTH CARE EDUCATION/TRAINING PROGRAM

## 2022-06-01 PROCEDURE — 84145 PROCALCITONIN (PCT): CPT

## 2022-06-01 PROCEDURE — 74177 CT ABD & PELVIS W/CONTRAST: CPT

## 2022-06-01 PROCEDURE — 85025 COMPLETE CBC W/AUTO DIFF WBC: CPT

## 2022-06-01 PROCEDURE — 96365 THER/PROPH/DIAG IV INF INIT: CPT

## 2022-06-01 PROCEDURE — 99285 EMERGENCY DEPT VISIT HI MDM: CPT

## 2022-06-01 PROCEDURE — 6370000000 HC RX 637 (ALT 250 FOR IP): Performed by: STUDENT IN AN ORGANIZED HEALTH CARE EDUCATION/TRAINING PROGRAM

## 2022-06-01 PROCEDURE — 83690 ASSAY OF LIPASE: CPT

## 2022-06-01 PROCEDURE — 83605 ASSAY OF LACTIC ACID: CPT

## 2022-06-01 PROCEDURE — 6370000000 HC RX 637 (ALT 250 FOR IP): Performed by: INTERNAL MEDICINE

## 2022-06-01 RX ORDER — PANTOPRAZOLE SODIUM 40 MG/1
40 TABLET, DELAYED RELEASE ORAL
Status: DISCONTINUED | OUTPATIENT
Start: 2022-06-02 | End: 2022-06-05 | Stop reason: HOSPADM

## 2022-06-01 RX ORDER — INSULIN LISPRO 100 [IU]/ML
0-12 INJECTION, SOLUTION INTRAVENOUS; SUBCUTANEOUS
Status: DISCONTINUED | OUTPATIENT
Start: 2022-06-01 | End: 2022-06-05 | Stop reason: HOSPADM

## 2022-06-01 RX ORDER — SODIUM CHLORIDE 0.9 % (FLUSH) 0.9 %
5-40 SYRINGE (ML) INJECTION EVERY 12 HOURS SCHEDULED
Status: DISCONTINUED | OUTPATIENT
Start: 2022-06-01 | End: 2022-06-05 | Stop reason: HOSPADM

## 2022-06-01 RX ORDER — INSULIN GLARGINE 100 [IU]/ML
30 INJECTION, SOLUTION SUBCUTANEOUS NIGHTLY
Status: DISCONTINUED | OUTPATIENT
Start: 2022-06-01 | End: 2022-06-05 | Stop reason: HOSPADM

## 2022-06-01 RX ORDER — PREGABALIN 50 MG/1
50 CAPSULE ORAL 3 TIMES DAILY
Status: DISCONTINUED | OUTPATIENT
Start: 2022-06-01 | End: 2022-06-05 | Stop reason: HOSPADM

## 2022-06-01 RX ORDER — IBUPROFEN 800 MG/1
400 TABLET ORAL ONCE
Status: DISCONTINUED | OUTPATIENT
Start: 2022-06-01 | End: 2022-06-01

## 2022-06-01 RX ORDER — ACETAMINOPHEN 650 MG/1
650 SUPPOSITORY RECTAL EVERY 6 HOURS PRN
Status: DISCONTINUED | OUTPATIENT
Start: 2022-06-01 | End: 2022-06-05 | Stop reason: HOSPADM

## 2022-06-01 RX ORDER — ASPIRIN 81 MG/1
81 TABLET ORAL DAILY
Status: DISCONTINUED | OUTPATIENT
Start: 2022-06-01 | End: 2022-06-05 | Stop reason: HOSPADM

## 2022-06-01 RX ORDER — DEXTROSE MONOHYDRATE 50 MG/ML
100 INJECTION, SOLUTION INTRAVENOUS PRN
Status: DISCONTINUED | OUTPATIENT
Start: 2022-06-01 | End: 2022-06-05 | Stop reason: HOSPADM

## 2022-06-01 RX ORDER — SODIUM CHLORIDE 0.9 % (FLUSH) 0.9 %
5-40 SYRINGE (ML) INJECTION PRN
Status: DISCONTINUED | OUTPATIENT
Start: 2022-06-01 | End: 2022-06-05 | Stop reason: HOSPADM

## 2022-06-01 RX ORDER — ENOXAPARIN SODIUM 100 MG/ML
40 INJECTION SUBCUTANEOUS DAILY
Status: DISCONTINUED | OUTPATIENT
Start: 2022-06-01 | End: 2022-06-05 | Stop reason: HOSPADM

## 2022-06-01 RX ORDER — ACETAMINOPHEN 325 MG/1
650 TABLET ORAL EVERY 6 HOURS PRN
Status: DISCONTINUED | OUTPATIENT
Start: 2022-06-01 | End: 2022-06-05 | Stop reason: HOSPADM

## 2022-06-01 RX ORDER — IBUPROFEN 800 MG/1
800 TABLET ORAL ONCE
Status: COMPLETED | OUTPATIENT
Start: 2022-06-01 | End: 2022-06-01

## 2022-06-01 RX ORDER — SODIUM CHLORIDE 9 MG/ML
INJECTION, SOLUTION INTRAVENOUS CONTINUOUS
Status: DISCONTINUED | OUTPATIENT
Start: 2022-06-01 | End: 2022-06-04

## 2022-06-01 RX ORDER — PREGABALIN 50 MG/1
50 CAPSULE ORAL 2 TIMES DAILY
COMMUNITY

## 2022-06-01 RX ORDER — INSULIN LISPRO 100 [IU]/ML
0-6 INJECTION, SOLUTION INTRAVENOUS; SUBCUTANEOUS NIGHTLY
Status: DISCONTINUED | OUTPATIENT
Start: 2022-06-01 | End: 2022-06-05 | Stop reason: HOSPADM

## 2022-06-01 RX ORDER — SODIUM CHLORIDE 9 MG/ML
INJECTION, SOLUTION INTRAVENOUS PRN
Status: DISCONTINUED | OUTPATIENT
Start: 2022-06-01 | End: 2022-06-05 | Stop reason: HOSPADM

## 2022-06-01 RX ORDER — IPRATROPIUM BROMIDE AND ALBUTEROL SULFATE 2.5; .5 MG/3ML; MG/3ML
1 SOLUTION RESPIRATORY (INHALATION) EVERY 4 HOURS PRN
Status: DISCONTINUED | OUTPATIENT
Start: 2022-06-01 | End: 2022-06-05 | Stop reason: HOSPADM

## 2022-06-01 RX ORDER — 0.9 % SODIUM CHLORIDE 0.9 %
30 INTRAVENOUS SOLUTION INTRAVENOUS ONCE
Status: COMPLETED | OUTPATIENT
Start: 2022-06-01 | End: 2022-06-01

## 2022-06-01 RX ORDER — METOPROLOL SUCCINATE 25 MG/1
25 TABLET, EXTENDED RELEASE ORAL DAILY
COMMUNITY
End: 2022-08-11 | Stop reason: SDUPTHER

## 2022-06-01 RX ADMIN — Medication 10 ML: at 21:19

## 2022-06-01 RX ADMIN — SODIUM CHLORIDE: 9 INJECTION, SOLUTION INTRAVENOUS at 19:15

## 2022-06-01 RX ADMIN — INSULIN GLARGINE 30 UNITS: 100 INJECTION, SOLUTION SUBCUTANEOUS at 20:39

## 2022-06-01 RX ADMIN — SODIUM CHLORIDE 2925 ML: 9 INJECTION, SOLUTION INTRAVENOUS at 13:20

## 2022-06-01 RX ADMIN — IOPAMIDOL 100 ML: 612 INJECTION, SOLUTION INTRAVENOUS at 14:17

## 2022-06-01 RX ADMIN — IBUPROFEN 800 MG: 800 TABLET ORAL at 13:27

## 2022-06-01 RX ADMIN — PIPERACILLIN AND TAZOBACTAM 3375 MG: 3; .375 INJECTION, POWDER, LYOPHILIZED, FOR SOLUTION INTRAVENOUS at 14:51

## 2022-06-01 RX ADMIN — ENOXAPARIN SODIUM 40 MG: 100 INJECTION SUBCUTANEOUS at 20:10

## 2022-06-01 RX ADMIN — PREGABALIN 50 MG: 50 CAPSULE ORAL at 20:39

## 2022-06-01 RX ADMIN — VANCOMYCIN HYDROCHLORIDE 1500 MG: 1.5 INJECTION, POWDER, LYOPHILIZED, FOR SOLUTION INTRAVENOUS at 15:34

## 2022-06-01 ASSESSMENT — ENCOUNTER SYMPTOMS
VOMITING: 0
DIARRHEA: 0
WHEEZING: 0
PHOTOPHOBIA: 0
COUGH: 0
NAUSEA: 0
SHORTNESS OF BREATH: 0
ABDOMINAL DISTENTION: 0
ABDOMINAL PAIN: 1
CONSTIPATION: 0

## 2022-06-01 ASSESSMENT — PAIN SCALES - GENERAL
PAINLEVEL_OUTOF10: 9
PAINLEVEL_OUTOF10: 10

## 2022-06-01 ASSESSMENT — PAIN DESCRIPTION - ORIENTATION: ORIENTATION: RIGHT;LOWER

## 2022-06-01 ASSESSMENT — PAIN DESCRIPTION - PAIN TYPE: TYPE: ACUTE PAIN

## 2022-06-01 ASSESSMENT — PAIN DESCRIPTION - LOCATION: LOCATION: ABDOMEN

## 2022-06-01 ASSESSMENT — PAIN - FUNCTIONAL ASSESSMENT: PAIN_FUNCTIONAL_ASSESSMENT: 0-10

## 2022-06-01 ASSESSMENT — PAIN DESCRIPTION - DESCRIPTORS: DESCRIPTORS: SHARP

## 2022-06-01 NOTE — PROGRESS NOTES
[facility] Pharmacokinetic Monitoring Service - Vancomycin     Oswaldo Crandall is a 61 y.o. male starting on vancomycin therapy for sepsis of unknown etiology. Pharmacy consulted by Maryuri Villanueva PA-C for monitoring and adjustment. Additional Antimicrobials: zosyn     Pertinent Laboratory Values: Wt Readings from Last 1 Encounters:   06/01/22 215 lb (97.5 kg)     Temp Readings from Last 1 Encounters:   06/01/22 98.9 °F (37.2 °C) (Oral)     Estimated Creatinine Clearance: 113 mL/min (based on SCr of 0.79 mg/dL).   Recent Labs     06/01/22  1300   CREATININE 0.79   WBC 7.5     Plan:  Once time dose of vancomycin 1500mg (15mg/kg) ordered while in ED   Pharmacy will need to be re-consulted if dosing to be continued if patient is admitted     Thank you for the consult,    Gabby Cloud [facility]  6/1/2022 3:03 PM

## 2022-06-01 NOTE — ED NOTES
Bladder scan completed at this time. 148 mL of urine scanned within bladder. SIVAN [place] advised.       Quang Merchant RN  06/01/22 [id number]

## 2022-06-01 NOTE — ED TRIAGE NOTES
A & Ox4. Skin pink warm and dry. Went to urgent care on Sunday for possible UTI. Was put on Cipro. Can't hardly urinate now and pain is getting worse. Fever, chills now along with dizziness. Saw Dr Ingrid Villagran today and was sent here to be seen for possible infection.  Complains of entire body aches

## 2022-06-01 NOTE — ED PROVIDER NOTES
[address] [facility] ED  EMERGENCY DEPARTMENT ENCOUNTER      Pt Name: Elva Wilcox  MRN: [id number]  [place] 1965  Date of evaluation: 6/1/2022  Provider: Shane Hatch Dr       Chief Complaint   Patient presents with    Abdominal Pain     Lower abdominal pain with difficulty urinating and pain with urination since Sunday         HISTORY OF PRESENT ILLNESS   (Location/Symptom, Timing/Onset, Context/Setting, Quality, Duration, Modifying Factors, Severity)  Note limiting factors. Elva Wilcox is a 61 y.o. male who per chart review has pmhx of TIIDM, traumatic SDH presents to the emergency department for evaluation of illness x 1 week. Pt states sx began this past Thursday. He reports subjective fever, chills, poor appetite, fatigue, dysuria, lower back and lower abd pain. He states he feels there is something stuck at urethra when urinating. No hematuria. Was seen at urgent care on Saturday, dx with presumed UTI and placed on cipro. Pt states he has been compliant with the abx but feeling no better. He was seen at his pcp today who directed him here for further evaluation. He denies nausea vomiting diarrhea constipation headache dizziness cough cp sob rash urinary frequency or urgency wounds ear or throat pain. No sick contacts. HPI    Nursing Notes were reviewed. REVIEW OF SYSTEMS    (2-9 systems for level 4, 10 or more for level 5)     Review of Systems   Constitutional: Positive for activity change, appetite change, chills, fatigue and fever. Negative for diaphoresis. HENT: Negative for congestion. Eyes: Negative for photophobia. Respiratory: Negative for cough, shortness of breath and wheezing. Cardiovascular: Negative for chest pain and palpitations. Gastrointestinal: Positive for abdominal pain. Negative for abdominal distention, constipation, diarrhea, nausea and vomiting. Genitourinary: Positive for dysuria.  Negative for flank pain, frequency, hematuria, penile discharge, penile pain, penile swelling, scrotal swelling, testicular pain and urgency. Musculoskeletal: Negative for myalgias. Allergic/Immunologic: Negative for immunocompromised state. Neurological: Negative for dizziness, weakness and headaches. All other systems reviewed and are negative. Except as noted above the remainder of the review of systems was reviewed and negative.        PAST MEDICAL HISTORY     Past Medical History:   Diagnosis Date    CAD (coronary artery disease)     [facility]     Hyperlipidemia     Hypertension     [facility])     syncope, coughing    Type 2 diabetes mellitus without complication ([facility] [facility]          SURGICAL HISTORY       Past Surgical History:   Procedure Laterality Date    COLONOSCOPY  10/17/2014    COLONOSCOPY N/A 1/6/2022    COLONOSCOPY WITH POLYPECTOMY performed by Victorino Andrews MD at [address] [place] [address]  05/26/2020    PTCA           CURRENT MEDICATIONS       Previous Medications    ACETAMINOPHEN (TYLENOL ARTHRITIS PAIN) 650 MG EXTENDED RELEASE TABLET    Take 650 mg by mouth 2 times daily    ACYCLOVIR (ZOVIRAX) 800 MG TABLET        ALBUTEROL (PROVENTIL) (2.5 MG/3ML) 0.083% NEBULIZER SOLUTION        ALBUTEROL SULFATE  (90 BASE) MCG/ACT INHALER    Inhale 2 puffs into the lungs every 6 hours as needed for Wheezing    ALLERGY RELIEF 10 MG TABLET    TAKE 1 TABLET BY MOUTH DAILY (AM)    ASPIRIN (ASPIRIN LOW DOSE) 81 MG EC TABLET    TAKE 1 TABLET BY MOUTH DAILY (AM)    ATORVASTATIN (LIPITOR) 40 MG TABLET    TAKE 1 TABLET BY MOUTH NIGHTLY (HS)    BLOOD GLUCOSE MONITOR STRIPS    Check BG four times daily , DX: E11.65,    BLOOD GLUCOSE MONITORING SUPPL SONDRA    check BG once daily, DX: E11.9, NIDDM    CONTINUOUS BLOOD GLUC  (FREESTYLE CHEIKH 2 READER) SONDRA    1 Device by Does not apply route 4 times daily (before meals and nightly) CONTINUOUS BLOOD GLUC SENSOR (FREESTYLE CHEIKH 2 SENSOR) MISC    USE AS DIRECTED TO TEST FOUR TIMES A DAY    CYCLOBENZAPRINE (FLEXERIL) 10 MG TABLET        DULAGLUTIDE (TRULICITY) 4.5 SO/2.0ME SOPN    Inject 4.5 mg into the skin once a week    DULERA 200-5 MCG/ACT INHALER        EASY COMFORT LANCETS MISC        EMPAGLIFLOZIN (JARDIANCE) 25 MG TABLET    Take 25 mg by mouth every morning    FLUOXETINE (PROZAC) 10 MG CAPSULE        FLUTICASONE (FLONASE) 50 MCG/ACT NASAL SPRAY    1 spray by Nasal route daily    GABAPENTIN (NEURONTIN) 100 MG CAPSULE    Take 100 mg by mouth 3 times daily. Take 2 caps #x daily    HYDROCHLOROTHIAZIDE (HYDRODIURIL) 12.5 MG TABLET    daily     HYDROCODONE-ACETAMINOPHEN (NORCO) 5-325 MG PER TABLET        LANCETS (ONETOUCH DELICA PLUS [id number]) MISC    1 DEVICE BY DOES NOT APPLY ROUTE 4 TIMES DAILY (BEFORE MEALS AND NIGHTLY)    LANTUS SOLOSTAR 100 UNIT/ML INJECTION PEN    30 UNITS AT BEDTIME    METOPROLOL SUCCINATE (TOPROL XL) 25 MG EXTENDED RELEASE TABLET    Take 1 tablet by mouth daily    NITROGLYCERIN (NITROSTAT) 0.4 MG SL TABLET    up to max of 3 total doses. If no relief after 1 dose, call 911. OMEPRAZOLE (PRILOSEC) 20 MG CAPSULE    Take 1 capsule by mouth daily. PREGABALIN (LYRICA) 50 MG CAPSULE        PREGABALIN (LYRICA) 50 MG CAPSULE    Take 50 mg by mouth 3 times daily.     REFRESH TEARS 0.5 % SOLN OPHTHALMIC SOLUTION        TRIAMCINOLONE (KENALOG) 0.025 % CREAM        UNIFINE PENTIPS 31G X 6 MM MISC        VALSARTAN (DIOVAN) 160 MG TABLET    Take by mouth daily     VITAMIN D (ERGOCALCIFEROL) 1.25 MG (57709 UT) CAPS CAPSULE           ALLERGIES     Metformin and related    FAMILY HISTORY       Family History   Problem Relation Age of Onset    Cancer Neg Hx           SOCIAL HISTORY       Social History     Socioeconomic History    Marital status:      Spouse name: None    Number of children: None    Years of education: None    Highest education level: None Occupational History    None   Tobacco Use    Smoking status: Current Some Day Smoker     Start date: 17    Smokeless tobacco: Never Used    Tobacco comment: smokes on the weekends with drinking   Vaping Use    Vaping Use: Never used   Substance and Sexual Activity    Alcohol use: Yes     Comment: weekends     Drug use: Not Currently     Frequency: 1.0 times per week     Types: Cocaine     Comment: Last used 2020    Sexual activity: None   Other Topics Concern    None   Social History Narrative    None     Social Determinants of Health     Financial Resource Strain:     Difficulty of Paying Living Expenses: Not on file   Food Insecurity:     Worried About Running Out of Food in the Last Year: Not on file    Parag of Food in the Last Year: Not on file   Transportation Needs:     Lack of Transportation (Medical): Not on file    Lack of Transportation (Non-Medical):  Not on file   Physical Activity:     Days of Exercise per Week: Not on file    Minutes of Exercise per Session: Not on file   Stress:     Feeling of Stress : Not on file   Social Connections:     Frequency of Communication with Friends and Family: Not on file    Frequency of Social Gatherings with Friends and Family: Not on file    Attends Congregational Services: Not on file    Active Member of [address] [place] [address] [employer] or Organizations: Not on file    Attends Club or Organization Meetings: Not on file    Marital Status: Not on file   Intimate Partner Violence:     Fear of Current or Ex-Partner: Not on file    Emotionally Abused: Not on file    Physically Abused: Not on file    Sexually Abused: Not on file   Housing Stability:     Unable to Pay for Housing in the Last Year: Not on file    Number of [place] in the Last Year: Not on file    Unstable Housing in the Last Year: Not on file       SCREENINGS         [place] Coma Scale  Eye Opening: Spontaneous  Best Verbal Response: Oriented  Best Motor Response: Obeys commands  [place] Coma Scale Score: 15                     [place] Assessment  BP: 107/60  Heart Rate: 87                 PHYSICAL EXAM    (up to 7 for level 4, 8 or more for level 5)     ED Triage Vitals [06/01/22 1233]   BP Temp Temp Source Heart Rate Resp SpO2 Height Weight   110/67 100.4 °F (38 °C) Oral (!) 120 18 94 % 5' 7\" (1.702 m) 215 lb (97.5 kg)       Physical Exam  Constitutional:       General: He is not in acute distress. Appearance: He is well-developed. He is not ill-appearing, toxic-appearing or diaphoretic. HENT:      Head: Normocephalic and atraumatic. Nose: Nose normal.      Mouth/Throat:      Mouth: Mucous membranes are moist.   Eyes:      Pupils: Pupils are equal, round, and reactive to light. Cardiovascular:      Rate and Rhythm: Regular rhythm. Tachycardia present. Heart sounds: No murmur heard. No friction rub. No gallop. Pulmonary:      Effort: Pulmonary effort is normal.      Breath sounds: Normal breath sounds. No wheezing, rhonchi or rales. Abdominal:      General: Bowel sounds are normal. There is no distension. Palpations: Abdomen is soft. Tenderness: There is no abdominal tenderness. There is no guarding or rebound. Musculoskeletal:         General: No swelling. Cervical back: Normal range of motion. Right lower leg: No edema. Left lower leg: No edema. Skin:     General: Skin is warm and dry. Capillary Refill: Capillary refill takes less than 2 seconds. Findings: No rash. Neurological:      General: No focal deficit present. Mental Status: He is alert and oriented to person, place, and time. DIAGNOSTIC RESULTS     EKG: All EKG's are interpreted by the Emergency Department Physician who either signs or Co-signs this chart in the absence of a cardiologist.    EKG shows sinus tach with , normal axis, normal intervals, no ST changes.         RADIOLOGY:   Non-plain film images such as CT, Ultrasound and MRI are read by the radiologist. Justyna Ramos radiographic images are visualized and preliminarily interpreted by the emergency physician with the below findings:      Interpretation per the Radiologist below, if available at the time of this note:    XR CHEST PORTABLE   Final Result   Mild congestive changes. CT ABDOMEN PELVIS W IV CONTRAST Additional Contrast? None   Final Result      No acute process in the abdomen/pelvis. Appendicolith, otherwise unremarkable appendix without evidence for appendicitis. No evidence for diverticulitis. Possible mild bladder wall thickening versus underdistention.  Correlate with urinalysis as indicated if concern for cystitis.         ==========               ED BEDSIDE ULTRASOUND:   Performed by ED Physician - none    LABS:  Labs Reviewed   COMPREHENSIVE METABOLIC PANEL - Abnormal; Notable for the following components:       Result Value    Sodium 124 (*)     Chloride 88 (*)     CO2 18 (*)     Anion Gap 18 (*)     Glucose 110 (*)     Calcium 8.4 (*)     Total Bilirubin 0.8 (*)     ALT 52 (*)     AST 55 (*)     All other components within normal limits   CBC WITH AUTO DIFFERENTIAL - Abnormal; Notable for the following components:    RDW 15.5 (*)     Platelets 070 (*)     Lymphocytes Absolute 0.3 (*)     All other components within normal limits   LIPASE - Abnormal; Notable for the following components:    Lipase 125 (*)     All other components within normal limits   URINALYSIS WITH REFLEX TO CULTURE - Abnormal; Notable for the following components:    Glucose, Ur >=1000 (*)     Ketones, Urine >=80 (*)     Blood, Urine TRACE (*)     Leukocyte Esterase, Urine TRACE (*)     All other components within normal limits   PROCALCITONIN - Abnormal; Notable for the following components:    Procalcitonin 1.54 (*)     All other components within normal limits   MICROSCOPIC URINALYSIS - Abnormal; Notable for the following components:    Bacteria, UA RARE (*)     All other components within normal of clinically significant/life threatening deterioration in the patient's condition which required my urgent intervention. CONSULTS:  PHARMACY TO DOSE VANCOMYCIN  IP CONSULT TO INFECTIOUS DISEASES    PROCEDURES:  Unless otherwise noted below, none     Procedures        FINAL IMPRESSION      1. Septicemia ([address]    2. Hyponatremia          DISPOSITION/PLAN   DISPOSITION Admitted 06/01/2022 03:43:06 PM      PATIENT REFERRED TO:  No follow-up provider specified. DISCHARGE MEDICATIONS:  New Prescriptions    No medications on file     Controlled Substances Monitoring:     No flowsheet data found.     (Please note that portions of this note were completed with a voice recognition program.  Efforts were made to edit the dictations but occasionally words are mis-transcribed.)    Ephraim Harvey PA-C (electronically signed)             Ephraim Harvey PA-C  06/01/22 1763

## 2022-06-01 NOTE — H&P
Department of Internal Medicine  General Internal Medicine  Attending History and Physical      CHIEF COMPLAINT:  Fevers, malaise    Reason for Admission:  Sepsis 2/2 UTI    History Obtained From:  patient, spouse    HISTORY OF PRESENT ILLNESS:      The patient is a 61 y.o. male with significant past medical history of HTN/HLD, DM2, CAD, COPD who was having burning with urination over the weekend so he went to urgent care where he was diagnosed with a UTI and started on cipro but states he persisted with fevers, chills, malaise and decreased appetite and continued dysuria so he came to the ED where he was febrile, tachycardic and tachypneic. Hyponatremia of 124, elevated procalcitonin 1.5, no leukocytosis and UA with only minimal bacteria but patient was taking the cipro. Pt BP borderline low. He was given sepsis bolus of fluids with minimal improvement in BP, started on abx and admitted for further management of his UTI. Pt is [place] speaking but does understand some [place] and wife was able to fill in and translate as needed.  Denies cough, congestion, sob, cp, palpitations, abd pain, n/v/d     Past Medical History:        Diagnosis Date    CAD (coronary artery disease)     [facility] [address] [facility]     Hyperlipidemia     Hypertension     SD[facility]ural [facility]oma) [facility])     syncope, coughing    Type 2 diabetes mellitus without complication [facility])      Past Surgical History:        Procedure Laterality Date    COLONOSCOPY  10/17/2014    COLONOSCOPY N/A 1/6/2022    COLONOSCOPY WITH POLYPECTOMY performed by Judy Pak MD at [address] [place] [address]  05/26/2020    PTCA           Medications Prior to Admission:    Medications Prior to Admission: LANTUS SOLOSTAR 100 UNIT/ML injection pen, 30 UNITS AT BEDTIME  Continuous Blood Gluc Sensor (FREESTYLE CHEIKH 2 SENSOR) [facility] [place] [facility], USE AS DIRECTED TO TEST FOUR TIMES A DAY  acyclovir (ZOVIRAX) 800 MG tablet,   albuterol (PROVENTIL) (2.5 MG/3ML) 0.083% nebulizer solution,   UNIFINE PENTIPS 31G X 6 MM MISC,   triamcinolone (KENALOG) 0.025 % cream,   blood glucose monitor strips, Check BG four times daily , DX: E11.65,  Dulaglutide (TRULICITY) 4.5 KD/6.5PT SOPN, Inject 4.5 mg into the skin once a week  empagliflozin (JARDIANCE) 25 MG tablet, Take 25 mg by mouth every morning  Lancets (ONETOUCH DELICA PLUS [id number]) MISC, 1 DEVICE BY DOES NOT APPLY ROUTE 4 TIMES DAILY (BEFORE MEALS AND NIGHTLY)  Easy Comfort Lancets MISC,   vitamin D (ERGOCALCIFEROL) 1.25 MG (53714 UT) CAPS capsule,   pregabalin (LYRICA) 50 MG capsule, Take 50 mg by mouth 3 times daily. [DISCONTINUED] HYDROcodone-acetaminophen (NORCO) 5-325 MG per tablet,   ALLERGY RELIEF 10 MG tablet, TAKE 1 TABLET BY MOUTH DAILY (AM)  metoprolol succinate (TOPROL XL) 25 MG extended release tablet, Take 1 tablet by mouth daily  pregabalin (LYRICA) 50 MG capsule,   FLUoxetine (PROZAC) 10 MG capsule,   REFRESH TEARS 0.5 % SOLN ophthalmic solution,   Continuous Blood Gluc  (FREESTYLE CHEIKH 2 READER) SONDRA, 1 Device by Does not apply route 4 times daily (before meals and nightly)  aspirin (ASPIRIN LOW DOSE) 81 MG EC tablet, TAKE 1 TABLET BY MOUTH DAILY (AM)  atorvastatin (LIPITOR) 40 MG tablet, TAKE 1 TABLET BY MOUTH NIGHTLY (HS)  [DISCONTINUED] gabapentin (NEURONTIN) 100 MG capsule, Take 100 mg by mouth 3 times daily. Take 2 caps #x daily  nitroGLYCERIN (NITROSTAT) 0.4 MG SL tablet, up to max of 3 total doses.  If no relief after 1 dose, call 911.  valsartan (DIOVAN) 160 MG tablet, Take by mouth daily   cyclobenzaprine (FLEXERIL) 10 MG tablet,   hydrochlorothiazide (HYDRODIURIL) 12.5 MG tablet, daily   DULERA 200-5 MCG/ACT inhaler, Inhale 2 puffs into the lungs daily   [DISCONTINUED] acetaminophen (TYLENOL ARTHRITIS PAIN) 650 MG extended release tablet, Take 650 mg by mouth 2 times daily  fluticasone (FLONASE) 50 MCG/ACT nasal spray, 1 spray by Nasal route tx  - diagnosed Sunday due to dysuria and started on cipro - continued fevers, chills, malaise  - febrile 100.4F, tachycardic, tachypneic, borderline hypotension  - given sepsis bolus in ED. Continue IVF  - elevated procalcitonin  - follow cultures  - start zosyn  - ID consulted    # Hyponatremia  - Na 124 in setting of poor appetite  - monitor with IVF    # HTN/HLD/CAD  - holding BP meds, resume other home meds    # DM  - cont lantus, ISS, lyrica    DVT: lovenox    Disposition: Sepsis 2/2 UTI on IVF and IV abx. ID consulted as refractory to outpatient treatment. Monitor BP and AM labs. Anticipated length of stay greater than 48 hours.       Johnna Holloway DO  Internal Medicine   Hospitalist    >45 minutes in total care time

## 2022-06-01 NOTE — CARE COORDINATION
[facility] EMERGENCY [facility] CENTER AT [place] Case Management Initial Discharge Assessment    Met with Patient to discuss discharge plan. PCP: BOYD Blakely NP                                Date of Last Visit: 2 months    [facility] Patient: No        [facility] Notified: no    If no PCP, list provided? N/A    Discharge Planning    Living Arrangements: independently at home    Who do you live with? girlfriend    Who helps you with your care:  self    If lives at home:     Do you have any barriers navigating in your home? no    Patient can perform ADL? Yes    Current Services (outpatient and in home) :  None    Dialysis: No    Is transportation available to get to your appointments? Yes    DME Equipment:  no    Respiratory equipment: None    Respiratory provider:  no     [facility]  [facility]    Consult with Medication Assistance Program?  No      Patient agreeable to [place] [address]? No    Patient agreeable to SNF/Rehab? No    Other discharge needs identified? N/A    Does Patient Have a High-Risk for Readmission Diagnosis (CHF, PN, MI, COPD)? No     Initial Discharge Plan? (Note: please see concurrent daily documentation for any updates after initial note). Pt denied d/c needs in ER.     Readmission Risk              Risk of Unplanned Readmission:  0         Electronically signed by Victor Hugo Aldana RN on 6/1/2022 at 4:11 PM

## 2022-06-01 NOTE — ACP (ADVANCE CARE PLANNING)
Advance Care Planning     Advance Care Planning Activator (Inpatient)  Conversation Note      Date of ACP Conversation: 2022     Conversation Conducted with: Patient with Decision Making Capacity    ACP Activator: Jamie Cardenas RN        Health Care Decision Maker:     Current Designated Health Care Decision Maker:     Primary Decision Maker: David Madera Other - [phone]    Care Preferences    Ventilation: \"If you were in your present state of health and suddenly became very ill and were unable to breathe on your own, what would your preference be about the use of a ventilator (breathing machine) if it were available to you? \"      Would the patient desire the use of ventilator (breathing machine)?: yes    \"If your health worsens and it becomes clear that your chance of recovery is unlikely, what would your preference be about the use of a ventilator (breathing machine) if it were available to you? \"     Would the patient desire the use of ventilator (breathing machine)? : this may change his opinion. He declined information on advance directives. Resuscitation  \"CPR works best to restart the heart when there is a sudden event, like a heart attack, in someone who is otherwise healthy. Unfortunately, CPR does not typically restart the heart for people who have serious health conditions or who are very sick. \"    \"In the event your heart stopped as a result of an underlying serious health condition, would you want attempts to be made to restart your heart (answer \"yes\" for attempt to resuscitate) or would you prefer a natural death (answer \"no\" for do not attempt to resuscitate)? \" yes       [x] Yes   [] No   Educated Patient / Minal Apgar regarding differences between Advance Directives and portable DNR orders.     Length of ACP Conversation in minutes:  10    Conversation Outcomes:  [x] ACP discussion completed  [] Existing advance directive reviewed with patient; no changes to patient's previously recorded wishes  [] New Advance Directive completed  [] Portable Do Not Rescitate prepared for Provider review and signature  [] POLST/POST/MOLST/MOST prepared for Provider review and signature      Follow-up plan:    [] Schedule follow-up conversation to continue planning  [] Referred individual to Provider for additional questions/concerns   [] Advised patient/agent/surrogate to review completed ACP document and update if needed with changes in condition, patient preferences or care setting    [x] This note routed to one or more involved healthcare providers

## 2022-06-02 LAB
ACINETOBACTER CALCOAC BAUMANNII COMPLEX BY PCR: NOT DETECTED
ANION GAP SERPL CALCULATED.3IONS-SCNC: 14 MEQ/L (ref 9–15)
BACTEROIDES FRAGILIS BY PCR: NOT DETECTED
BASOPHILS ABSOLUTE: 0 K/UL (ref 0–0.2)
BASOPHILS RELATIVE PERCENT: 0.2 %
BLOOD CULTURE, ROUTINE: ABNORMAL
BUN BLDV-MCNC: 9 MG/DL (ref 6–20)
CALCIUM SERPL-MCNC: 7.8 MG/DL (ref 8.5–9.9)
CANDIDA ALBICANS BY PCR: NOT DETECTED
CANDIDA AURIS BY PCR: NOT DETECTED
CANDIDA GLABRATA BY PCR: NOT DETECTED
CANDIDA KRUSEI BY PCR: NOT DETECTED
CANDIDA PARAPSILOSIS BY PCR: NOT DETECTED
CANDIDA TROPICALIS BY PCR: NOT DETECTED
CHLORIDE BLD-SCNC: 101 MEQ/L (ref 95–107)
CO2: 18 MEQ/L (ref 20–31)
CREAT SERPL-MCNC: 0.66 MG/DL (ref 0.7–1.2)
CRYPTOCOCCUS NEOFORMANS/GATTII BY PCR: NOT DETECTED
CULTURE, BLOOD 2: ABNORMAL
ENTEROBACTER CLOACAE COMPLEX BY PCR: NOT DETECTED
ENTEROBACTERALES BY PCR: NOT DETECTED
ENTEROCOCCUS FAECALIS BY PCR: NOT DETECTED
ENTEROCOCCUS FAECIUM BY PCR: NOT DETECTED
EOSINOPHILS ABSOLUTE: 0 K/UL (ref 0–0.7)
EOSINOPHILS RELATIVE PERCENT: 0.2 %
ESCHERICHIA COLI BY PCR: NOT DETECTED
GFR AFRICAN AMERICAN: >60
GFR NON-AFRICAN AMERICAN: >60
GLUCOSE BLD-MCNC: 115 MG/DL (ref 70–99)
GLUCOSE BLD-MCNC: 117 MG/DL (ref 70–99)
GLUCOSE BLD-MCNC: 120 MG/DL (ref 70–99)
GLUCOSE BLD-MCNC: 86 MG/DL (ref 70–99)
GLUCOSE BLD-MCNC: 97 MG/DL (ref 70–99)
HAEMOPHILUS INFLUENZAE BY PCR: NOT DETECTED
HCT VFR BLD CALC: 39.8 % (ref 42–52)
HEMOGLOBIN: 13.6 G/DL (ref 14–18)
KLEBSIELLA AEROGENES BY PCR: NOT DETECTED
KLEBSIELLA OXYTOCA BY PCR: NOT DETECTED
KLEBSIELLA PNEUMONIAE GROUP BY PCR: NOT DETECTED
LISTERIA MONOCYTOGENES BY PCR: NOT DETECTED
LYMPHOCYTES ABSOLUTE: 0.7 K/UL (ref 1–4.8)
LYMPHOCYTES RELATIVE PERCENT: 11.3 %
MAGNESIUM: 2.3 MG/DL (ref 1.7–2.4)
MCH RBC QN AUTO: 30.7 PG (ref 27–31.3)
MCHC RBC AUTO-ENTMCNC: 34.1 % (ref 33–37)
MCV RBC AUTO: 90 FL (ref 80–100)
MONOCYTES ABSOLUTE: 0.7 K/UL (ref 0.2–0.8)
MONOCYTES RELATIVE PERCENT: 11 %
NEISSERIA MENINGITIDIS BY PCR: NOT DETECTED
NEUTROPHILS ABSOLUTE: 5 K/UL (ref 1.4–6.5)
NEUTROPHILS RELATIVE PERCENT: 77.3 %
PDW BLD-RTO: 15.2 % (ref 11.5–14.5)
PERFORMED ON: ABNORMAL
PERFORMED ON: NORMAL
PLATELET # BLD: 102 K/UL (ref 130–400)
POTASSIUM REFLEX MAGNESIUM: 3.1 MEQ/L (ref 3.4–4.9)
PROCALCITONIN: 2.54 NG/ML (ref 0–0.15)
PROTEUS SPECIES BY PCR: NOT DETECTED
PSEUDOMONAS AERUGINOSA BY PCR: NOT DETECTED
RBC # BLD: 4.42 M/UL (ref 4.7–6.1)
SALMONELLA SPECIES BY PCR: NOT DETECTED
SERRATIA MARCESCENS BY PCR: NOT DETECTED
SODIUM BLD-SCNC: 133 MEQ/L (ref 135–144)
STAPHYLOCOCCUS AUREUS BY PCR: NOT DETECTED
STAPHYLOCOCCUS EPIDERMIDIS BY PCR: NOT DETECTED
STAPHYLOCOCCUS LUGDUNENSIS BY PCR: NOT DETECTED
STAPHYLOCOCCUS SPECIES BY PCR: NOT DETECTED
STENOTROPHOMONAS MALTOPHILIA BY PCR: NOT DETECTED
STREPTOCOCCUS AGALACTIAE BY PCR: DETECTED
STREPTOCOCCUS PNEUMONIAE BY PCR: NOT DETECTED
STREPTOCOCCUS PYOGENES  BY PCR: NOT DETECTED
STREPTOCOCCUS SPECIES BY PCR: DETECTED
WBC # BLD: 6.4 K/UL (ref 4.8–10.8)

## 2022-06-02 PROCEDURE — 6370000000 HC RX 637 (ALT 250 FOR IP): Performed by: INTERNAL MEDICINE

## 2022-06-02 PROCEDURE — 83735 ASSAY OF MAGNESIUM: CPT

## 2022-06-02 PROCEDURE — 2580000003 HC RX 258: Performed by: INTERNAL MEDICINE

## 2022-06-02 PROCEDURE — 97162 PT EVAL MOD COMPLEX 30 MIN: CPT

## 2022-06-02 PROCEDURE — 94664 DEMO&/EVAL PT USE INHALER: CPT

## 2022-06-02 PROCEDURE — 85025 COMPLETE CBC W/AUTO DIFF WBC: CPT

## 2022-06-02 PROCEDURE — 86403 PARTICLE AGGLUT ANTBDY SCRN: CPT

## 2022-06-02 PROCEDURE — 99232 SBSQ HOSP IP/OBS MODERATE 35: CPT | Performed by: INTERNAL MEDICINE

## 2022-06-02 PROCEDURE — 80048 BASIC METABOLIC PNL TOTAL CA: CPT

## 2022-06-02 PROCEDURE — 94640 AIRWAY INHALATION TREATMENT: CPT

## 2022-06-02 PROCEDURE — 84145 PROCALCITONIN (PCT): CPT

## 2022-06-02 PROCEDURE — 6360000002 HC RX W HCPCS: Performed by: INTERNAL MEDICINE

## 2022-06-02 PROCEDURE — 1210000000 HC MED SURG R&B

## 2022-06-02 PROCEDURE — 36415 COLL VENOUS BLD VENIPUNCTURE: CPT

## 2022-06-02 PROCEDURE — 97166 OT EVAL MOD COMPLEX 45 MIN: CPT

## 2022-06-02 PROCEDURE — 94761 N-INVAS EAR/PLS OXIMETRY MLT: CPT

## 2022-06-02 RX ADMIN — PIPERACILLIN AND TAZOBACTAM 3375 MG: 3; .375 INJECTION, POWDER, LYOPHILIZED, FOR SOLUTION INTRAVENOUS at 00:04

## 2022-06-02 RX ADMIN — PREGABALIN 50 MG: 50 CAPSULE ORAL at 21:24

## 2022-06-02 RX ADMIN — IPRATROPIUM BROMIDE AND ALBUTEROL 1 PUFF: 20; 100 SPRAY, METERED RESPIRATORY (INHALATION) at 08:12

## 2022-06-02 RX ADMIN — ENOXAPARIN SODIUM 40 MG: 100 INJECTION SUBCUTANEOUS at 08:11

## 2022-06-02 RX ADMIN — Medication 10 ML: at 21:24

## 2022-06-02 RX ADMIN — INSULIN GLARGINE 30 UNITS: 100 INJECTION, SOLUTION SUBCUTANEOUS at 21:24

## 2022-06-02 RX ADMIN — SODIUM CHLORIDE: 9 INJECTION, SOLUTION INTRAVENOUS at 16:42

## 2022-06-02 RX ADMIN — PREGABALIN 50 MG: 50 CAPSULE ORAL at 08:10

## 2022-06-02 RX ADMIN — ACETAMINOPHEN 650 MG: 325 TABLET ORAL at 00:40

## 2022-06-02 RX ADMIN — ASPIRIN 81 MG: 81 TABLET, COATED ORAL at 08:00

## 2022-06-02 RX ADMIN — IPRATROPIUM BROMIDE AND ALBUTEROL 1 PUFF: 20; 100 SPRAY, METERED RESPIRATORY (INHALATION) at 19:26

## 2022-06-02 RX ADMIN — PIPERACILLIN AND TAZOBACTAM 3375 MG: 3; .375 INJECTION, POWDER, LYOPHILIZED, FOR SOLUTION INTRAVENOUS at 08:36

## 2022-06-02 RX ADMIN — Medication 10 ML: at 08:13

## 2022-06-02 RX ADMIN — PREGABALIN 50 MG: 50 CAPSULE ORAL at 17:47

## 2022-06-02 RX ADMIN — PIPERACILLIN AND TAZOBACTAM 3375 MG: 3; .375 INJECTION, POWDER, LYOPHILIZED, FOR SOLUTION INTRAVENOUS at 16:49

## 2022-06-02 RX ADMIN — PANTOPRAZOLE SODIUM 40 MG: 40 TABLET, DELAYED RELEASE ORAL at 06:12

## 2022-06-02 ASSESSMENT — PAIN SCALES - GENERAL
PAINLEVEL_OUTOF10: 0
PAINLEVEL_OUTOF10: 0

## 2022-06-02 NOTE — PROGRESS NOTES
[facility] OCCUPATIONAL THERAPY EVALUATION - ACUTE     NAME: Charisse Brown  : 1965 (61 y.o.)  MRN: [id number]  CODE STATUS: Full Code  [facility]    Date of Service: 2022    Patient Diagnosis(es): Hyponatremia [E87.1]  Septicemia ([address] [A41.9]  Sepsis ([address] [A41.9]   Patient Active Problem List    Diagnosis Date Noted    Sepsis ([address] 2022    Adenomatous polyp of colon     Adenomatous polyp of descending colon     Adenomatous polyp of sigmoid colon     Seasonal allergies 2021    ACS (acute coronary syndrome) ([address] 2020    Chest pain 2020    Uncontrolled type 2 diabetes mellitus with hyperglycemia ([address]     Bilateral carpal tunnel syndrome 2019    Diabetes mellitus ([address] 2014    SDH (subdural hematoma) ([address] 2014    ED (erectile dysfunction) 2014    Hypertrophy of prostate with urinary obstruction and other lower urinary tract symptoms (LUTS) 2014    Incomplete bladder emptying 2014    Nocturia 2014    Urinary frequency 2014        Past Medical History:   Diagnosis Date    CAD (coronary artery disease)     COPD (chronic obstructive pulmonary disease) (HCC)     Hyperlipidemia     Hypertension     SDH (subdural hematoma) (HCC)     syncope, coughing    Type 2 diabetes mellitus without complication [facility])      Past Surgical History:   Procedure Laterality Date    COLONOSCOPY  10/17/2014    COLONOSCOPY N/A 2022    COLONOSCOPY WITH POLYPECTOMY performed by Victorino Andrews MD at [facility]  2020    PTCA          Restrictions  Restrictions/Precautions: Fall Risk (med per dale)     Safety Devices: Safety Devices  Type of Devices: All fall risk precautions in place;Call light within reach; Left in chair     Patient's date of birth confirmed: Yes    General:  Chart Reviewed: Yes  Patient assessed for rehabilitation services?: Yes    Subjective  Subjective: \"I'm feeling better\"       Pain at start of treatment: No    Pain at end of treatment: No    Location:   Nursing notified: Not Applicable  RN:   Intervention: None    Prior Level of Function:  Social/Functional History  Lives With: Significant other (GF home all the time)  Type of Home: Apartment (Splits time between his apt and girlfriend's- girlfriend is 2 floor, pt is 1 floor)  Home Layout: Two level,One level  Home Access: Level entry  Bathroom Shower/Tub: Tub/Shower unit  Bathroom Equipment: Grab bars in shower,Hand-held shower  Home Equipment: Cane  ADL Assistance: Independent  Homemaking Assistance: Independent  Ambulation Assistance: Independent  Transfer Assistance: Independent  Active : Yes  Mode of Transportation: [employer]  Occupation: Unemployed  Additional Comments: Pt. declines     OBJECTIVE:     Orientation Status:  Orientation  Overall Orientation Status: Within Normal Limits  Orientation Level: Oriented X4    Observation:  Observation/Palpation  Posture: Good  Observation: Pt alert and attentive, agreeable to evaluation    Cognition Status:  Cognition  Overall Cognitive Status: WNL    Perception Status:  Perception  Overall Perceptual Status: WFL    Vision and Hearing Status:  Hearing  Hearing: Within functional limits   Vision - Basic Assessment  Prior Vision: No visual deficits  Visual History: No significant visual history  Patient Visual Report: No visual complaint reported. Visual Field Cut: No  Oculo Motor Control: WNL    GROSS ASSESSMENT AROM/PROM:  AROM: Within functional limits  PROM: Within functional limits    ROM:   LUE AROM (degrees)  LUE AROM : WFL  Left Hand AROM (degrees)  Left Hand AROM: WFL  RUE AROM (degrees)  RUE AROM : WFL  Right Hand AROM (degrees)  Right Hand AROM: WFL    UE STRENGTH:  Strength:  Within functional limits (4/5 all planes)    UE COORDINATION:  Coordination: Within functional limits    UE TONE:  Tone: Normal    UE SENSATION:  Sensation: Impaired (L hip, reports positional from bed)    Hand Dominance:  Hand Dominance  Hand Dominance: Right    ADL Status:  ADL  Feeding: Independent  Grooming: Independent  UE Bathing: Independent  LE Bathing: Independent  UE Dressing: Independent  LE Dressing: Independent  Toileting: Independent  Additional Comments: Simulated ADLs as above. No difficulty reaching feet  Toilet Transfers  Toilet Transfer: Unable to assess  Toilet Transfers Comments: Anticipate mod I    Functional Mobility:  Patient ambulated household distance in hallway with No device at Independent level. Pt with no difficulty maneuvering around obstacles or retrieving objects from floor. Bed Mobility  Bed mobility  Rolling to Left: Supervision  Supine to Sit: Supervision  Sit to Supine: Unable to assess (pt up to chair)  Bed Mobility Comments: Slow to complete. Limited by IV pole    Seated and Standing Balance:  Balance  Sitting: Intact  Standing: Intact    Functional Endurance:  Activity Tolerance  Activity Tolerance: Patient Tolerated treatment well    D/C Recommendations:  OT D/C RECOMMENDATIONS  REQUIRES OT FOLLOW-UP: No    Equipment Recommendations:  OT Equipment Recommendations  Equipment Needed: No    OT Education:   Patient Education  Education Given To: Patient  Education Provided: Role of Therapy;Plan of Care  Education Method: Verbal  Barriers to Learning: None  Education Outcome: Verbalized understanding    OT Follow Up:  OT D/C RECOMMENDATIONS  REQUIRES OT FOLLOW-UP: No       Assessment/Discharge Disposition:  Assessment: Pt is a 61year old man from home who [facility] with sepsis. Pt demonstrates no significant functional deficits and requires no physical assist. No acute OT needs identified.   Prognosis: Good  No Skilled OT: Independent with functional mobility,Independent with ADL's  Decision Making: Low Complexity  History: Pt's medical history is moderately complex  Exam: Pt. has no performance deficits  Assistance / Modification: Pt. requires no physical assist    AMPAC (Six Click) Self care Score   How much help for putting on and taking off regular lower body clothing?: None  How much help for Bathing?: None  How much help for Toileting?: None  How much help for putting on and taking off regular upper body clothing?: None  How much help for taking care of personal grooming?: None  How much help for eating meals?: None  AM-[facility] Inpatient Daily Activity Raw Score: 24  AM-PAC Inpatient ADL T-Scale Score : 57.54  ADL Inpatient CMS 0-100% Score: 0    Therapy key for assistance levels -   Independent/Mod I = Pt. is able to perform task with no assistance but may require a device   Stand by assistance = Pt. does not perform task at an independent level but does not need physical assistance, requires verbal cues  Minimal, Moderate, Maximal Assistance = Pt. requires physical assistance (25%, 50%, 75% assist from helper) for task but is able to actively participate in task   Dependent = Pt. requires total assistance with task and is not able to actively participate with task completion     Plan:  Plan  Times per Week: No acute OT    Goals:   Patient Goal: Patient goals : \"I want to get home\"      Discussed and agreed upon: Yes Comments:       Therapy Time:   Individual   Time In 0853   Time Out 0901   Minutes 8          Eval: 8 minutes     Electronically signed by:     TYRESE Kenney,   6/2/2022, 9:19 AM

## 2022-06-02 NOTE — PROGRESS NOTES
Department of Internal Medicine  General Internal Medicine  Attending Progress Note      SUBJECTIVE:  Pt seen and examined. Feels much better today. Was hoping to go home today, but explained that his blood cultures are positive and likely from infected cavity per ID and will need culture results to see which abx to go home with.  BP also still borderline today    OBJECTIVE      Medications    Current Facility-Administered Medications: sodium chloride flush 0.9 % injection 5-40 mL, 5-40 mL, IntraVENous, 2 times per day  sodium chloride flush 0.9 % injection 5-40 mL, 5-40 mL, IntraVENous, PRN  0.9 % sodium chloride infusion, , IntraVENous, PRN  enoxaparin (LOVENOX) injection 40 mg, 40 mg, SubCUTAneous, Daily  acetaminophen (TYLENOL) tablet 650 mg, 650 mg, Oral, Q6H PRN **OR** acetaminophen (TYLENOL) suppository 650 mg, 650 mg, Rectal, Q6H PRN  0.9 % sodium chloride infusion, , IntraVENous, Continuous  [DISCONTINUED] piperacillin-tazobactam (ZOSYN) 3,375 mg in dextrose 5 % 50 mL IVPB (mini-bag), 3,375 mg, IntraVENous, Once **AND** piperacillin-tazobactam (ZOSYN) 3,375 mg in dextrose 5 % 50 mL IVPB extended infusion (mini-bag), 3,375 mg, IntraVENous, Q8H  insulin glargine (LANTUS) injection vial 30 Units, 30 Units, SubCUTAneous, Nightly  ipratropium-albuterol (DUONEB) nebulizer solution 1 ampule, 1 ampule, Inhalation, Q4H PRN  insulin lispro (HUMALOG) injection vial 0-12 Units, 0-12 Units, SubCUTAneous, TID WC  insulin lispro (HUMALOG) injection vial 0-6 Units, 0-6 Units, SubCUTAneous, Nightly  glucose chewable tablet 16 g, 4 tablet, Oral, PRN  dextrose bolus 10% 125 mL, 125 mL, IntraVENous, PRN **OR** dextrose bolus 10% 250 mL, 250 mL, IntraVENous, PRN  glucagon (rDNA) injection 1 mg, 1 mg, IntraMUSCular, PRN  dextrose 5 % solution, 100 mL/hr, IntraVENous, PRN  pregabalin (LYRICA) capsule 50 mg, 50 mg, Oral, TID  aspirin EC tablet 81 mg, 81 mg, Oral, Daily  pantoprazole (PROTONIX) tablet 40 mg, 40 mg, Oral, QAM AC  albuterol-ipratropium (COMBIVENT RESPIMAT)  MCG/ACT inhaler 1 puff, 1 puff, Inhalation, BID  Physical    VITALS:  BP (!) 96/54   Pulse 83   Temp 98.1 °F (36.7 °C) (Oral)   Resp 16   Ht 5' 7\" (1.702 m)   Wt 215 lb (97.5 kg)   SpO2 99%   BMI 33.67 kg/m²   Constitutional: Awake and alert in no acute distress. Lying in bed comfortably  Head: Normocephalic, atraumatic  Eyes: EOMI, PERRLA  ENT: moist mucous membranes  Neck: neck supple, trachea midline  Lungs: Good inspiratory effort, no wheeze, no rhonchi, no rales  Heart: RRR, normal S1 and S2  GI: Soft, non-distended, non tender, no guarding, no rebound, +BS  MSK: no edema noted  Skin: warm, dry  Psych: appropriate affect  Data    CBC:   Lab Results   Component Value Date    WBC 6.4 06/02/2022    RBC 4.42 06/02/2022    HGB 13.6 06/02/2022    HCT 39.8 06/02/2022    MCV 90.0 06/02/2022    MCH 30.7 06/02/2022    MCHC 34.1 06/02/2022    RDW 15.2 06/02/2022     06/02/2022    MPV 9.9 03/31/2015     CMP:    Lab Results   Component Value Date     06/02/2022    K 3.1 06/02/2022     06/02/2022    CO2 18 06/02/2022    BUN 9 06/02/2022    CREATININE 0.66 06/02/2022    GFRAA >60.0 06/02/2022    LABGLOM >60.0 06/02/2022    GLUCOSE 97 06/02/2022    PROT 7.1 06/01/2022    LABALBU 3.6 06/01/2022    CALCIUM 7.8 06/02/2022    BILITOT 0.8 06/01/2022    ALKPHOS 87 06/01/2022    AST 55 06/01/2022    ALT 52 06/01/2022       ASSESSMENT AND PLAN      # Sepsis 2/2 UTI failed outpatient tx, strep bacteremia in setting of infected cavity  - diagnosed with UTI Sunday due to dysuria and started on cipro - continued fevers, chills, malaise  - febrile 100.4F, tachycardic, tachypneic, borderline hypotension  - given sepsis bolus in ED.  Continue IVF  - elevated procalcitonin  - Blood cultures with Strep agalactiae  - follow cultures  - start zosyn  - ID consulted     # Hyponatremia- improved  - Na 124 in setting of poor appetite improved to 133 today  - monitor with IVF     # HTN/HLD/CAD  - holding BP meds, resume other home meds     # DM  - cont lantus, ISS, lyrica     DVT: lovenox    Disposition: Blood cultures preliminarily with strep. Awaiting final cultures and abx recs from ID. Still borderline hypotension, though asymptomatic. Possible discharge in next 1-2 days when ok from ID.       Vishal Wahl DO  Internal Medicine   Hospitalist    >35 minutes in total care time

## 2022-06-02 NOTE — PLAN OF CARE
See OT evaluation for all goals and OT POC.  Electronically signed by Ronald Downs OTR/L on 6/2/2022 at 9:20 AM

## 2022-06-02 NOTE — PROGRESS NOTES
Physical Therapy Med Surg Initial Assessment  Facility/Department: [facility]  [facility]       NAME: Charisse Brown  : 1965 (61 y.o.)  MRN: [id number]  CODE STATUS: Full Code    Date of Service: 2022    Patient Diagnosis(es): Hyponatremia [E87.1]  Septicemia ([facility] [A41.9]  Sepsis [facility]) [A41.9]   Chief Complaint   Patient presents with    Abdominal Pain     Lower abdominal pain with difficulty urinating and pain with urination since      Patient Active Problem List    Diagnosis Date Noted    Sepsis ([facility] 2022    Adenomatous polyp of colon     Adenomatous polyp of descending colon     Adenomatous polyp of sigmoid colon     Seasonal allergies 2021    ACS (acute coronary syndrome) ([facility] 2020    Chest pain 2020    Uncontrolled type 2 diabetes mellitus with hyperglycemia ([facility]     Bilateral carpal tunnel syndrome 2019    Diabetes mellitus ([facility] 2014    SDH (subdural hematoma) ([facility] 2014    ED (erectile dysfunction) 2014    Hypertrophy of prostate with urinary obstruction and other lower urinary tract symptoms (LUTS) 2014    Incomplete bladder emptying 2014    Nocturia 2014    Urinary frequency 2014        Past Medical History:   Diagnosis Date    CAD (coronary artery disease)     COPD (chronic obstructive pulmonary disease) (HCC)     Hyperlipidemia     Hypertension     SDH (subdural hematoma) ([facility])     syncope, coughing    Type 2 diabetes mellitus without complication [facility])      Past Surgical History:   Procedure Laterality Date    COLONOSCOPY  10/17/2014    COLONOSCOPY N/A 2022    COLONOSCOPY WITH POLYPECTOMY performed by Victorino Andrews MD at [place]  2020    PTCA         Chart Reviewed: Yes  Patient assessed for rehabilitation services?: Yes  Family / Caregiver Present: No  General Comment  Comments: Pt resting in bed - agreeable to PT evaluation    Restrictions:  Restrictions/Precautions: Fall Risk (med per dale)     SUBJECTIVE:   Subjective: \"I would like to sit up. \"    Pain  Pain: denies pre and post session pain. Prior Level of Function:  Social/Functional History  Lives With: Significant other (GF home all the time)  Type of Home: Apartment (Splits time between his apt and girlfriend's- girlfriend is 2 floor, pt is 1 floor)  Home Layout: Two level,One level  Home Access: Level entry  Bathroom Shower/Tub: Tub/Shower unit  Bathroom Equipment: Grab bars in shower,Hand-held shower  Home Equipment: Cane  ADL Assistance: Independent  Homemaking Assistance: Independent  Ambulation Assistance: Independent  Transfer Assistance: Independent  Active : Yes  Mode of Transportation: [employer]  Occupation: Unemployed  Additional Comments: Pt. declines     OBJECTIVE:   Vision  Vision: Within Functional Limits (visual field normal; smooth pursuits normal)  Hearing: Within functional limits    Cognition:  Overall Orientation Status: Within Normal Limits  Orientation Level: Oriented X4  Follows Commands: Within Functional Limits  Overall Cognitive Status: WNL    Observation/Palpation  Observation: Pt alert and attentive, agreeable to evaluation. No acute distress noted. ROM:  RLE PROM: WFL  LLE PROM: WFL    Strength:  Strength RLE  Strength RLE: WFL  Strength LLE  Strength LLE: WFL    Neuro:  Balance  Sitting - Static: Good  Sitting - Dynamic: Good  Standing - Static: Good  Standing - Dynamic: Good  Comments: retrieves object from floor indep                      Tone: Normal    Sensation: Intact    Bed mobility  Rolling to Left: Supervision  Supine to Sit: Supervision  Sit to Supine: Unable to assess (pt up to chair)  Bed Mobility Comments: Slow to complete. Limited by IV pole    Transfers  Sit to Stand: Modified independent  Stand to sit:  Independent  Bed to Chair: Independent    Ambulation  Surface: level tile  Device: No Device  Assistance: Independent  Quality of Gait: normalized  Distance: 50ft X 2    Stairs/Curb  Stairs?: No              Activity Tolerance  Activity Tolerance: Patient tolerated treatment well    Patient Education  Education Given To: Patient  Education Provided: Role of Therapy  Education Method: Verbal  Education Outcome: Verbalized understanding       ASSESSMENT:   Decision Making: Medium Complexity  History: Med  Exam: High  Clinical Presentation: Med    Therapy Prognosis: Good  Barriers to Learning: none         DISCHARGE RECOMMENDATIONS:  Discharge Recommendations: Home independently  No Skilled PT: Safe to return home    Assessment: Pt completes basic mobility at indep level of function. Denies mobility needs. No further PT indicated in house. Rec OOB>chair for all meals and frequent ambulation. Requires PT Follow-Up: No      PLAN OF CARE:  Plan  Plan: Discharge with evaluation only    Safety Devices  Type of Devices: All fall risk precautions in place    Goals:  Long Term Goals  Long term goal 1: NA    [facility] (6 CLICK) BASIC MOBILITY  AM-PAC Inpatient Mobility Raw Score : 24     Therapy Time:   Individual   Time In 0853   Time Out 0901   Minutes Jovana Hill, [place], 06/02/22 at 9:19 AM         Definitions for assistance levels  Independent = pt does not require any physical supervision or assistance from another person for activity completion. Device may be needed.   Stand by assistance = pt requires verbal cues or instructions from another person, close to but not touching, to perform the activity  Minimal assistance= pt performs 75% or more of the activity; assistance is required to complete the activity  Moderate assistance= pt performs 50% of the activity; assistance is required to complete the activity  Maximal assistance = pt performs 25% of the activity; assistance is required to complete the activity  Dependent = pt requires total physical assistance to accomplish the task

## 2022-06-02 NOTE — PROGRESS NOTES
[facility] AT [place] Respiratory Therapy Evaluation   Current Order: David Biggs q4PRN     Home Regimen: MDI BID     Ordering Physician: Julian  Re-evaluation Date:  6/4     Diagnosis: sepsis    Patient Status: Stable / Unstable + Physician notified    The following MDI Criteria must be met in order to convert aerosol to MDI with spacer. If unable to meet, MDI will be converted to aerosol:  []  Patient able to demonstrate the ability to use MDI effectively  []  Patient alert and cooperative  []  Patient able to take deep breath with 5-10 second hold  []  Medication(s) available in this delivery method   []  Peak flow greater than or equal to 200 ml/min            Current Order Substituted To  (same drug, same frequency)   Aerosol to MDI [] Albuterol Sulfate 0.083% unit dose by aerosol Albuterol Sulfate MDI 2 puffs by inhalation with spacer    [] Levalbuterol 1.25 mg unit dose by aerosol Levalbuterol MDI 2 puffs by inhalation with spacer    [] Levalbuterol 0.63 mg unit dose by aerosol Levalbuterol MDI 2 puffs by inhalation with spacer    [] Ipratropium Bromide 0.02% unit dose by aerosol Ipratropium Bromide MDI 2 puffs by inhalation with spacer    [] Duoneb (Ipratropium + Albuterol) unit dose by aerosol Ipratropium MDI + Albuterol MDI 2 puffs by inhalation w/spacer   MDI to Aerosol [] Albuterol Sulfate MDI Albuterol Sulfate 0.083% unit dose by aerosol    [] Levalbuterol MDI 2 puffs by inhalation Levalbuterol 1.25 mg unit dose by aerosol    [] Ipratropium Bromide MDI by inhalation Ipratropium Bromide 0.02% unit dose by aerosol    [] Combivent (Ipratropium + Albuterol) MDI by inhalation Duoneb (Ipratropium + Albuterol) unit dose by aerosol       Treatment Assessment [Frequency/Schedule]:  Change frequency to:combivent 1 puff bid  __________________________________________________per Protocol, P&T, MEC      Points 0 1 2 3 4   Pulmonary Status  Non-Smoker  []   Smoking history   < 20 pack years  [x]   Smoking history  ?  20 pack years  []   Pulmonary Disorder  (acute or chronic)  []   Severe or Chronic w/ Exacerbation  []     Surgical Status No [x]   Surgeries     General []   Surgery Lower []   Abdominal Thoracic or []   Upper Abdominal Thoracic with  PulmonaryDisorder  []     Chest X-ray Clear/Not  Ordered     []  Chronic Changes  Results Pending  []  Infiltrates, atelectasis, pleural effusion, or edema  [x]  Infiltrates in more than one lobe []  Infiltrate + Atelectasis, &/or pleural effusion  []    Respiratory Pattern Regular,  RR = 12-20 [x]  Increased,  RR = 21-25 []  VILLAGOMEZ, irregular,  or RR = 26-30 []  Decreased FEV1  or RR = 31-35 []  Severe SOB, use  of accessory muscles, or RR ? 35  []    Mental Status Alert, oriented,  Cooperative [x]  Confused but Follows commands []  Lethargic or unable to follow commands []  Obtunded  []  Comatose  []    Breath Sounds Clear to  auscultation  []  Decreased unilaterally or  in bases only [x]  Decreased  bilaterally  []  Crackles or intermittent wheezes []  Wheezes []    Cough Strong, Spontan., & nonproductive [x]  Strong,  spontaneous, &  productive []  Weak,  Nonproductive []  Weak, productive or  with wheezes []  No spontaneous  cough or may require suctioning []    Level of Activity Ambulatory [x]  Ambulatory w/ Assist  []  Non-ambulatory []  Paraplegic []  Quadriplegic []    Total    Score:_4______     Triage Score:__5______      Tri       Triage:     1. (>20) Freq: Q3    2. (16-20) Freq: Q4   3. (11-15) Freq: QID & Albuterol Q2 PRN    4. (6-10) Freq: TID & Albuterol Q2 PRN    5. (0-5) Freq Q4prn

## 2022-06-02 NOTE — PROGRESS NOTES
The following substitutions have been approved by the P&T Committee. They are intended to provide the patient with most appropriate care possible. CURRENT ORDER: Duoneb q4 PRN_________________________________________________________    SUBSTITUTED TO:    If Beta2 Agonist ordered, substitute:   [] Albuterol Sulfate MDI 4 puffs by inhalation at the same frequency    If Anticholinergic Bronchodilator ordered without Beta2 Agonist, substitute:   [] Ipratropium Bromide (Atrovent) MDI 4 puffs by inhalation at the same frequency    If Beta2 Agonist and Anticholinergic Bronchodilator ordered, substitute:   [x] Ipratropium Bromide and Albuterol Sulfate (Combivent) MDI 4 puffs by inhalation at the same frequency    If an Inhaled Steroid ordered, substitute:   [] Beclomethasone (Qvar) 80 mcg 2 puffs by inhalation BID    After extubation the patient may be converted to nebulizer order if they are unable to perform the MDI.     Physician signature not required, per protocol

## 2022-06-03 LAB
ANION GAP SERPL CALCULATED.3IONS-SCNC: 12 MEQ/L (ref 9–15)
BASOPHILS ABSOLUTE: 0 K/UL (ref 0–0.2)
BASOPHILS RELATIVE PERCENT: 0.2 %
BUN BLDV-MCNC: 6 MG/DL (ref 6–20)
CALCIUM SERPL-MCNC: 6.6 MG/DL (ref 8.5–9.9)
CHLORIDE BLD-SCNC: 111 MEQ/L (ref 95–107)
CO2: 17 MEQ/L (ref 20–31)
CREAT SERPL-MCNC: 0.57 MG/DL (ref 0.7–1.2)
CULTURE, BLOOD ID SENSITIVITY: ABNORMAL
CULTURE, BLOOD ID SENSITIVITY: ABNORMAL
EOSINOPHILS ABSOLUTE: 0.1 K/UL (ref 0–0.7)
EOSINOPHILS RELATIVE PERCENT: 1.6 %
GFR AFRICAN AMERICAN: >60
GFR NON-AFRICAN AMERICAN: >60
GLUCOSE BLD-MCNC: 107 MG/DL (ref 70–99)
GLUCOSE BLD-MCNC: 111 MG/DL (ref 70–99)
GLUCOSE BLD-MCNC: 124 MG/DL (ref 70–99)
GLUCOSE BLD-MCNC: 141 MG/DL (ref 70–99)
HCT VFR BLD CALC: 34.4 % (ref 42–52)
HEMOGLOBIN: 11.5 G/DL (ref 14–18)
LYMPHOCYTES ABSOLUTE: 1 K/UL (ref 1–4.8)
LYMPHOCYTES RELATIVE PERCENT: 19.2 %
MAGNESIUM: 2 MG/DL (ref 1.7–2.4)
MCH RBC QN AUTO: 30.5 PG (ref 27–31.3)
MCHC RBC AUTO-ENTMCNC: 33.5 % (ref 33–37)
MCV RBC AUTO: 90.9 FL (ref 80–100)
MONOCYTES ABSOLUTE: 0.5 K/UL (ref 0.2–0.8)
MONOCYTES RELATIVE PERCENT: 9.9 %
NEUTROPHILS ABSOLUTE: 3.5 K/UL (ref 1.4–6.5)
NEUTROPHILS RELATIVE PERCENT: 69.1 %
ORGANISM: ABNORMAL
ORGANISM: ABNORMAL
PDW BLD-RTO: 15.7 % (ref 11.5–14.5)
PERFORMED ON: ABNORMAL
PLATELET # BLD: 113 K/UL (ref 130–400)
POTASSIUM REFLEX MAGNESIUM: 2.8 MEQ/L (ref 3.4–4.9)
RBC # BLD: 3.79 M/UL (ref 4.7–6.1)
SODIUM BLD-SCNC: 140 MEQ/L (ref 135–144)
WBC # BLD: 5.1 K/UL (ref 4.8–10.8)

## 2022-06-03 PROCEDURE — 6370000000 HC RX 637 (ALT 250 FOR IP): Performed by: INTERNAL MEDICINE

## 2022-06-03 PROCEDURE — 36415 COLL VENOUS BLD VENIPUNCTURE: CPT

## 2022-06-03 PROCEDURE — 94761 N-INVAS EAR/PLS OXIMETRY MLT: CPT

## 2022-06-03 PROCEDURE — 80048 BASIC METABOLIC PNL TOTAL CA: CPT

## 2022-06-03 PROCEDURE — 6360000002 HC RX W HCPCS: Performed by: INTERNAL MEDICINE

## 2022-06-03 PROCEDURE — 99232 SBSQ HOSP IP/OBS MODERATE 35: CPT | Performed by: INTERNAL MEDICINE

## 2022-06-03 PROCEDURE — 85025 COMPLETE CBC W/AUTO DIFF WBC: CPT

## 2022-06-03 PROCEDURE — 2500000003 HC RX 250 WO HCPCS: Performed by: INTERNAL MEDICINE

## 2022-06-03 PROCEDURE — 94640 AIRWAY INHALATION TREATMENT: CPT

## 2022-06-03 PROCEDURE — 83735 ASSAY OF MAGNESIUM: CPT

## 2022-06-03 PROCEDURE — 1210000000 HC MED SURG R&B

## 2022-06-03 PROCEDURE — 2580000003 HC RX 258: Performed by: INTERNAL MEDICINE

## 2022-06-03 RX ORDER — CLINDAMYCIN PHOSPHATE 600 MG/50ML
600 INJECTION INTRAVENOUS EVERY 8 HOURS
Status: DISCONTINUED | OUTPATIENT
Start: 2022-06-03 | End: 2022-06-05 | Stop reason: HOSPADM

## 2022-06-03 RX ADMIN — Medication 10 ML: at 10:45

## 2022-06-03 RX ADMIN — SODIUM CHLORIDE: 9 INJECTION, SOLUTION INTRAVENOUS at 08:19

## 2022-06-03 RX ADMIN — ACETAMINOPHEN 650 MG: 325 TABLET ORAL at 21:33

## 2022-06-03 RX ADMIN — PIPERACILLIN AND TAZOBACTAM 3375 MG: 3; .375 INJECTION, POWDER, LYOPHILIZED, FOR SOLUTION INTRAVENOUS at 01:21

## 2022-06-03 RX ADMIN — IPRATROPIUM BROMIDE AND ALBUTEROL 1 PUFF: 20; 100 SPRAY, METERED RESPIRATORY (INHALATION) at 19:37

## 2022-06-03 RX ADMIN — PANTOPRAZOLE SODIUM 40 MG: 40 TABLET, DELAYED RELEASE ORAL at 10:44

## 2022-06-03 RX ADMIN — INSULIN GLARGINE 30 UNITS: 100 INJECTION, SOLUTION SUBCUTANEOUS at 21:33

## 2022-06-03 RX ADMIN — PREGABALIN 50 MG: 50 CAPSULE ORAL at 14:54

## 2022-06-03 RX ADMIN — SODIUM CHLORIDE: 9 INJECTION, SOLUTION INTRAVENOUS at 22:50

## 2022-06-03 RX ADMIN — CLINDAMYCIN PHOSPHATE 600 MG: 600 INJECTION, SOLUTION INTRAVENOUS at 22:27

## 2022-06-03 RX ADMIN — PIPERACILLIN AND TAZOBACTAM 3375 MG: 3; .375 INJECTION, POWDER, LYOPHILIZED, FOR SOLUTION INTRAVENOUS at 10:49

## 2022-06-03 RX ADMIN — IPRATROPIUM BROMIDE AND ALBUTEROL 1 PUFF: 20; 100 SPRAY, METERED RESPIRATORY (INHALATION) at 07:16

## 2022-06-03 RX ADMIN — ASPIRIN 81 MG: 81 TABLET, COATED ORAL at 10:44

## 2022-06-03 RX ADMIN — PIPERACILLIN AND TAZOBACTAM 3375 MG: 3; .375 INJECTION, POWDER, LYOPHILIZED, FOR SOLUTION INTRAVENOUS at 18:41

## 2022-06-03 RX ADMIN — SODIUM CHLORIDE: 9 INJECTION, SOLUTION INTRAVENOUS at 16:08

## 2022-06-03 RX ADMIN — ENOXAPARIN SODIUM 40 MG: 100 INJECTION SUBCUTANEOUS at 10:45

## 2022-06-03 RX ADMIN — PREGABALIN 50 MG: 50 CAPSULE ORAL at 21:33

## 2022-06-03 RX ADMIN — Medication 10 ML: at 21:34

## 2022-06-03 RX ADMIN — PREGABALIN 50 MG: 50 CAPSULE ORAL at 10:44

## 2022-06-03 ASSESSMENT — PAIN SCALES - GENERAL: PAINLEVEL_OUTOF10: 0

## 2022-06-03 NOTE — CARE COORDINATION
IV BENEFIT REQUEST FORM    FAX FROM: [facility]                        [address] [place]    REQUESTED BY: Electronically signed by Lucas Yen RN on 6/3/2022 at 10:01 AM                                               RN/C3: PHONE: [phone])     DATE:/TIME OF REQUEST: 22  TIME: 10:01 AM      TO: Annecristin Palominoarch[address] TO: [phone]    PHONE: [phone]     THIS PATIENT HAS BEEN IDENTIFIED TO POSSIBLY NEED LONG TERM IV'S. PLEASE CHECK INSURANCE COVERAGE FOR THE FOLLOWING PT/DRUGS. PATIENT'S NAME: Collette Gordon                              [facility]   PATIENT'S : 1965  PATIENT ADDRESS: [address] [place] [address]  SSN:    ([id number])     PAYOR NAME:  Payor: Byron Salas / Plan: [place] / Product Type: *No Product type* /     DRUG: (ZOSYN)                         DOSE: (3.375)            FREQUENCY: Q (8) HR    DRUG: ()                         DOSE: ()            FREQUENCY: Q () HR    DRUG: ()                         DOSE: ()            FREQUENCY: Q () HR    __________ CHECK HERE IF PT HAS NO INSURANCE AND REQUESTING SELF PAY COST. *IF [facility] IS NOT A PROVIDER FOR THIS PATIENT, PLEASE FORWARD INFO VIA FAX TO [facility] @ [phone],(PHONE NUMBER: [phone]) TO RUN BENEFIT VERIFICATION AND NOTIFY THE ABOVE C3 OF THIS PLAN. (FAX FACE SHEET WITH DEMOGRAPHICS AND INSURANCE INFO WITH THIS FORM.)  PLEASE FAX BENEFIT INFO TO: THE [phone]    This message is intended only for the use of the individual or entity to which it is addressed and may contain information that is privileged, confidential, and exempt from disclosure under applicable law.  If the reader of the notice is not intended recipient of the employee/agent responsible for delivering the message to the intended recipient, you are hereby notified than any dissemination, distribution or copying of this communication is strictly prohibited. Please contact the sender for further instructions on handling the information.

## 2022-06-03 NOTE — PROGRESS NOTES
Department of Internal Medicine  General Internal Medicine  Attending Progress Note      SUBJECTIVE:  Pt seen and examined. Feels much better today. Awaiting ID abx recs. Explained to pt that we are still waiting on final cultures which can take a couple of days. Pt agreeable to wait.  Girlfriend updated per patient request.    OBJECTIVE      Medications    Current Facility-Administered Medications: sodium chloride flush 0.9 % injection 5-40 mL, 5-40 mL, IntraVENous, 2 times per day  sodium chloride flush 0.9 % injection 5-40 mL, 5-40 mL, IntraVENous, PRN  0.9 % sodium chloride infusion, , IntraVENous, PRN  enoxaparin (LOVENOX) injection 40 mg, 40 mg, SubCUTAneous, Daily  acetaminophen (TYLENOL) tablet 650 mg, 650 mg, Oral, Q6H PRN **OR** acetaminophen (TYLENOL) suppository 650 mg, 650 mg, Rectal, Q6H PRN  0.9 % sodium chloride infusion, , IntraVENous, Continuous  [DISCONTINUED] piperacillin-tazobactam (ZOSYN) 3,375 mg in dextrose 5 % 50 mL IVPB (mini-bag), 3,375 mg, IntraVENous, Once **AND** piperacillin-tazobactam (ZOSYN) 3,375 mg in dextrose 5 % 50 mL IVPB extended infusion (mini-bag), 3,375 mg, IntraVENous, Q8H  insulin glargine (LANTUS) injection vial 30 Units, 30 Units, SubCUTAneous, Nightly  ipratropium-albuterol (DUONEB) nebulizer solution 1 ampule, 1 ampule, Inhalation, Q4H PRN  insulin lispro (HUMALOG) injection vial 0-12 Units, 0-12 Units, SubCUTAneous, TID WC  insulin lispro (HUMALOG) injection vial 0-6 Units, 0-6 Units, SubCUTAneous, Nightly  glucose chewable tablet 16 g, 4 tablet, Oral, PRN  dextrose bolus 10% 125 mL, 125 mL, IntraVENous, PRN **OR** dextrose bolus 10% 250 mL, 250 mL, IntraVENous, PRN  glucagon (rDNA) injection 1 mg, 1 mg, IntraMUSCular, PRN  dextrose 5 % solution, 100 mL/hr, IntraVENous, PRN  pregabalin (LYRICA) capsule 50 mg, 50 mg, Oral, TID  aspirin EC tablet 81 mg, 81 mg, Oral, Daily  pantoprazole (PROTONIX) tablet 40 mg, 40 mg, Oral, QAM AC  albuterol-ipratropium (COMBIVENT RESPIMAT)  MCG/ACT inhaler 1 puff, 1 puff, Inhalation, BID  Physical    VITALS:  /62   Pulse 77   Temp 98.1 °F (36.7 °C)   Resp 16   Ht 5' 7\" (1.702 m)   Wt 215 lb (97.5 kg)   SpO2 96%   BMI 33.67 kg/m²   Constitutional: Awake and alert in no acute distress. Lying in bed comfortably  Head: Normocephalic, atraumatic  Eyes: EOMI, PERRLA  ENT: moist mucous membranes  Neck: neck supple, trachea midline  Lungs: Good inspiratory effort, no wheeze, no rhonchi, no rales  Heart: RRR, normal S1 and S2  GI: Soft, non-distended, non tender, no guarding, no rebound, +BS  MSK: no edema noted  Skin: warm, dry  Psych: appropriate affect  Data    CBC:   Lab Results   Component Value Date    WBC 5.1 06/03/2022    RBC 3.79 06/03/2022    HGB 11.5 06/03/2022    HCT 34.4 06/03/2022    MCV 90.9 06/03/2022    MCH 30.5 06/03/2022    MCHC 33.5 06/03/2022    RDW 15.7 06/03/2022     06/03/2022    MPV 9.9 03/31/2015     CMP:    Lab Results   Component Value Date     06/03/2022    K 2.8 06/03/2022     06/03/2022    CO2 17 06/03/2022    BUN 6 06/03/2022    CREATININE 0.57 06/03/2022    GFRAA >60.0 06/03/2022    LABGLOM >60.0 06/03/2022    GLUCOSE 111 06/03/2022    PROT 7.1 06/01/2022    LABALBU 3.6 06/01/2022    CALCIUM 6.6 06/03/2022    BILITOT 0.8 06/01/2022    ALKPHOS 87 06/01/2022    AST 55 06/01/2022    ALT 52 06/01/2022       ASSESSMENT AND PLAN      # Sepsis 2/2 UTI failed outpatient tx, strep bacteremia in setting of infected cavity  - diagnosed with UTI Sunday due to dysuria and started on cipro - continued fevers, chills, malaise  - febrile 100.4F, tachycardic, tachypneic, borderline hypotension  - given sepsis bolus in ED.  Continue IVF  - elevated procalcitonin  - Blood cultures with Strep agalactiae  - follow cultures  - cont zosyn  - ID consulted- will need home going abx recs prior to discharge     # Hyponatremia- resolved  - Na 124 in setting of poor appetite improved to 140 today  - monitor with IVF     # HTN/HLD/CAD  - holding BP meds, resume other home meds     # DM  - cont lantus, ISS, lyrica     DVT: lovenox    Disposition: Blood cultures preliminarily with strep. Awaiting final cultures and abx recs from ID. Asymptomatic borderline hypotension. Will discharge when ok from ID stand.  Med rec complete outside of abx      Lavern Glynn DO  Internal Medicine   Hospitalist    >35 minutes in total care time

## 2022-06-04 LAB
ANION GAP SERPL CALCULATED.3IONS-SCNC: 20 MEQ/L (ref 9–15)
ANION GAP SERPL CALCULATED.3IONS-SCNC: 8 MEQ/L (ref 9–15)
BASOPHILS ABSOLUTE: 0 K/UL (ref 0–0.2)
BASOPHILS RELATIVE PERCENT: 0.3 %
BUN BLDV-MCNC: 5 MG/DL (ref 6–20)
BUN BLDV-MCNC: 9 MG/DL (ref 6–20)
CALCIUM SERPL-MCNC: 8.2 MG/DL (ref 8.5–9.9)
CALCIUM SERPL-MCNC: 9 MG/DL (ref 8.5–9.9)
CHLORIDE BLD-SCNC: 101 MEQ/L (ref 95–107)
CHLORIDE BLD-SCNC: 119 MEQ/L (ref 95–107)
CO2: 20 MEQ/L (ref 20–31)
CO2: 23 MEQ/L (ref 20–31)
CREAT SERPL-MCNC: 0.7 MG/DL (ref 0.7–1.2)
CREAT SERPL-MCNC: 0.97 MG/DL (ref 0.7–1.2)
EOSINOPHILS ABSOLUTE: 0.1 K/UL (ref 0–0.7)
EOSINOPHILS RELATIVE PERCENT: 1.2 %
GFR AFRICAN AMERICAN: >60
GFR AFRICAN AMERICAN: >60
GFR NON-AFRICAN AMERICAN: >60
GFR NON-AFRICAN AMERICAN: >60
GLUCOSE BLD-MCNC: 103 MG/DL (ref 70–99)
GLUCOSE BLD-MCNC: 107 MG/DL (ref 70–99)
GLUCOSE BLD-MCNC: 110 MG/DL (ref 70–99)
GLUCOSE BLD-MCNC: 117 MG/DL (ref 70–99)
GLUCOSE BLD-MCNC: 139 MG/DL (ref 70–99)
GLUCOSE BLD-MCNC: 160 MG/DL (ref 70–99)
HCT VFR BLD CALC: 37 % (ref 42–52)
HEMOGLOBIN: 12.4 G/DL (ref 14–18)
LYMPHOCYTES ABSOLUTE: 1.4 K/UL (ref 1–4.8)
LYMPHOCYTES RELATIVE PERCENT: 19.2 %
MAGNESIUM: 2 MG/DL (ref 1.7–2.4)
MCH RBC QN AUTO: 30.1 PG (ref 27–31.3)
MCHC RBC AUTO-ENTMCNC: 33.5 % (ref 33–37)
MCV RBC AUTO: 89.9 FL (ref 80–100)
MONOCYTES ABSOLUTE: 0.6 K/UL (ref 0.2–0.8)
MONOCYTES RELATIVE PERCENT: 8 %
NEUTROPHILS ABSOLUTE: 5.1 K/UL (ref 1.4–6.5)
NEUTROPHILS RELATIVE PERCENT: 71.3 %
PDW BLD-RTO: 15.7 % (ref 11.5–14.5)
PERFORMED ON: ABNORMAL
PLATELET # BLD: 167 K/UL (ref 130–400)
POTASSIUM REFLEX MAGNESIUM: 3.5 MEQ/L (ref 3.4–4.9)
POTASSIUM REFLEX MAGNESIUM: 3.7 MEQ/L (ref 3.4–4.9)
PROCALCITONIN: 0.82 NG/ML (ref 0–0.15)
RBC # BLD: 4.11 M/UL (ref 4.7–6.1)
SODIUM BLD-SCNC: 132 MEQ/L (ref 135–144)
SODIUM BLD-SCNC: 159 MEQ/L (ref 135–144)
WBC # BLD: 7.2 K/UL (ref 4.8–10.8)

## 2022-06-04 PROCEDURE — 83735 ASSAY OF MAGNESIUM: CPT

## 2022-06-04 PROCEDURE — 84145 PROCALCITONIN (PCT): CPT

## 2022-06-04 PROCEDURE — 6370000000 HC RX 637 (ALT 250 FOR IP): Performed by: INTERNAL MEDICINE

## 2022-06-04 PROCEDURE — 2500000003 HC RX 250 WO HCPCS: Performed by: INTERNAL MEDICINE

## 2022-06-04 PROCEDURE — 85025 COMPLETE CBC W/AUTO DIFF WBC: CPT

## 2022-06-04 PROCEDURE — 36415 COLL VENOUS BLD VENIPUNCTURE: CPT

## 2022-06-04 PROCEDURE — 87040 BLOOD CULTURE FOR BACTERIA: CPT

## 2022-06-04 PROCEDURE — 94761 N-INVAS EAR/PLS OXIMETRY MLT: CPT

## 2022-06-04 PROCEDURE — 1210000000 HC MED SURG R&B

## 2022-06-04 PROCEDURE — 80048 BASIC METABOLIC PNL TOTAL CA: CPT

## 2022-06-04 PROCEDURE — 6360000002 HC RX W HCPCS: Performed by: INTERNAL MEDICINE

## 2022-06-04 PROCEDURE — 94664 DEMO&/EVAL PT USE INHALER: CPT

## 2022-06-04 PROCEDURE — 94640 AIRWAY INHALATION TREATMENT: CPT

## 2022-06-04 PROCEDURE — 2580000003 HC RX 258: Performed by: INTERNAL MEDICINE

## 2022-06-04 RX ORDER — SODIUM CHLORIDE 450 MG/100ML
INJECTION, SOLUTION INTRAVENOUS CONTINUOUS
Status: DISCONTINUED | OUTPATIENT
Start: 2022-06-04 | End: 2022-06-04

## 2022-06-04 RX ADMIN — PANTOPRAZOLE SODIUM 40 MG: 40 TABLET, DELAYED RELEASE ORAL at 06:11

## 2022-06-04 RX ADMIN — SODIUM CHLORIDE: 9 INJECTION, SOLUTION INTRAVENOUS at 06:09

## 2022-06-04 RX ADMIN — IPRATROPIUM BROMIDE AND ALBUTEROL 1 PUFF: 20; 100 SPRAY, METERED RESPIRATORY (INHALATION) at 19:00

## 2022-06-04 RX ADMIN — SODIUM CHLORIDE 1000 ML: 4.5 INJECTION, SOLUTION INTRAVENOUS at 13:14

## 2022-06-04 RX ADMIN — CLINDAMYCIN PHOSPHATE 600 MG: 600 INJECTION, SOLUTION INTRAVENOUS at 13:13

## 2022-06-04 RX ADMIN — PREGABALIN 50 MG: 50 CAPSULE ORAL at 22:50

## 2022-06-04 RX ADMIN — PREGABALIN 50 MG: 50 CAPSULE ORAL at 15:28

## 2022-06-04 RX ADMIN — ENOXAPARIN SODIUM 40 MG: 100 INJECTION SUBCUTANEOUS at 08:23

## 2022-06-04 RX ADMIN — CLINDAMYCIN PHOSPHATE 600 MG: 600 INJECTION, SOLUTION INTRAVENOUS at 06:10

## 2022-06-04 RX ADMIN — IPRATROPIUM BROMIDE AND ALBUTEROL 1 PUFF: 20; 100 SPRAY, METERED RESPIRATORY (INHALATION) at 07:39

## 2022-06-04 RX ADMIN — INSULIN GLARGINE 30 UNITS: 100 INJECTION, SOLUTION SUBCUTANEOUS at 22:51

## 2022-06-04 RX ADMIN — PREGABALIN 50 MG: 50 CAPSULE ORAL at 08:23

## 2022-06-04 RX ADMIN — ASPIRIN 81 MG: 81 TABLET, COATED ORAL at 08:23

## 2022-06-04 NOTE — PROGRESS NOTES
Louis Ross  1965  male  Medical Record Number: [id number]    Follow up dental caries with infected tooth and UTI with possible nephrolithiasis, bacteremia - cx with GBS    Physical Exam:  Dental caries especially L upper jaw  General: Patient appears in no acute distress, cooperative  Skin: no new rashes or erythema or induration  HEENT:  Neck is supple  Heart: S1 S2  Lungs: bilaterally clear to auscultation  Abdomen: soft, ND, NTTP, +BS  Extrem:no asymmetry of the upper or lower extremities  Neuro exam: CN II-XII intact      Labs: I have reviewed all lab results by electronic record, including most recent CBC, metabolic panel, and pertinent abnormalities were addressed from an infectious disease perspective. WBC trends are being monitored. Lab Results   Component Value Date     06/03/2022    K 2.8 06/03/2022     06/03/2022    CO2 17 06/03/2022    BUN 6 06/03/2022    CREATININE 0.57 06/03/2022    GLUCOSE 111 06/03/2022    CALCIUM 6.6 06/03/2022      Lab Results   Component Value Date    WBC 5.1 06/03/2022    HGB 11.5 (L) 06/03/2022    HCT 34.4 (L) 06/03/2022    MCV 90.9 06/03/2022     (L) 06/03/2022       Radiology:   I have reviewed imaging results per electronic record and most pertinent abnormalities are being addressed from an infectious disease standpoint.        Assessment:  Sepsis/ bacteremia secondary to dental caries  UTI - possible nephrolithiasis with small amount of hematuria  Elevated procalcitonin    Plan:  Changed to Clindamycin  Monitor temp curve  Repeat BC for clearance  PO on discharge when ready  Needs to call him dentist for follow up in 1-2 weeks       Migdalia Aranda D.O.

## 2022-06-04 NOTE — PROGRESS NOTES
Department of Internal Medicine  General Internal Medicine  Attending Progress Note      SUBJECTIVE:  Pt resting in bed. Abx changed to clindamycin. Awaiting final abx recs per ID. Sodium from 140 to 159 today. 1/2NS bolus ordered and repeat labs this afternoon.      OBJECTIVE      Medications    Current Facility-Administered Medications: sodium chloride 0.45 % bolus, 1,000 mL, IntraVENous, Once  0.45 % sodium chloride infusion, , IntraVENous, Continuous  clindamycin (CLEOCIN) 600 mg in dextrose 5 % 50 mL IVPB, 600 mg, IntraVENous, Q8H  sodium chloride flush 0.9 % injection 5-40 mL, 5-40 mL, IntraVENous, 2 times per day  sodium chloride flush 0.9 % injection 5-40 mL, 5-40 mL, IntraVENous, PRN  0.9 % sodium chloride infusion, , IntraVENous, PRN  enoxaparin (LOVENOX) injection 40 mg, 40 mg, SubCUTAneous, Daily  acetaminophen (TYLENOL) tablet 650 mg, 650 mg, Oral, Q6H PRN **OR** acetaminophen (TYLENOL) suppository 650 mg, 650 mg, Rectal, Q6H PRN  insulin glargine (LANTUS) injection vial 30 Units, 30 Units, SubCUTAneous, Nightly  ipratropium-albuterol (DUONEB) nebulizer solution 1 ampule, 1 ampule, Inhalation, Q4H PRN  insulin lispro (HUMALOG) injection vial 0-12 Units, 0-12 Units, SubCUTAneous, TID WC  insulin lispro (HUMALOG) injection vial 0-6 Units, 0-6 Units, SubCUTAneous, Nightly  glucose chewable tablet 16 g, 4 tablet, Oral, PRN  dextrose bolus 10% 125 mL, 125 mL, IntraVENous, PRN **OR** dextrose bolus 10% 250 mL, 250 mL, IntraVENous, PRN  glucagon (rDNA) injection 1 mg, 1 mg, IntraMUSCular, PRN  dextrose 5 % solution, 100 mL/hr, IntraVENous, PRN  pregabalin (LYRICA) capsule 50 mg, 50 mg, Oral, TID  aspirin EC tablet 81 mg, 81 mg, Oral, Daily  pantoprazole (PROTONIX) tablet 40 mg, 40 mg, Oral, QAM AC  albuterol-ipratropium (COMBIVENT RESPIMAT)  MCG/ACT inhaler 1 puff, 1 puff, Inhalation, BID  Physical    VITALS:  /62   Pulse 93   Temp 99.3 °F (37.4 °C)   Resp 17   Ht 5' 7\" (1.702 m)   Wt 215 lb (97.5 kg)   SpO2 96%   BMI 33.67 kg/m²   Constitutional: Lying in bed comfortably  Head: Normocephalic, atraumatic  ENT: moist mucous membranes  Neck: neck supple, trachea midline  Lungs: non labored  Heart: RRR, normal S1 and S2  GI: non-distended  MSK: no edema noted  Skin: warm, dry    Data    CBC:   Lab Results   Component Value Date    WBC 7.2 06/04/2022    RBC 4.11 06/04/2022    HGB 12.4 06/04/2022    HCT 37.0 06/04/2022    MCV 89.9 06/04/2022    MCH 30.1 06/04/2022    MCHC 33.5 06/04/2022    RDW 15.7 06/04/2022     06/04/2022    MPV 9.9 03/31/2015     CMP:    Lab Results   Component Value Date     06/04/2022    K 3.7 06/04/2022     06/04/2022    CO2 20 06/04/2022    BUN 9 06/04/2022    CREATININE 0.97 06/04/2022    GFRAA >60.0 06/04/2022    LABGLOM >60.0 06/04/2022    GLUCOSE 160 06/04/2022    PROT 7.1 06/01/2022    LABALBU 3.6 06/01/2022    CALCIUM 9.0 06/04/2022    BILITOT 0.8 06/01/2022    ALKPHOS 87 06/01/2022    AST 55 06/01/2022    ALT 52 06/01/2022       ASSESSMENT AND PLAN      # Sepsis 2/2 UTI failed outpatient tx, strep bacteremia in setting of infected dental caries  - diagnosed with UTI Sunday due to dysuria and started on cipro - continued fevers, chills, malaise  - febrile 100.4F, tachycardic, tachypneic, borderline hypotension  - given sepsis bolus in ED. Continue IVF  - elevated procalcitonin  - Blood cultures with Strep agalactiae  - follow cultures  - started on zosyn. Changed to Clindamycin by ID  - ID consulted- will need home going abx recs prior to discharge     # Hyponatremia- resolved, now hypernatremia  - Na 124 in setting of poor appetite improved to 140 but now up to 159  - IVF changed to 1/2NS and will repeat labs this afternoon  - monitor with IVF     # HTN/HLD/CAD  - holding BP meds, resume other home meds     # DM  - cont lantus, ISS, lyrica     DVT: lovenox    Disposition: Blood cultures preliminarily with strep.  Awaiting final cultures and abx recs from ID. Asymptomatic borderline hypotension. Will discharge when ok from ID stand and when sodium improved.  Med rec complete outside of abx      Vishal Wahl DO  Internal Medicine   Hospitalist    >35 minutes in total care time

## 2022-06-04 NOTE — CONSULTS
Sera Bang  1965  male  Medical Record Number: [id number]    Patient informed that I am an Infectious Disease physician and permission obtained from the patient to speak in front of any visitors prior to any discussion for HIPPA purposes. HPI: Patient found to have what appears to be strep bacteremia with complaint of burning with urination     All information taken by  and wifewho are with patient. Patient can speak and understand some Kosovan but  used for this consult. He went to urgent care for burning with urination - diagnosed with UTI and started on cipro but got progressively worse. Patient also has had a broken tooth with toothache for quite some time - he has a small hole. No notable abscess that I can see but this may be the etiology of the bacteremia. States that he has a family hx of nephrolithiasis and felt pressure a few days ago that something was blocking the urine from coming out. He has UA with blood noted.    Feels better since admission    Review of Systems: All 14 review of systems were discussed with the patient and are negative other than as stated above      Past Medical History:   Diagnosis Date    CAD (coronary artery disease)     COPD [facility]ronic obstructive [facility] [address]     Hyperlipidemia     Hypertension     [facility] hematoma) ([facility])     syncope, coughing    Type 2 diabetes mellitus without complication [facility])        Past Surgical History:   Procedure Laterality Date    COLONOSCOPY  10/17/2014    COLONOSCOPY N/A 1/6/2022    COLONOSCOPY WITH POLYPECTOMY performed by Safia Marques MD at [address] [place] [address]  05/26/2020    PTCA         Social History     Socioeconomic History    Marital status:      Spouse name: Not on file    Number of children: Not on file    Years of education: Not on file    Highest education level: Not on file   Occupational History    Not on file Tobacco Use    Smoking status: Current Some Day Smoker     Start date: 17    Smokeless tobacco: Never Used    Tobacco comment: smokes on the weekends with drinking   Vaping Use    Vaping Use: Never used   Substance and Sexual Activity    Alcohol use: Not on file     Comment: weekends     Drug use: Not on file     Comment: Last used 2020    Sexual activity: Not on file   Other Topics Concern    Not on file   Social History Narrative    Not on file     Social Determinants of Health     Financial Resource Strain:     Difficulty of Paying Living Expenses: Not on file   Food Insecurity:     Worried About Running Out of Food in the Last Year: Not on file    Parag of Food in the Last Year: Not on file   Transportation Needs:     Lack of Transportation (Medical): Not on file    Lack of Transportation (Non-Medical): Not on file   Physical Activity:     Days of Exercise per Week: Not on file    Minutes of Exercise per Session: Not on file   Stress:     Feeling of Stress : Not on file   Social Connections:     Frequency of Communication with Friends and Family: Not on file    Frequency of Social Gatherings with Friends and Family: Not on file    Attends Church Services: Not on file    Active Member of [address] [place] [address] [employer] or Organizations: Not on file    Attends Club or Organization Meetings: Not on file    Marital Status: Not on file   Intimate Partner Violence:     Fear of Current or Ex-Partner: Not on file    Emotionally Abused: Not on file    Physically Abused: Not on file    Sexually Abused: Not on file   Housing Stability:     Unable to Pay for Housing in the Last Year: Not on file    Number of [place] in the Last Year: Not on file    Unstable Housing in the Last Year: Not on file       Family History   Problem Relation Age of Onset    Cancer Neg Hx        No current facility-administered medications on file prior to encounter.      Current Outpatient Medications on File Prior to Encounter Medication Sig Dispense Refill    pregabalin (LYRICA) 50 MG capsule Take 50 mg by mouth 2 times daily.  metoprolol succinate (TOPROL XL) 25 MG extended release tablet Take 25 mg by mouth daily      LANTUS SOLOSTAR 100 UNIT/ML injection pen 30 UNITS AT BEDTIME 15 mL 3    Continuous Blood Gluc Sensor (FREESTYLE CHEIKH 2 SENSOR) MISC USE AS DIRECTED TO TEST FOUR TIMES A DAY 2 each 3    albuterol (PROVENTIL) (2.5 MG/3ML) 0.083% nebulizer solution       UNIFINE PENTIPS 31G X 6 MM MISC       blood glucose monitor strips Check BG four times daily , DX: E11.65, 150 strip 6    empagliflozin (JARDIANCE) 25 MG tablet Take 25 mg by mouth every morning (Patient taking differently: Take 25 mg by mouth every morning Girlfriend states that patient takes in am and pm) 30 tablet 3    Lancets (ONETOUCH DELICA PLUS UEUEUD14S) MISC 1 DEVICE BY DOES NOT APPLY ROUTE 4 TIMES DAILY (BEFORE MEALS AND NIGHTLY) 100 each 5    Easy Comfort Lancets MISC       vitamin D (ERGOCALCIFEROL) 1.25 MG (89918 UT) CAPS capsule       ALLERGY RELIEF 10 MG tablet TAKE 1 TABLET BY MOUTH DAILY (AM) 28 tablet 5    FLUoxetine (PROZAC) 10 MG capsule Take 10 mg by mouth daily Take 3 capsules daily for 30mg      Continuous Blood Gluc  (FREESTYLE CHEIKH 2 READER) SONDRA 1 Device by Does not apply route 4 times daily (before meals and nightly) 1 each 0    aspirin (ASPIRIN LOW DOSE) 81 MG EC tablet TAKE 1 TABLET BY MOUTH DAILY (AM) 90 tablet 3    atorvastatin (LIPITOR) 40 MG tablet TAKE 1 TABLET BY MOUTH NIGHTLY (HS) 30 tablet 3    nitroGLYCERIN (NITROSTAT) 0.4 MG SL tablet up to max of 3 total doses.  If no relief after 1 dose, call 911. 25 tablet 3    valsartan (DIOVAN) 160 MG tablet Take by mouth daily   2    cyclobenzaprine (FLEXERIL) 10 MG tablet 10 mg daily At HS  1    hydrochlorothiazide (HYDRODIURIL) 12.5 MG tablet daily   0    DULERA 200-5 MCG/ACT inhaler Inhale 2 puffs into the lungs daily   2    albuterol sulfate  (90 Base) MCG/ACT inhaler Inhale 2 puffs into the lungs every 6 hours as needed for Wheezing      Blood Glucose Monitoring Suppl SONDRA check BG once daily, DX: E11.9, NIDDM 1 Device 0    omeprazole (PRILOSEC) 20 MG capsule Take 1 capsule by mouth daily. 30 capsule 11       Physical Exam:  Dental caries especially L upper jaw  General: Patient appears in no acute distress, cooperative  Skin: no new rashes or erythema or induration  HEENT: EOMI, MMM, Neck is supple  Heart: S1 S2  Lungs: bilaterally clear to auscultation  Abdomen: soft, ND, NTTP, +BS  Extrem:no asymmetry of the upper or lower extremities  Neuro exam: CN II-XII intact      Labs: I have reviewed all lab results by electronic record, including most recent CBC, metabolic panel, and pertinent abnormalities were addressed from an infectious disease perspective. WBC trends are being monitored. Lab Results   Component Value Date     06/03/2022    K 2.8 06/03/2022     06/03/2022    CO2 17 06/03/2022    BUN 6 06/03/2022    CREATININE 0.57 06/03/2022    GLUCOSE 111 06/03/2022    CALCIUM 6.6 06/03/2022      Lab Results   Component Value Date    WBC 5.1 06/03/2022    HGB 11.5 (L) 06/03/2022    HCT 34.4 (L) 06/03/2022    MCV 90.9 06/03/2022     (L) 06/03/2022       Radiology:   I have reviewed imaging results per electronic record and most pertinent abnormalities are being addressed from an infectious disease standpoint. Assessment:  Sepsis/ bacteremia secondary to dental caries  UTI - possible nephrolithiasis with small amount of hematuria  Elevated procalcitonin    Plan:  Zosyn for now - PO likely on dc.    Needs to call him dentist for follow up in 1-2 weeks       Monroe Aranda D.O.

## 2022-06-04 NOTE — PROGRESS NOTES
[facility] [place] Respiratory Therapy Evaluation   Current Order:  Combivent bid      Home Regimen: MDI BID  Ordering Physician: Julian  Re-evaluation Date:  6/7    Diagnosis: Sepsis      Patient Status: Stable / Unstable + Physician notified    The following MDI Criteria must be met in order to convert aerosol to MDI with spacer. If unable to meet, MDI will be converted to aerosol:  []  Patient able to demonstrate the ability to use MDI effectively  []  Patient alert and cooperative  []  Patient able to take deep breath with 5-10 second hold  []  Medication(s) available in this delivery method   []  Peak flow greater than or equal to 200 ml/min            Current Order Substituted To  (same drug, same frequency)   Aerosol to MDI [] Albuterol Sulfate 0.083% unit dose by aerosol Albuterol Sulfate MDI 2 puffs by inhalation with spacer    [] Levalbuterol 1.25 mg unit dose by aerosol Levalbuterol MDI 2 puffs by inhalation with spacer    [] Levalbuterol 0.63 mg unit dose by aerosol Levalbuterol MDI 2 puffs by inhalation with spacer    [] Ipratropium Bromide 0.02% unit dose by aerosol Ipratropium Bromide MDI 2 puffs by inhalation with spacer    [] Duoneb (Ipratropium + Albuterol) unit dose by aerosol Ipratropium MDI + Albuterol MDI 2 puffs by inhalation w/spacer   MDI to Aerosol [] Albuterol Sulfate MDI Albuterol Sulfate 0.083% unit dose by aerosol    [] Levalbuterol MDI 2 puffs by inhalation Levalbuterol 1.25 mg unit dose by aerosol    [] Ipratropium Bromide MDI by inhalation Ipratropium Bromide 0.02% unit dose by aerosol    [] Combivent (Ipratropium + Albuterol) MDI by inhalation Duoneb (Ipratropium + Albuterol) unit dose by aerosol       Treatment Assessment [Frequency/Schedule]:  Change frequency to: __NO Changes________________________________________________per Protocol, P&T, MEC      Points 0 1 2 3 4   Pulmonary Status  Non-Smoker  []   Smoking history   < 20 pack years  []   Smoking history  ?  20 pack years  [] Pulmonary Disorder  (acute or chronic)  [x]   Severe or Chronic w/ Exacerbation  []     Surgical Status No [x]   Surgeries     General []   Surgery Lower []   Abdominal Thoracic or []   Upper Abdominal Thoracic with  PulmonaryDisorder  []     Chest X-ray Clear/Not  Ordered     [x]  Chronic Changes  Results Pending  []  Infiltrates, atelectasis, pleural effusion, or edema  []  Infiltrates in more than one lobe []  Infiltrate + Atelectasis, &/or pleural effusion  []    Respiratory Pattern Regular,  RR = 12-20 [x]  Increased,  RR = 21-25 []  VILLAGOMEZ, irregular,  or RR = 26-30 []  Decreased FEV1  or RR = 31-35 []  Severe SOB, use  of accessory muscles, or RR ? 35  []    Mental Status Alert, oriented,  Cooperative [x]  Confused but Follows commands []  Lethargic or unable to follow commands []  Obtunded  []  Comatose  []    Breath Sounds Clear to  auscultation  [x]  Decreased unilaterally or  in bases only []  Decreased  bilaterally  []  Crackles or intermittent wheezes []  Wheezes []    Cough Strong, Spontan., & nonproductive [x]  Strong,  spontaneous, &  productive []  Weak,  Nonproductive []  Weak, productive or  with wheezes []  No spontaneous  cough or may require suctioning []    Level of Activity Ambulatory [x]  Ambulatory w/ Assist  []  Non-ambulatory []  Paraplegic []  Quadriplegic []    Total    Score:__3_____     Triage Score:____5____      Tri       Triage:     1. (>20) Freq: Q3    2. (16-20) Freq: Q4   3. (11-15) Freq: QID & Albuterol Q2 PRN    4. (6-10) Freq: TID & Albuterol Q2 PRN    5. (0-5) Freq Q4prn

## 2022-06-05 VITALS
TEMPERATURE: 98.1 F | SYSTOLIC BLOOD PRESSURE: 110 MMHG | HEART RATE: 72 BPM | OXYGEN SATURATION: 97 % | DIASTOLIC BLOOD PRESSURE: 67 MMHG | WEIGHT: 215 LBS | RESPIRATION RATE: 16 BRPM | HEIGHT: 67 IN | BODY MASS INDEX: 33.74 KG/M2

## 2022-06-05 LAB
ANION GAP SERPL CALCULATED.3IONS-SCNC: 11 MEQ/L (ref 9–15)
BASOPHILS ABSOLUTE: 0 K/UL (ref 0–0.2)
BASOPHILS RELATIVE PERCENT: 0.4 %
BUN BLDV-MCNC: 6 MG/DL (ref 6–20)
CALCIUM SERPL-MCNC: 8.2 MG/DL (ref 8.5–9.9)
CHLORIDE BLD-SCNC: 107 MEQ/L (ref 95–107)
CO2: 23 MEQ/L (ref 20–31)
CREAT SERPL-MCNC: 0.61 MG/DL (ref 0.7–1.2)
EOSINOPHILS ABSOLUTE: 0.1 K/UL (ref 0–0.7)
EOSINOPHILS RELATIVE PERCENT: 1.6 %
GFR AFRICAN AMERICAN: >60
GFR NON-AFRICAN AMERICAN: >60
GLUCOSE BLD-MCNC: 112 MG/DL (ref 70–99)
GLUCOSE BLD-MCNC: 113 MG/DL (ref 70–99)
HCT VFR BLD CALC: 35.9 % (ref 42–52)
HEMOGLOBIN: 12.4 G/DL (ref 14–18)
LYMPHOCYTES ABSOLUTE: 1.3 K/UL (ref 1–4.8)
LYMPHOCYTES RELATIVE PERCENT: 19.6 %
MAGNESIUM: 2 MG/DL (ref 1.7–2.4)
MCH RBC QN AUTO: 30.6 PG (ref 27–31.3)
MCHC RBC AUTO-ENTMCNC: 34.5 % (ref 33–37)
MCV RBC AUTO: 88.5 FL (ref 80–100)
MONOCYTES ABSOLUTE: 0.5 K/UL (ref 0.2–0.8)
MONOCYTES RELATIVE PERCENT: 7.6 %
NEUTROPHILS ABSOLUTE: 4.5 K/UL (ref 1.4–6.5)
NEUTROPHILS RELATIVE PERCENT: 70.8 %
PDW BLD-RTO: 15.4 % (ref 11.5–14.5)
PERFORMED ON: ABNORMAL
PLATELET # BLD: 198 K/UL (ref 130–400)
POTASSIUM REFLEX MAGNESIUM: 3.3 MEQ/L (ref 3.4–4.9)
RBC # BLD: 4.06 M/UL (ref 4.7–6.1)
SODIUM BLD-SCNC: 141 MEQ/L (ref 135–144)
WBC # BLD: 6.4 K/UL (ref 4.8–10.8)

## 2022-06-05 PROCEDURE — 83735 ASSAY OF MAGNESIUM: CPT

## 2022-06-05 PROCEDURE — 6370000000 HC RX 637 (ALT 250 FOR IP): Performed by: INTERNAL MEDICINE

## 2022-06-05 PROCEDURE — 80048 BASIC METABOLIC PNL TOTAL CA: CPT

## 2022-06-05 PROCEDURE — 85025 COMPLETE CBC W/AUTO DIFF WBC: CPT

## 2022-06-05 PROCEDURE — 94640 AIRWAY INHALATION TREATMENT: CPT

## 2022-06-05 PROCEDURE — 6360000002 HC RX W HCPCS: Performed by: INTERNAL MEDICINE

## 2022-06-05 PROCEDURE — 2500000003 HC RX 250 WO HCPCS: Performed by: INTERNAL MEDICINE

## 2022-06-05 PROCEDURE — 2580000003 HC RX 258: Performed by: INTERNAL MEDICINE

## 2022-06-05 PROCEDURE — 94760 N-INVAS EAR/PLS OXIMETRY 1: CPT

## 2022-06-05 PROCEDURE — 36415 COLL VENOUS BLD VENIPUNCTURE: CPT

## 2022-06-05 RX ORDER — CLINDAMYCIN HYDROCHLORIDE 300 MG/1
600 CAPSULE ORAL 3 TIMES DAILY
Qty: 72 CAPSULE | Refills: 0 | Status: SHIPPED | OUTPATIENT
Start: 2022-06-05 | End: 2022-06-17

## 2022-06-05 RX ADMIN — IPRATROPIUM BROMIDE AND ALBUTEROL 1 PUFF: 20; 100 SPRAY, METERED RESPIRATORY (INHALATION) at 07:04

## 2022-06-05 RX ADMIN — CLINDAMYCIN PHOSPHATE 600 MG: 600 INJECTION, SOLUTION INTRAVENOUS at 10:41

## 2022-06-05 RX ADMIN — PANTOPRAZOLE SODIUM 40 MG: 40 TABLET, DELAYED RELEASE ORAL at 06:51

## 2022-06-05 RX ADMIN — ASPIRIN 81 MG: 81 TABLET, COATED ORAL at 10:40

## 2022-06-05 RX ADMIN — CLINDAMYCIN PHOSPHATE 600 MG: 600 INJECTION, SOLUTION INTRAVENOUS at 00:05

## 2022-06-05 RX ADMIN — Medication 10 ML: at 10:38

## 2022-06-05 RX ADMIN — PREGABALIN 50 MG: 50 CAPSULE ORAL at 10:37

## 2022-06-05 RX ADMIN — ENOXAPARIN SODIUM 40 MG: 100 INJECTION SUBCUTANEOUS at 10:37

## 2022-06-05 NOTE — DISCHARGE SUMMARY
Physician Discharge Summary     Patient ID:  Sourav Gould  [id number]  53 y.o.  1965    Admit date: 6/1/2022    Discharge date : 06/05/22     Admitting Physician: Mayra Reynolds DO     Discharge Physician: Mayra Reynolds DO     Admission Diagnoses: Hyponatremia [E87.1]  Septicemia ([facility] [address] [A41.9]  Sepsis ([facility] [address] [A41.9]    Discharge Diagnoses: Strep bacteremia 2/2 dental caries, UTI    Admission Condition: fair    Discharged Condition: good    Hospital Course: Pt recently diagnosed with UTI due to dysuria and started on Cipro at urgent care presented with worsening symptoms of fatigue, malaise, fevers/chills and admitted for sepsis due to UTI and started on Zosyn. ID consutled in setting of refractory infection. Blood cultures grew strep and patient noted to have dental caries which are the likely source. Abx changed to Clindamycin. Pts symptoms improved. Hyponatremia improved with IVF and patient stable for discharge pending final abx recs from ID. Pt discharged home on PO abx and will need to schedule dentist appt to further manage his cavities. Pt will follow up with PCP after discharge. Consults: ID    Discharge Exam:  Constitutional: Lying in bed comfortably  Head: Normocephalic, atraumatic  ENT: moist mucous membranes  Neck: neck supple, trachea midline  Lungs: non labored  Heart: RRR, normal S1 and S2  GI: non-distended  MSK: no edema noted  Skin: warm, dry    Labs:   Recent Labs     06/03/22  0528 06/04/22  0458 06/05/22  0459   WBC 5.1 7.2 6.4   HGB 11.5* 12.4* 12.4*   HCT 34.4* 37.0* 35.9*   * 167 198     Recent Labs     06/04/22  0458 06/04/22  1518 06/05/22  0459   * 132* 141   K 3.7 3.5 3.3*   * 101 107   CO2 20 23 23   BUN 9 5* 6   CREATININE 0.97 0.70 0.61*   CALCIUM 9.0 8.2* 8.2*     No results for input(s): AST, ALT, BILIDIR, BILITOT, ALKPHOS in the last 72 hours. No results for input(s): INR in the last 72 hours.   No results for input(s): Kimberlyia Bugler in the last 72 hours.    Urinalysis:   Lab Results   Component Value Date    NITRU Negative 06/01/2022    WBCUA 0-2 06/01/2022    BACTERIA RARE 06/01/2022    RBCUA 0-2 06/01/2022    BLOODU TRACE 06/01/2022    SPECGRAV 1.023 06/01/2022    GLUCOSEU >=1000 06/01/2022       Radiology:   Most recent    Chest CT      WITH CONTRAST:No results found for this or any previous visit. WITHOUT CONTRAST: No results found for this or any previous visit. CXR      2-view: Results for orders placed during the hospital encounter of 05/15/14    XR Chest Standard TWO VW    Narrative  EXAMINATION  CHEST, TWO VIEWS    HISTORY SHORTNESS OF BREATH.    COMPARISON  PRIOR COMPARISONS ARE NOT AVAILABLE. FINDINGS There is tortuosity of the thoracic aorta. The cardiac  silhouette is within normal limits. Pulmonary suman are symmetrical  without abnormality. Normal lung volumes noted. There is no focal  consolidation or acute interstitial lung process. No pleural effusion  or pneumothorax is seen. The bony structures and soft tissues  unremarkable. Hyperostosis seen throughout the thoracic spine. IMPRESSION    NO ACUTE LUNG PROCESS. -  Valeri Gray By-  [id number]  Released By-  [id number]  Released Date Time- 05/16/14 1125  This document has been electronically signed. ------------------------------------------------------------------------------       Portable: Results for orders placed during the hospital encounter of 06/01/22    XR CHEST PORTABLE    Narrative  TECHNIQUE:  Single portable view of the chest.    CLINICAL INDICATION:  Prostate-year-old male presenting with fever and painful urination. COMPARISON:  Chest x-ray obtained on May 26, 2020    PROCEDURE AND FINDINGS:  The cardiomediastinal silhouette is moderately enlarged. Mild prominence of the bronchovascular and interstitial lung markings is visualized bilaterally. No evidence of focal infiltrate or consolidation. No evidence of pneumothorax.   The costophrenic angles are clear, no evidence of pleural effusion or parenchymal lung mass. The bony thorax unremarkable for the patient's age. Impression  Mild congestive changes. Echo No results found for this or any previous visit. Disposition: home    In process/preliminary results:  Outstanding Order Results     Date and Time Order Name Status Description    6/4/2022  5:35 AM Culture, Blood 1 Preliminary           Patient Instructions:   Current Discharge Medication List      CONTINUE these medications which have NOT CHANGED    Details   pregabalin (LYRICA) 50 MG capsule Take 50 mg by mouth 2 times daily. metoprolol succinate (TOPROL XL) 25 MG extended release tablet Take 25 mg by mouth daily      LANTUS SOLOSTAR 100 UNIT/ML injection pen 30 UNITS AT BEDTIME  Qty: 15 mL, Refills: 3    Associated Diagnoses: Uncontrolled type 2 diabetes mellitus with hyperglycemia (HCC)      Continuous Blood Gluc Sensor (FREESTYLE CHEIKH 2 SENSOR) MISC USE AS DIRECTED TO TEST FOUR TIMES A DAY  Qty: 2 each, Refills: 3    Associated Diagnoses: Uncontrolled type 2 diabetes mellitus with hyperglycemia (HCC)      albuterol (PROVENTIL) (2.5 MG/3ML) 0.083% nebulizer solution       UNIFINE PENTIPS 31G X 6 MM MISC       blood glucose monitor strips Check BG four times daily , DX: E11.65,  Qty: 150 strip, Refills: 6    Associated Diagnoses: Diabetes mellitus due to underlying condition with hyperosmolarity without coma, with long-term current use of insulin (HCC)      empagliflozin (JARDIANCE) 25 MG tablet Take 25 mg by mouth every morning  Qty: 30 tablet, Refills: 3    Associated Diagnoses: Uncontrolled type 2 diabetes mellitus with hyperglycemia ([facility] [address]      ! ! Lancets (ONETOUCH DELICA PLUS [id number]) MISC 1 DEVICE BY DOES NOT APPLY ROUTE 4 TIMES DAILY (BEFORE MEALS AND NIGHTLY)  Qty: 100 each, Refills: 5      !!  Easy Comfort Lancets MISC       vitamin D (ERGOCALCIFEROL) 1.25 MG (73199 UT) CAPS capsule       ALLERGY RELIEF 10 MG tablet TAKE 1 TABLET BY MOUTH DAILY (AM)  Qty: 28 tablet, Refills: 5      FLUoxetine (PROZAC) 10 MG capsule Take 10 mg by mouth daily Take 3 capsules daily for 30mg      Continuous Blood Gluc  (FREESTYLE CHEIKH 2 READER) SONDRA 1 Device by Does not apply route 4 times daily (before meals and nightly)  Qty: 1 each, Refills: 0      aspirin (ASPIRIN LOW DOSE) 81 MG EC tablet TAKE 1 TABLET BY MOUTH DAILY (AM)  Qty: 90 tablet, Refills: 3    Associated Diagnoses: ACS (acute coronary syndrome) ([facility])      atorvastatin (LIPITOR) 40 MG tablet TAKE 1 TABLET BY MOUTH NIGHTLY (HS)  Qty: 30 tablet, Refills: 3      nitroGLYCERIN (NITROSTAT) 0.4 MG SL tablet up to max of 3 total doses. If no relief after 1 dose, call 911. Qty: 25 tablet, Refills: 3      valsartan (DIOVAN) 160 MG tablet Take by mouth daily   Refills: 2      cyclobenzaprine (FLEXERIL) 10 MG tablet 10 mg daily At HS  Refills: 1      hydrochlorothiazide (HYDRODIURIL) 12.5 MG tablet daily   Refills: 0      DULERA 200-5 MCG/ACT inhaler Inhale 2 puffs into the lungs daily   Refills: 2      albuterol sulfate  (90 Base) MCG/ACT inhaler Inhale 2 puffs into the lungs every 6 hours as needed for Wheezing    Associated Diagnoses: Uncontrolled type 2 diabetes mellitus with complication, without long-term current use of insulin ([facility])      Blood Glucose Monitoring Suppl SONDRA check BG once daily, DX: E11.9, NIDDM  Qty: 1 Device, Refills: 0      omeprazole (PRILOSEC) 20 MG capsule Take 1 capsule by mouth daily. Qty: 30 capsule, Refills: 11    Associated Diagnoses: GERD (gastroesophageal reflux disease)       ! ! - Potential duplicate medications found. Please discuss with provider.       STOP taking these medications       acyclovir (ZOVIRAX) 800 MG tablet Comments:   Reason for Stopping:         triamcinolone (KENALOG) 0.025 % cream Comments:   Reason for Stopping:         Dulaglutide (TRULICITY) 4.5 VJ/9.4BY SOPN Comments:   Reason for Stopping: HYDROcodone-acetaminophen (NORCO) 5-325 MG per tablet Comments:   Reason for Stopping:         REFRESH TEARS 0.5 % SOLN ophthalmic solution Comments:   Reason for Stopping:         gabapentin (NEURONTIN) 100 MG capsule Comments:   Reason for Stopping:         acetaminophen (TYLENOL ARTHRITIS PAIN) 650 MG extended release tablet Comments:   Reason for Stopping:         fluticasone (FLONASE) 50 MCG/ACT nasal spray Comments:   Reason for Stopping:             Activity: activity as tolerated  Diet: regular diet  Wound Care: none needed    Follow-up with BOYD Davis NP  in 1 week.     DC time 35 minutes    Signed:  Electronically signed by Jyoti Enrique DO on 6/5/2022 at 2:19 PM

## 2022-06-05 NOTE — PROGRESS NOTES
Called about discharge prescription for clindamycin as desired by my colleague. Patient is afebrile. Repeat cultures so far negative. Procalcitonin lower is on clindamycin tolerating.   Switch to oral clindamycin same dose he can follow-up with us as an outpatient

## 2022-06-06 RX ORDER — GLIMEPIRIDE 2 MG/1
TABLET ORAL
Qty: 28 TABLET | Refills: 3 | OUTPATIENT
Start: 2022-06-06

## 2022-06-07 RX ORDER — GLIMEPIRIDE 2 MG/1
TABLET ORAL
Qty: 28 TABLET | Refills: 3 | OUTPATIENT
Start: 2022-06-07

## 2022-06-09 LAB — BLOOD CULTURE, ROUTINE: NORMAL

## 2022-06-11 DIAGNOSIS — E11.65 UNCONTROLLED TYPE 2 DIABETES MELLITUS WITH HYPERGLYCEMIA (HCC): ICD-10-CM

## 2022-06-14 RX ORDER — GLIMEPIRIDE 2 MG/1
TABLET ORAL
Qty: 28 TABLET | Refills: 5 | OUTPATIENT
Start: 2022-06-14

## 2022-06-14 RX ORDER — INSULIN GLARGINE 100 [IU]/ML
INJECTION, SOLUTION SUBCUTANEOUS
Qty: 15 ML | Refills: 3 | OUTPATIENT
Start: 2022-06-14

## 2022-06-14 RX ORDER — EMPAGLIFLOZIN 25 MG/1
TABLET, FILM COATED ORAL
Qty: 28 TABLET | Refills: 3 | OUTPATIENT
Start: 2022-06-14

## 2022-06-16 ENCOUNTER — OFFICE VISIT (OUTPATIENT)
Dept: ENDOCRINOLOGY | Age: 57
End: 2022-06-16
Payer: COMMERCIAL

## 2022-06-16 VITALS
HEIGHT: 67 IN | OXYGEN SATURATION: 94 % | WEIGHT: 216.2 LBS | DIASTOLIC BLOOD PRESSURE: 71 MMHG | BODY MASS INDEX: 33.93 KG/M2 | SYSTOLIC BLOOD PRESSURE: 110 MMHG | HEART RATE: 69 BPM

## 2022-06-16 DIAGNOSIS — E11.65 UNCONTROLLED TYPE 2 DIABETES MELLITUS WITH HYPERGLYCEMIA (HCC): Primary | ICD-10-CM

## 2022-06-16 LAB
CHP ED QC CHECK: NORMAL
GLUCOSE BLD-MCNC: 237 MG/DL
HBA1C MFR BLD: 6.5 %

## 2022-06-16 PROCEDURE — 3044F HG A1C LEVEL LT 7.0%: CPT | Performed by: PHYSICIAN ASSISTANT

## 2022-06-16 PROCEDURE — 3017F COLORECTAL CA SCREEN DOC REV: CPT | Performed by: PHYSICIAN ASSISTANT

## 2022-06-16 PROCEDURE — 99214 OFFICE O/P EST MOD 30 MIN: CPT | Performed by: PHYSICIAN ASSISTANT

## 2022-06-16 PROCEDURE — 2022F DILAT RTA XM EVC RTNOPTHY: CPT | Performed by: PHYSICIAN ASSISTANT

## 2022-06-16 PROCEDURE — 4004F PT TOBACCO SCREEN RCVD TLK: CPT | Performed by: PHYSICIAN ASSISTANT

## 2022-06-16 PROCEDURE — 82962 GLUCOSE BLOOD TEST: CPT | Performed by: PHYSICIAN ASSISTANT

## 2022-06-16 PROCEDURE — 83036 HEMOGLOBIN GLYCOSYLATED A1C: CPT | Performed by: PHYSICIAN ASSISTANT

## 2022-06-16 PROCEDURE — 1111F DSCHRG MED/CURRENT MED MERGE: CPT | Performed by: PHYSICIAN ASSISTANT

## 2022-06-16 PROCEDURE — G8417 CALC BMI ABV UP PARAM F/U: HCPCS | Performed by: PHYSICIAN ASSISTANT

## 2022-06-16 PROCEDURE — G8427 DOCREV CUR MEDS BY ELIG CLIN: HCPCS | Performed by: PHYSICIAN ASSISTANT

## 2022-06-16 RX ORDER — IBUPROFEN 800 MG/1
1 TABLET ORAL EVERY 6 HOURS PRN
COMMUNITY
Start: 2022-06-13

## 2022-06-16 RX ORDER — PROPYLENE GLYCOL/PEG 400 0.3 %-0.4%
1 DROPS OPHTHALMIC (EYE) 2 TIMES DAILY PRN
COMMUNITY
Start: 2022-06-07

## 2022-06-16 RX ORDER — DULAGLUTIDE 4.5 MG/.5ML
0.5 INJECTION, SOLUTION SUBCUTANEOUS WEEKLY
Qty: 4 PEN | Refills: 5 | Status: SHIPPED | OUTPATIENT
Start: 2022-06-16

## 2022-06-16 RX ORDER — DULAGLUTIDE 4.5 MG/.5ML
0.5 INJECTION, SOLUTION SUBCUTANEOUS WEEKLY
COMMUNITY
Start: 2022-06-07 | End: 2022-06-16 | Stop reason: SDUPTHER

## 2022-06-16 RX ORDER — INSULIN GLARGINE 100 [IU]/ML
INJECTION, SOLUTION SUBCUTANEOUS
Qty: 15 ML | Refills: 3 | Status: SHIPPED | OUTPATIENT
Start: 2022-06-16

## 2022-06-16 ASSESSMENT — ENCOUNTER SYMPTOMS
ABDOMINAL PAIN: 0
EYE PAIN: 0
WHEEZING: 0
VOMITING: 0
DIARRHEA: 0
RHINORRHEA: 0
COUGH: 0
EYE REDNESS: 0
SHORTNESS OF BREATH: 0
NAUSEA: 0
SORE THROAT: 0

## 2022-06-16 NOTE — PATIENT INSTRUCTIONS
Endocrinology-    1. Check your blood sugars 4 times a day, before meals and at night  2. Document these numbers in a blood glucose log and bring them with you to your follow-up appointment. 3. If you are prescribed insulin, Do not take your mealtime insulin if your blood sugars less than 120   4. Call our office if you have blood sugars less than 80 or greater then 300 on two or more occasions  5. Call our office if you have any questions regarding your blood sugars or insulin dosing regiment  6. Signs of low blood sugar may include tremors, feeling shaky, sweating, dizziness, confusion and weakness. Check your blood sugar immediatly if you have any of these symptoms. The plan as discussed at your appointment-   1. Continue Lantus 30 units at bedtime  2. Continue Trulicity 4.5 mg weekly  3. Continue Jardiance 25 mg mornings  4. Freestyle Chioma 2 being used  5.  Follow-up in 3 months

## 2022-06-16 NOTE — PROGRESS NOTES
6/16/2022    Assessment:       Diagnosis Orders   1. Uncontrolled type 2 diabetes mellitus with hyperglycemia (HCC)  POCT Glucose    POCT glycosylated hemoglobin (Hb A1C)    empagliflozin (JARDIANCE) 25 MG tablet    insulin glargine (LANTUS SOLOSTAR) 100 UNIT/ML injection pen     AVG am glucose 110-140     PLAN:     1. Continue Lantus 30 units at bedtime  2. Continue Trulicity 4.5 mg weekly  3. Continue Jardiance 25 mg mornings  4. Freestyle Chioma 2 being used  5. Follow-up in 3 months    Orders Placed This Encounter   Procedures    POCT Glucose    POCT glycosylated hemoglobin (Hb A1C)     Orders Placed This Encounter   Medications    empagliflozin (JARDIANCE) 25 MG tablet     Sig: Take 1 tablet by mouth every morning     Dispense:  30 tablet     Refill:  3    insulin glargine (LANTUS SOLOSTAR) 100 UNIT/ML injection pen     Sig: INJECT 30 UNITS AT BEDTIME     Dispense:  15 mL     Refill:  3    TRULICITY 4.5 IT/9.4DY SOPN     Sig: Inject 0.5 mLs into the skin once a week     Dispense:  4 pen     Refill:  5     Return in about 3 months (around 9/16/2022) for Diabetes. Subjective:     Chief Complaint   Patient presents with    Diabetes    Discuss Medications     glimeperide; patient has been receiving from the pharmacy in his blister packs. Would like to know if he should still be taking. Vitals:    06/16/22 0953   BP: 110/71   Site: Left Upper Arm   Position: Sitting   Cuff Size: Large Adult   Pulse: 69   SpO2: 94%   Weight: 216 lb 3.2 oz (98.1 kg)   Height: 5' 7\" (1.702 m)     Wt Readings from Last 3 Encounters:   06/16/22 216 lb 3.2 oz (98.1 kg)   06/01/22 215 lb (97.5 kg)   04/18/22 227 lb (103 kg)     BP Readings from Last 3 Encounters:   06/16/22 110/71   06/05/22 110/67   04/18/22 134/68     Erin Isaacs is a 65-year-old  man with hospital admission 6/2020, for chest pain. Status post PCI's. Has a history of COPD and type 2 insulin-dependent diabetes.   6/16/2022 patient returns today for follow-up of his diabetes. He and his wife reviewing his medications and found that he was still receiving glimepiride and his pill pack from the pharmacy and was also taking Jardiance 25 mg twice a day. He was experiencing hypoglycemia in the mornings down into the 60s, he is asymptomatic and has hypoglycemia unawareness down to 40. He is utilizing the freestyle zeeshan continuous glucose monitoring system. I updated all of his prescriptions, will contact [facility] to discuss directly with the pharmacist the changes in his medications. Instructed the patient and his wife to discontinue any glimepiride, take Jardiance once a day in the morning. Also if he experienced a.m. hypoglycemia less than 90 to decrease his Lantus to 20 units nightly. I also reminded them of the discharge instructions to contact me if they have blood glucose levels less than 80 on 2 or more occasions. They both verbalized understanding. Diabetes  He presents for his follow-up diabetic visit. He has type 2 diabetes mellitus. The initial diagnosis of diabetes was made 5 years ago. Pertinent negatives for hypoglycemia include no dizziness or headaches. Pertinent negatives for diabetes include no chest pain, no fatigue, no polydipsia and no polyuria. There are no hypoglycemic complications. Diabetic complications include heart disease. Risk factors for coronary artery disease include dyslipidemia, diabetes mellitus, male sex and obesity. Current diabetic treatment includes insulin injections and oral agent (triple therapy). He is compliant with treatment all of the time. He is currently taking insulin at bedtime. Rotation sites for injection include the abdominal wall. He is following a generally healthy diet. Meal planning includes avoidance of concentrated sweets. He participates in exercise three times a week. He monitors blood glucose at home 5+ x per day. His overall blood glucose range is 180-200 mg/dl.  An ACE inhibitor/angiotensin II receptor blocker is being taken. He does not see a podiatrist.Eye exam is current. Past Medical History:   Diagnosis Date    CAD (coronary artery disease)     COPD (chronic obstructive pulmonary disease) ([address]     Hyperlipidemia     Hypertension     SDH [facility]subdural hematoma) ([facility])     syncope, coughing    Type 2 diabetes mellitus without complication [facility])      Past Surgical History:   Procedure Laterality Date    COLONOSCOPY  10/17/2014    COLONOSCOPY N/A 1/6/2022    COLONOSCOPY WITH POLYPECTOMY performed by Alger Soulier, MD at [address] [place] [address]  05/26/2020    PTCA       Social History     Socioeconomic History    Marital status:      Spouse name: Not on file    Number of children: Not on file    Years of education: Not on file    Highest education level: Not on file   Occupational History    Not on file   Tobacco Use    Smoking status: Current Some Day Smoker     Start date: 1982    Smokeless tobacco: Never Used    Tobacco comment: smokes on the weekends with drinking   Vaping Use    Vaping Use: Never used   Substance and Sexual Activity    Alcohol use: Not on file     Comment: weekends     Drug use: Not on file     Comment: Last used 2020    Sexual activity: Not on file   Other Topics Concern    Not on file   Social History Narrative    Not on file     Social Determinants of Health     Financial Resource Strain:     Difficulty of Paying Living Expenses: Not on file   Food Insecurity:     Worried About Running Out of Food in the Last Year: Not on file    Parag of Food in the Last Year: Not on file   Transportation Needs:     Lack of Transportation (Medical): Not on file    Lack of Transportation (Non-Medical):  Not on file   Physical Activity:     Days of Exercise per Week: Not on file    Minutes of Exercise per Session: Not on file   Stress:     Feeling of Stress : Not on file   Social Connections:     Frequency of Communication with Friends and Family: Not on file    Frequency of Social Gatherings with Friends and Family: Not on file    Attends Rastafari Services: Not on file    Active Member of Clubs or Organizations: Not on file    Attends Club or Organization Meetings: Not on file    Marital Status: Not on file   Intimate Partner Violence:     Fear of Current or Ex-Partner: Not on file    Emotionally Abused: Not on file    Physically Abused: Not on file    Sexually Abused: Not on file   Housing Stability:     Unable to Pay for Housing in the Last Year: Not on file    Number of [place] in the Last Year: Not on file    Unstable Housing in the Last Year: Not on file     Family History   Problem Relation Age of Onset    Cancer Neg Hx      Allergies   Allergen Reactions    Metformin And Related      Diarrhea        Current Outpatient Medications:     ibuprofen (ADVIL;MOTRIN) 800 MG tablet, Take 1 tablet by mouth every 6 hours as needed, Disp: , Rfl:     SYSTANE ULTRA 0.4-0.3 % ophthalmic solution, Place 1 drop into both eyes 2 times daily as needed, Disp: , Rfl:     empagliflozin (JARDIANCE) 25 MG tablet, Take 1 tablet by mouth every morning, Disp: 30 tablet, Rfl: 3    insulin glargine (LANTUS SOLOSTAR) 100 UNIT/ML injection pen, INJECT 30 UNITS AT BEDTIME, Disp: 15 mL, Rfl: 3    TRULICITY 4.5 QY/3.2SE SOPN, Inject 0.5 mLs into the skin once a week, Disp: 4 pen, Rfl: 5    clindamycin (CLEOCIN) 300 MG capsule, Take 2 capsules by mouth 3 times daily for 12 days, Disp: 72 capsule, Rfl: 0    pregabalin (LYRICA) 50 MG capsule, Take 50 mg by mouth 2 times daily. , Disp: , Rfl:     metoprolol succinate (TOPROL XL) 25 MG extended release tablet, Take 25 mg by mouth daily, Disp: , Rfl:     albuterol (PROVENTIL) (2.5 MG/3ML) 0.083% nebulizer solution, Take 2.5 mg by nebulization every 4-6 hours as needed for Wheezing or Shortness of Breath , Disp: , Rfl:     UNIFINE PENTIPS 31G X 6 MM MISC, , Disp: , Rfl:    blood glucose monitor strips, Check BG four times daily , DX: E11.65,, Disp: 150 strip, Rfl: 6    Lancets (ONETOUCH DELICA PLUS [id number]) MISC, 1 DEVICE BY DOES NOT APPLY ROUTE 4 TIMES DAILY (BEFORE MEALS AND NIGHTLY), Disp: 100 each, Rfl: 5    Easy Comfort Lancets MISC, , Disp: , Rfl:     vitamin D (ERGOCALCIFEROL) 1.25 MG (32894 UT) CAPS capsule, Take 50,000 Units by mouth once a week , Disp: , Rfl:     ALLERGY RELIEF 10 MG tablet, TAKE 1 TABLET BY MOUTH DAILY (AM), Disp: 28 tablet, Rfl: 5    FLUoxetine (PROZAC) 10 MG capsule, Take 10 mg by mouth daily Take 3 capsules daily for 30mg, Disp: , Rfl:     aspirin (ASPIRIN LOW DOSE) 81 MG EC tablet, TAKE 1 TABLET BY MOUTH DAILY (AM), Disp: 90 tablet, Rfl: 3    atorvastatin (LIPITOR) 40 MG tablet, TAKE 1 TABLET BY MOUTH NIGHTLY (HS), Disp: 30 tablet, Rfl: 3    nitroGLYCERIN (NITROSTAT) 0.4 MG SL tablet, up to max of 3 total doses. If no relief after 1 dose, call 911., Disp: 25 tablet, Rfl: 3    valsartan (DIOVAN) 160 MG tablet, Take by mouth daily , Disp: , Rfl: 2    cyclobenzaprine (FLEXERIL) 10 MG tablet, 10 mg daily At HS, Disp: , Rfl: 1    hydrochlorothiazide (HYDRODIURIL) 12.5 MG tablet, Take 12.5 mg by mouth daily , Disp: , Rfl: 0    DULERA 200-5 MCG/ACT inhaler, Inhale 2 puffs into the lungs daily , Disp: , Rfl: 2    albuterol sulfate  (90 Base) MCG/ACT inhaler, Inhale 2 puffs into the lungs every 6 hours as needed for Wheezing, Disp: , Rfl:     Blood Glucose Monitoring Suppl SONDAR, check BG once daily, DX: E11.9, NIDDM, Disp: 1 Device, Rfl: 0    omeprazole (PRILOSEC) 20 MG capsule, Take 1 capsule by mouth daily. , Disp: 30 capsule, Rfl: 11    Continuous Blood Gluc Sensor (FREESTYLE CHEIKH 2 SENSOR) [facility] [place], USE AS DIRECTED TO TEST FOUR TIMES A DAY (Patient not taking: Reported on 6/16/2022), Disp: 2 each, Rfl: 3    Continuous Blood Gluc  (FREESTYLE CHEIKH 2 READER) SONDRA, 1 Device by Does not apply route 4 times daily (before meals and Negative for cough, shortness of breath and wheezing. Cardiovascular: Negative for chest pain, palpitations and leg swelling. Gastrointestinal: Negative for abdominal pain, diarrhea, nausea and vomiting. Endocrine: Negative for polydipsia and polyuria. Genitourinary: Negative for difficulty urinating. Musculoskeletal: Negative for arthralgias. Skin: Negative for rash. Allergic/Immunologic: Negative for environmental allergies. Neurological: Negative for dizziness and headaches. Hematological: Negative for adenopathy. Psychiatric/Behavioral: Negative for dysphoric mood. Objective:   Physical Exam  Vitals reviewed. Constitutional:       Appearance: He is well-developed. HENT:      Head: Normocephalic and atraumatic. Nose: No congestion. Mouth/Throat:      Mouth: Mucous membranes are moist.   Eyes:      Conjunctiva/sclera: Conjunctivae normal.   Neck:      Comments: No thyromegaly or palpable nodules  Cardiovascular:      Rate and Rhythm: Normal rate and regular rhythm. Heart sounds: Normal heart sounds. Pulmonary:      Effort: Pulmonary effort is normal.      Breath sounds: Normal breath sounds. Abdominal:      General: Bowel sounds are normal.      Palpations: Abdomen is soft. Musculoskeletal:         General: Normal range of motion. Cervical back: Normal range of motion and neck supple. Skin:     General: Skin is warm and dry. Neurological:      Mental Status: He is alert and oriented to person, place, and time.    Psychiatric:         Mood and Affect: Mood normal.

## 2022-06-24 ENCOUNTER — OFFICE VISIT (OUTPATIENT)
Dept: INFECTIOUS DISEASES | Age: 57
End: 2022-06-24
Payer: COMMERCIAL

## 2022-06-24 VITALS
WEIGHT: 214 LBS | SYSTOLIC BLOOD PRESSURE: 122 MMHG | BODY MASS INDEX: 33.59 KG/M2 | TEMPERATURE: 97.2 F | HEIGHT: 67 IN | HEART RATE: 90 BPM | RESPIRATION RATE: 16 BRPM | DIASTOLIC BLOOD PRESSURE: 84 MMHG

## 2022-06-24 DIAGNOSIS — B95.1 BACTEREMIA DUE TO GROUP B STREPTOCOCCUS: Primary | ICD-10-CM

## 2022-06-24 DIAGNOSIS — R78.81 BACTEREMIA DUE TO GROUP B STREPTOCOCCUS: Primary | ICD-10-CM

## 2022-06-24 PROCEDURE — G8417 CALC BMI ABV UP PARAM F/U: HCPCS | Performed by: INTERNAL MEDICINE

## 2022-06-24 PROCEDURE — 3017F COLORECTAL CA SCREEN DOC REV: CPT | Performed by: INTERNAL MEDICINE

## 2022-06-24 PROCEDURE — 4004F PT TOBACCO SCREEN RCVD TLK: CPT | Performed by: INTERNAL MEDICINE

## 2022-06-24 PROCEDURE — G8427 DOCREV CUR MEDS BY ELIG CLIN: HCPCS | Performed by: INTERNAL MEDICINE

## 2022-06-24 PROCEDURE — 1111F DSCHRG MED/CURRENT MED MERGE: CPT | Performed by: INTERNAL MEDICINE

## 2022-06-24 PROCEDURE — 99213 OFFICE O/P EST LOW 20 MIN: CPT | Performed by: INTERNAL MEDICINE

## 2022-06-24 ASSESSMENT — ENCOUNTER SYMPTOMS
GASTROINTESTINAL NEGATIVE: 1
RESPIRATORY NEGATIVE: 1

## 2022-06-24 NOTE — PROGRESS NOTES
Infectious Disease     Patient Name: Patrice Evans  Date: 6/24/2022  YOB: 1965  Medical Record Number: [id number]        Group B strep bacteremia      Date of positive cultures is 6/1/2022  Prior to discharge thought that bacteremia and happen from dental caries  Unlikely given group B strep is what grew    To possibly have urinary tract infection urinalysis showed minimal inflammation no quantitative white count was done on the urine no urine culture was performed      No urine cultures were obtained on that admission    Patient discharged on clindamycin    Since patient left the hospital he has completed clindamycin and has seen dentist for extraction of bad teeth        Review of Systems   Respiratory: Negative. Cardiovascular: Negative. Gastrointestinal: Negative.         Review of Systems: All 14 review of systems negative other than as stated above    Social History     Tobacco Use    Smoking status: Current Some Day Smoker     Start date: 17    Smokeless tobacco: Never Used    Tobacco comment: smokes on the weekends with drinking   Vaping Use    Vaping Use: Never used   Substance Use Topics    Alcohol use: Not on file     Comment: weekends     Drug use: Not on file     Comment: Last used 2020         Past Medical History:   Diagnosis Date    CAD (coronary artery disease)     COPD [facility]chronic obstructive pulmonary disease) ([facility]     Hyperlipidemia     Hypertension     SDH (subdural hematoma) (HCC)     syncope, coughing    Type 2 diabetes mellitus without complication [facility])            Past Surgical History:   Procedure Laterality Date    COLONOSCOPY  10/17/2014    COLONOSCOPY N/A 1/6/2022    COLONOSCOPY WITH POLYPECTOMY performed by Judy Pak MD at [address] [place] [address]  05/26/2020    PTCA           Current Outpatient Medications on File Prior to Visit   Medication Sig Dispense Refill    ibuprofen (ADVIL;MOTRIN) 800 MG tablet Take 1 tablet by mouth every 6 hours as needed      SYSTANE ULTRA 0.4-0.3 % ophthalmic solution Place 1 drop into both eyes 2 times daily as needed      empagliflozin (JARDIANCE) 25 MG tablet Take 1 tablet by mouth every morning 30 tablet 3    insulin glargine (LANTUS SOLOSTAR) 100 UNIT/ML injection pen INJECT 30 UNITS AT BEDTIME 15 mL 3    TRULICITY 4.5 BM/0.2MK SOPN Inject 0.5 mLs into the skin once a week 4 pen 5    metoprolol succinate (TOPROL XL) 25 MG extended release tablet Take 25 mg by mouth daily      Continuous Blood Gluc Sensor (FREESTYLE CHEIKH 2 SENSOR) MISC USE AS DIRECTED TO TEST FOUR TIMES A DAY 2 each 3    albuterol (PROVENTIL) (2.5 MG/3ML) 0.083% nebulizer solution Take 2.5 mg by nebulization every 4-6 hours as needed for Wheezing or Shortness of Breath       UNIFINE PENTIPS 31G X 6 MM MISC       blood glucose monitor strips Check BG four times daily , DX: E11.65, 150 strip 6    Lancets (ONETOUCH DELICA PLUS [id number]) MISC 1 DEVICE BY DOES NOT APPLY ROUTE 4 TIMES DAILY (BEFORE MEALS AND NIGHTLY) 100 each 5    Easy Comfort Lancets MISC       ALLERGY RELIEF 10 MG tablet TAKE 1 TABLET BY MOUTH DAILY (AM) 28 tablet 5    FLUoxetine (PROZAC) 10 MG capsule Take 10 mg by mouth daily Take 3 capsules daily for 30mg      Continuous Blood Gluc  (FREESTYLE CHEIKH 2 READER) SONDRA 1 Device by Does not apply route 4 times daily (before meals and nightly) 1 each 0    aspirin (ASPIRIN LOW DOSE) 81 MG EC tablet TAKE 1 TABLET BY MOUTH DAILY (AM) 90 tablet 3    atorvastatin (LIPITOR) 40 MG tablet TAKE 1 TABLET BY MOUTH NIGHTLY (HS) 30 tablet 3    nitroGLYCERIN (NITROSTAT) 0.4 MG SL tablet up to max of 3 total doses.  If no relief after 1 dose, call 911. 25 tablet 3    valsartan (DIOVAN) 160 MG tablet Take by mouth daily   2    cyclobenzaprine (FLEXERIL) 10 MG tablet 10 mg daily At HS  1    hydrochlorothiazide (HYDRODIURIL) 12.5 MG tablet Take 12.5 mg by mouth daily   0    DULERA 200-5 MCG/ACT inhaler Inhale 2 puffs into the lungs daily   2    albuterol sulfate  (90 Base) MCG/ACT inhaler Inhale 2 puffs into the lungs every 6 hours as needed for Wheezing      Blood Glucose Monitoring Suppl SONRDA check BG once daily, DX: E11.9, NIDDM 1 Device 0    omeprazole (PRILOSEC) 20 MG capsule Take 1 capsule by mouth daily. 30 capsule 11    pregabalin (LYRICA) 50 MG capsule Take 50 mg by mouth 2 times daily. (Patient not taking: Reported on 6/24/2022)      vitamin D (ERGOCALCIFEROL) 1.25 MG (17719 UT) CAPS capsule Take 50,000 Units by mouth once a week  (Patient not taking: Reported on 6/24/2022)       No current facility-administered medications on file prior to visit. Allergies   Allergen Reactions    Metformin And Related      Diarrhea          Family History   Problem Relation Age of Onset    Cancer Neg Hx          Physical Exam:      Physical Exam  Constitutional:       Appearance: He is not ill-appearing. Cardiovascular:      Heart sounds: Normal heart sounds. No murmur heard. Pulmonary:      Effort: No respiratory distress. Breath sounds: Normal breath sounds. No stridor. No wheezing, rhonchi or rales. Abdominal:      General: Abdomen is flat. Bowel sounds are normal. There is no distension. Palpations: There is no mass. Tenderness: There is no abdominal tenderness. There is no guarding. Musculoskeletal:         General: No swelling, tenderness, deformity or signs of injury. Blood pressure 122/84, pulse 90, temperature 97.2 °F (36.2 °C), temperature source Temporal, resp. rate 16, height 5' 7\" (1.702 m), weight 214 lb (97.1 kg).       .   Lab Results   Component Value Date    WBC 6.4 06/05/2022    HGB 12.4 (L) 06/05/2022    HCT 35.9 (L) 06/05/2022    MCV 88.5 06/05/2022     06/05/2022     Lab Results   Component Value Date     06/05/2022    K 3.3 06/05/2022     06/05/2022    CO2 23 06/05/2022    BUN 6 06/05/2022    CREATININE 0.61 06/05/2022 GLUCOSE 237 06/16/2022    CALCIUM 8.2 06/05/2022                ASSESSMENT:  Patient Active Problem List   Diagnosis    Diabetes mellitus ([facility] [address]    ED (erectile dysfunction)    Hypertrophy of prostate with urinary obstruction and other lower urinary tract symptoms (LUTS)    Incomplete bladder emptying    Nocturia    SDH (subdural hematoma) (HCC)    Urinary frequency    Bilateral carpal tunnel syndrome    Chest pain    Uncontrolled type 2 diabetes mellitus with hyperglycemia ([facility] [address]    ACS (acute coronary syndrome) ([facility] [address]    Seasonal allergies    Adenomatous polyp of colon    Adenomatous polyp of descending colon    Adenomatous polyp of sigmoid colon    Sepsis ([facility] [address]         PLAN:  Group B streptococcus bacteremia  Unknown source    Completed course of clindamycin

## 2022-06-27 RX ORDER — LORATADINE 10 MG/1
TABLET ORAL
Qty: 28 TABLET | Refills: 5 | Status: SHIPPED | OUTPATIENT
Start: 2022-06-27 | End: 2022-07-05

## 2022-07-05 RX ORDER — LORATADINE 10 MG/1
TABLET ORAL
Qty: 28 TABLET | Refills: 5 | Status: SHIPPED | OUTPATIENT
Start: 2022-07-05

## 2022-08-11 ENCOUNTER — OFFICE VISIT (OUTPATIENT)
Dept: CARDIOLOGY CLINIC | Age: 57
End: 2022-08-11
Payer: COMMERCIAL

## 2022-08-11 VITALS
TEMPERATURE: 97.4 F | BODY MASS INDEX: 34.37 KG/M2 | DIASTOLIC BLOOD PRESSURE: 64 MMHG | SYSTOLIC BLOOD PRESSURE: 118 MMHG | HEIGHT: 67 IN | HEART RATE: 74 BPM | RESPIRATION RATE: 18 BRPM | OXYGEN SATURATION: 98 % | WEIGHT: 219 LBS

## 2022-08-11 DIAGNOSIS — I24.9 ACS (ACUTE CORONARY SYNDROME) (HCC): ICD-10-CM

## 2022-08-11 PROCEDURE — 99214 OFFICE O/P EST MOD 30 MIN: CPT | Performed by: INTERNAL MEDICINE

## 2022-08-11 PROCEDURE — G8427 DOCREV CUR MEDS BY ELIG CLIN: HCPCS | Performed by: INTERNAL MEDICINE

## 2022-08-11 PROCEDURE — G8417 CALC BMI ABV UP PARAM F/U: HCPCS | Performed by: INTERNAL MEDICINE

## 2022-08-11 PROCEDURE — 4004F PT TOBACCO SCREEN RCVD TLK: CPT | Performed by: INTERNAL MEDICINE

## 2022-08-11 PROCEDURE — 3017F COLORECTAL CA SCREEN DOC REV: CPT | Performed by: INTERNAL MEDICINE

## 2022-08-11 RX ORDER — ATORVASTATIN CALCIUM 40 MG/1
TABLET, FILM COATED ORAL
Qty: 90 TABLET | Refills: 3 | Status: SHIPPED | OUTPATIENT
Start: 2022-08-11

## 2022-08-11 RX ORDER — ASPIRIN 81 MG/1
TABLET ORAL
Qty: 90 TABLET | Refills: 3 | Status: SHIPPED | OUTPATIENT
Start: 2022-08-11

## 2022-08-11 RX ORDER — CHLORHEXIDINE GLUCONATE 0.12 MG/ML
RINSE ORAL
COMMUNITY
Start: 2022-08-04

## 2022-08-11 RX ORDER — METOPROLOL SUCCINATE 25 MG/1
25 TABLET, EXTENDED RELEASE ORAL DAILY
Qty: 90 TABLET | Refills: 3 | Status: SHIPPED | OUTPATIENT
Start: 2022-08-11

## 2022-08-11 ASSESSMENT — ENCOUNTER SYMPTOMS
STRIDOR: 0
SHORTNESS OF BREATH: 0
COUGH: 0
EYES NEGATIVE: 1
NAUSEA: 0
WHEEZING: 0
RESPIRATORY NEGATIVE: 1
BLOOD IN STOOL: 0
GASTROINTESTINAL NEGATIVE: 1
CHEST TIGHTNESS: 0

## 2022-08-11 NOTE — PROGRESS NOTES
Subsequent Progress Note  Patient: Juwan Rivera  YOB: 1965  MRN: [id number]    Chief Complaint:  CAD HTN HPL   Chief Complaint   Patient presents with    Coronary Artery Disease    Chest Pain       CV Data:  5/26/20 RCA ASHWIN  5/21 Echo 60  12/21 spect negative     Subjective/HPI: no cp no sob no flaa no bleed takes meds. 10/27/21 had couple chest pains relieved with NTG some SOB involved. Takes meds. Otherwise active. No bleed. 2/11/22 doing well no further cp sob. Take smeds. 8/11/22 doing well no cp no sob takes meds no bleed no falls      occ smoker  occ etoh  Work- disabled.    +FH   Lives girlfriend     EKG: SR 66    Past Medical History:   Diagnosis Date    CAD (coronary artery disease)     COPD (chronic obstructive pulmonary disease) (HCC)     Hyperlipidemia     Hypertension     SDH (subdural hematoma) (HCC)     syncope, coughing    Type 2 diabetes mellitus without complication [facility])        Past Surgical History:   Procedure Laterality Date    COLONOSCOPY  10/17/2014    COLONOSCOPY N/A 1/6/2022    COLONOSCOPY WITH POLYPECTOMY performed by Akash Olivera MD at [address] [place] [address]  05/26/2020    PTCA         Family History   Problem Relation Age of Onset    Cancer Neg Hx        Social History     Socioeconomic History    Marital status:      Spouse name: None    Number of children: None    Years of education: None    Highest education level: None   Tobacco Use    Smoking status: Some Days     Packs/day: 1.00     Years: 30.00     Pack years: 30.00     Types: Cigarettes     Start date: 1982    Smokeless tobacco: Never    Tobacco comments:     smokes on the weekends with drinking   Vaping Use    Vaping Use: Never used       Allergies   Allergen Reactions    Metformin And Related      Diarrhea        Current Outpatient Medications   Medication Sig Dispense Refill    chlorhexidine (PERIDEX) 0.12 % solution       atorvastatin (LIPITOR) 40 MG tablet TAKE 1 TABLET BY MOUTH NIGHTLY (HS) 90 tablet 3    aspirin (ASPIRIN LOW DOSE) 81 MG EC tablet TAKE 1 TABLET BY MOUTH DAILY (AM) 90 tablet 3    metoprolol succinate (TOPROL XL) 25 MG extended release tablet Take 1 tablet by mouth in the morning. 90 tablet 3    ALLERGY RELIEF 10 MG tablet TAKE 1 TABLET BY MOUTH DAILY (AM) 28 tablet 5    ibuprofen (ADVIL;MOTRIN) 800 MG tablet Take 1 tablet by mouth every 6 hours as needed      SYSTANE ULTRA 0.4-0.3 % ophthalmic solution Place 1 drop into both eyes 2 times daily as needed      empagliflozin (JARDIANCE) 25 MG tablet Take 1 tablet by mouth every morning 30 tablet 3    insulin glargine (LANTUS SOLOSTAR) 100 UNIT/ML injection pen INJECT 30 UNITS AT BEDTIME 15 mL 3    TRULICITY 4.5 AF/3.5OC SOPN Inject 0.5 mLs into the skin once a week 4 pen 5    pregabalin (LYRICA) 50 MG capsule Take 50 mg by mouth in the morning and 50 mg before bedtime. Continuous Blood Gluc Sensor (FREESTYLE CHEIKH 2 SENSOR) MISC USE AS DIRECTED TO TEST FOUR TIMES A DAY 2 each 3    albuterol (PROVENTIL) (2.5 MG/3ML) 0.083% nebulizer solution Take 2.5 mg by nebulization every 4-6 hours as needed for Wheezing or Shortness of Breath       UNIFINE PENTIPS 31G X 6 MM MISC       blood glucose monitor strips Check BG four times daily , DX: E11.65, 150 strip 6    Lancets (ONETOUCH DELICA PLUS [id number]) MISC 1 DEVICE BY DOES NOT APPLY ROUTE 4 TIMES DAILY (BEFORE MEALS AND NIGHTLY) 100 each 5    Easy Comfort Lancets MISC       vitamin D (ERGOCALCIFEROL) 1.25 MG (23540 UT) CAPS capsule Take 50,000 Units by mouth once a week      FLUoxetine (PROZAC) 10 MG capsule Take 10 mg by mouth daily Take 3 capsules daily for 30mg      Continuous Blood Gluc  (FREESTYLE CHEIKH 2 READER) SNODRA 1 Device by Does not apply route 4 times daily (before meals and nightly) 1 each 0    nitroGLYCERIN (NITROSTAT) 0.4 MG SL tablet up to max of 3 total doses.  If no relief after 1 dose, call 911. 25 tablet 3    valsartan (DIOVAN) 160 MG tablet Take by mouth daily   2    cyclobenzaprine (FLEXERIL) 10 MG tablet 10 mg daily At HS  1    hydrochlorothiazide (HYDRODIURIL) 12.5 MG tablet Take 12.5 mg by mouth daily   0    DULERA 200-5 MCG/ACT inhaler Inhale 2 puffs into the lungs daily   2    albuterol sulfate  (90 Base) MCG/ACT inhaler Inhale 2 puffs into the lungs every 6 hours as needed for Wheezing      Blood Glucose Monitoring Suppl SONDRA check BG once daily, DX: E11.9, NIDDM 1 Device 0    omeprazole (PRILOSEC) 20 MG capsule Take 1 capsule by mouth daily. 30 capsule 11     No current facility-administered medications for this visit. Review of Systems:   Review of Systems   Constitutional: Negative. Negative for diaphoresis and fatigue. HENT: Negative. Eyes: Negative. Respiratory: Negative. Negative for cough, chest tightness, shortness of breath, wheezing and stridor. Cardiovascular: Negative. Negative for chest pain, palpitations and leg swelling. Gastrointestinal: Negative. Negative for blood in stool and nausea. Genitourinary: Negative. Musculoskeletal: Negative. Skin: Negative. Neurological: Negative. Negative for dizziness, syncope, weakness and light-headedness. Hematological: Negative. Psychiatric/Behavioral: Negative. Physical Examination:    /64 (Site: Right Upper Arm, Position: Sitting, Cuff Size: Large Adult)   Pulse 74   Temp 97.4 °F (36.3 °C) (Temporal)   Resp 18   Ht 5' 7\" (1.702 m)   Wt 219 lb (99.3 kg)   SpO2 98%   BMI 34.30 kg/m²    Physical Exam   Constitutional: He appears healthy. No distress. HENT:   Normal cephalic and Atraumatic   Eyes: Pupils are equal, round, and reactive to light. Neck: Thyroid normal. No JVD present. No neck adenopathy. No thyromegaly present. Cardiovascular: Normal rate, regular rhythm, normal heart sounds, intact distal pulses and normal pulses.    Pulmonary/Chest: Effort normal and breath sounds normal. He has no wheezes. He has no rales. He exhibits no tenderness. Abdominal: Soft. Bowel sounds are normal. There is no abdominal tenderness. Musculoskeletal:         General: No tenderness or edema. Normal range of motion. Cervical back: Normal range of motion and neck supple. Neurological: He is alert and oriented to person, place, and time. Skin: Skin is warm. No cyanosis. Nails show no clubbing.      LABS:  CBC:   Lab Results   Component Value Date/Time    WBC 6.4 06/05/2022 04:59 AM    RBC 4.06 06/05/2022 04:59 AM    HGB 12.4 06/05/2022 04:59 AM    HCT 35.9 06/05/2022 04:59 AM    MCV 88.5 06/05/2022 04:59 AM    MCH 30.6 06/05/2022 04:59 AM    MCHC 34.5 06/05/2022 04:59 AM    RDW 15.4 06/05/2022 04:59 AM     06/05/2022 04:59 AM    MPV 9.9 03/31/2015 07:38 AM     Lipids:  Lab Results   Component Value Date    CHOL 100 08/07/2020    CHOL 146 09/28/2018    CHOL 170 03/31/2015     Lab Results   Component Value Date    TRIG 123 08/07/2020    TRIG 109 09/28/2018    TRIG 136 03/31/2015     Lab Results   Component Value Date    HDL 30 (L) 02/09/2022    HDL 25 (L) 08/07/2020    HDL 34 (L) 09/28/2018     Lab Results   Component Value Date    LDLCALC 59 02/09/2022    LDLCALC 50 08/07/2020    LDLCALC 90 09/28/2018     No results found for: LABVLDL, VLDL  No results found for: CHOLHDLRATIO  CMP:    Lab Results   Component Value Date/Time     06/05/2022 04:59 AM    K 3.3 06/05/2022 04:59 AM     06/05/2022 04:59 AM    CO2 23 06/05/2022 04:59 AM    BUN 6 06/05/2022 04:59 AM    CREATININE 0.61 06/05/2022 04:59 AM    GFRAA >60.0 06/05/2022 04:59 AM    LABGLOM >60.0 06/05/2022 04:59 AM    GLUCOSE 237 06/16/2022 10:02 AM    PROT 7.1 06/01/2022 01:00 PM    LABALBU 3.6 06/01/2022 01:00 PM    CALCIUM 8.2 06/05/2022 04:59 AM    BILITOT 0.8 06/01/2022 01:00 PM    ALKPHOS 87 06/01/2022 01:00 PM    AST 55 06/01/2022 01:00 PM    ALT 52 06/01/2022 01:00 PM     BMP:    Lab Results   Component Value Date/Time     06/05/2022 04:59 AM    K 3.3 06/05/2022 04:59 AM     06/05/2022 04:59 AM    CO2 23 06/05/2022 04:59 AM    BUN 6 06/05/2022 04:59 AM    LABALBU 3.6 06/01/2022 01:00 PM    CREATININE 0.61 06/05/2022 04:59 AM    CALCIUM 8.2 06/05/2022 04:59 AM    GFRAA >60.0 06/05/2022 04:59 AM    LABGLOM >60.0 06/05/2022 04:59 AM    GLUCOSE 237 06/16/2022 10:02 AM     Magnesium:    Lab Results   Component Value Date/Time    MG 2.0 06/05/2022 04:59 AM     TSH:  Lab Results   Component Value Date    TSH 0.928 07/22/2014       Patient Active Problem List   Diagnosis    Diabetes mellitus ([facility]    ED (erectile dysfunction)    Hypertrophy of prostate with urinary obstruction and other lower urinary tract symptoms (LUTS)    Incomplete bladder emptying    Nocturia    SDH (subdural hematoma) ([facility]    Urinary frequency    Bilateral carpal tunnel syndrome    Chest pain    Uncontrolled type 2 diabetes mellitus with hyperglycemia (HCC)    ACS (acute coronary syndrome) (HCC)    Seasonal allergies    Adenomatous polyp of colon    Adenomatous polyp of descending colon    Adenomatous polyp of sigmoid colon    Sepsis ([facility]       Medications Discontinued During This Encounter   Medication Reason    atorvastatin (LIPITOR) 40 MG tablet REORDER    aspirin (ASPIRIN LOW DOSE) 81 MG EC tablet REORDER    metoprolol succinate (TOPROL XL) 25 MG extended release tablet REORDER       Modified Medications    Modified Medication Previous Medication    ASPIRIN (ASPIRIN LOW DOSE) 81 MG EC TABLET aspirin (ASPIRIN LOW DOSE) 81 MG EC tablet       TAKE 1 TABLET BY MOUTH DAILY (AM)    TAKE 1 TABLET BY MOUTH DAILY (AM)    ATORVASTATIN (LIPITOR) 40 MG TABLET atorvastatin (LIPITOR) 40 MG tablet       TAKE 1 TABLET BY MOUTH NIGHTLY (HS)    TAKE 1 TABLET BY MOUTH NIGHTLY (HS)    METOPROLOL SUCCINATE (TOPROL XL) 25 MG EXTENDED RELEASE TABLET metoprolol succinate (TOPROL XL) 25 MG extended release tablet       Take 1 tablet by mouth in the morning.     Take 25 mg by mouth daily       Orders Placed This Encounter   Medications    atorvastatin (LIPITOR) 40 MG tablet     Sig: TAKE 1 TABLET BY MOUTH NIGHTLY (HS)     Dispense:  90 tablet     Refill:  3    aspirin (ASPIRIN LOW DOSE) 81 MG EC tablet     Sig: TAKE 1 TABLET BY MOUTH DAILY (AM)     Dispense:  90 tablet     Refill:  3    metoprolol succinate (TOPROL XL) 25 MG extended release tablet     Sig: Take 1 tablet by mouth in the morning. Dispense:  90 tablet     Refill:  3       Assessment/Plan:    1. Coronary artery disease involving native coronary artery of native heart without angina pectoris    Stable continue CV meds    2. Essential hypertension  Stable - continue meds. Low salt diet. 3. Mixed hyperlipidemia  Statin - continue meds. Low fat diet. Fasting labs. Counseling:  Heart Healthy Lifestyle, Improve BMI, Stop Smoking, Low Salt Diet, Take Precautions to Prevent Falls and Walk Daily    Return in about 6 months (around 2/11/2023).       Electronically signed by Anthony Bowens MD on 8/11/2022 at 3:55 PM

## 2022-09-03 DIAGNOSIS — E11.65 UNCONTROLLED TYPE 2 DIABETES MELLITUS WITH HYPERGLYCEMIA (HCC): ICD-10-CM

## 2022-09-06 RX ORDER — EMPAGLIFLOZIN 25 MG/1
TABLET, FILM COATED ORAL
Qty: 28 TABLET | Refills: 3 | Status: SHIPPED | OUTPATIENT
Start: 2022-09-06

## 2022-09-12 RX ORDER — LANCETS 33 GAUGE
EACH MISCELLANEOUS
Qty: 100 EACH | Refills: 5 | Status: SHIPPED | OUTPATIENT
Start: 2022-09-12

## 2022-09-22 ENCOUNTER — OFFICE VISIT (OUTPATIENT)
Dept: ENDOCRINOLOGY | Age: 57
End: 2022-09-22
Payer: COMMERCIAL

## 2022-09-22 VITALS
BODY MASS INDEX: 34.69 KG/M2 | WEIGHT: 221 LBS | SYSTOLIC BLOOD PRESSURE: 132 MMHG | DIASTOLIC BLOOD PRESSURE: 88 MMHG | OXYGEN SATURATION: 96 % | HEIGHT: 67 IN | HEART RATE: 70 BPM

## 2022-09-22 DIAGNOSIS — E08.00 DIABETES MELLITUS DUE TO UNDERLYING CONDITION WITH HYPEROSMOLARITY WITHOUT COMA, WITH LONG-TERM CURRENT USE OF INSULIN (HCC): ICD-10-CM

## 2022-09-22 DIAGNOSIS — Z79.4 DIABETES MELLITUS DUE TO UNDERLYING CONDITION WITH HYPEROSMOLARITY WITHOUT COMA, WITH LONG-TERM CURRENT USE OF INSULIN (HCC): ICD-10-CM

## 2022-09-22 DIAGNOSIS — E11.65 UNCONTROLLED TYPE 2 DIABETES MELLITUS WITH HYPERGLYCEMIA (HCC): Primary | ICD-10-CM

## 2022-09-22 LAB
CHP ED QC CHECK: ABNORMAL
GLUCOSE BLD-MCNC: 199 MG/DL
HBA1C MFR BLD: 6.7 %

## 2022-09-22 PROCEDURE — 3017F COLORECTAL CA SCREEN DOC REV: CPT | Performed by: PHYSICIAN ASSISTANT

## 2022-09-22 PROCEDURE — 83036 HEMOGLOBIN GLYCOSYLATED A1C: CPT | Performed by: PHYSICIAN ASSISTANT

## 2022-09-22 PROCEDURE — 4004F PT TOBACCO SCREEN RCVD TLK: CPT | Performed by: PHYSICIAN ASSISTANT

## 2022-09-22 PROCEDURE — G8427 DOCREV CUR MEDS BY ELIG CLIN: HCPCS | Performed by: PHYSICIAN ASSISTANT

## 2022-09-22 PROCEDURE — 3044F HG A1C LEVEL LT 7.0%: CPT | Performed by: PHYSICIAN ASSISTANT

## 2022-09-22 PROCEDURE — 82962 GLUCOSE BLOOD TEST: CPT | Performed by: PHYSICIAN ASSISTANT

## 2022-09-22 PROCEDURE — 99214 OFFICE O/P EST MOD 30 MIN: CPT | Performed by: PHYSICIAN ASSISTANT

## 2022-09-22 PROCEDURE — G8417 CALC BMI ABV UP PARAM F/U: HCPCS | Performed by: PHYSICIAN ASSISTANT

## 2022-09-22 PROCEDURE — 2022F DILAT RTA XM EVC RTNOPTHY: CPT | Performed by: PHYSICIAN ASSISTANT

## 2022-09-22 RX ORDER — BLOOD SUGAR DIAGNOSTIC
STRIP MISCELLANEOUS
Qty: 50 STRIP | Refills: 6 | Status: SHIPPED | OUTPATIENT
Start: 2022-09-22 | End: 2022-10-03

## 2022-09-22 RX ORDER — FLASH GLUCOSE SENSOR
KIT MISCELLANEOUS
Qty: 2 EACH | Refills: 5 | Status: SHIPPED | OUTPATIENT
Start: 2022-09-22 | End: 2022-10-10

## 2022-09-22 RX ORDER — CARBOXYMETHYLCELLULOSE SODIUM 5 MG/ML
SOLUTION/ DROPS OPHTHALMIC
COMMUNITY
Start: 2022-09-06

## 2022-09-22 ASSESSMENT — ENCOUNTER SYMPTOMS
VOMITING: 0
ABDOMINAL PAIN: 0
DIARRHEA: 0
WHEEZING: 0
EYE REDNESS: 0
SORE THROAT: 0
COUGH: 0
RHINORRHEA: 0
NAUSEA: 0
SHORTNESS OF BREATH: 0
EYE PAIN: 0

## 2022-09-22 NOTE — PATIENT INSTRUCTIONS
Endocrinology-    Check your blood sugars 4 times a day, before meals and at night  Document these numbers in a blood glucose log and bring them with you to your follow-up appointment. If you are prescribed insulin, Do not take your mealtime insulin if your blood sugars less than 120   Call our office if you have blood sugars less than 80 or greater then 300 on two or more occasions  Call our office if you have any questions regarding your blood sugars or insulin dosing regiment  Signs of low blood sugar may include tremors, feeling shaky, sweating, dizziness, confusion and weakness. Check your blood sugar immediatly if you have any of these symptoms.      The plan as discussed at your appointment-   Continue Lantus 30 units at bedtime  Continue Trulicity 4.5 mg weekly  Continue Jardiance 25 mg mornings  Freestyle [place] 2 being used  Follow-up in 4 months

## 2022-09-22 NOTE — PROGRESS NOTES
current medication regiment     Diabetes  He presents for his follow-up diabetic visit. He has type 2 diabetes mellitus. The initial diagnosis of diabetes was made 5 years ago. Pertinent negatives for hypoglycemia include no dizziness or headaches. Pertinent negatives for diabetes include no chest pain, no fatigue, no polydipsia and no polyuria. There are no hypoglycemic complications. Diabetic complications include heart disease. Risk factors for coronary artery disease include dyslipidemia, diabetes mellitus, male sex and obesity. Current diabetic treatment includes insulin injections and oral agent (triple therapy). He is compliant with treatment all of the time. He is currently taking insulin at bedtime. Rotation sites for injection include the abdominal wall. He is following a generally healthy diet. Meal planning includes avoidance of concentrated sweets. He participates in exercise three times a week. He monitors blood glucose at home 5+ x per day. His overall blood glucose range is 180-200 mg/dl. An ACE inhibitor/angiotensin II receptor blocker is being taken. He does not see a podiatrist.Eye exam is current.    Past Medical History:   Diagnosis Date    CAD (coronary artery disease)     COPD (chronic obstructive pulmonary disease) (HCC)     Hyperlipidemia     Hypertension     SDH (subdural hematoma) (HCC)     syncope, coughing    Type 2 diabetes mellitus without complication [facility])      Past Surgical History:   Procedure Laterality Date    COLONOSCOPY  10/17/2014    COLONOSCOPY N/A 1/6/2022    COLONOSCOPY WITH POLYPECTOMY performed by Peter Karimi MD at [address] [place] [address]  05/26/2020    PTCA       Social History     Socioeconomic History    Marital status:      Spouse name: Not on file    Number of children: Not on file    Years of education: Not on file    Highest education level: Not on file   Occupational History    Not on file   Tobacco Use    Smoking status: Some Days     Packs/day: 1.00     Years: 30.00     Pack years: 30.00     Types: Cigarettes     Start date: 17    Smokeless tobacco: Never    Tobacco comments:     smokes on the weekends with drinking   Vaping Use    Vaping Use: Never used   Substance and Sexual Activity    Alcohol use: Not on file     Comment: weekends     Drug use: Not on file     Comment: Last used 2020    Sexual activity: Not on file   Other Topics Concern    Not on file   Social History Narrative    Not on file     Social Determinants of Health     Financial Resource Strain: Not on file   Food Insecurity: Not on file   Transportation Needs: Not on file   Physical Activity: Not on file   Stress: Not on file   Social Connections: Not on file   Intimate Partner Violence: Not on file   Housing Stability: Not on file     Family History   Problem Relation Age of Onset    Cancer Neg Hx      Allergies   Allergen Reactions    Metformin And Related      Diarrhea        Current Outpatient Medications:     Lancets (ONETOUCH DELICA PLUS [id number]) MISC, 4 TIMES DAILY (BEFORE MEALS AND NIGHTLY), Disp: 100 each, Rfl: 5    JARDIANCE 25 MG tablet, TAKE 1 TABLET BY MOUTH IN THE MORNING (AM), Disp: 28 tablet, Rfl: 3    chlorhexidine (PERIDEX) 0.12 % solution, , Disp: , Rfl:     atorvastatin (LIPITOR) 40 MG tablet, TAKE 1 TABLET BY MOUTH NIGHTLY (HS), Disp: 90 tablet, Rfl: 3    aspirin (ASPIRIN LOW DOSE) 81 MG EC tablet, TAKE 1 TABLET BY MOUTH DAILY (AM), Disp: 90 tablet, Rfl: 3    metoprolol succinate (TOPROL XL) 25 MG extended release tablet, Take 1 tablet by mouth in the morning., Disp: 90 tablet, Rfl: 3    ALLERGY RELIEF 10 MG tablet, TAKE 1 TABLET BY MOUTH DAILY (AM), Disp: 28 tablet, Rfl: 5    ibuprofen (ADVIL;MOTRIN) 800 MG tablet, Take 1 tablet by mouth every 6 hours as needed, Disp: , Rfl:     SYSTANE ULTRA 0.4-0.3 % ophthalmic solution, Place 1 drop into both eyes 2 times daily as needed, Disp: , Rfl:     insulin glargine (LANTUS SOLOSTAR) 100 UNIT/ML injection pen, INJECT 30 UNITS AT BEDTIME, Disp: 15 mL, Rfl: 3    TRULICITY 4.5 RY/4.7XF SOPN, Inject 0.5 mLs into the skin once a week, Disp: 4 pen, Rfl: 5    pregabalin (LYRICA) 50 MG capsule, Take 50 mg by mouth in the morning and 50 mg before bedtime. , Disp: , Rfl:     Continuous Blood Gluc Sensor (FREESTYLE CHEIKH 2 SENSOR) [facility], USE AS DIRECTED TO TEST FOUR TIMES A DAY, Disp: 2 each, Rfl: 3    albuterol (PROVENTIL) (2.5 MG/3ML) 0.083% nebulizer solution, Take 2.5 mg by nebulization every 4-6 hours as needed for Wheezing or Shortness of Breath , Disp: , Rfl:     UNIFINE PENTIPS 31G X 6 MM MISC, , Disp: , Rfl:     blood glucose monitor strips, Check BG four times daily , DX: E11.65,, Disp: 150 strip, Rfl: 6    Easy Comfort Lancets MISC, , Disp: , Rfl:     vitamin D (ERGOCALCIFEROL) 1.25 MG (60953 UT) CAPS capsule, Take 50,000 Units by mouth once a week, Disp: , Rfl:     FLUoxetine (PROZAC) 10 MG capsule, Take 10 mg by mouth daily Take 3 capsules daily for 30mg, Disp: , Rfl:     Continuous Blood Gluc  (FREESTYLE CHEIKH 2 READER) SONDRA, 1 Device by Does not apply route 4 times daily (before meals and nightly), Disp: 1 each, Rfl: 0    nitroGLYCERIN (NITROSTAT) 0.4 MG SL tablet, up to max of 3 total doses. If no relief after 1 dose, call 911., Disp: 25 tablet, Rfl: 3    valsartan (DIOVAN) 160 MG tablet, Take by mouth daily , Disp: , Rfl: 2    cyclobenzaprine (FLEXERIL) 10 MG tablet, 10 mg daily At HS, Disp: , Rfl: 1    hydrochlorothiazide (HYDRODIURIL) 12.5 MG tablet, Take 12.5 mg by mouth daily , Disp: , Rfl: 0    DULERA 200-5 MCG/ACT inhaler, Inhale 2 puffs into the lungs daily , Disp: , Rfl: 2    albuterol sulfate  (90 Base) MCG/ACT inhaler, Inhale 2 puffs into the lungs every 6 hours as needed for Wheezing, Disp: , Rfl:     Blood Glucose Monitoring Suppl SONDRA, check BG once daily, DX: E11.9, NIDDM, Disp: 1 Device, Rfl: 0    omeprazole (PRILOSEC) 20 MG capsule, Take 1 capsule by mouth daily. , Disp: 30 capsule, Rfl: 11    REFRESH TEARS 0.5 % SOLN ophthalmic solution, , Disp: , Rfl:   Lab Results   Component Value Date     06/05/2022    K 3.3 (L) 06/05/2022     06/05/2022    CO2 23 06/05/2022    BUN 6 06/05/2022    CREATININE 0.61 (L) 06/05/2022    GLUCOSE 237 06/16/2022    CALCIUM 8.2 (L) 06/05/2022    PROT 7.1 06/01/2022    LABALBU 3.6 06/01/2022    BILITOT 0.8 (H) 06/01/2022    ALKPHOS 87 06/01/2022    AST 55 (H) 06/01/2022    ALT 52 (H) 06/01/2022    LABGLOM >60.0 06/05/2022    GFRAA >60.0 06/05/2022    GLOB 3.5 06/01/2022     Lab Results   Component Value Date    WBC 6.4 06/05/2022    HGB 12.4 (L) 06/05/2022    HCT 35.9 (L) 06/05/2022    MCV 88.5 06/05/2022     06/05/2022     Lab Results   Component Value Date    LABA1C 6.5 06/16/2022    LABA1C 7.1 (H) 02/09/2022    LABA1C 7.3 (H) 10/05/2021   Results for Saul Meyers (MRN [id number]) as of 1/14/2022 15:24   Ref. Range 10/5/2021 12:38   Creatinine, Ur Latest Ref Range: Not Established mg/dL 40.8   Microalbumin Creatinine Ratio Latest Ref Range: 0.0 - 30.0 mg/G see below   Microalbumin, Random Urine Latest Ref Range: Not Established mg/dL <1.20         Lab Results   Component Value Date    CHOL 100 08/07/2020    CHOL 146 09/28/2018    CHOL 170 03/31/2015     Lab Results   Component Value Date    TRIG 123 08/07/2020    TRIG 109 09/28/2018    TRIG 136 03/31/2015     Lab Results   Component Value Date    HDL 30 (L) 02/09/2022    HDL 25 (L) 08/07/2020    HDL 34 (L) 09/28/2018     Lab Results   Component Value Date    LDLCALC 59 02/09/2022    LDLCALC 50 08/07/2020    [address] 90 09/28/2018     No results found for: LABVLDL, VLDL  No results found for: CHOLHDLRATIO  No results found for: TESTM  Lab Results   Component Value Date    TSH 0.928 07/22/2014       Review of Systems   Constitutional:  Negative for chills, fatigue and fever. HENT:  Negative for congestion, rhinorrhea and sore throat. Eyes:  Negative for pain and redness. Respiratory:  Negative for cough, shortness of breath and wheezing. Cardiovascular:  Negative for chest pain, palpitations and leg swelling. Gastrointestinal:  Negative for abdominal pain, diarrhea, nausea and vomiting. Endocrine: Negative for polydipsia and polyuria. Genitourinary:  Negative for difficulty urinating. Musculoskeletal:  Negative for arthralgias. Skin:  Negative for rash. Allergic/Immunologic: Negative for environmental allergies. Neurological:  Negative for dizziness and headaches. Hematological:  Negative for adenopathy. Psychiatric/Behavioral:  Negative for dysphoric mood. Objective:   Physical Exam  Vitals reviewed. Constitutional:       Appearance: He is well-developed. HENT:      Head: Normocephalic and atraumatic. Nose: No congestion. Mouth/Throat:      Mouth: Mucous membranes are moist.   Eyes:      Conjunctiva/sclera: Conjunctivae normal.   Neck:      Comments: No thyromegaly or palpable nodules  Cardiovascular:      Rate and Rhythm: Normal rate and regular rhythm. Heart sounds: Normal heart sounds. Pulmonary:      Effort: Pulmonary effort is normal.      Breath sounds: Normal breath sounds. Abdominal:      General: Bowel sounds are normal.      Palpations: Abdomen is soft. Musculoskeletal:         General: Normal range of motion. Cervical back: Normal range of motion and neck supple. Skin:     General: Skin is warm and dry. Neurological:      Mental Status: He is alert and oriented to person, place, and time.    Psychiatric:         Mood and Affect: Mood normal.

## 2022-10-01 DIAGNOSIS — E08.00 DIABETES MELLITUS DUE TO UNDERLYING CONDITION WITH HYPEROSMOLARITY WITHOUT COMA, WITH LONG-TERM CURRENT USE OF INSULIN (HCC): ICD-10-CM

## 2022-10-01 DIAGNOSIS — Z79.4 DIABETES MELLITUS DUE TO UNDERLYING CONDITION WITH HYPEROSMOLARITY WITHOUT COMA, WITH LONG-TERM CURRENT USE OF INSULIN (HCC): ICD-10-CM

## 2022-10-03 RX ORDER — BLOOD SUGAR DIAGNOSTIC
STRIP MISCELLANEOUS
Qty: 50 STRIP | Refills: 6 | Status: SHIPPED | OUTPATIENT
Start: 2022-10-03

## 2022-10-03 RX ORDER — PEN NEEDLE, DIABETIC
NEEDLE, DISPOSABLE MISCELLANEOUS
Qty: 30 EACH | Refills: 3 | Status: SHIPPED | OUTPATIENT
Start: 2022-10-03

## 2022-10-08 DIAGNOSIS — E11.65 UNCONTROLLED TYPE 2 DIABETES MELLITUS WITH HYPERGLYCEMIA (HCC): ICD-10-CM

## 2022-10-11 RX ORDER — FLASH GLUCOSE SENSOR
KIT MISCELLANEOUS
Qty: 2 EACH | Refills: 3 | Status: SHIPPED | OUTPATIENT
Start: 2022-10-11

## 2023-01-07 DIAGNOSIS — E08.00 DIABETES MELLITUS DUE TO UNDERLYING CONDITION WITH HYPEROSMOLARITY WITHOUT COMA, WITH LONG-TERM CURRENT USE OF INSULIN (HCC): ICD-10-CM

## 2023-01-07 DIAGNOSIS — Z79.4 DIABETES MELLITUS DUE TO UNDERLYING CONDITION WITH HYPEROSMOLARITY WITHOUT COMA, WITH LONG-TERM CURRENT USE OF INSULIN (HCC): ICD-10-CM

## 2023-01-09 RX ORDER — BLOOD SUGAR DIAGNOSTIC
STRIP MISCELLANEOUS
Qty: 50 STRIP | Refills: 6 | Status: SHIPPED | OUTPATIENT
Start: 2023-01-09

## 2023-01-23 RX ORDER — PEN NEEDLE, DIABETIC
NEEDLE, DISPOSABLE MISCELLANEOUS
Qty: 30 EACH | Refills: 3 | Status: SHIPPED | OUTPATIENT
Start: 2023-01-23

## 2023-02-04 DIAGNOSIS — E11.65 UNCONTROLLED TYPE 2 DIABETES MELLITUS WITH HYPERGLYCEMIA (HCC): ICD-10-CM

## 2023-02-06 RX ORDER — INSULIN GLARGINE 100 [IU]/ML
INJECTION, SOLUTION SUBCUTANEOUS
Qty: 15 ML | Refills: 3 | Status: SHIPPED | OUTPATIENT
Start: 2023-02-06

## 2023-02-16 RX ORDER — DULAGLUTIDE 4.5 MG/.5ML
INJECTION, SOLUTION SUBCUTANEOUS
Qty: 2 ML | Refills: 3 | Status: SHIPPED | OUTPATIENT
Start: 2023-02-16

## 2023-02-20 RX ORDER — LORATADINE 10 MG/1
TABLET ORAL
Qty: 28 TABLET | Refills: 5 | Status: SHIPPED | OUTPATIENT
Start: 2023-02-20

## 2023-02-21 RX ORDER — LANCETS 33 GAUGE
EACH MISCELLANEOUS
Qty: 100 EACH | Refills: 5 | Status: SHIPPED | OUTPATIENT
Start: 2023-02-21

## 2023-03-06 RX ORDER — DULAGLUTIDE 4.5 MG/.5ML
INJECTION, SOLUTION SUBCUTANEOUS
Qty: 2 ML | Refills: 3 | Status: SHIPPED | OUTPATIENT
Start: 2023-03-06

## 2023-03-07 ENCOUNTER — OFFICE VISIT (OUTPATIENT)
Dept: CARDIOLOGY CLINIC | Age: 58
End: 2023-03-07
Payer: COMMERCIAL

## 2023-03-07 VITALS
OXYGEN SATURATION: 98 % | DIASTOLIC BLOOD PRESSURE: 70 MMHG | SYSTOLIC BLOOD PRESSURE: 124 MMHG | BODY MASS INDEX: 33.77 KG/M2 | WEIGHT: 215.6 LBS | HEART RATE: 72 BPM

## 2023-03-07 DIAGNOSIS — I25.10 CORONARY ARTERY DISEASE INVOLVING NATIVE CORONARY ARTERY OF NATIVE HEART WITHOUT ANGINA PECTORIS: ICD-10-CM

## 2023-03-07 DIAGNOSIS — E78.2 MIXED HYPERLIPIDEMIA: ICD-10-CM

## 2023-03-07 DIAGNOSIS — I24.9 ACS (ACUTE CORONARY SYNDROME) (HCC): Primary | ICD-10-CM

## 2023-03-07 DIAGNOSIS — I10 ESSENTIAL HYPERTENSION: ICD-10-CM

## 2023-03-07 PROCEDURE — 99214 OFFICE O/P EST MOD 30 MIN: CPT | Performed by: INTERNAL MEDICINE

## 2023-03-07 PROCEDURE — 3074F SYST BP LT 130 MM HG: CPT | Performed by: INTERNAL MEDICINE

## 2023-03-07 PROCEDURE — G8484 FLU IMMUNIZE NO ADMIN: HCPCS | Performed by: INTERNAL MEDICINE

## 2023-03-07 PROCEDURE — 4004F PT TOBACCO SCREEN RCVD TLK: CPT | Performed by: INTERNAL MEDICINE

## 2023-03-07 PROCEDURE — G8427 DOCREV CUR MEDS BY ELIG CLIN: HCPCS | Performed by: INTERNAL MEDICINE

## 2023-03-07 PROCEDURE — 3078F DIAST BP <80 MM HG: CPT | Performed by: INTERNAL MEDICINE

## 2023-03-07 PROCEDURE — 3017F COLORECTAL CA SCREEN DOC REV: CPT | Performed by: INTERNAL MEDICINE

## 2023-03-07 PROCEDURE — G8417 CALC BMI ABV UP PARAM F/U: HCPCS | Performed by: INTERNAL MEDICINE

## 2023-03-07 RX ORDER — FLUTICASONE PROPIONATE 50 MCG
SPRAY, SUSPENSION (ML) NASAL
COMMUNITY
Start: 2023-02-21

## 2023-03-07 ASSESSMENT — ENCOUNTER SYMPTOMS
SHORTNESS OF BREATH: 0
CHEST TIGHTNESS: 0
STRIDOR: 0
BLOOD IN STOOL: 0
EYES NEGATIVE: 1
NAUSEA: 0
WHEEZING: 0
COUGH: 0
RESPIRATORY NEGATIVE: 1
GASTROINTESTINAL NEGATIVE: 1

## 2023-03-07 NOTE — PROGRESS NOTES
Subsequent Progress Note    Patient: Trevor Wright  YOB: 1965  MRN: [id number]    Chief Complaint:  CAD HTN HPL   Chief Complaint   Patient presents with    6 Month Follow-Up     For ACS       CV Data:  5/26/20 RCA ASHWIN  5/21 Echo 60  12/21 spect negative     Subjective/HPI: no cp no sob no flaa no bleed takes meds. 10/27/21 had couple chest pains relieved with NTG some SOB involved. Takes meds. Otherwise active. No bleed. 2/11/22 doing well no further cp sob. Take smeds. 8/11/22 doing well no cp no sob takes meds no bleed no falls    3/7/23 lost wt. No cp no so bno falls no bleed no edema active . occ smoker  occ etoh  Work- disabled.    +FH   Lives girlfriend     EKG: SR 66    Past Medical History:   Diagnosis Date    CAD (coronary artery disease)     COPD (chronic obstructive pulmonary disease) (HCC)     Hyperlipidemia     Hypertension     SDH (subdural hematoma)     syncope, coughing    Type 2 diabetes mellitus without complication [facility])        Past Surgical History:   Procedure Laterality Date    COLONOSCOPY  10/17/2014    COLONOSCOPY N/A 1/6/2022    COLONOSCOPY WITH POLYPECTOMY performed by Nic Glass MD at [address] [place] [address]  05/26/2020    PTCA         Family History   Problem Relation Age of Onset    Cancer Neg Hx        Social History     Socioeconomic History    Marital status:      Spouse name: None    Number of children: None    Years of education: None    Highest education level: None   Tobacco Use    Smoking status: Some Days     Packs/day: 1.00     Years: 30.00     Pack years: 30.00     Types: Cigarettes     Start date: 1982    Smokeless tobacco: Never    Tobacco comments:     smokes on the weekends with drinking   Vaping Use    Vaping Use: Never used       Allergies   Allergen Reactions    Metformin And Related      Diarrhea        Current Outpatient Medications   Medication Sig Dispense Refill    fluticasone (FLONASE) 50 MCG/ACT nasal spray       TRULICITY 4.5 AS/6.8RD SOPN INJECT 4.5 MG INTO THE SKIN ONCE A WEEK 2 mL 3    Lancets (ONETOUCH DELICA PLUS [id number]) MISC 4 TIMES DAILY (BEFORE MEALS AND NIGHTLY) 100 each 5    ALLERGY RELIEF 10 MG tablet TAKE 1 TABLET BY MOUTH DAILY (AM) 28 tablet 5    LANTUS SOLOSTAR 100 UNIT/ML injection pen INJECT 30 UNITS AT BEDTIME 15 mL 3    UNIFINE PENTIPS 31G X 6 MM MISC TEST ONCE A DAY 30 each 3    ONETOUCH ULTRA strip CHECK BG FOUR TIMES DAILY , DX: E11.65, 50 strip 6    Continuous Blood Gluc Sensor (FREESTYLE CHEIKH 2 SENSOR) MISC USE AS DIRECTED FO BLOOD GLUCOSE TESTING FOUR TIMES A DAY 2 each 3    REFRESH TEARS 0.5 % SOLN ophthalmic solution       JARDIANCE 25 MG tablet TAKE 1 TABLET BY MOUTH IN THE MORNING (AM) 28 tablet 3    chlorhexidine (PERIDEX) 0.12 % solution       atorvastatin (LIPITOR) 40 MG tablet TAKE 1 TABLET BY MOUTH NIGHTLY (HS) 90 tablet 3    aspirin (ASPIRIN LOW DOSE) 81 MG EC tablet TAKE 1 TABLET BY MOUTH DAILY (AM) 90 tablet 3    metoprolol succinate (TOPROL XL) 25 MG extended release tablet Take 1 tablet by mouth in the morning. 90 tablet 3    ibuprofen (ADVIL;MOTRIN) 800 MG tablet Take 1 tablet by mouth every 6 hours as needed      SYSTANE ULTRA 0.4-0.3 % ophthalmic solution Place 1 drop into both eyes 2 times daily as needed      pregabalin (LYRICA) 50 MG capsule Take 50 mg by mouth in the morning and 50 mg before bedtime.       albuterol (PROVENTIL) (2.5 MG/3ML) 0.083% nebulizer solution Take 2.5 mg by nebulization every 4-6 hours as needed for Wheezing or Shortness of Breath       Easy Comfort Lancets MISC       vitamin D (ERGOCALCIFEROL) 1.25 MG (94397 UT) CAPS capsule Take 50,000 Units by mouth once a week      FLUoxetine (PROZAC) 10 MG capsule Take 10 mg by mouth daily Take 3 capsules daily for 30mg      Continuous Blood Gluc  (FREESTYLE CHEIKH 2 READER) SONDRA 1 Device by Does not apply route 4 times daily (before meals and nightly) 1 each 0 nitroGLYCERIN (NITROSTAT) 0.4 MG SL tablet up to max of 3 total doses. If no relief after 1 dose, call 911. 25 tablet 3    valsartan (DIOVAN) 160 MG tablet Take by mouth daily   2    cyclobenzaprine (FLEXERIL) 10 MG tablet 10 mg daily At HS  1    hydrochlorothiazide (HYDRODIURIL) 12.5 MG tablet Take 12.5 mg by mouth daily   0    DULERA 200-5 MCG/ACT inhaler Inhale 2 puffs into the lungs daily   2    albuterol sulfate  (90 Base) MCG/ACT inhaler Inhale 2 puffs into the lungs every 6 hours as needed for Wheezing      Blood Glucose Monitoring Suppl SONDRA check BG once daily, DX: E11.9, NIDDM 1 Device 0    omeprazole (PRILOSEC) 20 MG capsule Take 1 capsule by mouth daily. 30 capsule 11     No current facility-administered medications for this visit. Review of Systems:   Review of Systems   Constitutional: Negative. Negative for diaphoresis and fatigue. HENT: Negative. Eyes: Negative. Respiratory: Negative. Negative for cough, chest tightness, shortness of breath, wheezing and stridor. Cardiovascular: Negative. Negative for chest pain, palpitations and leg swelling. Gastrointestinal: Negative. Negative for blood in stool and nausea. Genitourinary: Negative. Musculoskeletal: Negative. Skin: Negative. Neurological: Negative. Negative for dizziness, syncope, weakness and light-headedness. Hematological: Negative. Psychiatric/Behavioral: Negative. Physical Examination:    /70 (Site: Left Upper Arm, Position: Sitting, Cuff Size: Large Adult)   Pulse 72   Wt 215 lb 9.6 oz (97.8 kg)   SpO2 98%   BMI 33.77 kg/m²    Physical Exam   Constitutional: He appears healthy. No distress. HENT:   Normal cephalic and Atraumatic   Eyes: Pupils are equal, round, and reactive to light. Neck: Thyroid normal. No JVD present. No neck adenopathy. No thyromegaly present. Cardiovascular: Normal rate, regular rhythm, normal heart sounds, intact distal pulses and normal pulses. Pulmonary/Chest: Effort normal and breath sounds normal. He has no wheezes. He has no rales. He exhibits no tenderness. Abdominal: Soft. Bowel sounds are normal. There is no abdominal tenderness. Musculoskeletal:         General: No tenderness or edema. Normal range of motion. Cervical back: Normal range of motion and neck supple. Neurological: He is alert and oriented to person, place, and time. Skin: Skin is warm. No cyanosis. Nails show no clubbing.      LABS:  CBC:   Lab Results   Component Value Date/Time    WBC 6.4 06/05/2022 04:59 AM    RBC 4.06 06/05/2022 04:59 AM    HGB 12.4 06/05/2022 04:59 AM    HCT 35.9 06/05/2022 04:59 AM    MCV 88.5 06/05/2022 04:59 AM    MCH 30.6 06/05/2022 04:59 AM    MCHC 34.5 06/05/2022 04:59 AM    RDW 15.4 06/05/2022 04:59 AM     06/05/2022 04:59 AM    MPV 9.9 03/31/2015 07:38 AM     Lipids:  Lab Results   Component Value Date    CHOL 100 08/07/2020    CHOL 146 09/28/2018    CHOL 170 03/31/2015     Lab Results   Component Value Date    TRIG 123 08/07/2020    TRIG 109 09/28/2018    TRIG 136 03/31/2015     Lab Results   Component Value Date    HDL 30 (L) 02/09/2022    HDL 25 (L) 08/07/2020    HDL 34 (L) 09/28/2018     Lab Results   Component Value Date    LDLCALC 59 02/09/2022    LDLCALC 50 08/07/2020    LDLCALC 90 09/28/2018     No results found for: LABVLDL, VLDL  No results found for: CHOLHDLRATIO  CMP:    Lab Results   Component Value Date/Time     06/05/2022 04:59 AM    K 3.3 06/05/2022 04:59 AM     06/05/2022 04:59 AM    CO2 23 06/05/2022 04:59 AM    BUN 6 06/05/2022 04:59 AM    CREATININE 0.61 06/05/2022 04:59 AM    GFRAA >60.0 06/05/2022 04:59 AM    LABGLOM >60.0 06/05/2022 04:59 AM    GLUCOSE 199 09/22/2022 11:52 AM    PROT 7.1 06/01/2022 01:00 PM    LABALBU 3.6 06/01/2022 01:00 PM    CALCIUM 8.2 06/05/2022 04:59 AM    BILITOT 0.8 06/01/2022 01:00 PM    ALKPHOS 87 06/01/2022 01:00 PM    AST 55 06/01/2022 01:00 PM    ALT 52 06/01/2022 01:00 PM     BMP:    Lab Results   Component Value Date/Time     06/05/2022 04:59 AM    K 3.3 06/05/2022 04:59 AM     06/05/2022 04:59 AM    CO2 23 06/05/2022 04:59 AM    BUN 6 06/05/2022 04:59 AM    LABALBU 3.6 06/01/2022 01:00 PM    CREATININE 0.61 06/05/2022 04:59 AM    CALCIUM 8.2 06/05/2022 04:59 AM    GFRAA >60.0 06/05/2022 04:59 AM    LABGLOM >60.0 06/05/2022 04:59 AM    GLUCOSE 199 09/22/2022 11:52 AM     Magnesium:    Lab Results   Component Value Date/Time    MG 2.0 06/05/2022 04:59 AM     TSH:  Lab Results   Component Value Date    TSH 0.928 07/22/2014       Patient Active Problem List   Diagnosis    Diabetes mellitus ([facility] [address]    ED (erectile dysfunction)    Hypertrophy of prostate with urinary obstruction and other lower urinary tract symptoms (LUTS)    Incomplete bladder emptying    Nocturia    SDH (subdural hematoma)    Urinary frequency    Bilateral carpal tunnel syndrome    Chest pain    Uncontrolled type 2 diabetes mellitus with hyperglycemia (HCC)    ACS (acute coronary syndrome) (HCC)    Seasonal allergies    Adenomatous polyp of colon    Adenomatous polyp of descending colon    Adenomatous polyp of sigmoid colon    Sepsis ([facility] [address]       There are no discontinued medications. Modified Medications    No medications on file       No orders of the defined types were placed in this encounter. Assessment/Plan:    1. Coronary artery disease involving native coronary artery of native heart without angina pectoris    Stable continue CV meds    2. Essential hypertension  Stable - continue meds. Low salt diet. 3. Mixed hyperlipidemia  Statin - continue meds. Low fat diet. Fasting labs. 4. DM - Diabetic diet discussed. Counseling:  Heart Healthy Lifestyle, Improve BMI, Stop Smoking, Low Salt Diet, Take Precautions to Prevent Falls and Walk Daily    Return in about 6 months (around 9/7/2023).       Electronically signed by Lenon Brunner, MD on 3/7/2023 at 3:26 PM

## 2023-03-15 DIAGNOSIS — E11.65 UNCONTROLLED TYPE 2 DIABETES MELLITUS WITH HYPERGLYCEMIA (HCC): ICD-10-CM

## 2023-03-15 RX ORDER — FLASH GLUCOSE SENSOR
KIT MISCELLANEOUS
Qty: 2 EACH | Refills: 5 | Status: SHIPPED | OUTPATIENT
Start: 2023-03-15

## 2023-03-20 DIAGNOSIS — Z79.4 DIABETES MELLITUS DUE TO UNDERLYING CONDITION WITH HYPEROSMOLARITY WITHOUT COMA, WITH LONG-TERM CURRENT USE OF INSULIN (HCC): ICD-10-CM

## 2023-03-20 DIAGNOSIS — E08.00 DIABETES MELLITUS DUE TO UNDERLYING CONDITION WITH HYPEROSMOLARITY WITHOUT COMA, WITH LONG-TERM CURRENT USE OF INSULIN (HCC): ICD-10-CM

## 2023-03-20 RX ORDER — LANCETS 33 GAUGE
EACH MISCELLANEOUS
Qty: 100 EACH | Refills: 5 | Status: SHIPPED | OUTPATIENT
Start: 2023-03-20

## 2023-03-20 RX ORDER — BLOOD SUGAR DIAGNOSTIC
STRIP MISCELLANEOUS
Qty: 50 STRIP | Refills: 6 | Status: SHIPPED | OUTPATIENT
Start: 2023-03-20

## 2023-03-25 DIAGNOSIS — Z79.4 DIABETES MELLITUS DUE TO UNDERLYING CONDITION WITH HYPEROSMOLARITY WITHOUT COMA, WITH LONG-TERM CURRENT USE OF INSULIN (HCC): ICD-10-CM

## 2023-03-25 DIAGNOSIS — E08.00 DIABETES MELLITUS DUE TO UNDERLYING CONDITION WITH HYPEROSMOLARITY WITHOUT COMA, WITH LONG-TERM CURRENT USE OF INSULIN (HCC): ICD-10-CM

## 2023-03-27 RX ORDER — BLOOD SUGAR DIAGNOSTIC
STRIP MISCELLANEOUS
Qty: 50 STRIP | Refills: 6 | OUTPATIENT
Start: 2023-03-27

## 2023-03-28 DIAGNOSIS — E11.65 UNCONTROLLED TYPE 2 DIABETES MELLITUS WITH HYPERGLYCEMIA (HCC): ICD-10-CM

## 2023-03-28 LAB
ALBUMIN SERPL-MCNC: 4.5 G/DL (ref 3.5–4.6)
ALP SERPL-CCNC: 75 U/L (ref 35–104)
ALT SERPL-CCNC: 36 U/L (ref 0–41)
ANION GAP SERPL CALCULATED.3IONS-SCNC: 10 MEQ/L (ref 9–15)
AST SERPL-CCNC: 22 U/L (ref 0–40)
BILIRUB SERPL-MCNC: 0.4 MG/DL (ref 0.2–0.7)
BUN SERPL-MCNC: 14 MG/DL (ref 6–20)
CALCIUM SERPL-MCNC: 9.8 MG/DL (ref 8.5–9.9)
CHLORIDE SERPL-SCNC: 99 MEQ/L (ref 95–107)
CO2 SERPL-SCNC: 30 MEQ/L (ref 20–31)
CREAT SERPL-MCNC: 0.8 MG/DL (ref 0.7–1.2)
GLOBULIN SER CALC-MCNC: 3 G/DL (ref 2.3–3.5)
GLUCOSE SERPL-MCNC: 138 MG/DL (ref 70–99)
HBA1C MFR BLD: 7.1 % (ref 4.8–5.9)
POTASSIUM SERPL-SCNC: 4.4 MEQ/L (ref 3.4–4.9)
PROT SERPL-MCNC: 7.5 G/DL (ref 6.3–8)
SODIUM SERPL-SCNC: 139 MEQ/L (ref 135–144)

## 2023-04-01 DIAGNOSIS — E08.00 DIABETES MELLITUS DUE TO UNDERLYING CONDITION WITH HYPEROSMOLARITY WITHOUT COMA, WITH LONG-TERM CURRENT USE OF INSULIN (HCC): ICD-10-CM

## 2023-04-01 DIAGNOSIS — Z79.4 DIABETES MELLITUS DUE TO UNDERLYING CONDITION WITH HYPEROSMOLARITY WITHOUT COMA, WITH LONG-TERM CURRENT USE OF INSULIN (HCC): ICD-10-CM

## 2023-04-04 RX ORDER — BLOOD SUGAR DIAGNOSTIC
STRIP MISCELLANEOUS
Qty: 50 STRIP | Refills: 6 | Status: SHIPPED | OUTPATIENT
Start: 2023-04-04

## 2023-04-06 ENCOUNTER — OFFICE VISIT (OUTPATIENT)
Dept: ENDOCRINOLOGY | Age: 58
End: 2023-04-06
Payer: COMMERCIAL

## 2023-04-06 VITALS
OXYGEN SATURATION: 93 % | DIASTOLIC BLOOD PRESSURE: 80 MMHG | HEART RATE: 76 BPM | WEIGHT: 219 LBS | SYSTOLIC BLOOD PRESSURE: 122 MMHG | HEIGHT: 67 IN | BODY MASS INDEX: 34.37 KG/M2

## 2023-04-06 DIAGNOSIS — E11.65 UNCONTROLLED TYPE 2 DIABETES MELLITUS WITH HYPERGLYCEMIA (HCC): ICD-10-CM

## 2023-04-06 LAB
CHP ED QC CHECK: ABNORMAL
GLUCOSE BLD-MCNC: 152 MG/DL

## 2023-04-06 PROCEDURE — 82962 GLUCOSE BLOOD TEST: CPT | Performed by: PHYSICIAN ASSISTANT

## 2023-04-06 PROCEDURE — 2022F DILAT RTA XM EVC RTNOPTHY: CPT | Performed by: PHYSICIAN ASSISTANT

## 2023-04-06 PROCEDURE — 99214 OFFICE O/P EST MOD 30 MIN: CPT | Performed by: PHYSICIAN ASSISTANT

## 2023-04-06 PROCEDURE — 3051F HG A1C>EQUAL 7.0%<8.0%: CPT | Performed by: PHYSICIAN ASSISTANT

## 2023-04-06 PROCEDURE — G8427 DOCREV CUR MEDS BY ELIG CLIN: HCPCS | Performed by: PHYSICIAN ASSISTANT

## 2023-04-06 PROCEDURE — 4004F PT TOBACCO SCREEN RCVD TLK: CPT | Performed by: PHYSICIAN ASSISTANT

## 2023-04-06 PROCEDURE — G8417 CALC BMI ABV UP PARAM F/U: HCPCS | Performed by: PHYSICIAN ASSISTANT

## 2023-04-06 PROCEDURE — 3017F COLORECTAL CA SCREEN DOC REV: CPT | Performed by: PHYSICIAN ASSISTANT

## 2023-04-06 ASSESSMENT — ENCOUNTER SYMPTOMS
RHINORRHEA: 0
EYE PAIN: 0
SORE THROAT: 0
COUGH: 0
WHEEZING: 0
DIARRHEA: 0
VOMITING: 0
ABDOMINAL PAIN: 0
SHORTNESS OF BREATH: 0
NAUSEA: 0
EYE REDNESS: 0

## 2023-04-06 NOTE — PATIENT INSTRUCTIONS
Endocrinology-    Check your blood sugars 4 times a day, before meals and at night  Document these numbers in a blood glucose log and bring them with you to your follow-up appointment. If you are prescribed insulin, Do not take your mealtime insulin if your blood sugars less than 120   Call our office if you have blood sugars less than 80 or greater then 300 on two or more occasions  Call our office if you have any questions regarding your blood sugars or insulin dosing regiment  Signs of low blood sugar may include tremors, feeling shaky, sweating, dizziness, confusion and weakness. Check your blood sugar immediatly if you have any of these symptoms.      The plan as discussed at your appointment-   Continue Lantus 10 units at bedtime if glucose is greater than 172   Continue Trulicity 4.5 mg weekly  Continue Jardiance 25 mg mornings  Freestyle [place] 2 being used  Follow-up in 6 months

## 2023-04-19 RX ORDER — PEN NEEDLE, DIABETIC
NEEDLE, DISPOSABLE MISCELLANEOUS
Qty: 30 EACH | Refills: 3 | Status: SHIPPED | OUTPATIENT
Start: 2023-04-19

## 2023-04-20 DIAGNOSIS — I24.9 ACS (ACUTE CORONARY SYNDROME) (HCC): ICD-10-CM

## 2023-04-20 NOTE — TELEPHONE ENCOUNTER
Requesting medication refill. Please approve or deny this request.    Rx requested:  Requested Prescriptions     Pending Prescriptions Disp Refills    nitroGLYCERIN (NITROSTAT) 0.4 MG SL tablet 25 tablet 3     Sig: up to max of 3 total doses. If no relief after 1 dose, call 911.     metoprolol succinate (TOPROL XL) 25 MG extended release tablet 90 tablet 3     Sig: Take 1 tablet by mouth daily    aspirin (ASPIRIN LOW DOSE) 81 MG EC tablet 90 tablet 3     Sig: TAKE 1 TABLET BY MOUTH DAILY (AM)    atorvastatin (LIPITOR) 40 MG tablet 90 tablet 3     Sig: TAKE 1 TABLET BY MOUTH NIGHTLY (HS)         Last Office Visit:   3/7/2023      Next Visit Date:  Future Appointments   Date Time Provider Dolly English   9/12/2023  2:45 PM Letha Woodward  [facility]   10/9/2023  9:30 AM Savannah Zhou, [address]

## 2023-04-21 RX ORDER — ASPIRIN 81 MG/1
TABLET ORAL
Qty: 90 TABLET | Refills: 3 | Status: SHIPPED | OUTPATIENT
Start: 2023-04-21

## 2023-04-21 RX ORDER — NITROGLYCERIN 0.4 MG/1
TABLET SUBLINGUAL
Qty: 25 TABLET | Refills: 3 | Status: SHIPPED | OUTPATIENT
Start: 2023-04-21

## 2023-04-21 RX ORDER — ATORVASTATIN CALCIUM 40 MG/1
TABLET, FILM COATED ORAL
Qty: 90 TABLET | Refills: 3 | Status: SHIPPED | OUTPATIENT
Start: 2023-04-21

## 2023-04-21 RX ORDER — METOPROLOL SUCCINATE 25 MG/1
25 TABLET, EXTENDED RELEASE ORAL DAILY
Qty: 90 TABLET | Refills: 3 | Status: SHIPPED | OUTPATIENT
Start: 2023-04-21

## 2023-04-22 DIAGNOSIS — E11.65 UNCONTROLLED TYPE 2 DIABETES MELLITUS WITH HYPERGLYCEMIA (HCC): ICD-10-CM

## 2023-05-15 RX ORDER — PEN NEEDLE, DIABETIC
NEEDLE, DISPOSABLE MISCELLANEOUS
Qty: 30 EACH | Refills: 3 | Status: SHIPPED | OUTPATIENT
Start: 2023-05-15

## 2023-06-19 RX ORDER — LORATADINE 10 MG/1
TABLET ORAL
Qty: 28 TABLET | Refills: 5 | Status: SHIPPED | OUTPATIENT
Start: 2023-06-19

## 2023-06-26 RX ORDER — DULAGLUTIDE 4.5 MG/.5ML
INJECTION, SOLUTION SUBCUTANEOUS
Qty: 2 ML | Refills: 3 | Status: SHIPPED | OUTPATIENT
Start: 2023-06-26

## 2023-08-14 RX ORDER — NITROGLYCERIN 0.4 MG/1
TABLET SUBLINGUAL
Qty: 25 TABLET | Refills: 3 | Status: SHIPPED | OUTPATIENT
Start: 2023-08-14

## 2023-08-14 RX ORDER — METOPROLOL SUCCINATE 25 MG/1
TABLET, EXTENDED RELEASE ORAL
Qty: 90 TABLET | Refills: 3 | Status: SHIPPED | OUTPATIENT
Start: 2023-08-14

## 2023-08-14 NOTE — TELEPHONE ENCOUNTER
Requesting medication refill. Please approve or deny this request.    Rx requested:  Requested Prescriptions     Pending Prescriptions Disp Refills    nitroGLYCERIN (NITROSTAT) 0.4 MG SL tablet [Pharmacy Med Name: NITROGLYCERIN 0.4 MG SUBL 0.4 Tablet] 25 tablet 3     Sig: UP TO MAX OF 3 TOTAL DOSES. IF NO RELIEF AFTER 1 DOSE, CALL 911. metoprolol succinate (TOPROL XL) 25 MG extended release tablet [Pharmacy Med Name: METOPROLOL SUCCINATE ER 25 25 Tablet] 90 tablet 3     Sig: TAKE 1 TABLET BY MOUTH IN THE MORNING. (AM)         Last Office Visit:   3/7/2023      Next Visit Date:  Future Appointments   Date Time Provider [address] [place] [address]   9/12/2023  2:45 PM Isela Groves MD [address] [place] [address]   10/10/2023  9:30 AM Anabel Mckinnon, [address]               Last refill 4/21/23. Please approve or deny.

## 2023-08-19 DIAGNOSIS — E11.65 UNCONTROLLED TYPE 2 DIABETES MELLITUS WITH HYPERGLYCEMIA (HCC): ICD-10-CM

## 2023-08-21 RX ORDER — INSULIN GLARGINE 100 [IU]/ML
INJECTION, SOLUTION SUBCUTANEOUS
Qty: 15 ML | Refills: 3 | Status: SHIPPED | OUTPATIENT
Start: 2023-08-21

## 2023-08-28 DIAGNOSIS — Z79.4 DIABETES MELLITUS DUE TO UNDERLYING CONDITION WITH HYPEROSMOLARITY WITHOUT COMA, WITH LONG-TERM CURRENT USE OF INSULIN (HCC): ICD-10-CM

## 2023-08-28 DIAGNOSIS — E08.00 DIABETES MELLITUS DUE TO UNDERLYING CONDITION WITH HYPEROSMOLARITY WITHOUT COMA, WITH LONG-TERM CURRENT USE OF INSULIN (HCC): ICD-10-CM

## 2023-08-28 RX ORDER — BLOOD SUGAR DIAGNOSTIC
STRIP MISCELLANEOUS
Qty: 50 STRIP | Refills: 6 | Status: SHIPPED | OUTPATIENT
Start: 2023-08-28

## 2023-09-09 DIAGNOSIS — E08.00 DIABETES MELLITUS DUE TO UNDERLYING CONDITION WITH HYPEROSMOLARITY WITHOUT COMA, WITH LONG-TERM CURRENT USE OF INSULIN (HCC): ICD-10-CM

## 2023-09-09 DIAGNOSIS — Z79.4 DIABETES MELLITUS DUE TO UNDERLYING CONDITION WITH HYPEROSMOLARITY WITHOUT COMA, WITH LONG-TERM CURRENT USE OF INSULIN (HCC): ICD-10-CM

## 2023-09-11 ENCOUNTER — TRANSCRIBE ORDERS (OUTPATIENT)
Dept: ADMINISTRATIVE | Age: 58
End: 2023-09-11

## 2023-09-11 DIAGNOSIS — R31.9 HEMATURIA SYNDROME: Primary | ICD-10-CM

## 2023-09-11 RX ORDER — BLOOD SUGAR DIAGNOSTIC
STRIP MISCELLANEOUS
Qty: 50 STRIP | Refills: 6 | OUTPATIENT
Start: 2023-09-11

## 2023-09-12 ENCOUNTER — OFFICE VISIT (OUTPATIENT)
Dept: CARDIOLOGY CLINIC | Age: 58
End: 2023-09-12
Payer: COMMERCIAL

## 2023-09-12 VITALS
HEIGHT: 67 IN | SYSTOLIC BLOOD PRESSURE: 118 MMHG | DIASTOLIC BLOOD PRESSURE: 78 MMHG | WEIGHT: 213.4 LBS | BODY MASS INDEX: 33.49 KG/M2 | HEART RATE: 102 BPM | OXYGEN SATURATION: 100 %

## 2023-09-12 DIAGNOSIS — R09.89 BILATERAL CAROTID BRUITS: ICD-10-CM

## 2023-09-12 DIAGNOSIS — I25.10 CORONARY ARTERY DISEASE INVOLVING NATIVE CORONARY ARTERY OF NATIVE HEART WITHOUT ANGINA PECTORIS: ICD-10-CM

## 2023-09-12 DIAGNOSIS — E78.2 MIXED HYPERLIPIDEMIA: ICD-10-CM

## 2023-09-12 DIAGNOSIS — Z00.00 PE (PHYSICAL EXAM), ANNUAL: Primary | ICD-10-CM

## 2023-09-12 DIAGNOSIS — I10 ESSENTIAL HYPERTENSION: ICD-10-CM

## 2023-09-12 PROCEDURE — G8427 DOCREV CUR MEDS BY ELIG CLIN: HCPCS | Performed by: INTERNAL MEDICINE

## 2023-09-12 PROCEDURE — 93000 ELECTROCARDIOGRAM COMPLETE: CPT | Performed by: INTERNAL MEDICINE

## 2023-09-12 PROCEDURE — G8417 CALC BMI ABV UP PARAM F/U: HCPCS | Performed by: INTERNAL MEDICINE

## 2023-09-12 PROCEDURE — 3017F COLORECTAL CA SCREEN DOC REV: CPT | Performed by: INTERNAL MEDICINE

## 2023-09-12 PROCEDURE — 3074F SYST BP LT 130 MM HG: CPT | Performed by: INTERNAL MEDICINE

## 2023-09-12 PROCEDURE — 4004F PT TOBACCO SCREEN RCVD TLK: CPT | Performed by: INTERNAL MEDICINE

## 2023-09-12 PROCEDURE — 99214 OFFICE O/P EST MOD 30 MIN: CPT | Performed by: INTERNAL MEDICINE

## 2023-09-12 PROCEDURE — 3078F DIAST BP <80 MM HG: CPT | Performed by: INTERNAL MEDICINE

## 2023-09-12 RX ORDER — CHOLECALCIFEROL (VITAMIN D3) 25 MCG
CAPSULE ORAL
COMMUNITY
Start: 2023-08-18

## 2023-09-12 RX ORDER — NITROGLYCERIN 0.4 MG/1
TABLET SUBLINGUAL
Qty: 25 TABLET | Refills: 3 | Status: SHIPPED | OUTPATIENT
Start: 2023-09-12

## 2023-09-12 RX ORDER — ASPIRIN 81 MG/1
TABLET ORAL
Qty: 90 TABLET | Refills: 3 | Status: SHIPPED | OUTPATIENT
Start: 2023-09-12

## 2023-09-12 RX ORDER — METOPROLOL SUCCINATE 25 MG/1
TABLET, EXTENDED RELEASE ORAL
Qty: 90 TABLET | Refills: 3 | Status: SHIPPED | OUTPATIENT
Start: 2023-09-12

## 2023-09-12 RX ORDER — ATORVASTATIN CALCIUM 40 MG/1
TABLET, FILM COATED ORAL
Qty: 90 TABLET | Refills: 3 | Status: SHIPPED | OUTPATIENT
Start: 2023-09-12

## 2023-09-12 RX ORDER — AMOXICILLIN AND CLAVULANATE POTASSIUM 875; 125 MG/1; MG/1
TABLET, FILM COATED ORAL
COMMUNITY
Start: 2023-09-11

## 2023-09-12 ASSESSMENT — ENCOUNTER SYMPTOMS
EYES NEGATIVE: 1
RESPIRATORY NEGATIVE: 1
STRIDOR: 0
NAUSEA: 0
SHORTNESS OF BREATH: 0
WHEEZING: 0
GASTROINTESTINAL NEGATIVE: 1
COUGH: 0
BLOOD IN STOOL: 0
CHEST TIGHTNESS: 0

## 2023-09-12 NOTE — PROGRESS NOTES
12:36 PM    CREATININE 0.61 09/11/2023 12:36 PM    GFRAA >60.0 06/05/2022 04:59 AM    LABGLOM >60.0 09/11/2023 12:36 PM    GLUCOSE 87 09/11/2023 12:36 PM    PROT 7.5 09/11/2023 12:36 PM    LABALBU 4.4 09/11/2023 12:36 PM    CALCIUM 9.6 09/11/2023 12:36 PM    BILITOT <0.2 09/11/2023 12:36 PM    ALKPHOS 72 09/11/2023 12:36 PM    AST 24 09/11/2023 12:36 PM    ALT 34 09/11/2023 12:36 PM     BMP:    Lab Results   Component Value Date/Time     09/11/2023 12:36 PM    K 4.3 09/11/2023 12:36 PM    K 3.3 06/05/2022 04:59 AM    CL 97 09/11/2023 12:36 PM    CO2 27 09/11/2023 12:36 PM    BUN 15 09/11/2023 12:36 PM    LABALBU 4.4 09/11/2023 12:36 PM    CREATININE 0.61 09/11/2023 12:36 PM    CALCIUM 9.6 09/11/2023 12:36 PM    GFRAA >60.0 06/05/2022 04:59 AM    LABGLOM >60.0 09/11/2023 12:36 PM    GLUCOSE 87 09/11/2023 12:36 PM     Magnesium:    Lab Results   Component Value Date/Time    MG 2.0 06/05/2022 04:59 AM     TSH:  Lab Results   Component Value Date    TSH 0.928 07/22/2014       Patient Active Problem List   Diagnosis    Diabetes mellitus ([address] )    ED (erectile dysfunction)    Hypertrophy of prostate with urinary obstruction and other lower urinary tract symptoms (LUTS)    Incomplete bladder emptying    Nocturia    SDH (subdural hematoma) (HCC)    Urinary frequency    Bilateral carpal tunnel syndrome    Chest pain    Uncontrolled type 2 diabetes mellitus with hyperglycemia (HCC)    ACS (acute coronary syndrome) (HCC)    Seasonal allergies    Adenomatous polyp of colon    Adenomatous polyp of descending colon    Adenomatous polyp of sigmoid colon    Sepsis ([address])       Medications Discontinued During This Encounter   Medication Reason    aspirin (ASPIRIN LOW DOSE) 81 MG EC tablet REORDER    atorvastatin (LIPITOR) 40 MG tablet REORDER    nitroGLYCERIN (NITROSTAT) 0.4 MG SL tablet REORDER    metoprolol succinate (TOPROL XL) 25 MG extended release tablet REORDER         Modified Medications    Modified Medication Previous

## 2023-09-19 ENCOUNTER — HOSPITAL ENCOUNTER (OUTPATIENT)
Dept: CT IMAGING | Age: 58
Discharge: HOME | End: 2023-09-21
Payer: COMMERCIAL

## 2023-09-19 DIAGNOSIS — R31.9 HEMATURIA SYNDROME: ICD-10-CM

## 2023-09-19 PROCEDURE — 74176 CT ABD & PELVIS W/O CONTRAST: CPT

## 2023-09-26 ENCOUNTER — HOSPITAL ENCOUNTER (OUTPATIENT)
Dept: ULTRASOUND IMAGING | Age: 58
Discharge: HOME | End: 2023-09-28
Attending: INTERNAL MEDICINE
Payer: COMMERCIAL

## 2023-09-26 DIAGNOSIS — R09.89 BILATERAL CAROTID BRUITS: ICD-10-CM

## 2023-09-26 LAB
VAS LEFT CCA DIST EDV: 30.4 CM/S
VAS LEFT CCA DIST PSV: 104 CM/S
VAS LEFT CCA MID EDV: 22.5 CM/S
VAS LEFT CCA MID PSV: 98.2 CM/S
VAS LEFT CCA PROX EDV: 36.1 CM/S
VAS LEFT CCA PROX PSV: 154 CM/S
VAS LEFT ECA EDV: 32.2 CM/S
VAS LEFT ECA PSV: 113 CM/S
VAS LEFT ICA DIST EDV: 29 CM/S
VAS LEFT ICA DIST PSV: 52.2 CM/S
VAS LEFT ICA MID EDV: 25.5 CM/S
VAS LEFT ICA MID PSV: 60.6 CM/S
VAS LEFT ICA PROX EDV: 25.9 CM/S
VAS LEFT ICA PROX PSV: 64.2 CM/S
VAS LEFT ICA/CCA PSV: 1.25
VAS LEFT VERTEBRAL EDV: 7.88 CM/S
VAS LEFT VERTEBRAL PSV: 30.4 CM/S
VAS RIGHT CCA DIST EDV: 20.6 CM/S
VAS RIGHT CCA DIST PSV: 68 CM/S
VAS RIGHT CCA MID EDV: 20.2 CM/S
VAS RIGHT CCA MID PSV: 75.1 CM/S
VAS RIGHT CCA PROX EDV: 19.3 CM/S
VAS RIGHT CCA PROX PSV: 63.7 CM/S
VAS RIGHT ECA EDV: 18.4 CM/S
VAS RIGHT ECA PSV: 56.2 CM/S
VAS RIGHT ICA DIST EDV: 32 CM/S
VAS RIGHT ICA DIST PSV: 66.7 CM/S
VAS RIGHT ICA MID EDV: 34.7 CM/S
VAS RIGHT ICA MID PSV: 72 CM/S
VAS RIGHT ICA PROX EDV: 23.7 CM/S
VAS RIGHT ICA PROX PSV: 52.7 CM/S
VAS RIGHT ICA/CCA PSV: 0.96
VAS RIGHT VERTEBRAL EDV: 13.8 CM/S
VAS RIGHT VERTEBRAL PSV: 38.3 CM/S

## 2023-09-26 PROCEDURE — 93880 EXTRACRANIAL BILAT STUDY: CPT | Performed by: INTERNAL MEDICINE

## 2023-09-26 PROCEDURE — 93880 EXTRACRANIAL BILAT STUDY: CPT

## 2023-10-10 ENCOUNTER — HOSPITAL ENCOUNTER (EMERGENCY)
Age: 58
Discharge: HOME | End: 2023-10-10
Attending: EMERGENCY MEDICINE
Payer: COMMERCIAL

## 2023-10-10 ENCOUNTER — APPOINTMENT (OUTPATIENT)
Dept: CT IMAGING | Age: 58
End: 2023-10-10
Attending: EMERGENCY MEDICINE
Payer: COMMERCIAL

## 2023-10-10 VITALS
HEART RATE: 78 BPM | DIASTOLIC BLOOD PRESSURE: 77 MMHG | WEIGHT: 213 LBS | BODY MASS INDEX: 33.43 KG/M2 | HEIGHT: 67 IN | RESPIRATION RATE: 16 BRPM | SYSTOLIC BLOOD PRESSURE: 109 MMHG | TEMPERATURE: 98.3 F | OXYGEN SATURATION: 94 %

## 2023-10-10 DIAGNOSIS — N30.01 ACUTE CYSTITIS WITH HEMATURIA: Primary | ICD-10-CM

## 2023-10-10 LAB
ANION GAP SERPL CALCULATED.3IONS-SCNC: 11 MEQ/L (ref 9–15)
APTT PPP: 28 SEC (ref 24.4–36.8)
BACTERIA URNS QL MICRO: NEGATIVE /HPF
BASOPHILS # BLD: 0 K/UL (ref 0–0.2)
BASOPHILS NFR BLD: 0.4 %
BILIRUB UR QL STRIP: ABNORMAL
BUN SERPL-MCNC: 19 MG/DL (ref 6–20)
CALCIUM SERPL-MCNC: 9.2 MG/DL (ref 8.5–9.9)
CHLORIDE SERPL-SCNC: 99 MEQ/L (ref 95–107)
CLARITY UR: ABNORMAL
CO2 SERPL-SCNC: 26 MEQ/L (ref 20–31)
COLOR UR: ABNORMAL
CREAT SERPL-MCNC: 0.69 MG/DL (ref 0.7–1.2)
EOSINOPHIL # BLD: 0.1 K/UL (ref 0–0.7)
EOSINOPHIL NFR BLD: 0.9 %
EPI CELLS #/AREA URNS AUTO: ABNORMAL /HPF (ref 0–5)
ERYTHROCYTE [DISTWIDTH] IN BLOOD BY AUTOMATED COUNT: 14.1 % (ref 11.5–14.5)
GLUCOSE SERPL-MCNC: 120 MG/DL (ref 70–99)
GLUCOSE UR STRIP-MCNC: >=1000 MG/DL
HCT VFR BLD AUTO: 52.4 % (ref 42–52)
HGB BLD-MCNC: 17 G/DL (ref 14–18)
HGB UR QL STRIP: ABNORMAL
HYALINE CASTS #/AREA URNS LPF: ABNORMAL /LPF (ref 0–5)
INR PPP: 1
KETONES UR STRIP-MCNC: NEGATIVE MG/DL
LEUKOCYTE ESTERASE UR QL STRIP: ABNORMAL
LYMPHOCYTES # BLD: 1.4 K/UL (ref 1–4.8)
LYMPHOCYTES NFR BLD: 15.1 %
MCH RBC QN AUTO: 30.2 PG (ref 27–31.3)
MCHC RBC AUTO-ENTMCNC: 32.4 % (ref 33–37)
MCV RBC AUTO: 93.2 FL (ref 79–92.2)
MONOCYTES # BLD: 0.7 K/UL (ref 0.2–0.8)
MONOCYTES NFR BLD: 7.2 %
NEUTROPHILS # BLD: 6.9 K/UL (ref 1.4–6.5)
NEUTS SEG NFR BLD: 76 %
NITRITE UR QL STRIP: POSITIVE
PH UR STRIP: 6 [PH] (ref 5–9)
PLATELET # BLD AUTO: 185 K/UL (ref 130–400)
POC CREATININE WHOLE BLOOD: 0.8
POTASSIUM SERPL-SCNC: 4.4 MEQ/L (ref 3.4–4.9)
PROT UR STRIP-MCNC: >=300 MG/DL
PROTHROMBIN TIME: 13 SEC (ref 12.3–14.9)
RBC # BLD AUTO: 5.62 M/UL (ref 4.7–6.1)
RBC #/AREA URNS AUTO: >100 /HPF (ref 0–5)
SODIUM SERPL-SCNC: 136 MEQ/L (ref 135–144)
SP GR UR STRIP: 1.03 (ref 1–1.03)
URINE REFLEX TO CULTURE: YES
UROBILINOGEN UR STRIP-ACNC: 0.2 E.U./DL
WBC # BLD AUTO: 9.1 K/UL (ref 4.8–10.8)
WBC #/AREA URNS AUTO: >100 /HPF (ref 0–5)

## 2023-10-10 PROCEDURE — 96375 TX/PRO/DX INJ NEW DRUG ADDON: CPT

## 2023-10-10 PROCEDURE — 6360000004 HC RX CONTRAST MEDICATION: Performed by: EMERGENCY MEDICINE

## 2023-10-10 PROCEDURE — 99285 EMERGENCY DEPT VISIT HI MDM: CPT

## 2023-10-10 PROCEDURE — 80048 BASIC METABOLIC PNL TOTAL CA: CPT

## 2023-10-10 PROCEDURE — 6360000002 HC RX W HCPCS: Performed by: EMERGENCY MEDICINE

## 2023-10-10 PROCEDURE — 36415 COLL VENOUS BLD VENIPUNCTURE: CPT

## 2023-10-10 PROCEDURE — 81001 URINALYSIS AUTO W/SCOPE: CPT

## 2023-10-10 PROCEDURE — 85025 COMPLETE CBC W/AUTO DIFF WBC: CPT

## 2023-10-10 PROCEDURE — 85610 PROTHROMBIN TIME: CPT

## 2023-10-10 PROCEDURE — 85730 THROMBOPLASTIN TIME PARTIAL: CPT

## 2023-10-10 PROCEDURE — 74177 CT ABD & PELVIS W/CONTRAST: CPT

## 2023-10-10 PROCEDURE — 2580000003 HC RX 258: Performed by: EMERGENCY MEDICINE

## 2023-10-10 PROCEDURE — 96365 THER/PROPH/DIAG IV INF INIT: CPT

## 2023-10-10 PROCEDURE — 87086 URINE CULTURE/COLONY COUNT: CPT

## 2023-10-10 RX ORDER — CEPHALEXIN 500 MG/1
500 CAPSULE ORAL 4 TIMES DAILY
Qty: 40 CAPSULE | Refills: 0 | Status: SHIPPED | OUTPATIENT
Start: 2023-10-10 | End: 2023-10-20

## 2023-10-10 RX ORDER — MORPHINE SULFATE 4 MG/ML
6 INJECTION, SOLUTION INTRAMUSCULAR; INTRAVENOUS ONCE
Status: COMPLETED | OUTPATIENT
Start: 2023-10-10 | End: 2023-10-10

## 2023-10-10 RX ORDER — ONDANSETRON 2 MG/ML
4 INJECTION INTRAMUSCULAR; INTRAVENOUS ONCE
Status: COMPLETED | OUTPATIENT
Start: 2023-10-10 | End: 2023-10-10

## 2023-10-10 RX ADMIN — ONDANSETRON 4 MG: 2 INJECTION INTRAMUSCULAR; INTRAVENOUS at 10:32

## 2023-10-10 RX ADMIN — CEFTRIAXONE SODIUM 1000 MG: 1 INJECTION, POWDER, FOR SOLUTION INTRAMUSCULAR; INTRAVENOUS at 11:27

## 2023-10-10 RX ADMIN — IOPAMIDOL 50 ML: 612 INJECTION, SOLUTION INTRAVENOUS at 11:17

## 2023-10-10 RX ADMIN — MORPHINE SULFATE 6 MG: 4 INJECTION INTRAVENOUS at 10:33

## 2023-10-10 ASSESSMENT — PAIN DESCRIPTION - FREQUENCY: FREQUENCY: CONTINUOUS

## 2023-10-10 ASSESSMENT — PAIN - FUNCTIONAL ASSESSMENT
PAIN_FUNCTIONAL_ASSESSMENT: NONE - DENIES PAIN
PAIN_FUNCTIONAL_ASSESSMENT: 0-10

## 2023-10-10 ASSESSMENT — PAIN DESCRIPTION - DESCRIPTORS
DESCRIPTORS: STABBING
DESCRIPTORS: SHARP

## 2023-10-10 ASSESSMENT — PAIN DESCRIPTION - ORIENTATION: ORIENTATION: LOWER

## 2023-10-10 ASSESSMENT — PAIN DESCRIPTION - PAIN TYPE: TYPE: ACUTE PAIN

## 2023-10-10 ASSESSMENT — ENCOUNTER SYMPTOMS
ABDOMINAL PAIN: 1
SHORTNESS OF BREATH: 0
BACK PAIN: 0
VOMITING: 0

## 2023-10-10 ASSESSMENT — PAIN SCALES - GENERAL
PAINLEVEL_OUTOF10: 7
PAINLEVEL_OUTOF10: 7

## 2023-10-10 ASSESSMENT — PAIN DESCRIPTION - LOCATION
LOCATION: OTHER (COMMENT)
LOCATION: ABDOMEN

## 2023-10-10 NOTE — ED PROVIDER NOTES
[facility] ED  EMERGENCY DEPARTMENT ENCOUNTER      Pt Name: Arely Velasquez  MRN: [id number]  [address] [facility] 1965  Date of evaluation: 10/10/2023  Provider: Ravi Tse MD    CHIEF COMPLAINT       Chief Complaint   Patient presents with    Hematuria     Pt c/o blood in urine on and off for past month         HISTORY OF PRESENT ILLNESS   (Location/Symptom, Timing/Onset, Context/Setting, Quality, Duration, Modifying Factors, Severity)  Note limiting factors. Arely Velasquez is a 60 y.o. male who presents to the emergency department for evaluation of hematuria. He says that it has been intermittent over the past month. Had a couple days ago but was not evaluated. Says that today he is having suprapubic discomfort when he urinates, hematuria. Denies anticoagulation use or traumatic injury. Does not endorse fever, neck or jaw pain, chest pain or difficulty breathing, flank pain, upper abdominal pain, testicular swelling, blood in stool, numbness or weakness. Has not taken anything for relief today. HPI  Chart review notes history of CAD on aspirin, COPD, hypertension, hyperlipidemia, diabetes  Nursing Notes were reviewed. REVIEW OF SYSTEMS    (2-9 systems for level 4, 10 or more for level 5)     Review of Systems   Constitutional:  Negative for fever. Respiratory:  Negative for shortness of breath. Gastrointestinal:  Positive for abdominal pain (Suprapubic, nonradiating). Negative for vomiting. Genitourinary:  Positive for dysuria and hematuria. Negative for flank pain. Musculoskeletal:  Negative for back pain and neck pain. Skin:  Negative for pallor. Neurological:  Negative for syncope. All other systems reviewed and are negative. Except as noted above the remainder of the review of systems was reviewed and negative.        PAST MEDICAL HISTORY     Past Medical History:   Diagnosis Date    CAD (coronary artery disease)     COPD (chronic obstructive pulmonary disease) ([address] [facility])

## 2023-10-10 NOTE — ED TRIAGE NOTES
Pt c/o blood and blood clothes in urine on and off for past month, pt was at [facility] ago and had ct done , per pt ct negative. Pt c/o sharp suprapubic pain. Pt a&ox4, skin w/d/pink, 0 distress, steady gait noted.

## 2023-10-10 NOTE — ED NOTES
Pt stable, a&ox4, skin w/d/pink, 0 distress, 0 c/o. Pt out from ed with steady gait, 0 problems.      Micheline Bey, RN  10/10/23 0992

## 2023-10-11 ENCOUNTER — OFFICE VISIT (OUTPATIENT)
Dept: ENDOCRINOLOGY | Age: 58
End: 2023-10-11

## 2023-10-11 VITALS
HEART RATE: 78 BPM | WEIGHT: 217 LBS | BODY MASS INDEX: 34.06 KG/M2 | DIASTOLIC BLOOD PRESSURE: 73 MMHG | HEIGHT: 67 IN | OXYGEN SATURATION: 93 % | SYSTOLIC BLOOD PRESSURE: 109 MMHG

## 2023-10-11 DIAGNOSIS — E11.65 UNCONTROLLED TYPE 2 DIABETES MELLITUS WITH HYPERGLYCEMIA (HCC): ICD-10-CM

## 2023-10-11 DIAGNOSIS — E08.00 DIABETES MELLITUS DUE TO UNDERLYING CONDITION WITH HYPEROSMOLARITY WITHOUT COMA, WITH LONG-TERM CURRENT USE OF INSULIN (HCC): Primary | ICD-10-CM

## 2023-10-11 DIAGNOSIS — Z79.4 DIABETES MELLITUS DUE TO UNDERLYING CONDITION WITH HYPEROSMOLARITY WITHOUT COMA, WITH LONG-TERM CURRENT USE OF INSULIN (HCC): Primary | ICD-10-CM

## 2023-10-11 LAB
BACTERIA UR CULT: NORMAL
CHP ED QC CHECK: ABNORMAL
GLUCOSE BLD-MCNC: 152 MG/DL
HBA1C MFR BLD: 7 %

## 2023-10-11 RX ORDER — IBUPROFEN 600 MG/1
TABLET ORAL
COMMUNITY
Start: 2023-10-10

## 2023-10-11 RX ORDER — LANCETS 30 GAUGE
1 EACH MISCELLANEOUS
Qty: 150 EACH | Refills: 3 | Status: SHIPPED | OUTPATIENT
Start: 2023-10-11

## 2023-10-11 RX ORDER — DULAGLUTIDE 4.5 MG/.5ML
INJECTION, SOLUTION SUBCUTANEOUS
Qty: 2 ML | Refills: 3 | Status: SHIPPED | OUTPATIENT
Start: 2023-10-11

## 2023-10-11 RX ORDER — SILDENAFIL 100 MG/1
100 TABLET, FILM COATED ORAL PRN
Qty: 30 TABLET | Refills: 3 | Status: SHIPPED | OUTPATIENT
Start: 2023-10-11

## 2023-10-11 RX ORDER — GLUCOSAMINE HCL/CHONDROITIN SU 500-400 MG
1 CAPSULE ORAL
Qty: 150 STRIP | Refills: 3 | Status: SHIPPED | OUTPATIENT
Start: 2023-10-11

## 2023-10-11 ASSESSMENT — ENCOUNTER SYMPTOMS
WHEEZING: 0
SORE THROAT: 0
VOMITING: 0
EYE REDNESS: 0
SHORTNESS OF BREATH: 0
EYE PAIN: 0
NAUSEA: 0
ABDOMINAL PAIN: 0
COUGH: 0
DIARRHEA: 0
RHINORRHEA: 0

## 2023-10-11 NOTE — PROGRESS NOTES
No thyromegaly or palpable nodules  Cardiovascular:      Rate and Rhythm: Normal rate and regular rhythm. Heart sounds: Normal heart sounds. Pulmonary:      Effort: Pulmonary effort is normal.      Breath sounds: Normal breath sounds. Abdominal:      General: Bowel sounds are normal.      Palpations: Abdomen is soft. Musculoskeletal:         General: Normal range of motion. Cervical back: Normal range of motion and neck supple. Skin:     General: Skin is warm and dry. Neurological:      Mental Status: He is alert and oriented to person, place, and time.    Psychiatric:         Mood and Affect: Mood normal.

## 2023-10-12 LAB
PERFORMED ON: NORMAL
POC CREATININE: 0.8 MG/DL (ref 0.8–1.3)
POC SAMPLE TYPE: NORMAL

## 2023-11-06 DIAGNOSIS — E11.65 UNCONTROLLED TYPE 2 DIABETES MELLITUS WITH HYPERGLYCEMIA (HCC): ICD-10-CM

## 2023-11-06 RX ORDER — DULAGLUTIDE 4.5 MG/.5ML
INJECTION, SOLUTION SUBCUTANEOUS
Qty: 2 ML | Refills: 3 | Status: SHIPPED | OUTPATIENT
Start: 2023-11-06

## 2023-11-20 RX ORDER — PEN NEEDLE, DIABETIC
NEEDLE, DISPOSABLE MISCELLANEOUS
Qty: 30 EACH | Refills: 3 | Status: SHIPPED | OUTPATIENT
Start: 2023-11-20

## 2023-12-04 RX ORDER — DULAGLUTIDE 4.5 MG/.5ML
INJECTION, SOLUTION SUBCUTANEOUS
Qty: 2 ML | Refills: 3 | Status: SHIPPED | OUTPATIENT
Start: 2023-12-04

## 2024-01-07 RX ORDER — NITROGLYCERIN 0.4 MG/1
TABLET SUBLINGUAL
Qty: 25 TABLET | Refills: 3 | Status: SHIPPED | OUTPATIENT
Start: 2024-01-07

## 2024-01-14 RX ORDER — NITROGLYCERIN 0.4 MG/1
TABLET SUBLINGUAL
Qty: 25 TABLET | Refills: 3 | Status: SHIPPED | OUTPATIENT
Start: 2024-01-14

## 2024-02-12 RX ORDER — DULAGLUTIDE 4.5 MG/.5ML
INJECTION, SOLUTION SUBCUTANEOUS
Qty: 2 ML | Refills: 3 | Status: SHIPPED | OUTPATIENT
Start: 2024-02-12

## 2024-02-20 RX ORDER — LORATADINE 10 MG/1
TABLET ORAL
Qty: 28 TABLET | Refills: 5 | Status: SHIPPED | OUTPATIENT
Start: 2024-02-20

## 2024-02-24 DIAGNOSIS — E11.65 UNCONTROLLED TYPE 2 DIABETES MELLITUS WITH HYPERGLYCEMIA (HCC): ICD-10-CM

## 2024-03-09 DIAGNOSIS — E11.65 UNCONTROLLED TYPE 2 DIABETES MELLITUS WITH HYPERGLYCEMIA (HCC): ICD-10-CM

## 2024-03-11 RX ORDER — INSULIN GLARGINE 100 [IU]/ML
INJECTION, SOLUTION SUBCUTANEOUS
Qty: 15 ML | Refills: 3 | Status: SHIPPED | OUTPATIENT
Start: 2024-03-11

## 2024-03-12 ENCOUNTER — OFFICE VISIT (OUTPATIENT)
Dept: CARDIOLOGY CLINIC | Age: 59
End: 2024-03-12
Payer: COMMERCIAL

## 2024-03-12 VITALS
WEIGHT: 213.6 LBS | SYSTOLIC BLOOD PRESSURE: 126 MMHG | BODY MASS INDEX: 33.53 KG/M2 | HEIGHT: 67 IN | DIASTOLIC BLOOD PRESSURE: 64 MMHG | HEART RATE: 86 BPM | OXYGEN SATURATION: 94 %

## 2024-03-12 DIAGNOSIS — I25.10 CORONARY ARTERY DISEASE INVOLVING NATIVE CORONARY ARTERY OF NATIVE HEART WITHOUT ANGINA PECTORIS: ICD-10-CM

## 2024-03-12 DIAGNOSIS — R09.89 BILATERAL CAROTID BRUITS: ICD-10-CM

## 2024-03-12 DIAGNOSIS — E78.2 MIXED HYPERLIPIDEMIA: Primary | ICD-10-CM

## 2024-03-12 DIAGNOSIS — I10 ESSENTIAL HYPERTENSION: ICD-10-CM

## 2024-03-12 PROCEDURE — 4004F PT TOBACCO SCREEN RCVD TLK: CPT | Performed by: INTERNAL MEDICINE

## 2024-03-12 PROCEDURE — 3074F SYST BP LT 130 MM HG: CPT | Performed by: INTERNAL MEDICINE

## 2024-03-12 PROCEDURE — 3017F COLORECTAL CA SCREEN DOC REV: CPT | Performed by: INTERNAL MEDICINE

## 2024-03-12 PROCEDURE — G8427 DOCREV CUR MEDS BY ELIG CLIN: HCPCS | Performed by: INTERNAL MEDICINE

## 2024-03-12 PROCEDURE — 99214 OFFICE O/P EST MOD 30 MIN: CPT | Performed by: INTERNAL MEDICINE

## 2024-03-12 PROCEDURE — G8484 FLU IMMUNIZE NO ADMIN: HCPCS | Performed by: INTERNAL MEDICINE

## 2024-03-12 PROCEDURE — 3078F DIAST BP <80 MM HG: CPT | Performed by: INTERNAL MEDICINE

## 2024-03-12 PROCEDURE — G8417 CALC BMI ABV UP PARAM F/U: HCPCS | Performed by: INTERNAL MEDICINE

## 2024-03-12 RX ORDER — ASPIRIN 81 MG/1
TABLET ORAL
Qty: 90 TABLET | Refills: 3 | Status: SHIPPED | OUTPATIENT
Start: 2024-03-12

## 2024-03-12 RX ORDER — ATORVASTATIN CALCIUM 40 MG/1
TABLET, FILM COATED ORAL
Qty: 90 TABLET | Refills: 3 | Status: SHIPPED | OUTPATIENT
Start: 2024-03-12

## 2024-03-12 RX ORDER — METOPROLOL SUCCINATE 25 MG/1
TABLET, EXTENDED RELEASE ORAL
Qty: 90 TABLET | Refills: 3 | Status: SHIPPED | OUTPATIENT
Start: 2024-03-12

## 2024-03-12 ASSESSMENT — ENCOUNTER SYMPTOMS
WHEEZING: 0
COUGH: 0
CHEST TIGHTNESS: 0
BLOOD IN STOOL: 0
NAUSEA: 0
EYES NEGATIVE: 1
GASTROINTESTINAL NEGATIVE: 1
SHORTNESS OF BREATH: 0
RESPIRATORY NEGATIVE: 1
STRIDOR: 0

## 2024-03-12 NOTE — PROGRESS NOTES
TABLET BY MOUTH IN THE MORNING. (AM)     Dispense:  90 tablet     Refill:  3         Assessment/Plan:    1. Coronary artery disease involving native coronary artery of native heart without angina pectoris    Stable continue CV meds    2. Essential hypertension  Stable - continue meds. Low salt diet.      3. Mixed hyperlipidemia  Statin - continue meds. Low fat diet. Fasting labs.    4. DM - Diabetic diet discussed.     5. F/u CT Abd for renal stone as ordered.      6. Carotid Bruits- CUS.-minimal.    Counseling:  Heart Healthy Lifestyle, Improve BMI, Stop Smoking, Low Salt Diet, Take Precautions to Prevent Falls and Walk Daily    Return in about 6 months (around 9/12/2024).      Electronically signed by ZAIDA MEDINA MD on 3/12/2024 at 1:24 PM

## 2024-03-18 RX ORDER — LANCETS 33 GAUGE
EACH MISCELLANEOUS
Qty: 100 EACH | Refills: 3 | Status: SHIPPED | OUTPATIENT
Start: 2024-03-18

## 2024-04-09 DIAGNOSIS — E11.65 UNCONTROLLED TYPE 2 DIABETES MELLITUS WITH HYPERGLYCEMIA (HCC): ICD-10-CM

## 2024-04-09 LAB
ALBUMIN SERPL-MCNC: 4.2 G/DL (ref 3.5–4.6)
ALP SERPL-CCNC: 78 U/L (ref 35–104)
ALT SERPL-CCNC: 30 U/L (ref 0–41)
ANION GAP SERPL CALCULATED.3IONS-SCNC: 10 MEQ/L (ref 9–15)
AST SERPL-CCNC: 18 U/L (ref 0–40)
BILIRUB SERPL-MCNC: <0.2 MG/DL (ref 0.2–0.7)
BUN SERPL-MCNC: 21 MG/DL (ref 8–23)
CALCIUM SERPL-MCNC: 9.5 MG/DL (ref 8.5–9.9)
CHLORIDE SERPL-SCNC: 102 MEQ/L (ref 95–107)
CO2 SERPL-SCNC: 26 MEQ/L (ref 20–31)
CREAT SERPL-MCNC: 0.75 MG/DL (ref 0.7–1.2)
GLOBULIN SER CALC-MCNC: 3.2 G/DL (ref 2.3–3.5)
GLUCOSE SERPL-MCNC: 145 MG/DL (ref 70–99)
POTASSIUM SERPL-SCNC: 4.7 MEQ/L (ref 3.4–4.9)
PROT SERPL-MCNC: 7.4 G/DL (ref 6.3–8)
SODIUM SERPL-SCNC: 138 MEQ/L (ref 135–144)

## 2024-04-09 RX ORDER — BLOOD SUGAR DIAGNOSTIC
STRIP MISCELLANEOUS
Qty: 150 STRIP | Refills: 3 | Status: SHIPPED | OUTPATIENT
Start: 2024-04-09

## 2024-04-10 DIAGNOSIS — E11.65 UNCONTROLLED TYPE 2 DIABETES MELLITUS WITH HYPERGLYCEMIA (HCC): ICD-10-CM

## 2024-04-10 LAB
ESTIMATED AVERAGE GLUCOSE: 146 MG/DL
HBA1C MFR BLD: 6.7 % (ref 4–6)
SHBG SERPL-SCNC: 35 NMOL/L (ref 19–76)
TESTOST FREE SERPL-MCNC: 64.4 PG/ML (ref 47–244)
TESTOST SERPL-MCNC: 335 NG/DL (ref 193–740)

## 2024-04-10 RX ORDER — DULAGLUTIDE 4.5 MG/.5ML
INJECTION, SOLUTION SUBCUTANEOUS
Qty: 2 ML | Refills: 3 | Status: SHIPPED | OUTPATIENT
Start: 2024-04-10

## 2024-04-10 RX ORDER — INSULIN GLARGINE 100 [IU]/ML
INJECTION, SOLUTION SUBCUTANEOUS
Qty: 15 ML | Refills: 3 | Status: SHIPPED | OUTPATIENT
Start: 2024-04-10

## 2024-04-13 DIAGNOSIS — Z79.4 DIABETES MELLITUS DUE TO UNDERLYING CONDITION WITH HYPEROSMOLARITY WITHOUT COMA, WITH LONG-TERM CURRENT USE OF INSULIN (HCC): ICD-10-CM

## 2024-04-13 DIAGNOSIS — E08.00 DIABETES MELLITUS DUE TO UNDERLYING CONDITION WITH HYPEROSMOLARITY WITHOUT COMA, WITH LONG-TERM CURRENT USE OF INSULIN (HCC): ICD-10-CM

## 2024-04-15 RX ORDER — BLOOD SUGAR DIAGNOSTIC
STRIP MISCELLANEOUS
Qty: 50 STRIP | Refills: 6 | OUTPATIENT
Start: 2024-04-15

## 2024-05-02 ENCOUNTER — OFFICE VISIT (OUTPATIENT)
Dept: ENDOCRINOLOGY | Age: 59
End: 2024-05-02

## 2024-05-02 VITALS
DIASTOLIC BLOOD PRESSURE: 78 MMHG | OXYGEN SATURATION: 95 % | HEIGHT: 67 IN | HEART RATE: 73 BPM | SYSTOLIC BLOOD PRESSURE: 122 MMHG | WEIGHT: 209 LBS | BODY MASS INDEX: 32.8 KG/M2

## 2024-05-02 DIAGNOSIS — Z79.4 DIABETES MELLITUS DUE TO UNDERLYING CONDITION WITH HYPEROSMOLARITY WITHOUT COMA, WITH LONG-TERM CURRENT USE OF INSULIN (HCC): Primary | ICD-10-CM

## 2024-05-02 DIAGNOSIS — E08.00 DIABETES MELLITUS DUE TO UNDERLYING CONDITION WITH HYPEROSMOLARITY WITHOUT COMA, WITH LONG-TERM CURRENT USE OF INSULIN (HCC): Primary | ICD-10-CM

## 2024-05-02 DIAGNOSIS — E11.65 UNCONTROLLED TYPE 2 DIABETES MELLITUS WITH HYPERGLYCEMIA (HCC): ICD-10-CM

## 2024-05-02 LAB
CHP ED QC CHECK: NORMAL
GLUCOSE BLD-MCNC: 182 MG/DL

## 2024-05-02 RX ORDER — INSULIN GLARGINE 100 [IU]/ML
INJECTION, SOLUTION SUBCUTANEOUS
Qty: 15 ML | Refills: 3 | Status: SHIPPED | OUTPATIENT
Start: 2024-05-02

## 2024-05-02 RX ORDER — DULAGLUTIDE 4.5 MG/.5ML
INJECTION, SOLUTION SUBCUTANEOUS
Qty: 2 ML | Refills: 3 | Status: SHIPPED | OUTPATIENT
Start: 2024-05-02

## 2024-05-02 ASSESSMENT — ENCOUNTER SYMPTOMS
SHORTNESS OF BREATH: 0
WHEEZING: 0
VOMITING: 0
ABDOMINAL PAIN: 0
EYE PAIN: 0
COUGH: 0
RHINORRHEA: 0
NAUSEA: 0
EYE REDNESS: 0
DIARRHEA: 0
SORE THROAT: 0

## 2024-05-02 NOTE — PROGRESS NOTES
Disp: , Rfl: 0    DULERA 200-5 MCG/ACT inhaler, Inhale 2 puffs into the lungs daily, Disp: , Rfl: 2    albuterol sulfate  (90 Base) MCG/ACT inhaler, Inhale 2 puffs into the lungs every 6 hours as needed for Wheezing, Disp: , Rfl:     Blood Glucose Monitoring Suppl SONDRA, check BG once daily, DX: E11.9, NIDDM, Disp: 1 Device, Rfl: 0    omeprazole (PRILOSEC) 20 MG capsule, Take 1 capsule by mouth daily., Disp: 30 capsule, Rfl: 11  Lab Results   Component Value Date     04/09/2024    K 4.7 04/09/2024     04/09/2024    CO2 26 04/09/2024    BUN 21 04/09/2024    CREATININE 0.75 04/09/2024    GLUCOSE 145 (H) 04/09/2024    CALCIUM 9.5 04/09/2024    BILITOT <0.2 04/09/2024    ALKPHOS 78 04/09/2024    AST 18 04/09/2024    ALT 30 04/09/2024    LABGLOM >90.0 04/09/2024    GFRAA >60.0 06/05/2022    GLOB 3.2 04/09/2024     Lab Results   Component Value Date    WBC 9.1 10/10/2023    HGB 17.0 10/10/2023    HCT 52.4 (H) 10/10/2023    MCV 93.2 (H) 10/10/2023     10/10/2023     Lab Results   Component Value Date    LABA1C 6.7 (H) 04/09/2024    LABA1C 7.0 (H) 10/11/2023    LABA1C 7.1 (H) 03/28/2023   Results for JOSEPH GARRETT (MRN [id number]) as of 1/14/2022 15:24   Ref. Range 10/5/2021 12:38   Creatinine, Ur Latest Ref Range: Not Established mg/dL 40.8   Microalbumin Creatinine Ratio Latest Ref Range: 0.0 - 30.0 mg/G see below   Microalbumin, Random Urine Latest Ref Range: Not Established mg/dL <1.20         Lab Results   Component Value Date    CHOL 100 08/07/2020    CHOL 146 09/28/2018    CHOL 170 03/31/2015     Lab Results   Component Value Date    TRIG 123 08/07/2020    TRIG 109 09/28/2018    TRIG 136 03/31/2015     Lab Results   Component Value Date    HDL 30 (L) 02/09/2022    HDL 25 (L) 08/07/2020    HDL 34 (L) 09/28/2018     No components found for: \"LDLCHOLESTEROL\", \"LDLCALC\"    No results found for: \"VLDL\"  No results found for: \"CHOLHDLRATIO\"  No results found for: \"TESTM\"      Component  Ref Range

## 2024-05-02 NOTE — PATIENT INSTRUCTIONS
Endocrinology-    Check your blood sugars 4 times a day, before meals and at night  Document these numbers in a blood glucose log and bring them with you to your follow-up appointment.  If you are prescribed insulin, Do not take your mealtime insulin if your blood sugars less than 120   Call our office if you have blood sugars less than 80 or greater then 300 on two or more occasions  Call our office if you have any questions regarding your blood sugars or insulin dosing regiment  Signs of low blood sugar may include tremors, feeling shaky, sweating, dizziness, confusion and weakness. Check your blood sugar immediatly if you have any of these symptoms.     The plan as discussed at your appointment-   Continue Lantus 10 units at bedtime if glucose is greater than 150   Continue Trulicity 4.5 mg weekly  Continue Jardiance 25 mg mornings  Freestyle Chioma 2 being used  Viagra 100 mg by mouth daily as needed for ED  Follow-up in 6 months

## 2024-05-05 RX ORDER — METOPROLOL SUCCINATE 25 MG/1
25 TABLET, EXTENDED RELEASE ORAL DAILY
Qty: 90 TABLET | Refills: 3 | Status: SHIPPED | OUTPATIENT
Start: 2024-05-05

## 2024-06-03 RX ORDER — PEN NEEDLE, DIABETIC
NEEDLE, DISPOSABLE MISCELLANEOUS
Qty: 30 EACH | Refills: 3 | Status: SHIPPED | OUTPATIENT
Start: 2024-06-03

## 2024-06-24 RX ORDER — PEN NEEDLE, DIABETIC
NEEDLE, DISPOSABLE MISCELLANEOUS
Qty: 30 EACH | Refills: 3 | Status: SHIPPED | OUTPATIENT
Start: 2024-06-24

## 2024-06-24 RX ORDER — NITROGLYCERIN 0.4 MG/1
TABLET SUBLINGUAL
Qty: 25 TABLET | Refills: 3 | Status: SHIPPED | OUTPATIENT
Start: 2024-06-24

## 2024-06-24 NOTE — TELEPHONE ENCOUNTER
Requesting medication refill. Please approve or deny this request.    Rx requested:  Requested Prescriptions     Pending Prescriptions Disp Refills    nitroGLYCERIN (NITROSTAT) 0.4 MG SL tablet [Pharmacy Med Name: NITROGLYCERIN 0.4 MG SUBL 0.4 Tablet] 25 tablet 3     Sig: UP TO MAX OF 3 TOTAL DOSES. IF NO RELIEF AFTER 1 DOSE, CALL 911.         Last Office Visit:   3/12/2024      Next Visit Date:  Future Appointments   Date Time Provider Department Center   9/19/2024  1:00 PM Brandon Da Silva MD [facility]   11/7/2024 11:00 AM Sorto, Supa S, PA [facility]               Last refill 01/14/2024. Please approve or deny.

## 2024-06-27 RX ORDER — LORATADINE 10 MG/1
TABLET ORAL
Qty: 28 TABLET | Refills: 5 | Status: SHIPPED | OUTPATIENT
Start: 2024-06-27

## 2024-07-01 RX ORDER — PEN NEEDLE, DIABETIC
NEEDLE, DISPOSABLE MISCELLANEOUS
Refills: 3 | OUTPATIENT
Start: 2024-07-01

## 2024-07-15 RX ORDER — LANCETS 33 GAUGE
EACH MISCELLANEOUS
Qty: 100 EACH | Refills: 3 | Status: SHIPPED | OUTPATIENT
Start: 2024-07-15

## 2024-09-11 RX ORDER — ASPIRIN 81 MG/1
TABLET ORAL
Qty: 90 TABLET | Refills: 3 | Status: SHIPPED | OUTPATIENT
Start: 2024-09-11

## 2024-09-14 RX ORDER — NITROGLYCERIN 0.4 MG/1
TABLET SUBLINGUAL
Qty: 25 TABLET | Refills: 3 | Status: SHIPPED | OUTPATIENT
Start: 2024-09-14

## 2024-09-16 RX ORDER — BLOOD SUGAR DIAGNOSTIC
STRIP MISCELLANEOUS
Qty: 150 STRIP | Refills: 3 | Status: SHIPPED | OUTPATIENT
Start: 2024-09-16

## 2024-09-20 ENCOUNTER — OFFICE VISIT (OUTPATIENT)
Dept: CARDIOLOGY CLINIC | Age: 59
End: 2024-09-20
Payer: COMMERCIAL

## 2024-09-20 VITALS
DIASTOLIC BLOOD PRESSURE: 76 MMHG | SYSTOLIC BLOOD PRESSURE: 122 MMHG | OXYGEN SATURATION: 97 % | BODY MASS INDEX: 33.05 KG/M2 | WEIGHT: 211 LBS | HEART RATE: 84 BPM

## 2024-09-20 DIAGNOSIS — E78.2 MIXED HYPERLIPIDEMIA: Primary | ICD-10-CM

## 2024-09-20 PROCEDURE — 93000 ELECTROCARDIOGRAM COMPLETE: CPT | Performed by: INTERNAL MEDICINE

## 2024-09-20 PROCEDURE — G8427 DOCREV CUR MEDS BY ELIG CLIN: HCPCS | Performed by: INTERNAL MEDICINE

## 2024-09-20 PROCEDURE — 3017F COLORECTAL CA SCREEN DOC REV: CPT | Performed by: INTERNAL MEDICINE

## 2024-09-20 PROCEDURE — G8417 CALC BMI ABV UP PARAM F/U: HCPCS | Performed by: INTERNAL MEDICINE

## 2024-09-20 PROCEDURE — 99214 OFFICE O/P EST MOD 30 MIN: CPT | Performed by: INTERNAL MEDICINE

## 2024-09-20 PROCEDURE — 4004F PT TOBACCO SCREEN RCVD TLK: CPT | Performed by: INTERNAL MEDICINE

## 2024-09-20 ASSESSMENT — ENCOUNTER SYMPTOMS
STRIDOR: 0
SHORTNESS OF BREATH: 0
EYES NEGATIVE: 1
GASTROINTESTINAL NEGATIVE: 1
WHEEZING: 0
NAUSEA: 0
CHEST TIGHTNESS: 0
RESPIRATORY NEGATIVE: 1
BLOOD IN STOOL: 0
COUGH: 0

## 2024-09-28 DIAGNOSIS — E11.65 UNCONTROLLED TYPE 2 DIABETES MELLITUS WITH HYPERGLYCEMIA (HCC): ICD-10-CM

## 2024-09-29 DIAGNOSIS — E11.65 UNCONTROLLED TYPE 2 DIABETES MELLITUS WITH HYPERGLYCEMIA (HCC): ICD-10-CM

## 2024-10-01 RX ORDER — DULAGLUTIDE 4.5 MG/.5ML
INJECTION, SOLUTION SUBCUTANEOUS
Qty: 2 ML | Refills: 3 | Status: SHIPPED | OUTPATIENT
Start: 2024-10-01

## 2024-10-02 DIAGNOSIS — E11.65 UNCONTROLLED TYPE 2 DIABETES MELLITUS WITH HYPERGLYCEMIA (HCC): ICD-10-CM

## 2024-10-29 RX ORDER — DULAGLUTIDE 4.5 MG/.5ML
4.5 INJECTION, SOLUTION SUBCUTANEOUS WEEKLY
Qty: 2 ML | Refills: 3 | Status: SHIPPED | OUTPATIENT
Start: 2024-10-29

## 2024-11-04 RX ORDER — LANCETS 33 GAUGE
EACH MISCELLANEOUS
Qty: 100 EACH | Refills: 5 | Status: SHIPPED | OUTPATIENT
Start: 2024-11-04

## 2024-11-11 ENCOUNTER — OFFICE VISIT (OUTPATIENT)
Dept: ENDOCRINOLOGY | Age: 59
End: 2024-11-11
Payer: COMMERCIAL

## 2024-11-11 VITALS
HEART RATE: 71 BPM | SYSTOLIC BLOOD PRESSURE: 138 MMHG | DIASTOLIC BLOOD PRESSURE: 91 MMHG | WEIGHT: 210 LBS | BODY MASS INDEX: 32.96 KG/M2 | OXYGEN SATURATION: 94 % | HEIGHT: 67 IN

## 2024-11-11 DIAGNOSIS — E11.65 UNCONTROLLED TYPE 2 DIABETES MELLITUS WITH HYPERGLYCEMIA (HCC): ICD-10-CM

## 2024-11-11 DIAGNOSIS — Z79.4 DIABETES MELLITUS DUE TO UNDERLYING CONDITION WITH HYPEROSMOLARITY WITHOUT COMA, WITH LONG-TERM CURRENT USE OF INSULIN (HCC): Primary | ICD-10-CM

## 2024-11-11 DIAGNOSIS — E08.00 DIABETES MELLITUS DUE TO UNDERLYING CONDITION WITH HYPEROSMOLARITY WITHOUT COMA, WITH LONG-TERM CURRENT USE OF INSULIN (HCC): Primary | ICD-10-CM

## 2024-11-11 DIAGNOSIS — N52.9 ERECTILE DYSFUNCTION, UNSPECIFIED ERECTILE DYSFUNCTION TYPE: ICD-10-CM

## 2024-11-11 LAB
CHP ED QC CHECK: NORMAL
GLUCOSE BLD-MCNC: 164 MG/DL
HBA1C MFR BLD: 6.6 %

## 2024-11-11 PROCEDURE — 3017F COLORECTAL CA SCREEN DOC REV: CPT | Performed by: PHYSICIAN ASSISTANT

## 2024-11-11 PROCEDURE — G8417 CALC BMI ABV UP PARAM F/U: HCPCS | Performed by: PHYSICIAN ASSISTANT

## 2024-11-11 PROCEDURE — 4004F PT TOBACCO SCREEN RCVD TLK: CPT | Performed by: PHYSICIAN ASSISTANT

## 2024-11-11 PROCEDURE — 83036 HEMOGLOBIN GLYCOSYLATED A1C: CPT | Performed by: PHYSICIAN ASSISTANT

## 2024-11-11 PROCEDURE — 3044F HG A1C LEVEL LT 7.0%: CPT | Performed by: PHYSICIAN ASSISTANT

## 2024-11-11 PROCEDURE — 95251 CONT GLUC MNTR ANALYSIS I&R: CPT | Performed by: PHYSICIAN ASSISTANT

## 2024-11-11 PROCEDURE — 99214 OFFICE O/P EST MOD 30 MIN: CPT | Performed by: PHYSICIAN ASSISTANT

## 2024-11-11 PROCEDURE — 2022F DILAT RTA XM EVC RTNOPTHY: CPT | Performed by: PHYSICIAN ASSISTANT

## 2024-11-11 PROCEDURE — G8484 FLU IMMUNIZE NO ADMIN: HCPCS | Performed by: PHYSICIAN ASSISTANT

## 2024-11-11 PROCEDURE — 82962 GLUCOSE BLOOD TEST: CPT | Performed by: PHYSICIAN ASSISTANT

## 2024-11-11 PROCEDURE — G8427 DOCREV CUR MEDS BY ELIG CLIN: HCPCS | Performed by: PHYSICIAN ASSISTANT

## 2024-11-11 RX ORDER — ACYCLOVIR 400 MG/1
1 TABLET ORAL DAILY
Qty: 1 EACH | Refills: 0 | Status: SHIPPED | OUTPATIENT
Start: 2024-11-11

## 2024-11-11 RX ORDER — SILDENAFIL 100 MG/1
100 TABLET, FILM COATED ORAL PRN
Qty: 30 TABLET | Refills: 3 | Status: SHIPPED | OUTPATIENT
Start: 2024-11-11

## 2024-11-11 RX ORDER — BLOOD SUGAR DIAGNOSTIC
STRIP MISCELLANEOUS
Qty: 150 STRIP | Refills: 3 | Status: SHIPPED | OUTPATIENT
Start: 2024-11-11

## 2024-11-11 RX ORDER — INSULIN GLARGINE 100 [IU]/ML
INJECTION, SOLUTION SUBCUTANEOUS
Qty: 15 ML | Refills: 3 | Status: CANCELLED | OUTPATIENT
Start: 2024-11-11

## 2024-11-11 RX ORDER — ACYCLOVIR 400 MG/1
1 TABLET ORAL
Qty: 12 EACH | Refills: 10 | Status: SHIPPED | OUTPATIENT
Start: 2024-11-11

## 2024-11-11 RX ORDER — SILDENAFIL 100 MG/1
100 TABLET, FILM COATED ORAL PRN
Qty: 30 TABLET | Refills: 3 | Status: SHIPPED | OUTPATIENT
Start: 2024-11-11 | End: 2024-11-11 | Stop reason: SDUPTHER

## 2024-11-11 RX ORDER — DULAGLUTIDE 4.5 MG/.5ML
4.5 INJECTION, SOLUTION SUBCUTANEOUS WEEKLY
Qty: 2 ML | Refills: 3 | Status: SHIPPED | OUTPATIENT
Start: 2024-11-11

## 2024-11-11 ASSESSMENT — ENCOUNTER SYMPTOMS
WHEEZING: 0
ABDOMINAL PAIN: 0
RHINORRHEA: 0
EYE REDNESS: 0
VOMITING: 0
EYE PAIN: 0
DIARRHEA: 0
SHORTNESS OF BREATH: 0
NAUSEA: 0
SORE THROAT: 0
COUGH: 0

## 2024-11-11 NOTE — PATIENT INSTRUCTIONS
Continue Lantus 10 units at bedtime if glucose is greater than 150   Continue Trulicity 4.5 mg weekly  Continue Jardiance 25 mg mornings  Freestyle Chioma 2 being used  Dexcom G7 ordered   Viagra 100 mg by mouth daily as needed for ED  Follow-up in 6 months

## 2024-11-11 NOTE — PROGRESS NOTES
podiatrist.Eye exam is current.     Past Medical History:   Diagnosis Date    CAD (coronary artery disease)     COPD (chronic obstructive pulmonary disease) (HCC)     Hyperlipidemia     Hypertension     SDH (subdural hematoma)     syncope, coughing    Type 2 diabetes mellitus without complication (HCC)      Past Surgical History:   Procedure Laterality Date    COLONOSCOPY  10/17/2014    COLONOSCOPY N/A 1/6/2022    COLONOSCOPY WITH POLYPECTOMY performed by Farzaneh Day MD at [facility]    CORONARY ANGIOPLASTY WITH STENT PLACEMENT  05/26/2020    PTCA       Social History     Socioeconomic History    Marital status:      Spouse name: Not on file    Number of children: Not on file    Years of education: Not on file    Highest education level: Not on file   Occupational History    Not on file   Tobacco Use    Smoking status: Some Days     Current packs/day: 1.00     Average packs/day: 1 pack/day for 42.9 years (42.9 ttl pk-yrs)     Types: Cigarettes     Start date: 1982    Smokeless tobacco: Never    Tobacco comments:     smokes on the weekends with drinking   Vaping Use    Vaping status: Never Used   Substance and Sexual Activity    Alcohol use: Not Currently     Comment: weekends     Drug use: Not on file     Comment: Last used 2020    Sexual activity: Not on file   Other Topics Concern    Not on file   Social History Narrative    Not on file     Social Determinants of Health     Financial Resource Strain: Not on file   Food Insecurity: Not on file   Transportation Needs: Not on file   Physical Activity: Not on file   Stress: Not on file   Social Connections: Not on file   Intimate Partner Violence: Not on file   Housing Stability: Not on file     Family History   Problem Relation Age of Onset    Cancer Neg Hx      Allergies   Allergen Reactions    Metformin And Related      Diarrhea        Current Outpatient Medications:     Dulaglutide (TRULICITY) 4.5 MG/0.5ML SOAJ, Inject 4.5 mg into the skin once

## 2024-11-25 RX ORDER — DULAGLUTIDE 4.5 MG/.5ML
4.5 INJECTION, SOLUTION SUBCUTANEOUS WEEKLY
Qty: 2 ML | Refills: 3 | OUTPATIENT
Start: 2024-11-25

## 2024-12-07 DIAGNOSIS — E11.65 UNCONTROLLED TYPE 2 DIABETES MELLITUS WITH HYPERGLYCEMIA (HCC): ICD-10-CM

## 2024-12-08 RX ORDER — NITROGLYCERIN 0.4 MG/1
TABLET SUBLINGUAL
Qty: 25 TABLET | Refills: 3 | Status: SHIPPED | OUTPATIENT
Start: 2024-12-08

## 2024-12-09 RX ORDER — PEN NEEDLE, DIABETIC
NEEDLE, DISPOSABLE MISCELLANEOUS
Qty: 30 EACH | Refills: 5 | Status: SHIPPED | OUTPATIENT
Start: 2024-12-09

## 2024-12-09 RX ORDER — INSULIN GLARGINE 100 [IU]/ML
INJECTION, SOLUTION SUBCUTANEOUS
Qty: 15 ML | Refills: 3 | Status: SHIPPED | OUTPATIENT
Start: 2024-12-09

## 2025-01-05 NOTE — PROGRESS NOTES
Rx Refill Note  Requested Prescriptions     Pending Prescriptions Disp Refills   • busPIRone (BUSPAR) 5 MG tablet [Pharmacy Med Name: busPIRone HCl 5 MG Oral Tablet] 90 tablet 0     Sig: TAKE 1 TABLET BY MOUTH THREE TIMES DAILY AS NEEDED FOR ANXIETY      Last office visit with prescribing clinician: 10/31/2024      Next office visit with prescribing clinician: 3/26/2025   3}  Carmelina Connor  01/05/25, 18:50 EST   
Take 3 mLs by nebulization every 4-6 hours as needed for Wheezing or Shortness of Breath, Disp: , Rfl:     Easy Comfort Lancets MISC, , Disp: , Rfl:     vitamin D (ERGOCALCIFEROL) 1.25 MG (97203 UT) CAPS capsule, Take 1 capsule by mouth once a week, Disp: , Rfl:     FLUoxetine (PROZAC) 10 MG capsule, Take 1 capsule by mouth daily Take 3 capsules daily for 30mg, Disp: , Rfl:     Continuous Blood Gluc  (FREESTYLE CHEIKH 2 READER) SONDRA, 1 Device by Does not apply route 4 times daily (before meals and nightly), Disp: 1 each, Rfl: 0    nitroGLYCERIN (NITROSTAT) 0.4 MG SL tablet, up to max of 3 total doses. If no relief after 1 dose, call 911., Disp: 25 tablet, Rfl: 3    valsartan (DIOVAN) 160 MG tablet, Take by mouth daily , Disp: , Rfl: 2    cyclobenzaprine (FLEXERIL) 10 MG tablet, 1 tablet daily At HS, Disp: , Rfl: 1    hydrochlorothiazide (HYDRODIURIL) 12.5 MG tablet, Take 1 tablet by mouth daily, Disp: , Rfl: 0    DULERA 200-5 MCG/ACT inhaler, Inhale 2 puffs into the lungs daily, Disp: , Rfl: 2    albuterol sulfate  (90 Base) MCG/ACT inhaler, Inhale 2 puffs into the lungs every 6 hours as needed for Wheezing, Disp: , Rfl:     Blood Glucose Monitoring Suppl SONDRA, check BG once daily, DX: E11.9, NIDDM, Disp: 1 Device, Rfl: 0    omeprazole (PRILOSEC) 20 MG capsule, Take 1 capsule by mouth daily. , Disp: 30 capsule, Rfl: 11  Lab Results   Component Value Date     03/28/2023    K 4.4 03/28/2023    CL 99 03/28/2023    CO2 30 03/28/2023    BUN 14 03/28/2023    CREATININE 0.80 03/28/2023    GLUCOSE 152 (H) 04/06/2023    CALCIUM 9.8 03/28/2023    PROT 7.5 03/28/2023    LABALBU 4.5 03/28/2023    BILITOT 0.4 03/28/2023    ALKPHOS 75 03/28/2023    AST 22 03/28/2023    ALT 36 03/28/2023    LABGLOM >60.0 03/28/2023    GFRAA >60.0 06/05/2022    GLOB 3.0 03/28/2023     Lab Results   Component Value Date    WBC 6.4 06/05/2022    HGB 12.4 (L) 06/05/2022    HCT 35.9 (L) 06/05/2022    MCV 88.5 06/05/2022

## 2025-01-09 RX ORDER — BLOOD SUGAR DIAGNOSTIC
STRIP MISCELLANEOUS
Qty: 150 STRIP | Refills: 3 | Status: SHIPPED | OUTPATIENT
Start: 2025-01-09

## 2025-01-11 DIAGNOSIS — E11.65 UNCONTROLLED TYPE 2 DIABETES MELLITUS WITH HYPERGLYCEMIA (HCC): ICD-10-CM

## 2025-01-13 RX ORDER — LORATADINE 10 MG/1
TABLET ORAL
Qty: 28 TABLET | Refills: 5 | Status: SHIPPED | OUTPATIENT
Start: 2025-01-13

## 2025-02-01 DIAGNOSIS — E11.65 UNCONTROLLED TYPE 2 DIABETES MELLITUS WITH HYPERGLYCEMIA (HCC): ICD-10-CM

## 2025-03-03 RX ORDER — PEN NEEDLE, DIABETIC
NEEDLE, DISPOSABLE MISCELLANEOUS
Qty: 30 EACH | Refills: 4 | Status: SHIPPED | OUTPATIENT
Start: 2025-03-03

## 2025-03-06 DIAGNOSIS — E11.65 UNCONTROLLED TYPE 2 DIABETES MELLITUS WITH HYPERGLYCEMIA (HCC): ICD-10-CM

## 2025-03-11 DIAGNOSIS — E11.65 UNCONTROLLED TYPE 2 DIABETES MELLITUS WITH HYPERGLYCEMIA (HCC): ICD-10-CM

## 2025-03-11 RX ORDER — INSULIN GLARGINE 100 [IU]/ML
INJECTION, SOLUTION SUBCUTANEOUS
Qty: 15 ML | Refills: 4 | Status: SHIPPED | OUTPATIENT
Start: 2025-03-11

## 2025-03-17 DIAGNOSIS — Z79.4 DIABETES MELLITUS DUE TO UNDERLYING CONDITION WITH HYPEROSMOLARITY WITHOUT COMA, WITH LONG-TERM CURRENT USE OF INSULIN (HCC): ICD-10-CM

## 2025-03-17 DIAGNOSIS — N52.9 ERECTILE DYSFUNCTION, UNSPECIFIED ERECTILE DYSFUNCTION TYPE: ICD-10-CM

## 2025-03-17 DIAGNOSIS — E08.00 DIABETES MELLITUS DUE TO UNDERLYING CONDITION WITH HYPEROSMOLARITY WITHOUT COMA, WITH LONG-TERM CURRENT USE OF INSULIN (HCC): ICD-10-CM

## 2025-03-17 LAB
ALBUMIN SERPL-MCNC: 4.2 G/DL (ref 3.5–4.6)
ALP SERPL-CCNC: 69 U/L (ref 35–104)
ALT SERPL-CCNC: 32 U/L (ref 0–41)
ANION GAP SERPL CALCULATED.3IONS-SCNC: 11 MEQ/L (ref 9–15)
AST SERPL-CCNC: 21 U/L (ref 0–40)
BILIRUB SERPL-MCNC: 0.3 MG/DL (ref 0.2–0.7)
BUN SERPL-MCNC: 16 MG/DL (ref 8–23)
CALCIUM SERPL-MCNC: 9.2 MG/DL (ref 8.5–9.9)
CHLORIDE SERPL-SCNC: 102 MEQ/L (ref 95–107)
CHOLEST SERPL-MCNC: 129 MG/DL (ref 0–199)
CO2 SERPL-SCNC: 25 MEQ/L (ref 20–31)
CREAT SERPL-MCNC: 0.65 MG/DL (ref 0.7–1.2)
ESTIMATED AVERAGE GLUCOSE: 137 MG/DL
GLOBULIN SER CALC-MCNC: 3.1 G/DL (ref 2.3–3.5)
GLUCOSE SERPL-MCNC: 186 MG/DL (ref 70–99)
HBA1C MFR BLD: 6.4 % (ref 4–6)
HDLC SERPL-MCNC: 35 MG/DL (ref 40–59)
LDLC SERPL CALC-MCNC: 50 MG/DL (ref 0–129)
POTASSIUM SERPL-SCNC: 4.4 MEQ/L (ref 3.4–4.9)
PROT SERPL-MCNC: 7.3 G/DL (ref 6.3–8)
SHBG SERPL-SCNC: 42 NMOL/L (ref 19–76)
SODIUM SERPL-SCNC: 138 MEQ/L (ref 135–144)
TESTOST FREE SERPL-MCNC: 67.3 PG/ML (ref 47–244)
TESTOST SERPL-MCNC: 387 NG/DL (ref 193–740)
TRIGL SERPL-MCNC: 218 MG/DL (ref 0–150)

## 2025-03-21 ENCOUNTER — OFFICE VISIT (OUTPATIENT)
Dept: CARDIOLOGY CLINIC | Age: 60
End: 2025-03-21

## 2025-03-21 VITALS
BODY MASS INDEX: 33.14 KG/M2 | WEIGHT: 211.6 LBS | SYSTOLIC BLOOD PRESSURE: 130 MMHG | HEART RATE: 87 BPM | OXYGEN SATURATION: 96 % | DIASTOLIC BLOOD PRESSURE: 80 MMHG

## 2025-03-21 DIAGNOSIS — E78.2 MIXED HYPERLIPIDEMIA: Primary | ICD-10-CM

## 2025-03-21 DIAGNOSIS — N52.9 ERECTILE DYSFUNCTION, UNSPECIFIED ERECTILE DYSFUNCTION TYPE: ICD-10-CM

## 2025-03-21 DIAGNOSIS — R09.89 BILATERAL CAROTID BRUITS: ICD-10-CM

## 2025-03-21 DIAGNOSIS — I25.10 CORONARY ARTERY DISEASE INVOLVING NATIVE CORONARY ARTERY OF NATIVE HEART WITHOUT ANGINA PECTORIS: ICD-10-CM

## 2025-03-21 DIAGNOSIS — I10 ESSENTIAL HYPERTENSION: ICD-10-CM

## 2025-03-21 RX ORDER — METOPROLOL SUCCINATE 25 MG/1
25 TABLET, EXTENDED RELEASE ORAL DAILY
Qty: 90 TABLET | Refills: 3 | Status: SHIPPED | OUTPATIENT
Start: 2025-03-21

## 2025-03-21 RX ORDER — HYDROCHLOROTHIAZIDE 12.5 MG/1
12.5 TABLET ORAL DAILY
Qty: 90 TABLET | Refills: 3 | Status: SHIPPED | OUTPATIENT
Start: 2025-03-21

## 2025-03-21 RX ORDER — ATORVASTATIN CALCIUM 40 MG/1
TABLET, FILM COATED ORAL
Qty: 90 TABLET | Refills: 3 | Status: SHIPPED | OUTPATIENT
Start: 2025-03-21

## 2025-03-21 RX ORDER — VALSARTAN 160 MG/1
160 TABLET ORAL DAILY
Qty: 90 TABLET | Refills: 3 | Status: SHIPPED | OUTPATIENT
Start: 2025-03-21

## 2025-03-21 RX ORDER — ASPIRIN 81 MG/1
TABLET ORAL
Qty: 90 TABLET | Refills: 3 | Status: SHIPPED | OUTPATIENT
Start: 2025-03-21

## 2025-03-21 RX ORDER — OMEPRAZOLE 20 MG/1
20 CAPSULE, DELAYED RELEASE ORAL DAILY
Qty: 90 CAPSULE | Refills: 3 | Status: SHIPPED | OUTPATIENT
Start: 2025-03-21

## 2025-03-21 RX ORDER — SILDENAFIL 100 MG/1
100 TABLET, FILM COATED ORAL PRN
Qty: 30 TABLET | Refills: 3 | Status: SHIPPED | OUTPATIENT
Start: 2025-03-21

## 2025-03-21 ASSESSMENT — ENCOUNTER SYMPTOMS
EYES NEGATIVE: 1
WHEEZING: 0
CHEST TIGHTNESS: 0
BLOOD IN STOOL: 0
STRIDOR: 0
RESPIRATORY NEGATIVE: 1
GASTROINTESTINAL NEGATIVE: 1
SHORTNESS OF BREATH: 0
COUGH: 0
NAUSEA: 0

## 2025-03-21 NOTE — PROGRESS NOTES
sildenafil (VIAGRA) 100 MG tablet       Take 1 tablet by mouth as needed for Erectile Dysfunction    Take 1 tablet by mouth as needed for Erectile Dysfunction    VALSARTAN (DIOVAN) 160 MG TABLET valsartan (DIOVAN) 160 MG tablet       Take 1 tablet by mouth daily    Take by mouth daily        Orders Placed This Encounter   Medications    metoprolol succinate (TOPROL XL) 25 MG extended release tablet     Sig: Take 1 tablet by mouth daily     Dispense:  90 tablet     Refill:  3    omeprazole (PRILOSEC) 20 MG delayed release capsule     Sig: Take 1 capsule by mouth daily     Dispense:  90 capsule     Refill:  3    sildenafil (VIAGRA) 100 MG tablet     Sig: Take 1 tablet by mouth as needed for Erectile Dysfunction     Dispense:  30 tablet     Refill:  3    aspirin (ASPIRIN LOW DOSE) 81 MG EC tablet     Sig: TAKE 1 TABLET BY MOUTH DAILY (AM)     Dispense:  90 tablet     Refill:  3    atorvastatin (LIPITOR) 40 MG tablet     Sig: TAKE 1 TABLET BY MOUTH NIGHTLY (HS)     Dispense:  90 tablet     Refill:  3    valsartan (DIOVAN) 160 MG tablet     Sig: Take 1 tablet by mouth daily     Dispense:  90 tablet     Refill:  3    hydroCHLOROthiazide 12.5 MG tablet     Sig: Take 1 tablet by mouth daily     Dispense:  90 tablet     Refill:  3         Assessment/Plan:    1. Coronary artery disease involving native coronary artery of native heart without angina pectoris    Stable continue CV meds    2. Essential hypertension  Stable - continue meds. Low salt diet.      3. Mixed hyperlipidemia  Statin - continue meds. Low fat diet. Fasting labs.    4. DM - Diabetic diet discussed.     6. Carotid Bruits- CUS.-minimal.    77. ED - no NTG w Viagra on same 24 hrs -discussed w pt.          Counseling:  Heart Healthy Lifestyle, Improve BMI, Stop Smoking, Low Salt Diet, Take Precautions to Prevent Falls and Walk Daily    Return in about 6 months (around 9/21/2025).      Electronically signed by ZAIDA MEDINA MD on 3/21/2025 at 2:11 PM

## 2025-04-21 RX ORDER — LANCETS 33 GAUGE
EACH MISCELLANEOUS
Qty: 100 EACH | Refills: 6 | Status: SHIPPED | OUTPATIENT
Start: 2025-04-21

## 2025-05-05 RX ORDER — METOPROLOL SUCCINATE 25 MG/1
TABLET, EXTENDED RELEASE ORAL
Qty: 90 TABLET | Refills: 3 | Status: SHIPPED | OUTPATIENT
Start: 2025-05-05

## 2025-05-05 NOTE — TELEPHONE ENCOUNTER
Requesting medication refill. Please approve or deny this request.    Rx requested:  Requested Prescriptions     Pending Prescriptions Disp Refills    metoprolol succinate (TOPROL XL) 25 MG extended release tablet [Pharmacy Med Name: METOPROLOL SUCCINATE ER 25 25 Tablet] 90 tablet 4     Sig: TAKE 1 TABLET BY MOUTH DAILY (AM)         Last Office Visit:   3/21/2025      Next Visit Date:  Future Appointments   Date Time Provider Department Center   5/12/2025  1:30 PM Sorto, Supa S, PA [facility]   9/26/2025  1:30 PM Brandon Da Silva MD [facility]

## 2025-05-19 ENCOUNTER — TELEPHONE (OUTPATIENT)
Age: 60
End: 2025-05-19

## 2025-05-19 RX ORDER — NITROGLYCERIN 0.4 MG/1
TABLET SUBLINGUAL
Qty: 25 TABLET | Refills: 4 | Status: SHIPPED | OUTPATIENT
Start: 2025-05-19

## 2025-05-19 NOTE — TELEPHONE ENCOUNTER
Requesting medication refill. Please approve or deny this request.    Rx requested:  Requested Prescriptions     Pending Prescriptions Disp Refills    nitroGLYCERIN (NITROSTAT) 0.4 MG SL tablet [Pharmacy Med Name: NITROGLYCERIN 0.4 MG SUBL 0.4 Tablet] 25 tablet 4     Sig: UP TO MAX OF 3 TOTAL DOSES. IF NO RELIEF AFTER 1 DOSE, CALL 911.         Last Office Visit:   3/21/2025      Next Visit Date:  Future Appointments   Date Time Provider Department Center   9/26/2025  1:30 PM Brandon Da Silva MD [facility]             Please approve or deny.

## 2025-05-29 DIAGNOSIS — Z79.4 DIABETES MELLITUS DUE TO UNDERLYING CONDITION WITH HYPEROSMOLARITY WITHOUT COMA, WITH LONG-TERM CURRENT USE OF INSULIN (HCC): ICD-10-CM

## 2025-05-29 DIAGNOSIS — E08.00 DIABETES MELLITUS DUE TO UNDERLYING CONDITION WITH HYPEROSMOLARITY WITHOUT COMA, WITH LONG-TERM CURRENT USE OF INSULIN (HCC): ICD-10-CM

## 2025-05-29 LAB
CREAT UR-MCNC: 82.8 MG/DL
MICROALBUMIN UR-MCNC: <1.2 MG/DL
MICROALBUMIN/CREAT UR-RTO: NORMAL MG/G (ref 0–30)

## 2025-06-03 ENCOUNTER — OFFICE VISIT (OUTPATIENT)
Age: 60
End: 2025-06-03
Payer: COMMERCIAL

## 2025-06-03 VITALS
HEIGHT: 67 IN | BODY MASS INDEX: 32.96 KG/M2 | SYSTOLIC BLOOD PRESSURE: 123 MMHG | WEIGHT: 210 LBS | HEART RATE: 70 BPM | DIASTOLIC BLOOD PRESSURE: 80 MMHG

## 2025-06-03 DIAGNOSIS — E08.00 DIABETES MELLITUS DUE TO UNDERLYING CONDITION WITH HYPEROSMOLARITY WITHOUT COMA, WITH LONG-TERM CURRENT USE OF INSULIN (HCC): Primary | ICD-10-CM

## 2025-06-03 DIAGNOSIS — E11.65 UNCONTROLLED TYPE 2 DIABETES MELLITUS WITH HYPERGLYCEMIA (HCC): ICD-10-CM

## 2025-06-03 DIAGNOSIS — Z79.4 DIABETES MELLITUS DUE TO UNDERLYING CONDITION WITH HYPEROSMOLARITY WITHOUT COMA, WITH LONG-TERM CURRENT USE OF INSULIN (HCC): Primary | ICD-10-CM

## 2025-06-03 LAB
CHP ED QC CHECK: NORMAL
GLUCOSE BLD-MCNC: 109 MG/DL

## 2025-06-03 PROCEDURE — 3044F HG A1C LEVEL LT 7.0%: CPT | Performed by: PHYSICIAN ASSISTANT

## 2025-06-03 PROCEDURE — G8417 CALC BMI ABV UP PARAM F/U: HCPCS | Performed by: PHYSICIAN ASSISTANT

## 2025-06-03 PROCEDURE — G8427 DOCREV CUR MEDS BY ELIG CLIN: HCPCS | Performed by: PHYSICIAN ASSISTANT

## 2025-06-03 PROCEDURE — 99213 OFFICE O/P EST LOW 20 MIN: CPT | Performed by: PHYSICIAN ASSISTANT

## 2025-06-03 PROCEDURE — 95251 CONT GLUC MNTR ANALYSIS I&R: CPT | Performed by: PHYSICIAN ASSISTANT

## 2025-06-03 PROCEDURE — PBSHW POCT GLUCOSE: Performed by: PHYSICIAN ASSISTANT

## 2025-06-03 PROCEDURE — 82962 GLUCOSE BLOOD TEST: CPT | Performed by: PHYSICIAN ASSISTANT

## 2025-06-03 PROCEDURE — 99214 OFFICE O/P EST MOD 30 MIN: CPT | Performed by: PHYSICIAN ASSISTANT

## 2025-06-03 PROCEDURE — 3017F COLORECTAL CA SCREEN DOC REV: CPT | Performed by: PHYSICIAN ASSISTANT

## 2025-06-03 PROCEDURE — 4004F PT TOBACCO SCREEN RCVD TLK: CPT | Performed by: PHYSICIAN ASSISTANT

## 2025-06-03 PROCEDURE — 2022F DILAT RTA XM EVC RTNOPTHY: CPT | Performed by: PHYSICIAN ASSISTANT

## 2025-06-03 RX ORDER — INSULIN GLARGINE 100 [IU]/ML
INJECTION, SOLUTION SUBCUTANEOUS
Qty: 15 ML | Refills: 4 | Status: SHIPPED | OUTPATIENT
Start: 2025-06-03

## 2025-06-03 RX ORDER — DULAGLUTIDE 4.5 MG/.5ML
4.5 INJECTION, SOLUTION SUBCUTANEOUS WEEKLY
Qty: 2 ML | Refills: 3 | Status: SHIPPED | OUTPATIENT
Start: 2025-06-03

## 2025-06-03 ASSESSMENT — ENCOUNTER SYMPTOMS
DIARRHEA: 0
ABDOMINAL PAIN: 0
COUGH: 0
SORE THROAT: 0
SHORTNESS OF BREATH: 0
NAUSEA: 0
EYE PAIN: 0
EYE REDNESS: 0
WHEEZING: 0
VOMITING: 0
RHINORRHEA: 0

## 2025-06-03 NOTE — PROGRESS NOTES
04/09/2024      Latest Reference Range & Units 05/29/25 17:06   Albumin Urine Not Established mg/dL <1.20   Creatinine, Ur Not Established mg/dL 82.8   Albumin/Creatinine Ratio 0.0 - 30.0 mg/G see below           Lab Results   Component Value Date    CHOL 129 03/17/2025    CHOL 100 08/07/2020    CHOL 146 09/28/2018     Lab Results   Component Value Date    TRIG 218 (H) 03/17/2025    TRIG 123 08/07/2020    TRIG 109 09/28/2018     Lab Results   Component Value Date    HDL 35 (L) 03/17/2025    HDL 30 (L) 02/09/2022    HDL 25 (L) 08/07/2020     No components found for: \"LDLCHOLESTEROL\", \"LDLCALC\"    No results found for: \"VLDL\"  No results found for: \"CHOLHDLRATIO\"  No results found for: \"TESTM\"      Component  Ref Range & Units 4/9/24 1301   Testosterone  193 - 740 ng/dL 335   Sex Hormone Binding  19 - 76 nmol/L 35   Testosterone, Free  47.0 - 244.0 pg/mL 64.4       Lab Results   Component Value Date    TSH 0.928 07/22/2014       Review of Systems   Constitutional:  Negative for chills, fatigue and fever.   HENT:  Negative for congestion, rhinorrhea and sore throat.    Eyes:  Negative for pain and redness.   Respiratory:  Negative for cough, shortness of breath and wheezing.    Cardiovascular:  Negative for chest pain, palpitations and leg swelling.   Gastrointestinal:  Negative for abdominal pain, diarrhea, nausea and vomiting.   Endocrine: Negative for polydipsia and polyuria.   Genitourinary:  Negative for difficulty urinating.   Musculoskeletal:  Negative for arthralgias.   Skin:  Negative for rash.   Allergic/Immunologic: Negative for environmental allergies.   Neurological:  Negative for dizziness and headaches.   Hematological:  Negative for adenopathy.   Psychiatric/Behavioral:  Negative for dysphoric mood.        Objective:   Physical Exam  Vitals reviewed.   Constitutional:       Appearance: He is well-developed.   HENT:      Head: Normocephalic and atraumatic.      Nose: No congestion.      Mouth/Throat:

## 2025-06-10 RX ORDER — NITROGLYCERIN 0.4 MG/1
TABLET SUBLINGUAL
Qty: 25 TABLET | Refills: 3 | Status: SHIPPED | OUTPATIENT
Start: 2025-06-10

## 2025-06-10 NOTE — TELEPHONE ENCOUNTER
Requesting medication refill. Please approve or deny this request.    Rx requested:  Requested Prescriptions     Pending Prescriptions Disp Refills    nitroGLYCERIN (NITROSTAT) 0.4 MG SL tablet [Pharmacy Med Name: NITROGLYCERIN 0.4 MG SUBL 0.4 Tablet] 25 tablet 3     Sig: UP TO MAX OF 3 TOTAL DOSES. IF NO RELIEF AFTER 1 DOSE, CALL 911.         Last Office Visit:   3/21/2025      Next Visit Date:  Future Appointments   Date Time Provider Department Center   9/26/2025  1:30 PM Brandon Da Silva MD [facility]   12/3/2025  1:30 PM Sorto, Supa S, PA [facility]

## 2025-06-12 RX ORDER — BLOOD-GLUCOSE METER
EACH MISCELLANEOUS
Qty: 1 KIT | Refills: 0 | Status: SHIPPED | OUTPATIENT
Start: 2025-06-12

## 2025-06-12 RX ORDER — BLOOD SUGAR DIAGNOSTIC
STRIP MISCELLANEOUS
Qty: 200 EACH | Refills: 3 | Status: SHIPPED | OUTPATIENT
Start: 2025-06-12

## 2025-06-12 RX ORDER — LANCETS
EACH MISCELLANEOUS
Qty: 200 EACH | Refills: 3 | Status: SHIPPED | OUTPATIENT
Start: 2025-06-12

## 2025-06-21 ENCOUNTER — APPOINTMENT (OUTPATIENT)
Dept: RADIOLOGY | Facility: HOSPITAL | Age: 60
End: 2025-06-21
Payer: COMMERCIAL

## 2025-06-21 ENCOUNTER — HOSPITAL ENCOUNTER (INPATIENT)
Facility: HOSPITAL | Age: 60
End: 2025-06-21
Attending: STUDENT IN AN ORGANIZED HEALTH CARE EDUCATION/TRAINING PROGRAM | Admitting: STUDENT IN AN ORGANIZED HEALTH CARE EDUCATION/TRAINING PROGRAM
Payer: COMMERCIAL

## 2025-06-21 ENCOUNTER — APPOINTMENT (OUTPATIENT)
Dept: CARDIOLOGY | Facility: HOSPITAL | Age: 60
End: 2025-06-21
Payer: COMMERCIAL

## 2025-06-21 DIAGNOSIS — E87.1 HYPONATREMIA: ICD-10-CM

## 2025-06-21 DIAGNOSIS — M54.50 LUMBAR BACK PAIN: Primary | ICD-10-CM

## 2025-06-21 DIAGNOSIS — R26.2 DIFFICULTY WALKING: ICD-10-CM

## 2025-06-21 PROBLEM — E78.5 HYPERLIPIDEMIA: Status: ACTIVE | Noted: 2024-04-10

## 2025-06-21 PROBLEM — J44.9 COPD, MILD (MULTI): Status: ACTIVE | Noted: 2017-06-29

## 2025-06-21 PROBLEM — M51.26 LUMBAR HERNIATED DISC: Status: ACTIVE | Noted: 2025-06-21

## 2025-06-21 PROBLEM — E11.65 UNCONTROLLED TYPE 2 DIABETES MELLITUS WITH HYPERGLYCEMIA: Status: ACTIVE | Noted: 2025-06-21

## 2025-06-21 PROBLEM — I10 PRIMARY HYPERTENSION: Status: ACTIVE | Noted: 2024-04-10

## 2025-06-21 PROBLEM — K21.9 GERD (GASTROESOPHAGEAL REFLUX DISEASE): Status: ACTIVE | Noted: 2025-06-21

## 2025-06-21 LAB
ALBUMIN SERPL BCP-MCNC: 4.4 G/DL (ref 3.4–5)
ALP SERPL-CCNC: 67 U/L (ref 33–136)
ALT SERPL W P-5'-P-CCNC: 29 U/L (ref 10–52)
ANION GAP SERPL CALC-SCNC: 17 MMOL/L (ref 10–20)
AST SERPL W P-5'-P-CCNC: 20 U/L (ref 9–39)
BASOPHILS # BLD AUTO: 0.02 X10*3/UL (ref 0–0.1)
BASOPHILS NFR BLD AUTO: 0.2 %
BILIRUB SERPL-MCNC: 0.9 MG/DL (ref 0–1.2)
BUN SERPL-MCNC: 14 MG/DL (ref 6–23)
CALCIUM SERPL-MCNC: 9.4 MG/DL (ref 8.6–10.3)
CHLORIDE SERPL-SCNC: 96 MMOL/L (ref 98–107)
CO2 SERPL-SCNC: 20 MMOL/L (ref 21–32)
CREAT SERPL-MCNC: 0.57 MG/DL (ref 0.5–1.3)
EGFRCR SERPLBLD CKD-EPI 2021: >90 ML/MIN/1.73M*2
EOSINOPHIL # BLD AUTO: 0 X10*3/UL (ref 0–0.7)
EOSINOPHIL NFR BLD AUTO: 0 %
ERYTHROCYTE [DISTWIDTH] IN BLOOD BY AUTOMATED COUNT: 14.2 % (ref 11.5–14.5)
ETHANOL SERPL-MCNC: <10 MG/DL
GLUCOSE BLD MANUAL STRIP-MCNC: 214 MG/DL (ref 74–99)
GLUCOSE SERPL-MCNC: 138 MG/DL (ref 74–99)
HCT VFR BLD AUTO: 51.7 % (ref 41–52)
HGB BLD-MCNC: 17.5 G/DL (ref 13.5–17.5)
IMM GRANULOCYTES # BLD AUTO: 0.13 X10*3/UL (ref 0–0.7)
IMM GRANULOCYTES NFR BLD AUTO: 1.1 % (ref 0–0.9)
LACTATE SERPL-SCNC: 2 MMOL/L (ref 0.4–2)
LYMPHOCYTES # BLD AUTO: 0.6 X10*3/UL (ref 1.2–4.8)
LYMPHOCYTES NFR BLD AUTO: 4.9 %
MCH RBC QN AUTO: 30.5 PG (ref 26–34)
MCHC RBC AUTO-ENTMCNC: 33.8 G/DL (ref 32–36)
MCV RBC AUTO: 90 FL (ref 80–100)
MONOCYTES # BLD AUTO: 0.93 X10*3/UL (ref 0.1–1)
MONOCYTES NFR BLD AUTO: 7.6 %
NEUTROPHILS # BLD AUTO: 10.53 X10*3/UL (ref 1.2–7.7)
NEUTROPHILS NFR BLD AUTO: 86.2 %
NRBC BLD-RTO: 0 /100 WBCS (ref 0–0)
PLATELET # BLD AUTO: 125 X10*3/UL (ref 150–450)
POTASSIUM SERPL-SCNC: 4.3 MMOL/L (ref 3.5–5.3)
PROT SERPL-MCNC: 7.6 G/DL (ref 6.4–8.2)
RBC # BLD AUTO: 5.74 X10*6/UL (ref 4.5–5.9)
SODIUM SERPL-SCNC: 129 MMOL/L (ref 136–145)
WBC # BLD AUTO: 12.2 X10*3/UL (ref 4.4–11.3)

## 2025-06-21 PROCEDURE — 36415 COLL VENOUS BLD VENIPUNCTURE: CPT | Performed by: PHYSICIAN ASSISTANT

## 2025-06-21 PROCEDURE — 96361 HYDRATE IV INFUSION ADD-ON: CPT

## 2025-06-21 PROCEDURE — G0378 HOSPITAL OBSERVATION PER HR: HCPCS

## 2025-06-21 PROCEDURE — 83605 ASSAY OF LACTIC ACID: CPT | Performed by: PHYSICIAN ASSISTANT

## 2025-06-21 PROCEDURE — 70450 CT HEAD/BRAIN W/O DYE: CPT | Performed by: STUDENT IN AN ORGANIZED HEALTH CARE EDUCATION/TRAINING PROGRAM

## 2025-06-21 PROCEDURE — 72125 CT NECK SPINE W/O DYE: CPT | Performed by: STUDENT IN AN ORGANIZED HEALTH CARE EDUCATION/TRAINING PROGRAM

## 2025-06-21 PROCEDURE — 99222 1ST HOSP IP/OBS MODERATE 55: CPT | Performed by: NURSE PRACTITIONER

## 2025-06-21 PROCEDURE — 82947 ASSAY GLUCOSE BLOOD QUANT: CPT

## 2025-06-21 PROCEDURE — 74176 CT ABD & PELVIS W/O CONTRAST: CPT | Mod: FOREIGN READ | Performed by: RADIOLOGY

## 2025-06-21 PROCEDURE — 2500000004 HC RX 250 GENERAL PHARMACY W/ HCPCS (ALT 636 FOR OP/ED): Performed by: PHYSICIAN ASSISTANT

## 2025-06-21 PROCEDURE — 85025 COMPLETE CBC W/AUTO DIFF WBC: CPT | Performed by: PHYSICIAN ASSISTANT

## 2025-06-21 PROCEDURE — 96372 THER/PROPH/DIAG INJ SC/IM: CPT | Performed by: PHYSICIAN ASSISTANT

## 2025-06-21 PROCEDURE — 72128 CT CHEST SPINE W/O DYE: CPT | Mod: FOREIGN READ | Performed by: RADIOLOGY

## 2025-06-21 PROCEDURE — 2500000002 HC RX 250 W HCPCS SELF ADMINISTERED DRUGS (ALT 637 FOR MEDICARE OP, ALT 636 FOR OP/ED): Performed by: NURSE PRACTITIONER

## 2025-06-21 PROCEDURE — 72131 CT LUMBAR SPINE W/O DYE: CPT | Mod: FOREIGN READ | Performed by: RADIOLOGY

## 2025-06-21 PROCEDURE — 2500000001 HC RX 250 WO HCPCS SELF ADMINISTERED DRUGS (ALT 637 FOR MEDICARE OP): Performed by: NURSE PRACTITIONER

## 2025-06-21 PROCEDURE — 72125 CT NECK SPINE W/O DYE: CPT

## 2025-06-21 PROCEDURE — 93005 ELECTROCARDIOGRAM TRACING: CPT

## 2025-06-21 PROCEDURE — 96360 HYDRATION IV INFUSION INIT: CPT | Mod: 59

## 2025-06-21 PROCEDURE — 71250 CT THORAX DX C-: CPT

## 2025-06-21 PROCEDURE — 71250 CT THORAX DX C-: CPT | Mod: FOREIGN READ | Performed by: RADIOLOGY

## 2025-06-21 PROCEDURE — 99285 EMERGENCY DEPT VISIT HI MDM: CPT | Mod: 25 | Performed by: STUDENT IN AN ORGANIZED HEALTH CARE EDUCATION/TRAINING PROGRAM

## 2025-06-21 PROCEDURE — 82077 ASSAY SPEC XCP UR&BREATH IA: CPT | Performed by: PHYSICIAN ASSISTANT

## 2025-06-21 PROCEDURE — 80053 COMPREHEN METABOLIC PANEL: CPT | Performed by: PHYSICIAN ASSISTANT

## 2025-06-21 PROCEDURE — 70450 CT HEAD/BRAIN W/O DYE: CPT

## 2025-06-21 RX ORDER — ORPHENADRINE CITRATE 30 MG/ML
60 INJECTION INTRAMUSCULAR; INTRAVENOUS ONCE
Status: COMPLETED | OUTPATIENT
Start: 2025-06-21 | End: 2025-06-21

## 2025-06-21 RX ORDER — INSULIN LISPRO 100 [IU]/ML
0-5 INJECTION, SOLUTION INTRAVENOUS; SUBCUTANEOUS
Status: DISCONTINUED | OUTPATIENT
Start: 2025-06-21 | End: 2025-06-24 | Stop reason: HOSPADM

## 2025-06-21 RX ORDER — ENOXAPARIN SODIUM 100 MG/ML
40 INJECTION SUBCUTANEOUS EVERY 24 HOURS
Status: DISCONTINUED | OUTPATIENT
Start: 2025-06-22 | End: 2025-06-24 | Stop reason: HOSPADM

## 2025-06-21 RX ORDER — DEXTROSE 50 % IN WATER (D50W) INTRAVENOUS SYRINGE
25
Status: DISCONTINUED | OUTPATIENT
Start: 2025-06-21 | End: 2025-06-24 | Stop reason: HOSPADM

## 2025-06-21 RX ORDER — ONDANSETRON HYDROCHLORIDE 2 MG/ML
4 INJECTION, SOLUTION INTRAVENOUS EVERY 8 HOURS PRN
Status: DISCONTINUED | OUTPATIENT
Start: 2025-06-21 | End: 2025-06-24 | Stop reason: HOSPADM

## 2025-06-21 RX ORDER — TALC
3 POWDER (GRAM) TOPICAL NIGHTLY PRN
Status: DISCONTINUED | OUTPATIENT
Start: 2025-06-21 | End: 2025-06-24 | Stop reason: HOSPADM

## 2025-06-21 RX ORDER — ACETAMINOPHEN 325 MG/1
650 TABLET ORAL EVERY 4 HOURS PRN
Status: DISCONTINUED | OUTPATIENT
Start: 2025-06-21 | End: 2025-06-24 | Stop reason: HOSPADM

## 2025-06-21 RX ORDER — ONDANSETRON 4 MG/1
4 TABLET, FILM COATED ORAL EVERY 8 HOURS PRN
Status: DISCONTINUED | OUTPATIENT
Start: 2025-06-21 | End: 2025-06-24 | Stop reason: HOSPADM

## 2025-06-21 RX ORDER — INSULIN LISPRO 100 [IU]/ML
0-5 INJECTION, SOLUTION INTRAVENOUS; SUBCUTANEOUS
Status: DISCONTINUED | OUTPATIENT
Start: 2025-06-22 | End: 2025-06-21

## 2025-06-21 RX ORDER — DEXTROSE 50 % IN WATER (D50W) INTRAVENOUS SYRINGE
12.5
Status: DISCONTINUED | OUTPATIENT
Start: 2025-06-21 | End: 2025-06-24 | Stop reason: HOSPADM

## 2025-06-21 RX ORDER — ACETAMINOPHEN 650 MG/1
650 SUPPOSITORY RECTAL EVERY 4 HOURS PRN
Status: DISCONTINUED | OUTPATIENT
Start: 2025-06-21 | End: 2025-06-24 | Stop reason: HOSPADM

## 2025-06-21 RX ORDER — ACETAMINOPHEN 160 MG/5ML
650 SOLUTION ORAL EVERY 4 HOURS PRN
Status: DISCONTINUED | OUTPATIENT
Start: 2025-06-21 | End: 2025-06-24 | Stop reason: HOSPADM

## 2025-06-21 RX ORDER — LIDOCAINE 560 MG/1
1 PATCH PERCUTANEOUS; TOPICAL; TRANSDERMAL DAILY
Status: DISCONTINUED | OUTPATIENT
Start: 2025-06-22 | End: 2025-06-24 | Stop reason: HOSPADM

## 2025-06-21 RX ORDER — PANTOPRAZOLE SODIUM 40 MG/1
40 TABLET, DELAYED RELEASE ORAL DAILY
Status: DISCONTINUED | OUTPATIENT
Start: 2025-06-22 | End: 2025-06-24 | Stop reason: HOSPADM

## 2025-06-21 RX ORDER — PANTOPRAZOLE SODIUM 40 MG/10ML
40 INJECTION, POWDER, LYOPHILIZED, FOR SOLUTION INTRAVENOUS DAILY
Status: DISCONTINUED | OUTPATIENT
Start: 2025-06-22 | End: 2025-06-24 | Stop reason: HOSPADM

## 2025-06-21 RX ADMIN — ORPHENADRINE CITRATE 60 MG: 60 INJECTION INTRAMUSCULAR; INTRAVENOUS at 19:59

## 2025-06-21 RX ADMIN — INSULIN LISPRO 2 UNITS: 100 INJECTION, SOLUTION INTRAVENOUS; SUBCUTANEOUS at 23:30

## 2025-06-21 RX ADMIN — SODIUM CHLORIDE 1000 ML: 0.9 INJECTION, SOLUTION INTRAVENOUS at 18:04

## 2025-06-21 RX ADMIN — ACETAMINOPHEN 650 MG: 325 TABLET ORAL at 23:30

## 2025-06-21 SDOH — SOCIAL STABILITY: SOCIAL INSECURITY: HAVE YOU HAD THOUGHTS OF HARMING ANYONE ELSE?: NO

## 2025-06-21 SDOH — SOCIAL STABILITY: SOCIAL INSECURITY: DO YOU FEEL UNSAFE GOING BACK TO THE PLACE WHERE YOU ARE LIVING?: NO

## 2025-06-21 SDOH — SOCIAL STABILITY: SOCIAL INSECURITY: DOES ANYONE TRY TO KEEP YOU FROM HAVING/CONTACTING OTHER FRIENDS OR DOING THINGS OUTSIDE YOUR HOME?: NO

## 2025-06-21 SDOH — SOCIAL STABILITY: SOCIAL INSECURITY: DO YOU FEEL ANYONE HAS EXPLOITED OR TAKEN ADVANTAGE OF YOU FINANCIALLY OR OF YOUR PERSONAL PROPERTY?: NO

## 2025-06-21 SDOH — SOCIAL STABILITY: SOCIAL INSECURITY: ARE YOU OR HAVE YOU BEEN THREATENED OR ABUSED PHYSICALLY, EMOTIONALLY, OR SEXUALLY BY ANYONE?: NO

## 2025-06-21 SDOH — SOCIAL STABILITY: SOCIAL INSECURITY: HAVE YOU HAD ANY THOUGHTS OF HARMING ANYONE ELSE?: NO

## 2025-06-21 SDOH — SOCIAL STABILITY: SOCIAL INSECURITY: ABUSE: ADULT

## 2025-06-21 SDOH — SOCIAL STABILITY: SOCIAL INSECURITY: HAS ANYONE EVER THREATENED TO HURT YOUR FAMILY OR YOUR PETS?: NO

## 2025-06-21 SDOH — SOCIAL STABILITY: SOCIAL INSECURITY: ARE THERE ANY APPARENT SIGNS OF INJURIES/BEHAVIORS THAT COULD BE RELATED TO ABUSE/NEGLECT?: NO

## 2025-06-21 ASSESSMENT — COGNITIVE AND FUNCTIONAL STATUS - GENERAL
DAILY ACTIVITIY SCORE: 17
PATIENT BASELINE BEDBOUND: NO
DRESSING REGULAR UPPER BODY CLOTHING: A LITTLE
MOBILITY SCORE: 9
MOVING TO AND FROM BED TO CHAIR: TOTAL
TOILETING: A LITTLE
CLIMB 3 TO 5 STEPS WITH RAILING: TOTAL
TURNING FROM BACK TO SIDE WHILE IN FLAT BAD: A LOT
STANDING UP FROM CHAIR USING ARMS: TOTAL
WALKING IN HOSPITAL ROOM: TOTAL
PERSONAL GROOMING: A LITTLE
DRESSING REGULAR LOWER BODY CLOTHING: A LOT
HELP NEEDED FOR BATHING: A LOT
MOVING FROM LYING ON BACK TO SITTING ON SIDE OF FLAT BED WITH BEDRAILS: A LITTLE

## 2025-06-21 ASSESSMENT — LIFESTYLE VARIABLES
EVER HAD A DRINK FIRST THING IN THE MORNING TO STEADY YOUR NERVES TO GET RID OF A HANGOVER: NO
HAVE PEOPLE ANNOYED YOU BY CRITICIZING YOUR DRINKING: NO
EVER FELT BAD OR GUILTY ABOUT YOUR DRINKING: NO
TOTAL SCORE: 0
HAVE YOU EVER FELT YOU SHOULD CUT DOWN ON YOUR DRINKING: NO

## 2025-06-21 ASSESSMENT — PAIN - FUNCTIONAL ASSESSMENT
PAIN_FUNCTIONAL_ASSESSMENT: 0-10
PAIN_FUNCTIONAL_ASSESSMENT: 0-10

## 2025-06-21 ASSESSMENT — PATIENT HEALTH QUESTIONNAIRE - PHQ9
2. FEELING DOWN, DEPRESSED OR HOPELESS: NOT AT ALL
SUM OF ALL RESPONSES TO PHQ9 QUESTIONS 1 & 2: 0
1. LITTLE INTEREST OR PLEASURE IN DOING THINGS: NOT AT ALL

## 2025-06-21 ASSESSMENT — PAIN SCALES - GENERAL
PAINLEVEL_OUTOF10: 10 - WORST POSSIBLE PAIN

## 2025-06-21 ASSESSMENT — PAIN DESCRIPTION - PROGRESSION: CLINICAL_PROGRESSION: NOT CHANGED

## 2025-06-22 ENCOUNTER — APPOINTMENT (OUTPATIENT)
Dept: RADIOLOGY | Facility: HOSPITAL | Age: 60
End: 2025-06-22
Payer: COMMERCIAL

## 2025-06-22 VITALS
BODY MASS INDEX: 29.77 KG/M2 | OXYGEN SATURATION: 93 % | HEART RATE: 101 BPM | HEIGHT: 69 IN | WEIGHT: 201 LBS | TEMPERATURE: 100 F | SYSTOLIC BLOOD PRESSURE: 114 MMHG | RESPIRATION RATE: 18 BRPM | DIASTOLIC BLOOD PRESSURE: 63 MMHG

## 2025-06-22 LAB
ALBUMIN SERPL BCP-MCNC: 3.7 G/DL (ref 3.4–5)
ALP SERPL-CCNC: 60 U/L (ref 33–136)
ALT SERPL W P-5'-P-CCNC: 25 U/L (ref 10–52)
ANION GAP SERPL CALC-SCNC: 13 MMOL/L (ref 10–20)
APPEARANCE UR: CLEAR
AST SERPL W P-5'-P-CCNC: 21 U/L (ref 9–39)
BACTERIA #/AREA URNS AUTO: ABNORMAL /HPF
BASOPHILS # BLD AUTO: 0.02 X10*3/UL (ref 0–0.1)
BASOPHILS NFR BLD AUTO: 0.2 %
BILIRUB SERPL-MCNC: 0.6 MG/DL (ref 0–1.2)
BILIRUB UR STRIP.AUTO-MCNC: NEGATIVE MG/DL
BUN SERPL-MCNC: 14 MG/DL (ref 6–23)
CALCIUM SERPL-MCNC: 8.8 MG/DL (ref 8.6–10.3)
CHLORIDE SERPL-SCNC: 97 MMOL/L (ref 98–107)
CO2 SERPL-SCNC: 23 MMOL/L (ref 21–32)
COLOR UR: YELLOW
CREAT SERPL-MCNC: 0.57 MG/DL (ref 0.5–1.3)
CRP SERPL-MCNC: 30.09 MG/DL
EGFRCR SERPLBLD CKD-EPI 2021: >90 ML/MIN/1.73M*2
EOSINOPHIL # BLD AUTO: 0 X10*3/UL (ref 0–0.7)
EOSINOPHIL NFR BLD AUTO: 0 %
ERYTHROCYTE [DISTWIDTH] IN BLOOD BY AUTOMATED COUNT: 14.4 % (ref 11.5–14.5)
GLUCOSE BLD MANUAL STRIP-MCNC: 153 MG/DL (ref 74–99)
GLUCOSE BLD MANUAL STRIP-MCNC: 154 MG/DL (ref 74–99)
GLUCOSE BLD MANUAL STRIP-MCNC: 179 MG/DL (ref 74–99)
GLUCOSE BLD MANUAL STRIP-MCNC: 199 MG/DL (ref 74–99)
GLUCOSE SERPL-MCNC: 130 MG/DL (ref 74–99)
GLUCOSE UR STRIP.AUTO-MCNC: ABNORMAL MG/DL
HCT VFR BLD AUTO: 42.7 % (ref 41–52)
HGB BLD-MCNC: 14.9 G/DL (ref 13.5–17.5)
HOLD SPECIMEN: NORMAL
IMM GRANULOCYTES # BLD AUTO: 0.08 X10*3/UL (ref 0–0.7)
IMM GRANULOCYTES NFR BLD AUTO: 1 % (ref 0–0.9)
KETONES UR STRIP.AUTO-MCNC: ABNORMAL MG/DL
LEUKOCYTE ESTERASE UR QL STRIP.AUTO: ABNORMAL
LYMPHOCYTES # BLD AUTO: 0.67 X10*3/UL (ref 1.2–4.8)
LYMPHOCYTES NFR BLD AUTO: 8.2 %
MAGNESIUM SERPL-MCNC: 2.03 MG/DL (ref 1.6–2.4)
MCH RBC QN AUTO: 31.1 PG (ref 26–34)
MCHC RBC AUTO-ENTMCNC: 34.9 G/DL (ref 32–36)
MCV RBC AUTO: 89 FL (ref 80–100)
MONOCYTES # BLD AUTO: 1.06 X10*3/UL (ref 0.1–1)
MONOCYTES NFR BLD AUTO: 13 %
NEUTROPHILS # BLD AUTO: 6.31 X10*3/UL (ref 1.2–7.7)
NEUTROPHILS NFR BLD AUTO: 77.6 %
NITRITE UR QL STRIP.AUTO: NEGATIVE
NRBC BLD-RTO: 0 /100 WBCS (ref 0–0)
PH UR STRIP.AUTO: 6 [PH]
PLATELET # BLD AUTO: 124 X10*3/UL (ref 150–450)
POTASSIUM SERPL-SCNC: 3.8 MMOL/L (ref 3.5–5.3)
PROT SERPL-MCNC: 6.8 G/DL (ref 6.4–8.2)
PROT UR STRIP.AUTO-MCNC: ABNORMAL MG/DL
PSA SERPL-MCNC: 0.39 NG/ML
PSA SERPL-MCNC: 0.41 NG/ML
RBC # BLD AUTO: 4.79 X10*6/UL (ref 4.5–5.9)
RBC # UR STRIP.AUTO: ABNORMAL MG/DL
RBC #/AREA URNS AUTO: ABNORMAL /HPF
SODIUM SERPL-SCNC: 129 MMOL/L (ref 136–145)
SP GR UR STRIP.AUTO: 1.02
SQUAMOUS #/AREA URNS AUTO: ABNORMAL /HPF
UROBILINOGEN UR STRIP.AUTO-MCNC: NORMAL MG/DL
WBC # BLD AUTO: 8.1 X10*3/UL (ref 4.4–11.3)
WBC #/AREA URNS AUTO: >50 /HPF
YEAST BUDDING #/AREA UR COMP ASSIST: PRESENT /HPF

## 2025-06-22 PROCEDURE — 2500000004 HC RX 250 GENERAL PHARMACY W/ HCPCS (ALT 636 FOR OP/ED)

## 2025-06-22 PROCEDURE — 82947 ASSAY GLUCOSE BLOOD QUANT: CPT

## 2025-06-22 PROCEDURE — 2500000002 HC RX 250 W HCPCS SELF ADMINISTERED DRUGS (ALT 637 FOR MEDICARE OP, ALT 636 FOR OP/ED): Performed by: NURSE PRACTITIONER

## 2025-06-22 PROCEDURE — 86140 C-REACTIVE PROTEIN: CPT | Performed by: NURSE PRACTITIONER

## 2025-06-22 PROCEDURE — 84153 ASSAY OF PSA TOTAL: CPT | Mod: ELYLAB | Performed by: UROLOGY

## 2025-06-22 PROCEDURE — 2500000005 HC RX 250 GENERAL PHARMACY W/O HCPCS: Performed by: UROLOGY

## 2025-06-22 PROCEDURE — 99232 SBSQ HOSP IP/OBS MODERATE 35: CPT

## 2025-06-22 PROCEDURE — 87086 URINE CULTURE/COLONY COUNT: CPT | Mod: ELYLAB | Performed by: PHYSICIAN ASSISTANT

## 2025-06-22 PROCEDURE — 72148 MRI LUMBAR SPINE W/O DYE: CPT | Performed by: STUDENT IN AN ORGANIZED HEALTH CARE EDUCATION/TRAINING PROGRAM

## 2025-06-22 PROCEDURE — 2500000004 HC RX 250 GENERAL PHARMACY W/ HCPCS (ALT 636 FOR OP/ED): Performed by: NURSE PRACTITIONER

## 2025-06-22 PROCEDURE — 84153 ASSAY OF PSA TOTAL: CPT | Mod: ELYLAB | Performed by: STUDENT IN AN ORGANIZED HEALTH CARE EDUCATION/TRAINING PROGRAM

## 2025-06-22 PROCEDURE — 36415 COLL VENOUS BLD VENIPUNCTURE: CPT | Performed by: NURSE PRACTITIONER

## 2025-06-22 PROCEDURE — 2500000001 HC RX 250 WO HCPCS SELF ADMINISTERED DRUGS (ALT 637 FOR MEDICARE OP)

## 2025-06-22 PROCEDURE — 81001 URINALYSIS AUTO W/SCOPE: CPT | Performed by: PHYSICIAN ASSISTANT

## 2025-06-22 PROCEDURE — 2500000001 HC RX 250 WO HCPCS SELF ADMINISTERED DRUGS (ALT 637 FOR MEDICARE OP): Performed by: STUDENT IN AN ORGANIZED HEALTH CARE EDUCATION/TRAINING PROGRAM

## 2025-06-22 PROCEDURE — 85025 COMPLETE CBC W/AUTO DIFF WBC: CPT | Performed by: NURSE PRACTITIONER

## 2025-06-22 PROCEDURE — 51701 INSERT BLADDER CATHETER: CPT

## 2025-06-22 PROCEDURE — 72148 MRI LUMBAR SPINE W/O DYE: CPT

## 2025-06-22 PROCEDURE — 36415 COLL VENOUS BLD VENIPUNCTURE: CPT | Performed by: UROLOGY

## 2025-06-22 PROCEDURE — 2500000005 HC RX 250 GENERAL PHARMACY W/O HCPCS: Performed by: NURSE PRACTITIONER

## 2025-06-22 PROCEDURE — 80053 COMPREHEN METABOLIC PANEL: CPT | Performed by: NURSE PRACTITIONER

## 2025-06-22 PROCEDURE — 1200000002 HC GENERAL ROOM WITH TELEMETRY DAILY

## 2025-06-22 PROCEDURE — 96372 THER/PROPH/DIAG INJ SC/IM: CPT | Performed by: NURSE PRACTITIONER

## 2025-06-22 PROCEDURE — 2500000002 HC RX 250 W HCPCS SELF ADMINISTERED DRUGS (ALT 637 FOR MEDICARE OP, ALT 636 FOR OP/ED): Performed by: STUDENT IN AN ORGANIZED HEALTH CARE EDUCATION/TRAINING PROGRAM

## 2025-06-22 PROCEDURE — 2500000001 HC RX 250 WO HCPCS SELF ADMINISTERED DRUGS (ALT 637 FOR MEDICARE OP): Performed by: NURSE PRACTITIONER

## 2025-06-22 PROCEDURE — 83735 ASSAY OF MAGNESIUM: CPT | Performed by: NURSE PRACTITIONER

## 2025-06-22 RX ORDER — VALSARTAN 160 MG/1
160 TABLET ORAL DAILY
Status: DISCONTINUED | OUTPATIENT
Start: 2025-06-22 | End: 2025-06-24 | Stop reason: HOSPADM

## 2025-06-22 RX ORDER — OXYBUTYNIN CHLORIDE 5 MG/1
5 TABLET ORAL 3 TIMES DAILY
Status: DISCONTINUED | OUTPATIENT
Start: 2025-06-22 | End: 2025-06-24 | Stop reason: HOSPADM

## 2025-06-22 RX ORDER — SILDENAFIL 100 MG/1
100 TABLET, FILM COATED ORAL AS NEEDED
COMMUNITY
Start: 2025-03-21

## 2025-06-22 RX ORDER — TAMSULOSIN HYDROCHLORIDE 0.4 MG/1
0.4 CAPSULE ORAL
COMMUNITY

## 2025-06-22 RX ORDER — DOCUSATE SODIUM 100 MG/1
100 CAPSULE, LIQUID FILLED ORAL 2 TIMES DAILY
Status: DISCONTINUED | OUTPATIENT
Start: 2025-06-22 | End: 2025-06-24 | Stop reason: HOSPADM

## 2025-06-22 RX ORDER — OXYBUTYNIN CHLORIDE 5 MG/1
5 TABLET ORAL EVERY 8 HOURS PRN
COMMUNITY
Start: 2024-04-19

## 2025-06-22 RX ORDER — TIZANIDINE 4 MG/1
4 TABLET ORAL EVERY 8 HOURS PRN
COMMUNITY
Start: 2017-05-30

## 2025-06-22 RX ORDER — ATORVASTATIN CALCIUM 40 MG/1
40 TABLET, FILM COATED ORAL NIGHTLY
COMMUNITY

## 2025-06-22 RX ORDER — POLYETHYLENE GLYCOL 3350 17 G/17G
17 POWDER, FOR SOLUTION ORAL DAILY
Status: DISCONTINUED | OUTPATIENT
Start: 2025-06-22 | End: 2025-06-24 | Stop reason: HOSPADM

## 2025-06-22 RX ORDER — CYCLOBENZAPRINE HCL 10 MG
10 TABLET ORAL NIGHTLY
COMMUNITY

## 2025-06-22 RX ORDER — TAMSULOSIN HYDROCHLORIDE 0.4 MG/1
0.4 CAPSULE ORAL NIGHTLY
Status: DISCONTINUED | OUTPATIENT
Start: 2025-06-22 | End: 2025-06-24 | Stop reason: HOSPADM

## 2025-06-22 RX ORDER — METOPROLOL SUCCINATE 25 MG/1
25 TABLET, EXTENDED RELEASE ORAL DAILY
COMMUNITY

## 2025-06-22 RX ORDER — CEFTRIAXONE 1 G/50ML
1 INJECTION, SOLUTION INTRAVENOUS EVERY 24 HOURS
Status: DISCONTINUED | OUTPATIENT
Start: 2025-06-22 | End: 2025-06-24 | Stop reason: HOSPADM

## 2025-06-22 RX ORDER — SENNOSIDES 8.6 MG/1
1 TABLET ORAL NIGHTLY
Status: DISCONTINUED | OUTPATIENT
Start: 2025-06-22 | End: 2025-06-24 | Stop reason: HOSPADM

## 2025-06-22 RX ORDER — ASPIRIN 81 MG/1
81 TABLET ORAL DAILY
COMMUNITY

## 2025-06-22 RX ORDER — PREGABALIN 50 MG/1
50 CAPSULE ORAL 2 TIMES DAILY
COMMUNITY

## 2025-06-22 RX ORDER — SIMVASTATIN 20 MG/1
20 TABLET, FILM COATED ORAL NIGHTLY
Status: DISCONTINUED | OUTPATIENT
Start: 2025-06-22 | End: 2025-06-24 | Stop reason: HOSPADM

## 2025-06-22 RX ORDER — PREGABALIN 50 MG/1
50 CAPSULE ORAL 2 TIMES DAILY
Status: DISCONTINUED | OUTPATIENT
Start: 2025-06-22 | End: 2025-06-24 | Stop reason: HOSPADM

## 2025-06-22 RX ORDER — VALSARTAN 160 MG/1
160 TABLET ORAL DAILY
COMMUNITY
Start: 2023-10-09

## 2025-06-22 RX ORDER — SIMVASTATIN 20 MG/1
1 TABLET, FILM COATED ORAL NIGHTLY
Status: ON HOLD | COMMUNITY
Start: 2017-05-30 | End: 2025-06-22 | Stop reason: WASHOUT

## 2025-06-22 RX ORDER — HYDROCHLOROTHIAZIDE 12.5 MG/1
12.5 TABLET ORAL DAILY
COMMUNITY

## 2025-06-22 RX ORDER — TIZANIDINE 4 MG/1
4 TABLET ORAL EVERY 8 HOURS PRN
Status: DISCONTINUED | OUTPATIENT
Start: 2025-06-22 | End: 2025-06-24 | Stop reason: HOSPADM

## 2025-06-22 RX ORDER — OMEPRAZOLE 20 MG/1
20 CAPSULE, DELAYED RELEASE ORAL DAILY
COMMUNITY
Start: 2025-03-21

## 2025-06-22 RX ORDER — TRAMADOL HYDROCHLORIDE 50 MG/1
25 TABLET, FILM COATED ORAL EVERY 6 HOURS PRN
Status: DISCONTINUED | OUTPATIENT
Start: 2025-06-22 | End: 2025-06-24 | Stop reason: HOSPADM

## 2025-06-22 RX ORDER — LIDOCAINE HYDROCHLORIDE 20 MG/ML
20 JELLY TOPICAL ONCE
Status: COMPLETED | OUTPATIENT
Start: 2025-06-22 | End: 2025-06-22

## 2025-06-22 RX ADMIN — PREGABALIN 50 MG: 50 CAPSULE ORAL at 00:50

## 2025-06-22 RX ADMIN — SENNOSIDES 8.6 MG: 8.6 TABLET ORAL at 21:18

## 2025-06-22 RX ADMIN — PANTOPRAZOLE SODIUM 40 MG: 40 TABLET, DELAYED RELEASE ORAL at 05:03

## 2025-06-22 RX ADMIN — PREGABALIN 50 MG: 50 CAPSULE ORAL at 21:18

## 2025-06-22 RX ADMIN — TAMSULOSIN HYDROCHLORIDE 0.4 MG: 0.4 CAPSULE ORAL at 00:50

## 2025-06-22 RX ADMIN — ENOXAPARIN SODIUM 40 MG: 40 INJECTION SUBCUTANEOUS at 00:50

## 2025-06-22 RX ADMIN — VALSARTAN 160 MG: 160 TABLET, FILM COATED ORAL at 09:05

## 2025-06-22 RX ADMIN — INSULIN LISPRO 1 UNITS: 100 INJECTION, SOLUTION INTRAVENOUS; SUBCUTANEOUS at 11:14

## 2025-06-22 RX ADMIN — INSULIN LISPRO 1 UNITS: 100 INJECTION, SOLUTION INTRAVENOUS; SUBCUTANEOUS at 16:09

## 2025-06-22 RX ADMIN — TIZANIDINE 4 MG: 4 TABLET ORAL at 23:11

## 2025-06-22 RX ADMIN — LIDOCAINE HYDROCHLORIDE 4 APPLICATION: 20 JELLY TOPICAL at 15:33

## 2025-06-22 RX ADMIN — CEFTRIAXONE 1 G: 1 INJECTION, SOLUTION INTRAVENOUS at 10:09

## 2025-06-22 RX ADMIN — INSULIN LISPRO 1 UNITS: 100 INJECTION, SOLUTION INTRAVENOUS; SUBCUTANEOUS at 06:45

## 2025-06-22 RX ADMIN — INSULIN LISPRO 1 UNITS: 100 INJECTION, SOLUTION INTRAVENOUS; SUBCUTANEOUS at 21:18

## 2025-06-22 RX ADMIN — SIMVASTATIN 20 MG: 20 TABLET, FILM COATED ORAL at 00:50

## 2025-06-22 RX ADMIN — DOCUSATE SODIUM 100 MG: 100 CAPSULE, LIQUID FILLED ORAL at 10:06

## 2025-06-22 RX ADMIN — SIMVASTATIN 20 MG: 20 TABLET, FILM COATED ORAL at 21:18

## 2025-06-22 RX ADMIN — LIDOCAINE 1 PATCH: 4 PATCH TOPICAL at 09:03

## 2025-06-22 RX ADMIN — TRAMADOL HYDROCHLORIDE 25 MG: 50 TABLET, FILM COATED ORAL at 14:49

## 2025-06-22 RX ADMIN — TAMSULOSIN HYDROCHLORIDE 0.4 MG: 0.4 CAPSULE ORAL at 21:18

## 2025-06-22 RX ADMIN — POLYETHYLENE GLYCOL 3350 17 G: 17 POWDER, FOR SOLUTION ORAL at 10:06

## 2025-06-22 RX ADMIN — TRAMADOL HYDROCHLORIDE 25 MG: 50 TABLET, FILM COATED ORAL at 21:18

## 2025-06-22 RX ADMIN — PREGABALIN 50 MG: 50 CAPSULE ORAL at 09:05

## 2025-06-22 RX ADMIN — Medication 3 MG: at 23:11

## 2025-06-22 RX ADMIN — DOCUSATE SODIUM 100 MG: 100 CAPSULE, LIQUID FILLED ORAL at 21:18

## 2025-06-22 RX ADMIN — OXYBUTYNIN CHLORIDE 5 MG: 5 TABLET ORAL at 09:05

## 2025-06-22 RX ADMIN — TIZANIDINE 4 MG: 4 TABLET ORAL at 05:01

## 2025-06-22 SDOH — HEALTH STABILITY: MENTAL HEALTH: HOW OFTEN DO YOU HAVE A DRINK CONTAINING ALCOHOL?: 2-3 TIMES A WEEK

## 2025-06-22 SDOH — SOCIAL STABILITY: SOCIAL INSECURITY: WITHIN THE LAST YEAR, HAVE YOU BEEN HUMILIATED OR EMOTIONALLY ABUSED IN OTHER WAYS BY YOUR PARTNER OR EX-PARTNER?: NO

## 2025-06-22 SDOH — SOCIAL STABILITY: SOCIAL INSECURITY
WITHIN THE LAST YEAR, HAVE YOU BEEN RAPED OR FORCED TO HAVE ANY KIND OF SEXUAL ACTIVITY BY YOUR PARTNER OR EX-PARTNER?: NO

## 2025-06-22 SDOH — ECONOMIC STABILITY: FOOD INSECURITY: WITHIN THE PAST 12 MONTHS, THE FOOD YOU BOUGHT JUST DIDN'T LAST AND YOU DIDN'T HAVE MONEY TO GET MORE.: NEVER TRUE

## 2025-06-22 SDOH — SOCIAL STABILITY: SOCIAL INSECURITY
WITHIN THE LAST YEAR, HAVE YOU BEEN KICKED, HIT, SLAPPED, OR OTHERWISE PHYSICALLY HURT BY YOUR PARTNER OR EX-PARTNER?: NO

## 2025-06-22 SDOH — ECONOMIC STABILITY: HOUSING INSECURITY: AT ANY TIME IN THE PAST 12 MONTHS, WERE YOU HOMELESS OR LIVING IN A SHELTER (INCLUDING NOW)?: NO

## 2025-06-22 SDOH — HEALTH STABILITY: MENTAL HEALTH: HOW MANY DRINKS CONTAINING ALCOHOL DO YOU HAVE ON A TYPICAL DAY WHEN YOU ARE DRINKING?: 10 OR MORE

## 2025-06-22 SDOH — ECONOMIC STABILITY: FOOD INSECURITY: WITHIN THE PAST 12 MONTHS, YOU WORRIED THAT YOUR FOOD WOULD RUN OUT BEFORE YOU GOT THE MONEY TO BUY MORE.: NEVER TRUE

## 2025-06-22 SDOH — SOCIAL STABILITY: SOCIAL INSECURITY: WITHIN THE LAST YEAR, HAVE YOU BEEN AFRAID OF YOUR PARTNER OR EX-PARTNER?: NO

## 2025-06-22 SDOH — ECONOMIC STABILITY: INCOME INSECURITY: IN THE PAST 12 MONTHS HAS THE ELECTRIC, GAS, OIL, OR WATER COMPANY THREATENED TO SHUT OFF SERVICES IN YOUR HOME?: NO

## 2025-06-22 SDOH — ECONOMIC STABILITY: HOUSING INSECURITY: IN THE LAST 12 MONTHS, WAS THERE A TIME WHEN YOU WERE NOT ABLE TO PAY THE MORTGAGE OR RENT ON TIME?: NO

## 2025-06-22 SDOH — ECONOMIC STABILITY: HOUSING INSECURITY: IN THE PAST 12 MONTHS, HOW MANY TIMES HAVE YOU MOVED WHERE YOU WERE LIVING?: 0

## 2025-06-22 SDOH — HEALTH STABILITY: MENTAL HEALTH: HOW OFTEN DO YOU HAVE SIX OR MORE DRINKS ON ONE OCCASION?: WEEKLY

## 2025-06-22 SDOH — ECONOMIC STABILITY: FOOD INSECURITY: HOW HARD IS IT FOR YOU TO PAY FOR THE VERY BASICS LIKE FOOD, HOUSING, MEDICAL CARE, AND HEATING?: NOT HARD AT ALL

## 2025-06-22 SDOH — ECONOMIC STABILITY: TRANSPORTATION INSECURITY: IN THE PAST 12 MONTHS, HAS LACK OF TRANSPORTATION KEPT YOU FROM MEDICAL APPOINTMENTS OR FROM GETTING MEDICATIONS?: NO

## 2025-06-22 ASSESSMENT — COGNITIVE AND FUNCTIONAL STATUS - GENERAL
CLIMB 3 TO 5 STEPS WITH RAILING: A LOT
WALKING IN HOSPITAL ROOM: A LOT
DAILY ACTIVITIY SCORE: 16
MOVING TO AND FROM BED TO CHAIR: A LOT
HELP NEEDED FOR BATHING: A LOT
TURNING FROM BACK TO SIDE WHILE IN FLAT BAD: A LOT
DRESSING REGULAR UPPER BODY CLOTHING: A LOT
WALKING IN HOSPITAL ROOM: A LOT
MOBILITY SCORE: 13
TURNING FROM BACK TO SIDE WHILE IN FLAT BAD: A LOT
TOILETING: A LOT
MOVING FROM LYING ON BACK TO SITTING ON SIDE OF FLAT BED WITH BEDRAILS: A LITTLE
TOILETING: A LOT
STANDING UP FROM CHAIR USING ARMS: A LOT
CLIMB 3 TO 5 STEPS WITH RAILING: A LOT
HELP NEEDED FOR BATHING: A LOT
MOBILITY SCORE: 13
MOVING TO AND FROM BED TO CHAIR: A LOT
DRESSING REGULAR LOWER BODY CLOTHING: A LOT
DAILY ACTIVITIY SCORE: 16
MOVING FROM LYING ON BACK TO SITTING ON SIDE OF FLAT BED WITH BEDRAILS: A LITTLE
DRESSING REGULAR UPPER BODY CLOTHING: A LOT
DRESSING REGULAR LOWER BODY CLOTHING: A LOT
STANDING UP FROM CHAIR USING ARMS: A LOT

## 2025-06-22 ASSESSMENT — ACTIVITIES OF DAILY LIVING (ADL)
PATIENT'S MEMORY ADEQUATE TO SAFELY COMPLETE DAILY ACTIVITIES?: YES
TOILETING: NEEDS ASSISTANCE
JUDGMENT_ADEQUATE_SAFELY_COMPLETE_DAILY_ACTIVITIES: YES
HEARING - LEFT EAR: FUNCTIONAL
ADEQUATE_TO_COMPLETE_ADL: YES
WALKS IN HOME: DEPENDENT
FEEDING YOURSELF: NEEDS ASSISTANCE
HEARING - RIGHT EAR: FUNCTIONAL
GROOMING: DEPENDENT
LACK_OF_TRANSPORTATION: NO
DRESSING YOURSELF: DEPENDENT
BATHING: NEEDS ASSISTANCE
LACK_OF_TRANSPORTATION: NO

## 2025-06-22 ASSESSMENT — PAIN SCALES - GENERAL
PAINLEVEL_OUTOF10: 8
PAINLEVEL_OUTOF10: 0 - NO PAIN
PAINLEVEL_OUTOF10: 8

## 2025-06-22 ASSESSMENT — PAIN - FUNCTIONAL ASSESSMENT
PAIN_FUNCTIONAL_ASSESSMENT: 0-10
PAIN_FUNCTIONAL_ASSESSMENT: 0-10

## 2025-06-22 ASSESSMENT — PAIN DESCRIPTION - DESCRIPTORS: DESCRIPTORS: ACHING;THROBBING

## 2025-06-22 ASSESSMENT — LIFESTYLE VARIABLES
SKIP TO QUESTIONS 9-10: 0
AUDIT-C TOTAL SCORE: 10

## 2025-06-22 NOTE — PROGRESS NOTES
"Daily Progress Note    Devin Armendariz is a 60 y.o. male on day 0 of admission presenting with Lumbar back pain.    Subjective   Patient seen in bed complaining of lower back pain.  States he has not had a bowel movement in greater than 3 days.  Pending PT/OT evaluation.  Denies chest pain or shortness of       Objective     Physical Exam    Physical Exam  Constitutional:       Appearance: He is ill-appearing.   HENT:      Head: Normocephalic.      Mouth/Throat:      Mouth: Mucous membranes are moist.   Eyes:      Pupils: Pupils are equal, round, and reactive to light.   Cardiovascular:      Rate and Rhythm: Normal rate and regular rhythm.      Heart sounds: Normal heart sounds, S1 normal and S2 normal.   Pulmonary:      Effort: Pulmonary effort is normal.      Breath sounds: Normal breath sounds.   Abdominal:      General: Bowel sounds are normal.      Palpations: Abdomen is soft.   Musculoskeletal:         General: Normal range of motion.      Cervical back: Neck supple.      Lumbar back: Tenderness present.   Skin:     General: Skin is warm.   Neurological:      Mental Status: He is alert and oriented to person, place, and time.      Motor: Weakness present.   Psychiatric:         Mood and Affect: Mood normal.         Behavior: Behavior normal.         Last Recorded Vitals  Blood pressure 98/60, pulse 84, temperature 36.4 °C (97.5 °F), resp. rate 20, height 1.753 m (5' 9\"), weight 91.2 kg (201 lb), SpO2 96%.  Intake/Output last 3 Shifts:  I/O last 3 completed shifts:  In: 1000 (11 mL/kg) [IV Piggyback:1000]  Out: 600 (6.6 mL/kg) [Urine:600 (0.2 mL/kg/hr)]  Weight: 91.2 kg     Medications  Scheduled medications  Scheduled Medications[1]  Continuous medications  Continuous Medications[2]  PRN medications  PRN Medications[3]    Labs  CBC:   Results from last 7 days   Lab Units 06/22/25  0621 06/21/25  1630   WBC AUTO x10*3/uL 8.1 12.2*   RBC AUTO x10*6/uL 4.79 5.74   HEMOGLOBIN g/dL 14.9 17.5   HEMATOCRIT % 42.7 51.7 "   MCV fL 89 90   MCH pg 31.1 30.5   MCHC g/dL 34.9 33.8   RDW % 14.4 14.2   PLATELETS AUTO x10*3/uL 124* 125*     CMP:    Results from last 7 days   Lab Units 06/22/25  0621 06/21/25  1630   SODIUM mmol/L 129* 129*   POTASSIUM mmol/L 3.8 4.3   CHLORIDE mmol/L 97* 96*   CO2 mmol/L 23 20*   BUN mg/dL 14 14   CREATININE mg/dL 0.57 0.57   GLUCOSE mg/dL 130* 138*   PROTEIN TOTAL g/dL 6.8 7.6   CALCIUM mg/dL 8.8 9.4   BILIRUBIN TOTAL mg/dL 0.6 0.9   ALK PHOS U/L 60 67   AST U/L 21 20   ALT U/L 25 29     BMP:    Results from last 7 days   Lab Units 06/22/25  0621 06/21/25  1630   SODIUM mmol/L 129* 129*   POTASSIUM mmol/L 3.8 4.3   CHLORIDE mmol/L 97* 96*   CO2 mmol/L 23 20*   BUN mg/dL 14 14   CREATININE mg/dL 0.57 0.57   CALCIUM mg/dL 8.8 9.4   GLUCOSE mg/dL 130* 138*     Magnesium:  Results from last 7 days   Lab Units 06/22/25  0621   MAGNESIUM mg/dL 2.03       Relevant Results  Results from last 7 days   Lab Units 06/22/25  0624 06/22/25  0621 06/21/25  2306 06/21/25  1630   POCT GLUCOSE mg/dL 154*  --  214*  --    GLUCOSE mg/dL  --  130*  --  138*     Lab Results   Component Value Date    HGBA1C 6.4 (H) 03/17/2025        Assessment/Plan    Lower back pain  Unable to ambulate  Hyponatremia  UTI  Constipation    -History of herniated lumbar disc not seen on imaging today  -UA positive for bacteria and leukocyte   -Will start on Rocephin  -Lidocaine patch, Zanaflex and Ultram as needed  -Bladder scan as needed  -Ortho consult  -Urology consult for retention  -MRI lower spine  -Colace, senna and MiraLAX  -PT/OT evaluation    COPD  GERD  Diabetes  HTN/HLD  -Diabetic diet with Accu-Cheks and sliding scale  -Resume home meds      DVTp: Lovenox    PLAN: Pending PT evaluation    Veronica K Radha, APRN-CNP    Plan of care was discussed extensively with patient.  Patient verbalized understanding through teach back method.  All question and concerns addressed upon examination.    Of note, this documentation is completed using  the Dragon Dictation system (voice recognition software). There may be spelling and/or grammatical errors that were not corrected prior to final submission.             [1] cefTRIAXone, 1 g, intravenous, q24h  docusate sodium, 100 mg, oral, BID  enoxaparin, 40 mg, subcutaneous, q24h  insulin lispro, 0-5 Units, subcutaneous, Before meals & nightly  lidocaine, 1 patch, transdermal, Daily  oxyBUTYnin, 5 mg, oral, TID  pantoprazole, 40 mg, oral, Daily   Or  pantoprazole, 40 mg, intravenous, Daily  polyethylene glycol, 17 g, oral, Daily  pregabalin, 50 mg, oral, BID  sennosides, 1 tablet, oral, Nightly  simvastatin, 20 mg, oral, Nightly  tamsulosin, 0.4 mg, oral, Nightly  valsartan, 160 mg, oral, Daily    [2]    [3] PRN medications: acetaminophen **OR** acetaminophen **OR** acetaminophen, dextrose, dextrose, glucagon, glucagon, melatonin, ondansetron **OR** ondansetron, tiZANidine, traMADol

## 2025-06-22 NOTE — PROGRESS NOTES
Occupational Therapy                 Therapy Communication Note    Patient Name: Devin Armendariz  MRN: [id number]  Department: [facility]  Room: [address] [place] [address]  Today's Date: 6/22/2025     Discipline: Occupational Therapy    Missed Visit: OT Missed Visit: Yes     Missed Visit Reason: Missed Visit Reason: Patient refused (Attempted PT/OT eval. Pt heavily sleeping on arrival, woke to therapist's voice. Declining PT/OT eval at this time d/t back pain and fatigue. Will re-attempt as able.)    Missed Time: Attempt at 0957

## 2025-06-22 NOTE — ED PROVIDER NOTES
HPI   Chief Complaint   Patient presents with    Fall     Pt fell on his bottom last night after he missed the bed trying to sit down, patient states he did not lose conciousness and did not hit his head nor is he on blood thinners, patient states his pain was so severe he could not move       60-year-old male with a history of HTN, HLD, CAD presenting to the ER today with pain in his low back and neck after a fall last night at 10 PM.  Patient states he was laying in his bed and was rolling over to put his phone down and did not realize he was at the edge of the bed and he rolled off the bed onto the floor.  He did hit his head but did not lose consciousness and is not on a blood thinner.  He has had some pain in his neck and lower back ever since then.  He did not injure his arms or legs from the fall and he denies chest pain or shortness of breath.  He denies any abdominal pain, nausea or vomiting.  He has not had any loss of bowel or bladder control or saddle anesthesias.  He called his girlfriend who then called 911 to bring him in.  Patient states after pain medication he is starting to feel better.  He denies any further complaints at this time.  He states that he drinks alcohol occasionally, no drug use and he does not smoke.      History provided by:  Patient (Girlfriend)          Patient History   Medical History[1]  Surgical History[2]  Family History[3]  Social History[4]    Physical Exam   ED Triage Vitals   Temperature Heart Rate Respirations BP   06/21/25 1853 06/21/25 1615 06/21/25 1615 06/21/25 1645   36.4 °C (97.5 °F) 96 (!) 21 129/89      Pulse Ox Temp Source Heart Rate Source Patient Position   06/21/25 1615 06/21/25 1853 06/21/25 1853 06/21/25 1853   95 % Temporal Monitor Lying      BP Location FiO2 (%)     06/21/25 1853 --     Left arm        Physical Exam  HENT:      Head: Normocephalic and atraumatic.      Mouth/Throat:      Mouth: Mucous membranes are moist.      Pharynx: Oropharynx is  clear. No oropharyngeal exudate or posterior oropharyngeal erythema.   Eyes:      Extraocular Movements: Extraocular movements intact.      Conjunctiva/sclera: Conjunctivae normal.      Pupils: Pupils are equal, round, and reactive to light.      Comments: No nystagmus.   Cardiovascular:      Rate and Rhythm: Normal rate and regular rhythm.      Pulses: Normal pulses.      Heart sounds: Normal heart sounds.   Pulmonary:      Effort: Pulmonary effort is normal.      Breath sounds: Normal breath sounds.   Abdominal:      General: Bowel sounds are normal. There is no distension.      Palpations: Abdomen is soft.      Tenderness: There is no abdominal tenderness. There is no right CVA tenderness, left CVA tenderness, guarding or rebound.   Musculoskeletal:      Cervical back: Normal range of motion and neck supple. No tenderness.      Comments: No scalp or facial bony tenderness.  Tenderness on palpation to the lower lumbar spinal and paraspinal regions without step-offs or obvious trauma.  Mild tenderness in the cervical paraspinal muscles without midline tenderness or step-offs.  No tenderness throughout the thoracic spine.  Chest wall nontender, atraumatic. 5/5 strength and sensation to extremities without bony tenderness or bony deformity.  No further signs of trauma on exam. NVI   Lymphadenopathy:      Cervical: No cervical adenopathy.   Skin:     General: Skin is warm.      Capillary Refill: Capillary refill takes less than 2 seconds.   Neurological:      General: No focal deficit present.      Mental Status: He is alert and oriented to person, place, and time.      Cranial Nerves: No cranial nerve deficit.      Sensory: No sensory deficit.      Motor: No weakness.      Coordination: Coordination normal.      Comments: Gcs 15, speech normal           ED Course & MDM   Diagnoses as of 06/21/25 9842   Lumbar back pain   Hyponatremia   Difficulty walking                 No data recorded                                  Medical Decision Making  60-year-old male presenting to the ER today with neck and low back pain after he rolled out of bed last night at 10 PM.  He then laid on the floor all night because he could not reach his cell phone.  He was able to get to his cell phone today, called his girlfriend who called EMS.  Patient states he hit his head when he fell but denies LOC or use of blood thinners.  He does not have a headache but has pain in his neck and lower back.  He denies injuring his arms or legs from the fall.  He denies loss of bowel or bladder control or saddle anesthesia.  He denies any further complaints and arrives afebrile with stable vital signs.  Patient was able to roll onto his side and I did visualize his spine and it does not show any acute signs of trauma but he is tender diffusely through the lower lumbar spinal and paraspinal regions as well as to the cervicals paraspinal muscles.  Heart RRR, lungs are clear and abdomen soft nontender nondistended with normal bowel sounds.  He is mentating appropriately, GCS is 15.  He is neurologically intact.  He has good distal pulses and is moving his extremities well.  Workup is initiated, patient agreed with this plan.    ECG per my interpretation normal sinus rhythm at 93 bpm with nonspecific changes.  QTc 437.  ABC with stable hemoglobin, white blood cell count of 12.2.  Glucose of 138 on CMP, hyponatremia of 129 and I did order IV fluids for the patient.  Renal function is stable.  Lactate is 2 and alcohol is negative.  Patient tells me he does not drink alcohol daily but does drink occasionally.  CT head does not show any acute abnormality.  There is no acute fracture or malalignment through the cervical, thoracic and lumbar spines.  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  There is no evidence of acute injury to the chest abdomen or pelvis.  When I reassessed the patient he states his pain is  coming back so did order him some Norflex and then we will have the patient ambulate.    Patient's girlfriend is now present at the bedside.  Initially patient tells me he would like to go home.  He is standing up next to the bed, lifting his legs trying to put his shorts on and then he decided he is having too much back pain and would like to stay in the hospital.  He is not having any neurologic deficits, he is neurovascularly intact.  Patient does live by himself, girlfriend states that she cannot take care of him tonight.  Patient is having difficulty walking, he is hyponatremic and I did discuss the case with LATA Walker who accepts the patient for admission at this time.  Patient is agreeable with admission.      Labs Reviewed   CBC WITH AUTO DIFFERENTIAL - Abnormal       Result Value    WBC 12.2 (*)     nRBC 0.0      RBC 5.74      Hemoglobin 17.5      Hematocrit 51.7      MCV 90      MCH 30.5      MCHC 33.8      RDW 14.2      Platelets 125 (*)     Neutrophils % 86.2      Immature Granulocytes %, Automated 1.1 (*)     Lymphocytes % 4.9      Monocytes % 7.6      Eosinophils % 0.0      Basophils % 0.2      Neutrophils Absolute 10.53 (*)     Immature Granulocytes Absolute, Automated 0.13      Lymphocytes Absolute 0.60 (*)     Monocytes Absolute 0.93      Eosinophils Absolute 0.00      Basophils Absolute 0.02     COMPREHENSIVE METABOLIC PANEL - Abnormal    Glucose 138 (*)     Sodium 129 (*)     Potassium 4.3      Chloride 96 (*)     Bicarbonate 20 (*)     Anion Gap 17      Urea Nitrogen 14      Creatinine 0.57      eGFR >90      Calcium 9.4      Albumin 4.4      Alkaline Phosphatase 67      Total Protein 7.6      AST 20      Bilirubin, Total 0.9      ALT 29     ALCOHOL - Normal    Alcohol <10     LACTATE - Normal    Lactate 2.0      Narrative:     Venipuncture immediately after or during the administration of Metamizole may lead to falsely low results. Testing should be performed immediately prior to Metamizole  dosing.   URINALYSIS WITH REFLEX CULTURE AND MICROSCOPIC    Narrative:     The following orders were created for panel order Urinalysis with Reflex Culture and Microscopic.  Procedure                               Abnormality         Status                     ---------                               -----------         ------                     Urinalysis with Reflex C...[[id number]]                                                 Extra Urine Gray Tube[[id number]]                                                         Please view results for these tests on the individual orders.   URINALYSIS WITH REFLEX CULTURE AND MICROSCOPIC   EXTRA URINE GRAY TUBE       CT chest abdomen pelvis wo IV contrast   Final Result   1. No evidence for acute injury to the chest, abdomen, pelvis,   thoracic, or lumbar spine.   2. There is DISH in the thoracic spine.   Signed by Christiano Madison MD      CT thoracic spine retrospective reconstruction protocol   Final Result   1. No evidence for acute injury to the chest, abdomen, pelvis,   thoracic, or lumbar spine.   2. There is DISH in the thoracic spine.   Signed by Christiano Madison MD      CT lumbar spine retrospective reconstruction protocol   Final Result   1. No evidence for acute injury to the chest, abdomen, pelvis,   thoracic, or lumbar spine.   2. There is DISH in the thoracic spine.   Signed by Christiano Madison MD      CT head wo IV contrast   Final Result   Brain:   No acute intracranial hemorrhage, mass effect, or CT apparent acute   infarct. Mild-moderate paranasal sinus mucosal thickening.        Cervical spine:   No acute fracture or traumatic malalignment.   Multilevel degenerative changes of the cervical spine, most prominent   at C5-C6.             Signed by: Pedrito Yañez 6/21/2025 7:07 PM   Dictation workstation:   [id number]      CT cervical spine wo IV contrast   Final Result   Brain:   No acute intracranial hemorrhage, mass effect, or CT apparent acute   infarct.  Mild-moderate paranasal sinus mucosal thickening.        Cervical spine:   No acute fracture or traumatic malalignment.   Multilevel degenerative changes of the cervical spine, most prominent   at C5-C6.             Signed by: Pedrito Yañez 6/21/2025 7:07 PM   Dictation workstation:   [id number]            Procedure  Procedures       [1] No past medical history on file.  [2]   Past Surgical History:  Procedure Laterality Date    OTHER SURGICAL HISTORY  09/29/2017    Brain Surgery   [3] No family history on file.  [4]   Social History  Tobacco Use    Smoking status: Not on file    Smokeless tobacco: Not on file   Substance Use Topics    Alcohol use: Not on file    Drug use: Not on file        Janie Hightower PA-C  06/21/25 1827

## 2025-06-22 NOTE — CARE PLAN
The patient's goals for the shift include      The clinical goals for the shift include patient back pain will decreased by the end of shift    Over the shift, the patient did not make progress toward the following goals. Barriers to progression include uti and urine retention. Recommendations to address these barriers include give antibiotics  Problem: Pain - Adult  Goal: Verbalizes/displays adequate comfort level or baseline comfort level  Outcome: Progressing     Problem: Safety - Adult  Goal: Free from fall injury  Outcome: Progressing     Problem: Discharge Planning  Goal: Discharge to home or other facility with appropriate resources  Outcome: Progressing     Problem: Chronic Conditions and Co-morbidities  Goal: Patient's chronic conditions and co-morbidity symptoms are monitored and maintained or improved  Outcome: Progressing     Problem: Nutrition  Goal: Nutrient intake appropriate for maintaining nutritional needs  Outcome: Progressing     Problem: Skin  Goal: Decreased wound size/increased tissue granulation at next dressing change  Outcome: Progressing  Flowsheets (Taken 6/22/2025 1619)  Decreased wound size/increased tissue granulation at next dressing change: Promote sleep for wound healing  Goal: Participates in plan/prevention/treatment measures  Outcome: Progressing  Flowsheets (Taken 6/22/2025 1619)  Participates in plan/prevention/treatment measures: Elevate heels  Goal: Prevent/manage excess moisture  Outcome: Progressing  Flowsheets (Taken 6/22/2025 1619)  Prevent/manage excess moisture: Cleanse incontinence/protect with barrier cream  Goal: Prevent/minimize sheer/friction injuries  Outcome: Progressing  Flowsheets (Taken 6/22/2025 1619)  Prevent/minimize sheer/friction injuries: HOB 30 degrees or less  Goal: Promote/optimize nutrition  Outcome: Progressing  Flowsheets (Taken 6/22/2025 1619)  Promote/optimize nutrition: Consume > 50% meals/supplements  Goal: Promote skin healing  Outcome:  Progressing  Flowsheets (Taken 6/22/2025 1619)  Promote skin healing: Turn/reposition every 2 hours/use positioning/transfer devices    and monitor output

## 2025-06-22 NOTE — PROGRESS NOTES
Physical Therapy                 Therapy Communication Note    Patient Name: Devin Armendariz  MRN: [id number]  Department: [facility]  Room: [address] [place], [place] [address]  Today's Date: 6/22/2025     Discipline: Physical Therapy    Comment:  9:56- Attempted to see pt for PT/OT evaluation, pt is lethargic and difficult to rouse. Pt refused eval despite max encouragement. Will re-attempt as schedule allows.

## 2025-06-22 NOTE — NURSING NOTE
Patient expressing need to urinate but unable to do. Attempted with urinal, then bladder scanned with 729 ml showing as volume.     Patient was able to urinate 150 ml after this. Attempted to straight cath x 2 but met resistance and was unable to advance the catheter despite use of a coude. Informed on-call MD Rivera -- will re-attempt use of urinal later.

## 2025-06-22 NOTE — CONSULTS
UROLOGY CONSULT NOTE FOR SUNDAY, 6/22/2025    SUBJECTIVE  60-year-old  male currently being referred urologically for urinary retention  Patient is somewhat of a vague historian and information also obtained from charting and history  Verbally does describe relatively mild to moderate lower urinary tract obstructive symptoms, describes a somewhat slow urinary stream, some early morning urinary hesitancy, variable degrees of urinary urgency and frequency and occasional nocturia 2-3 times depending on fluid intake and dietary factors  Urine otherwise has been clear to his eye, no hematuria no dysuria no upper tract symptoms and no flank pain fever or chills  Patient does have a history last year on 4/18/2024 of having cystoscopy with dilation of a bulbar urethral stricture measured at originally 10 Afghan dilated to 22 Afghan by urology at [facility] outpatient center and apparently has not followed up since that time, note that in the cystoscopy operative note the prostatic urethra was observed as being not obstructed, only the strictured area was narrow in the bulbar urethra  Additional medical comorbidities as listed and reviewed    OBJECTIVE  On today's visit the patient seems comfortable, bladder and abdomen is not distended no focal tenderness no flank tenderness  Renal function normal with serum creatinine of 0.57, normal hemoglobin of 14.9, normal WBC count  Urine culture is pending  Currently on Rocephin antibiotic coverage  CT scan abdomen was unremarkable and reports normal kidneys and bladder area  Currently on tamsulosin 0.4 mg daily  Apparently oxybutynin 5 mg 3 times daily/Ditropan was ordered unclear reason    IMPRESSIONS/PLAN    According to the CT scan findings, the kidneys and bladder were described as normal no evidence of any definite retention or abnormalities  Agree with Flomax 0.4 mg daily  Anticholinergic such as Ditropan may actually cause urinary retention and will  therefore hold this temporarily and observe  Will order bladder scanning after voiding to check and be sure patient is not having any residual, possibly straight cath as needed  Screening PSA    Thank you for allowing us to participate in the patient's care and management and we will follow along urologically    Additional medical and historical objective data reviewed and listed below      Current Facility-Administered Medications:     acetaminophen (Tylenol) tablet 650 mg, 650 mg, oral, q4h PRN, 650 mg at 06/21/25 2330 **OR** acetaminophen (Tylenol) oral liquid 650 mg, 650 mg, oral, q4h PRN **OR** acetaminophen (Tylenol) suppository 650 mg, 650 mg, rectal, q4h PRN, RILEY Soto    cefTRIAXone (Rocephin) 1 g in dextrose (iso) IV 50 mL, 1 g, intravenous, q24h, RILEY Martinez, Stopped at 06/22/25 1109    dextrose 50 % injection 12.5 g, 12.5 g, intravenous, q15 min PRN, RILEY Soto    dextrose 50 % injection 25 g, 25 g, intravenous, q15 min PRN, RILEY Soto    docusate sodium (Colace) capsule 100 mg, 100 mg, oral, BID, RILEY Martinez, 100 mg at 06/22/25 1006    enoxaparin (Lovenox) syringe 40 mg, 40 mg, subcutaneous, q24h, RILEY Soto, 40 mg at 06/22/25 0050    glucagon (Glucagen) injection 1 mg, 1 mg, intramuscular, q15 min PRN, RILEY Soto    glucagon (Glucagen) injection 1 mg, 1 mg, intramuscular, q15 min PRN, RILEY Soto    insulin lispro injection 0-5 Units, 0-5 Units, subcutaneous, Before meals & nightly, DIMAS Soto-CNP, 1 Units at 06/22/25 1114    lidocaine 4 % patch 1 patch, 1 patch, transdermal, Daily, RILEY Soto, 1 patch at 06/22/25 0903    melatonin tablet 3 mg, 3 mg, oral, Nightly PRN, Lovely Carreon, APRN-CNP    ondansetron (Zofran) tablet 4 mg, 4 mg, oral, q8h PRN **OR** ondansetron (Zofran) injection 4 mg, 4 mg, intravenous, q8h PRN, Lovely Carreon, APRN-CNP     oxyBUTYnin (Ditropan) tablet 5 mg, 5 mg, oral, TID, Yohannes Rivera MD, 5 mg at 06/22/25 0905    pantoprazole (ProtoNix) EC tablet 40 mg, 40 mg, oral, Daily, 40 mg at 06/22/25 0503 **OR** pantoprazole (Protonix) injection 40 mg, 40 mg, intravenous, Daily, RILEY Soto    polyethylene glycol (Glycolax, Miralax) packet 17 g, 17 g, oral, Daily, RILEY Martinez, 17 g at 06/22/25 1006    pregabalin (Lyrica) capsule 50 mg, 50 mg, oral, BID, Yohannes Rivera MD, 50 mg at 06/22/25 0905    sennosides (Senokot) tablet 8.6 mg, 1 tablet, oral, Nightly, RILEY Martinez    simvastatin (Zocor) tablet 20 mg, 20 mg, oral, Nightly, Yohannes Rivera MD, 20 mg at 06/22/25 0050    tamsulosin (Flomax) 24 hr capsule 0.4 mg, 0.4 mg, oral, Nightly, Yohannes Rivera MD, 0.4 mg at 06/22/25 0050    tiZANidine (Zanaflex) tablet 4 mg, 4 mg, oral, q8h PRN, Yohannes Rivera MD, 4 mg at 06/22/25 0501    traMADol (Ultram) tablet 25 mg, 25 mg, oral, q6h PRN, RILEY Martinez    valsartan (Diovan) tablet 160 mg, 160 mg, oral, Daily, Yohannes Rivera MD, 160 mg at 06/22/25 0905      Results for orders placed or performed during the hospital encounter of 06/21/25 (from the past 96 hours)   CBC and Auto Differential   Result Value Ref Range    WBC 12.2 (H) 4.4 - 11.3 x10*3/uL    nRBC 0.0 0.0 - 0.0 /100 WBCs    RBC 5.74 4.50 - 5.90 x10*6/uL    Hemoglobin 17.5 13.5 - 17.5 g/dL    Hematocrit 51.7 41.0 - 52.0 %    MCV 90 80 - 100 fL    MCH 30.5 26.0 - 34.0 pg    MCHC 33.8 32.0 - 36.0 g/dL    RDW 14.2 11.5 - 14.5 %    Platelets 125 (L) 150 - 450 x10*3/uL    Neutrophils % 86.2 40.0 - 80.0 %    Immature Granulocytes %, Automated 1.1 (H) 0.0 - 0.9 %    Lymphocytes % 4.9 13.0 - 44.0 %    Monocytes % 7.6 2.0 - 10.0 %    Eosinophils % 0.0 0.0 - 6.0 %    Basophils % 0.2 0.0 - 2.0 %    Neutrophils Absolute 10.53 (H) 1.20 - 7.70 x10*3/uL    Immature Granulocytes Absolute, Automated 0.13 0.00 - 0.70 x10*3/uL     Lymphocytes Absolute 0.60 (L) 1.20 - 4.80 x10*3/uL    Monocytes Absolute 0.93 0.10 - 1.00 x10*3/uL    Eosinophils Absolute 0.00 0.00 - 0.70 x10*3/uL    Basophils Absolute 0.02 0.00 - 0.10 x10*3/uL   Comprehensive metabolic panel   Result Value Ref Range    Glucose 138 (H) 74 - 99 mg/dL    Sodium 129 (L) 136 - 145 mmol/L    Potassium 4.3 3.5 - 5.3 mmol/L    Chloride 96 (L) 98 - 107 mmol/L    Bicarbonate 20 (L) 21 - 32 mmol/L    Anion Gap 17 10 - 20 mmol/L    Urea Nitrogen 14 6 - 23 mg/dL    Creatinine 0.57 0.50 - 1.30 mg/dL    eGFR >90 >60 mL/min/1.73m*2    Calcium 9.4 8.6 - 10.3 mg/dL    Albumin 4.4 3.4 - 5.0 g/dL    Alkaline Phosphatase 67 33 - 136 U/L    Total Protein 7.6 6.4 - 8.2 g/dL    AST 20 9 - 39 U/L    Bilirubin, Total 0.9 0.0 - 1.2 mg/dL    ALT 29 10 - 52 U/L   Ethanol   Result Value Ref Range    Alcohol <10 <=10 mg/dL   Lactate   Result Value Ref Range    Lactate 2.0 0.4 - 2.0 mmol/L   POCT GLUCOSE   Result Value Ref Range    POCT Glucose 214 (H) 74 - 99 mg/dL   Urinalysis with Reflex Culture and Microscopic   Result Value Ref Range    Color, Urine Yellow Light-Yellow, Yellow, Dark-Yellow    Appearance, Urine Clear Clear    Specific Gravity, Urine 1.025 1.005 - 1.035    pH, Urine 6.0 5.0, 5.5, 6.0, 6.5, 7.0, 7.5, 8.0    Protein, Urine 20 (TRACE) NEGATIVE, 10 (TRACE), 20 (TRACE) mg/dL    Glucose, Urine OVER (4+) (A) Normal mg/dL    Blood, Urine 0.2 (2+) (A) NEGATIVE mg/dL    Ketones, Urine 60 (2+) (A) NEGATIVE mg/dL    Bilirubin, Urine NEGATIVE NEGATIVE mg/dL    Urobilinogen, Urine Normal Normal mg/dL    Nitrite, Urine NEGATIVE NEGATIVE    Leukocyte Esterase, Urine 500 Kim/uL (A) NEGATIVE   Microscopic Only, Urine   Result Value Ref Range    WBC, Urine >50 (A) 1-5, NONE /HPF    RBC, Urine 6-10 (A) NONE, 1-2, 3-5 /HPF    Squamous Epithelial Cells, Urine 1-9 (SPARSE) Reference range not established. /HPF    Bacteria, Urine 4+ (A) NONE SEEN /HPF    Budding Yeast, Urine PRESENT (A) NONE /HPF    Comprehensive metabolic panel   Result Value Ref Range    Glucose 130 (H) 74 - 99 mg/dL    Sodium 129 (L) 136 - 145 mmol/L    Potassium 3.8 3.5 - 5.3 mmol/L    Chloride 97 (L) 98 - 107 mmol/L    Bicarbonate 23 21 - 32 mmol/L    Anion Gap 13 10 - 20 mmol/L    Urea Nitrogen 14 6 - 23 mg/dL    Creatinine 0.57 0.50 - 1.30 mg/dL    eGFR >90 >60 mL/min/1.73m*2    Calcium 8.8 8.6 - 10.3 mg/dL    Albumin 3.7 3.4 - 5.0 g/dL    Alkaline Phosphatase 60 33 - 136 U/L    Total Protein 6.8 6.4 - 8.2 g/dL    AST 21 9 - 39 U/L    Bilirubin, Total 0.6 0.0 - 1.2 mg/dL    ALT 25 10 - 52 U/L   CBC and Auto Differential   Result Value Ref Range    WBC 8.1 4.4 - 11.3 x10*3/uL    nRBC 0.0 0.0 - 0.0 /100 WBCs    RBC 4.79 4.50 - 5.90 x10*6/uL    Hemoglobin 14.9 13.5 - 17.5 g/dL    Hematocrit 42.7 41.0 - 52.0 %    MCV 89 80 - 100 fL    MCH 31.1 26.0 - 34.0 pg    MCHC 34.9 32.0 - 36.0 g/dL    RDW 14.4 11.5 - 14.5 %    Platelets 124 (L) 150 - 450 x10*3/uL    Neutrophils % 77.6 40.0 - 80.0 %    Immature Granulocytes %, Automated 1.0 (H) 0.0 - 0.9 %    Lymphocytes % 8.2 13.0 - 44.0 %    Monocytes % 13.0 2.0 - 10.0 %    Eosinophils % 0.0 0.0 - 6.0 %    Basophils % 0.2 0.0 - 2.0 %    Neutrophils Absolute 6.31 1.20 - 7.70 x10*3/uL    Immature Granulocytes Absolute, Automated 0.08 0.00 - 0.70 x10*3/uL    Lymphocytes Absolute 0.67 (L) 1.20 - 4.80 x10*3/uL    Monocytes Absolute 1.06 (H) 0.10 - 1.00 x10*3/uL    Eosinophils Absolute 0.00 0.00 - 0.70 x10*3/uL    Basophils Absolute 0.02 0.00 - 0.10 x10*3/uL   Magnesium   Result Value Ref Range    Magnesium 2.03 1.60 - 2.40 mg/dL   C-Reactive Protein   Result Value Ref Range    C-Reactive Protein 30.09 (H) <1.00 mg/dL   SST TOP   Result Value Ref Range    Extra Tube Hold for add-ons.    POCT GLUCOSE   Result Value Ref Range    POCT Glucose 154 (H) 74 - 99 mg/dL   POCT GLUCOSE   Result Value Ref Range    POCT Glucose 199 (H) 74 - 99 mg/dL     MR lumbar spine wo IV contrast  Result Date:  6/22/2025  Interpreted By:  Willian Valenzuela, STUDY: MR LUMBAR SPINE WO IV CONTRAST;  6/22/2025 12:43 pm   INDICATION: Signs/Symptoms:rule out cord compression - significant lower extremity weakness, urinary retention after fall.     COMPARISON: None.   ACCESSION NUMBER(S): [id number]   ORDERING CLINICIAN: ROBERTH CAMACHO   TECHNIQUE: Sagittal T1, T2, STIR, axial T1 and T2 weighted images of the lumbar spine were acquired.   FINDINGS: There is straightening of the normal lumbar lordosis.   The vertebral bodies demonstrate expected height. The marrow signal is within normal limits. No aggressive osseous lesion.   The lower thoracic cord appears unremarkable.   The prevertebral and posterior paraspinous soft tissues are unremarkable.   L1-L2: No posterior disc bulge. Bilateral neural foramina are patent. No significant spinal canal stenosis.   L2-L3: No posterior disc bulge. Bilateral neural foramina are patent. No significant spinal canal stenosis.   L3-L4: Shallow posterior disc bulge with superimposed 4 mm left foraminal disc protrusion. Mild bilateral facet arthropathy. Mild right and moderate left neural foramina narrowing. Moderate to severe narrowing of the left lateral recess. Mild-to-moderate spinal canal stenosis.   L4-L5: 5 mm posterior disc bulge combining combination with moderate bilateral facet arthropathy, resulting in mild left and moderate to severe right neural foramina narrowing with probable contacting/impinging right L4 exiting nerve root. Mild spinal canal stenosis.   L5-S1: Shallow posterior disc bulge with superimposed 3 mm right foraminal disc protrusion. Mild left and mild-to-moderate right neural foramina narrowing with probable contacting/impinging right L5 exiting nerve root. No significant spinal canal stenosis.       1. No acute osseous abnormality.   2. At L3-L4: Mild right and moderate left neural foramina narrowing. Moderate to severe narrowing of the left lateral recess.  Mild-to-moderate spinal canal stenosis.   3. At L4-L5: Mild left and moderate to severe right neural foramina narrowing with probable contacting/impinging right L4 exiting nerve root. Mild spinal canal stenosis.   4. At L5-S1: Mild left and mild-to-moderate right neural foramina narrowing with probable contacting/impinging right L5 exiting nerve root. No significant spinal canal stenosis.   MACRO: None   Signed by: Willian Valenzuela 6/22/2025 1:28 PM Dictation workstation:   [id number]    CT head wo IV contrast  Result Date: 6/21/2025  Interpreted By:  Pedrito Yañez, STUDY: CT HEAD WO IV CONTRAST; CT CERVICAL SPINE WO IV CONTRAST;  6/21/2025 6:00 pm   INDICATION: Signs/Symptoms:fall.   COMPARISON: CT brain 08/31/2016   ACCESSION NUMBER(S): [id number]; [id number]   ORDERING CLINICIAN: KATRIN DOW   TECHNIQUE: Noncontrast axial CT scan of the brain and cervical spine was performed.   FINDINGS: BRAIN:   Parenchyma: There is no intracranial hemorrhage. The grey-white differentiation is intact. There is no mass effect or midline shift. Right frontoparietal craniotomy.   CSF Spaces: The ventricles, sulci and basal cisterns are within normal limits for age.   Extra-Axial Fluid: There is no extraaxial fluid collection.   Calvarium: The calvarium is unremarkable.   Paranasal sinuses: Mild-moderate paranasal sinus mucosal thickening.   Mastoids: Clear.   Orbits: Normal.   Soft tissues: Unremarkable.   CERVICAL SPINE:   ALIGNMENT: Cervical lordosis is maintained. Atlantoaxial interval is within normal limits vertebral body heights and disc spaces are maintained.   VERTEBRAL BODIES AND POSTERIOR ELEMENTS: No cervical spine compression fracture.  No posterior element fracture.  No destructive bone lesion.   SPINAL CANAL: Multilevel degenerative changes of the cervical spine with up to moderate canal stenosis andsevere neural foraminal narrowing, most prominent at C5-C6. Additional superimposed findings compatible  with DISH.   NECK SOFT TISSUES: Within normal limits.   LUNG APICES: Imaged portion of the lung apices are within normal limits.   SKULL BASE: Within normal limits.       Brain: No acute intracranial hemorrhage, mass effect, or CT apparent acute infarct. Mild-moderate paranasal sinus mucosal thickening.   Cervical spine: No acute fracture or traumatic malalignment. Multilevel degenerative changes of the cervical spine, most prominent at C5-C6.     Signed by: Pedrito Yañez 6/21/2025 7:07 PM Dictation workstation:   [id number]    CT cervical spine wo IV contrast  Result Date: 6/21/2025  Interpreted By:  Pedrito Yañez, STUDY: CT HEAD WO IV CONTRAST; CT CERVICAL SPINE WO IV CONTRAST;  6/21/2025 6:00 pm   INDICATION: Signs/Symptoms:fall.   COMPARISON: CT brain 08/31/2016   ACCESSION NUMBER(S): [id number]; [id number]   ORDERING CLINICIAN: KATRIN DOW   TECHNIQUE: Noncontrast axial CT scan of the brain and cervical spine was performed.   FINDINGS: BRAIN:   Parenchyma: There is no intracranial hemorrhage. The grey-white differentiation is intact. There is no mass effect or midline shift. Right frontoparietal craniotomy.   CSF Spaces: The ventricles, sulci and basal cisterns are within normal limits for age.   Extra-Axial Fluid: There is no extraaxial fluid collection.   Calvarium: The calvarium is unremarkable.   Paranasal sinuses: Mild-moderate paranasal sinus mucosal thickening.   Mastoids: Clear.   Orbits: Normal.   Soft tissues: Unremarkable.   CERVICAL SPINE:   ALIGNMENT: Cervical lordosis is maintained. Atlantoaxial interval is within normal limits vertebral body heights and disc spaces are maintained.   VERTEBRAL BODIES AND POSTERIOR ELEMENTS: No cervical spine compression fracture.  No posterior element fracture.  No destructive bone lesion.   SPINAL CANAL: Multilevel degenerative changes of the cervical spine with up to moderate canal stenosis andsevere neural foraminal narrowing, most prominent  at C5-C6. Additional superimposed findings compatible with DISH.   NECK SOFT TISSUES: Within normal limits.   LUNG APICES: Imaged portion of the lung apices are within normal limits.   SKULL BASE: Within normal limits.       Brain: No acute intracranial hemorrhage, mass effect, or CT apparent acute infarct. Mild-moderate paranasal sinus mucosal thickening.   Cervical spine: No acute fracture or traumatic malalignment. Multilevel degenerative changes of the cervical spine, most prominent at C5-C6.     Signed by: Pedrito Yañez 6/21/2025 7:07 PM Dictation workstation:   [id number]    CT chest abdomen pelvis wo IV contrast  Result Date: 6/21/2025  STUDY: CT Chest, Abdomen, and Pelvis without IV Contrast, CT Thoracic Spine and Lumbar Spine without IV; 06/21/2025 at 6:04 PM INDICATION: Trauma evaluation status post fall. COMPARISON: None available. ACCESSION NUMBER(S): [id number], [id number], [id number] ORDERING CLINICIAN: KATRIN DOW TECHNIQUE: CT of the chest, abdomen, and pelvis was performed.  Contiguous axial images were obtained at 3 mm slice thickness through the chest, abdomen, and pelvis.  Coronal and sagittal reconstructions at 3 mm slice thickness were performed.  No intravenous contrast was administered.  Please note that spinal images were generated from the original CT chest, abdomen and pelvis imaging. FINDINGS: Please note that the evaluation of vessels, lymph nodes and organs is limited without intravenous contrast. CHEST: MEDIASTINUM: The heart is normal in size without pericardial effusion.  Coronary artery calcifications identified.  LUNGS/PLEURA: There is no pleural effusion, pleural thickening, or pneumothorax. The airways are patent. Lungs are clear without consolidation, interstitial disease, or suspicious nodules. LYMPH NODES: Thoracic lymph nodes are not enlarged. ABDOMEN:  LIVER: No hepatomegaly.  Smooth surface contour.  Normal attenuation.  BILE DUCTS: No intrahepatic or  extrahepatic biliary ductal dilatation.  GALLBLADDER: The gallbladder is unremarkable. STOMACH: No abnormalities identified.  PANCREAS: No masses or ductal dilatation.  SPLEEN: No splenomegaly or focal splenic lesion.  ADRENAL GLANDS: No thickening or nodules.  KIDNEYS AND URETERS: Kidneys are normal in size and location.  No renal or ureteral calculi.  PELVIS:  BLADDER: No abnormalities identified.  REPRODUCTIVE ORGANS: No abnormalities identified.  BOWEL: No abnormalities identified. The appendix is identified and is unremarkable.  VESSELS: No abnormalities identified.  Abdominal aorta is normal in caliber.  PERITONEUM/RETROPERITONEUM/LYMPH NODES: No free fluid.  No pneumoperitoneum. No lymphadenopathy.  ABDOMINAL WALL: No abnormalities identified. SOFT TISSUES: No abnormalities identified.  BONES: No acute fracture or aggressive osseous lesion. THORACIC SPINE: The alignment is anatomic.  There is no fracture or traumatic subluxation. The vertebral body heights are well maintained.  Disc spaces are preserved.  No significant central canal stenosis is demonstrated. The neural foramina are patent throughout. There is DISH. The paravertebral soft tissues are within normal limits.  LUMBAR SPINE: The alignment is anatomic.  There is no fracture or traumatic subluxation. The vertebral body heights are well maintained.  There is mild disc space narrowing with vacuum disc phenomenon at L4-5.  No significant central canal stenosis is demonstrated.  The neural foramina are patent throughout.  The paravertebral soft tissues are within normal limits.      1. No evidence for acute injury to the chest, abdomen, pelvis, thoracic, or lumbar spine. 2. There is DISH in the thoracic spine. Signed by Christiano Madison MD    CT thoracic spine retrospective reconstruction protocol  Result Date: 6/21/2025  STUDY: CT Chest, Abdomen, and Pelvis without IV Contrast, CT Thoracic Spine and Lumbar Spine without IV; 06/21/2025 at 6:04 PM  INDICATION: Trauma evaluation status post fall. COMPARISON: None available. ACCESSION NUMBER(S): [id number], [id number], [id number] ORDERING CLINICIAN: KATRIN DOW TECHNIQUE: CT of the chest, abdomen, and pelvis was performed.  Contiguous axial images were obtained at 3 mm slice thickness through the chest, abdomen, and pelvis.  Coronal and sagittal reconstructions at 3 mm slice thickness were performed.  No intravenous contrast was administered.  Please note that spinal images were generated from the original CT chest, abdomen and pelvis imaging. FINDINGS: Please note that the evaluation of vessels, lymph nodes and organs is limited without intravenous contrast. CHEST: MEDIASTINUM: The heart is normal in size without pericardial effusion.  Coronary artery calcifications identified.  LUNGS/PLEURA: There is no pleural effusion, pleural thickening, or pneumothorax. The airways are patent. Lungs are clear without consolidation, interstitial disease, or suspicious nodules. LYMPH NODES: Thoracic lymph nodes are not enlarged. ABDOMEN:  LIVER: No hepatomegaly.  Smooth surface contour.  Normal attenuation.  BILE DUCTS: No intrahepatic or extrahepatic biliary ductal dilatation.  GALLBLADDER: The gallbladder is unremarkable. STOMACH: No abnormalities identified.  PANCREAS: No masses or ductal dilatation.  SPLEEN: No splenomegaly or focal splenic lesion.  ADRENAL GLANDS: No thickening or nodules.  KIDNEYS AND URETERS: Kidneys are normal in size and location.  No renal or ureteral calculi.  PELVIS:  BLADDER: No abnormalities identified.  REPRODUCTIVE ORGANS: No abnormalities identified.  BOWEL: No abnormalities identified. The appendix is identified and is unremarkable.  VESSELS: No abnormalities identified.  Abdominal aorta is normal in caliber.  PERITONEUM/RETROPERITONEUM/LYMPH NODES: No free fluid.  No pneumoperitoneum. No lymphadenopathy.  ABDOMINAL WALL: No abnormalities identified. SOFT TISSUES: No  abnormalities identified.  BONES: No acute fracture or aggressive osseous lesion. THORACIC SPINE: The alignment is anatomic.  There is no fracture or traumatic subluxation. The vertebral body heights are well maintained.  Disc spaces are preserved.  No significant central canal stenosis is demonstrated. The neural foramina are patent throughout. There is DISH. The paravertebral soft tissues are within normal limits.  LUMBAR SPINE: The alignment is anatomic.  There is no fracture or traumatic subluxation. The vertebral body heights are well maintained.  There is mild disc space narrowing with vacuum disc phenomenon at L4-5.  No significant central canal stenosis is demonstrated.  The neural foramina are patent throughout.  The paravertebral soft tissues are within normal limits.      1. No evidence for acute injury to the chest, abdomen, pelvis, thoracic, or lumbar spine. 2. There is DISH in the thoracic spine. Signed by Christiano Madison MD    CT lumbar spine retrospective reconstruction protocol  Result Date: 6/21/2025  STUDY: CT Chest, Abdomen, and Pelvis without IV Contrast, CT Thoracic Spine and Lumbar Spine without IV; 06/21/2025 at 6:04 PM INDICATION: Trauma evaluation status post fall. COMPARISON: None available. ACCESSION NUMBER(S): [id number], [id number], [id number] ORDERING CLINICIAN: KATRIN DOW TECHNIQUE: CT of the chest, abdomen, and pelvis was performed.  Contiguous axial images were obtained at 3 mm slice thickness through the chest, abdomen, and pelvis.  Coronal and sagittal reconstructions at 3 mm slice thickness were performed.  No intravenous contrast was administered.  Please note that spinal images were generated from the original CT chest, abdomen and pelvis imaging. FINDINGS: Please note that the evaluation of vessels, lymph nodes and organs is limited without intravenous contrast. CHEST: MEDIASTINUM: The heart is normal in size without pericardial effusion.  Coronary artery  calcifications identified.  LUNGS/PLEURA: There is no pleural effusion, pleural thickening, or pneumothorax. The airways are patent. Lungs are clear without consolidation, interstitial disease, or suspicious nodules. LYMPH NODES: Thoracic lymph nodes are not enlarged. ABDOMEN:  LIVER: No hepatomegaly.  Smooth surface contour.  Normal attenuation.  BILE DUCTS: No intrahepatic or extrahepatic biliary ductal dilatation.  GALLBLADDER: The gallbladder is unremarkable. STOMACH: No abnormalities identified.  PANCREAS: No masses or ductal dilatation.  SPLEEN: No splenomegaly or focal splenic lesion.  ADRENAL GLANDS: No thickening or nodules.  KIDNEYS AND URETERS: Kidneys are normal in size and location.  No renal or ureteral calculi.  PELVIS:  BLADDER: No abnormalities identified.  REPRODUCTIVE ORGANS: No abnormalities identified.  BOWEL: No abnormalities identified. The appendix is identified and is unremarkable.  VESSELS: No abnormalities identified.  Abdominal aorta is normal in caliber.  PERITONEUM/RETROPERITONEUM/LYMPH NODES: No free fluid.  No pneumoperitoneum. No lymphadenopathy.  ABDOMINAL WALL: No abnormalities identified. SOFT TISSUES: No abnormalities identified.  BONES: No acute fracture or aggressive osseous lesion. THORACIC SPINE: The alignment is anatomic.  There is no fracture or traumatic subluxation. The vertebral body heights are well maintained.  Disc spaces are preserved.  No significant central canal stenosis is demonstrated. The neural foramina are patent throughout. There is DISH. The paravertebral soft tissues are within normal limits.  LUMBAR SPINE: The alignment is anatomic.  There is no fracture or traumatic subluxation. The vertebral body heights are well maintained.  There is mild disc space narrowing with vacuum disc phenomenon at L4-5.  No significant central canal stenosis is demonstrated.  The neural foramina are patent throughout.  The paravertebral soft tissues are within normal limits.       1. No evidence for acute injury to the chest, abdomen, pelvis, thoracic, or lumbar spine. 2. There is DISH in the thoracic spine. Signed by Christiano Madison MD

## 2025-06-22 NOTE — H&P
Medical Group History and Physical    ASSESSMENT & PLAN:     Lower back pain - Unable to ambulate  Fall from bed onto floor - unable to move  Hyponatremia  - PT OT evaluation  - Repeat labs in a.m.  - pain control: Norflex, Toradol, Tylenol  - Hx of herniated lumbar disc not seen on imaging today and no evidence of acute injury seen either - consider orthopedic consult    IDDM2  - SSI scale #1, Accu-Cheks and carb controlled diet  - Last A1c 6.4%  [03/2025]    VTE Prophylaxis: defer - ambulation    Lovely Carreon, APRN-CNP    HISTORY OF PRESENT ILLNESS:   Chief Complaint: Lower back pain s/p fall from bed onto floor - unable to get up    History Of Present Illness:    Devin Armendariz is a 60 y.o. male with a significant past medical history of lumbar herniated disc, COPD, IDDM2, GERD, HLD, HTN, SDH presenting to [facility] s/p mechanical fall from bed onto floor around 10 PM last night.  He remained on the floor until this morning because he was unable to get up d/t pain in the lower back; Pain appears more musculoskeletal in nature and out of proportion to physical exam, however he does have hx of herniated disc in lumbar region, there is no significant neurologic deficits, no paresthesias, no edema, ROM appears intact, peripheral pulses present.     Patient is uncooperative with exam yelling out in pain and rolling back-and-forth in the bed saying that he cannot stand up and the pain is too much.  He does work himself up into a very anxious state with high respiratory rates in the 40s but does calm down once providers leave.  He is requesting food; his girlfriend went to fast food restaurant for him.  Occasional alcohol use, non-smoker and denies illicit drug use.    Pain is likely musculoskeletal in nature - treated in ER with Norflex, and 1L normal saline, UA ordered but pt has not urinated; bladder scan PRN.     VSS ready for admission to general [facility] for PT/OT eval in AM    Review of systems: 10 point review  "of systems is otherwise negative except as mentioned above.    PAST HISTORIES:       Past Medical History:  Medical Problems       Problem List       * (Principal) Lumbar back pain    Overview Signed 6/21/2025  9:50 PM by RILEY Soto   Primary complaint is lower back pain; no acute injuries noted on imaging; lab work significant for low Na otherwise unremarkable.         COPD, mild (Multi)    Diabetes mellitus (Multi) (Chronic)    GERD (gastroesophageal reflux disease)    Hyperlipidemia    Primary hypertension    Incomplete bladder emptying    Lumbar herniated disc    SDH (subdural hematoma) (Multi)    Uncontrolled type 2 diabetes mellitus with hyperglycemia           Past Surgical History:  Surgical History[1]       Social History:  He has no history on file for tobacco use, alcohol use, and drug use.    Family History:  Family History[2]     Allergies:  Patient has no known allergies.    OBJECTIVE:       Last Recorded Vitals:  Vitals:    06/21/25 1715 06/21/25 1730 06/21/25 1853 06/21/25 2151   BP: 129/88  133/89 135/80   BP Location:   Left arm Left arm   Patient Position:   Lying Lying   Pulse: 96 (!) 102 (!) 104 (!) 101   Resp: (!) 38 (!) 41 20 19   Temp:   36.4 °C (97.5 °F) 36.3 °C (97.3 °F)   TempSrc:   Temporal Temporal   SpO2: 98% 96% 97% 95%   Weight:       Height:           Last I/O:  No intake/output data recorded.    Physical Exam  Vitals and nursing note reviewed.   Constitutional:       General: He is in acute distress.      Appearance: Normal appearance. He is obese.      Comments: Yelling out, increased RR, anxious, rolling back and forth in bed \"I can't stand\" no acute injury on imaging   HENT:      Head: Normocephalic and atraumatic.      Nose: Nose normal.      Mouth/Throat:      Mouth: Mucous membranes are moist.      Pharynx: Oropharynx is clear.   Eyes:      Extraocular Movements: Extraocular movements intact.      Conjunctiva/sclera: Conjunctivae normal.      Pupils: Pupils are " equal, round, and reactive to light.   Cardiovascular:      Rate and Rhythm: Normal rate and regular rhythm.      Pulses: Normal pulses.      Heart sounds: Normal heart sounds.   Pulmonary:      Effort: Pulmonary effort is normal.      Breath sounds: Normal breath sounds.   Abdominal:      General: Bowel sounds are normal. There is distension.      Palpations: Abdomen is soft.   Genitourinary:     Comments: Not assessed  Musculoskeletal:         General: Normal range of motion.      Cervical back: Normal range of motion and neck supple.      Comments: No acute injury, no edema,   Skin:     General: Skin is warm and dry.      Capillary Refill: Capillary refill takes less than 2 seconds.   Neurological:      General: No focal deficit present.      Mental Status: He is alert and oriented to person, place, and time. Mental status is at baseline.   Psychiatric:         Mood and Affect: Mood normal.         Thought Content: Thought content normal.         Judgment: Judgment normal.           Scheduled Medications  Scheduled Medications[3]  PRN Medications  PRN Medications[4]  Continuous Medications  Continuous Medications[5]    Outpatient Medications:  Prior to Admission medications    Not on File       LABS AND IMAGING:     Labs:  Results for orders placed or performed during the hospital encounter of 06/21/25 (from the past 24 hours)   CBC and Auto Differential   Result Value Ref Range    WBC 12.2 (H) 4.4 - 11.3 x10*3/uL    nRBC 0.0 0.0 - 0.0 /100 WBCs    RBC 5.74 4.50 - 5.90 x10*6/uL    Hemoglobin 17.5 13.5 - 17.5 g/dL    Hematocrit 51.7 41.0 - 52.0 %    MCV 90 80 - 100 fL    MCH 30.5 26.0 - 34.0 pg    MCHC 33.8 32.0 - 36.0 g/dL    RDW 14.2 11.5 - 14.5 %    Platelets 125 (L) 150 - 450 x10*3/uL    Neutrophils % 86.2 40.0 - 80.0 %    Immature Granulocytes %, Automated 1.1 (H) 0.0 - 0.9 %    Lymphocytes % 4.9 13.0 - 44.0 %    Monocytes % 7.6 2.0 - 10.0 %    Eosinophils % 0.0 0.0 - 6.0 %    Basophils % 0.2 0.0 - 2.0 %     Neutrophils Absolute 10.53 (H) 1.20 - 7.70 x10*3/uL    Immature Granulocytes Absolute, Automated 0.13 0.00 - 0.70 x10*3/uL    Lymphocytes Absolute 0.60 (L) 1.20 - 4.80 x10*3/uL    Monocytes Absolute 0.93 0.10 - 1.00 x10*3/uL    Eosinophils Absolute 0.00 0.00 - 0.70 x10*3/uL    Basophils Absolute 0.02 0.00 - 0.10 x10*3/uL   Comprehensive metabolic panel   Result Value Ref Range    Glucose 138 (H) 74 - 99 mg/dL    Sodium 129 (L) 136 - 145 mmol/L    Potassium 4.3 3.5 - 5.3 mmol/L    Chloride 96 (L) 98 - 107 mmol/L    Bicarbonate 20 (L) 21 - 32 mmol/L    Anion Gap 17 10 - 20 mmol/L    Urea Nitrogen 14 6 - 23 mg/dL    Creatinine 0.57 0.50 - 1.30 mg/dL    eGFR >90 >60 mL/min/1.73m*2    Calcium 9.4 8.6 - 10.3 mg/dL    Albumin 4.4 3.4 - 5.0 g/dL    Alkaline Phosphatase 67 33 - 136 U/L    Total Protein 7.6 6.4 - 8.2 g/dL    AST 20 9 - 39 U/L    Bilirubin, Total 0.9 0.0 - 1.2 mg/dL    ALT 29 10 - 52 U/L   Ethanol   Result Value Ref Range    Alcohol <10 <=10 mg/dL   Lactate   Result Value Ref Range    Lactate 2.0 0.4 - 2.0 mmol/L        Imaging:  CT chest abdomen pelvis wo IV contrast   Final Result   1. No evidence for acute injury to the chest, abdomen, pelvis,   thoracic, or lumbar spine.   2. There is DISH in the thoracic spine.   Signed by Christiano Madison MD      CT thoracic spine retrospective reconstruction protocol   Final Result   1. No evidence for acute injury to the chest, abdomen, pelvis,   thoracic, or lumbar spine.   2. There is DISH in the thoracic spine.   Signed by Christiano Madison MD      CT lumbar spine retrospective reconstruction protocol   Final Result   1. No evidence for acute injury to the chest, abdomen, pelvis,   thoracic, or lumbar spine.   2. There is DISH in the thoracic spine.   Signed by Christiano Madison MD      CT head wo IV contrast   Final Result   Brain:   No acute intracranial hemorrhage, mass effect, or CT apparent acute   infarct. Mild-moderate paranasal sinus mucosal thickening.         Cervical spine:   No acute fracture or traumatic malalignment.   Multilevel degenerative changes of the cervical spine, most prominent   at C5-C6.             Signed by: Pedrito Yañez 6/21/2025 7:07 PM   Dictation workstation:   [id number]      CT cervical spine wo IV contrast   Final Result   Brain:   No acute intracranial hemorrhage, mass effect, or CT apparent acute   infarct. Mild-moderate paranasal sinus mucosal thickening.        Cervical spine:   No acute fracture or traumatic malalignment.   Multilevel degenerative changes of the cervical spine, most prominent   at C5-C6.             Signed by: Pedrito Yañez 6/21/2025 7:07 PM   Dictation workstation:   [id number]                [1]   Past Surgical History:  Procedure Laterality Date    OTHER SURGICAL HISTORY  09/29/2017    Brain Surgery   [2] No family history on file.  [3] [4] [5]

## 2025-06-23 LAB
ALBUMIN SERPL BCP-MCNC: 3.4 G/DL (ref 3.4–5)
ALP SERPL-CCNC: 57 U/L (ref 33–136)
ALT SERPL W P-5'-P-CCNC: 24 U/L (ref 10–52)
ANION GAP SERPL CALC-SCNC: 11 MMOL/L (ref 10–20)
AST SERPL W P-5'-P-CCNC: 20 U/L (ref 9–39)
ATRIAL RATE: 93 BPM
BACTERIA UR CULT: ABNORMAL
BASOPHILS # BLD AUTO: 0.02 X10*3/UL (ref 0–0.1)
BASOPHILS NFR BLD AUTO: 0.3 %
BILIRUB SERPL-MCNC: 0.7 MG/DL (ref 0–1.2)
BUN SERPL-MCNC: 14 MG/DL (ref 6–23)
CALCIUM SERPL-MCNC: 8.6 MG/DL (ref 8.6–10.3)
CHLORIDE SERPL-SCNC: 97 MMOL/L (ref 98–107)
CO2 SERPL-SCNC: 25 MMOL/L (ref 21–32)
CREAT SERPL-MCNC: 0.62 MG/DL (ref 0.5–1.3)
EGFRCR SERPLBLD CKD-EPI 2021: >90 ML/MIN/1.73M*2
EOSINOPHIL # BLD AUTO: 0.08 X10*3/UL (ref 0–0.7)
EOSINOPHIL NFR BLD AUTO: 1.2 %
ERYTHROCYTE [DISTWIDTH] IN BLOOD BY AUTOMATED COUNT: 14.3 % (ref 11.5–14.5)
GLUCOSE BLD MANUAL STRIP-MCNC: 146 MG/DL (ref 74–99)
GLUCOSE BLD MANUAL STRIP-MCNC: 148 MG/DL (ref 74–99)
GLUCOSE BLD MANUAL STRIP-MCNC: 163 MG/DL (ref 74–99)
GLUCOSE BLD MANUAL STRIP-MCNC: 165 MG/DL (ref 74–99)
GLUCOSE SERPL-MCNC: 126 MG/DL (ref 74–99)
HCT VFR BLD AUTO: 42.1 % (ref 41–52)
HGB BLD-MCNC: 15 G/DL (ref 13.5–17.5)
HOLD SPECIMEN: NORMAL
IMM GRANULOCYTES # BLD AUTO: 0.05 X10*3/UL (ref 0–0.7)
IMM GRANULOCYTES NFR BLD AUTO: 0.7 % (ref 0–0.9)
LYMPHOCYTES # BLD AUTO: 0.94 X10*3/UL (ref 1.2–4.8)
LYMPHOCYTES NFR BLD AUTO: 13.9 %
MAGNESIUM SERPL-MCNC: 2.11 MG/DL (ref 1.6–2.4)
MCH RBC QN AUTO: 31.2 PG (ref 26–34)
MCHC RBC AUTO-ENTMCNC: 35.6 G/DL (ref 32–36)
MCV RBC AUTO: 88 FL (ref 80–100)
MONOCYTES # BLD AUTO: 0.89 X10*3/UL (ref 0.1–1)
MONOCYTES NFR BLD AUTO: 13.2 %
NEUTROPHILS # BLD AUTO: 4.76 X10*3/UL (ref 1.2–7.7)
NEUTROPHILS NFR BLD AUTO: 70.7 %
NRBC BLD-RTO: 0 /100 WBCS (ref 0–0)
P AXIS: 42 DEGREES
P OFFSET: 186 MS
P ONSET: 140 MS
PLATELET # BLD AUTO: 126 X10*3/UL (ref 150–450)
POTASSIUM SERPL-SCNC: 3.5 MMOL/L (ref 3.5–5.3)
PR INTERVAL: 146 MS
PROT SERPL-MCNC: 6.5 G/DL (ref 6.4–8.2)
Q ONSET: 213 MS
QRS COUNT: 15 BEATS
QRS DURATION: 94 MS
QT INTERVAL: 352 MS
QTC CALCULATION(BAZETT): 437 MS
QTC FREDERICIA: 407 MS
R AXIS: 37 DEGREES
RBC # BLD AUTO: 4.81 X10*6/UL (ref 4.5–5.9)
SODIUM SERPL-SCNC: 129 MMOL/L (ref 136–145)
T AXIS: 61 DEGREES
T OFFSET: 389 MS
VENTRICULAR RATE: 93 BPM
WBC # BLD AUTO: 6.7 X10*3/UL (ref 4.4–11.3)

## 2025-06-23 PROCEDURE — 2500000005 HC RX 250 GENERAL PHARMACY W/O HCPCS: Performed by: NURSE PRACTITIONER

## 2025-06-23 PROCEDURE — 84075 ASSAY ALKALINE PHOSPHATASE: CPT | Performed by: NURSE PRACTITIONER

## 2025-06-23 PROCEDURE — 2500000001 HC RX 250 WO HCPCS SELF ADMINISTERED DRUGS (ALT 637 FOR MEDICARE OP): Performed by: NURSE PRACTITIONER

## 2025-06-23 PROCEDURE — 2500000001 HC RX 250 WO HCPCS SELF ADMINISTERED DRUGS (ALT 637 FOR MEDICARE OP)

## 2025-06-23 PROCEDURE — 2500000002 HC RX 250 W HCPCS SELF ADMINISTERED DRUGS (ALT 637 FOR MEDICARE OP, ALT 636 FOR OP/ED): Performed by: STUDENT IN AN ORGANIZED HEALTH CARE EDUCATION/TRAINING PROGRAM

## 2025-06-23 PROCEDURE — 99232 SBSQ HOSP IP/OBS MODERATE 35: CPT

## 2025-06-23 PROCEDURE — 2500000004 HC RX 250 GENERAL PHARMACY W/ HCPCS (ALT 636 FOR OP/ED): Performed by: NURSE PRACTITIONER

## 2025-06-23 PROCEDURE — 2500000001 HC RX 250 WO HCPCS SELF ADMINISTERED DRUGS (ALT 637 FOR MEDICARE OP): Performed by: STUDENT IN AN ORGANIZED HEALTH CARE EDUCATION/TRAINING PROGRAM

## 2025-06-23 PROCEDURE — 36415 COLL VENOUS BLD VENIPUNCTURE: CPT | Performed by: NURSE PRACTITIONER

## 2025-06-23 PROCEDURE — 1210000001 HC SEMI-PRIVATE ROOM DAILY

## 2025-06-23 PROCEDURE — 82947 ASSAY GLUCOSE BLOOD QUANT: CPT

## 2025-06-23 PROCEDURE — 2500000002 HC RX 250 W HCPCS SELF ADMINISTERED DRUGS (ALT 637 FOR MEDICARE OP, ALT 636 FOR OP/ED)

## 2025-06-23 PROCEDURE — 83735 ASSAY OF MAGNESIUM: CPT | Performed by: NURSE PRACTITIONER

## 2025-06-23 PROCEDURE — 2500000004 HC RX 250 GENERAL PHARMACY W/ HCPCS (ALT 636 FOR OP/ED)

## 2025-06-23 PROCEDURE — 85025 COMPLETE CBC W/AUTO DIFF WBC: CPT | Performed by: NURSE PRACTITIONER

## 2025-06-23 RX ORDER — METOPROLOL SUCCINATE 25 MG/1
25 TABLET, EXTENDED RELEASE ORAL DAILY
Status: DISCONTINUED | OUTPATIENT
Start: 2025-06-23 | End: 2025-06-24 | Stop reason: HOSPADM

## 2025-06-23 RX ORDER — ATORVASTATIN CALCIUM 20 MG/1
40 TABLET, FILM COATED ORAL NIGHTLY
Status: DISCONTINUED | OUTPATIENT
Start: 2025-06-23 | End: 2025-06-24 | Stop reason: HOSPADM

## 2025-06-23 RX ORDER — HYDROCHLOROTHIAZIDE 12.5 MG/1
12.5 TABLET ORAL DAILY
Status: DISCONTINUED | OUTPATIENT
Start: 2025-06-23 | End: 2025-06-24 | Stop reason: HOSPADM

## 2025-06-23 RX ORDER — ASPIRIN 81 MG/1
81 TABLET ORAL DAILY
Status: DISCONTINUED | OUTPATIENT
Start: 2025-06-23 | End: 2025-06-24 | Stop reason: HOSPADM

## 2025-06-23 RX ORDER — CYCLOBENZAPRINE HCL 10 MG
10 TABLET ORAL NIGHTLY
Status: DISCONTINUED | OUTPATIENT
Start: 2025-06-23 | End: 2025-06-24 | Stop reason: HOSPADM

## 2025-06-23 RX ADMIN — HYDROCHLOROTHIAZIDE 12.5 MG: 12.5 TABLET ORAL at 11:06

## 2025-06-23 RX ADMIN — PREGABALIN 50 MG: 50 CAPSULE ORAL at 10:53

## 2025-06-23 RX ADMIN — SIMVASTATIN 20 MG: 20 TABLET, FILM COATED ORAL at 21:11

## 2025-06-23 RX ADMIN — TIZANIDINE 4 MG: 4 TABLET ORAL at 11:15

## 2025-06-23 RX ADMIN — TAMSULOSIN HYDROCHLORIDE 0.4 MG: 0.4 CAPSULE ORAL at 21:11

## 2025-06-23 RX ADMIN — VALSARTAN 160 MG: 160 TABLET, FILM COATED ORAL at 11:06

## 2025-06-23 RX ADMIN — METOPROLOL SUCCINATE 25 MG: 25 TABLET, EXTENDED RELEASE ORAL at 11:06

## 2025-06-23 RX ADMIN — DOCUSATE SODIUM 100 MG: 100 CAPSULE, LIQUID FILLED ORAL at 21:11

## 2025-06-23 RX ADMIN — ASPIRIN 81 MG: 81 TABLET, COATED ORAL at 10:53

## 2025-06-23 RX ADMIN — PREGABALIN 50 MG: 50 CAPSULE ORAL at 21:11

## 2025-06-23 RX ADMIN — ATORVASTATIN CALCIUM 40 MG: 20 TABLET ORAL at 21:11

## 2025-06-23 RX ADMIN — Medication 3 MG: at 21:13

## 2025-06-23 RX ADMIN — PANTOPRAZOLE SODIUM 40 MG: 40 TABLET, DELAYED RELEASE ORAL at 05:48

## 2025-06-23 RX ADMIN — SENNOSIDES 8.6 MG: 8.6 TABLET ORAL at 21:11

## 2025-06-23 RX ADMIN — TRAMADOL HYDROCHLORIDE 25 MG: 50 TABLET, FILM COATED ORAL at 13:53

## 2025-06-23 RX ADMIN — TRAMADOL HYDROCHLORIDE 25 MG: 50 TABLET, FILM COATED ORAL at 21:12

## 2025-06-23 RX ADMIN — TRAMADOL HYDROCHLORIDE 25 MG: 50 TABLET, FILM COATED ORAL at 05:48

## 2025-06-23 RX ADMIN — CYCLOBENZAPRINE HYDROCHLORIDE 10 MG: 10 TABLET, FILM COATED ORAL at 21:11

## 2025-06-23 RX ADMIN — INSULIN LISPRO 1 UNITS: 100 INJECTION, SOLUTION INTRAVENOUS; SUBCUTANEOUS at 11:15

## 2025-06-23 RX ADMIN — POLYETHYLENE GLYCOL 3350 17 G: 17 POWDER, FOR SOLUTION ORAL at 10:51

## 2025-06-23 RX ADMIN — INSULIN LISPRO 1 UNITS: 100 INJECTION, SOLUTION INTRAVENOUS; SUBCUTANEOUS at 16:01

## 2025-06-23 RX ADMIN — LIDOCAINE 1 PATCH: 4 PATCH TOPICAL at 10:52

## 2025-06-23 RX ADMIN — ACETAMINOPHEN 650 MG: 325 TABLET ORAL at 05:48

## 2025-06-23 RX ADMIN — CEFTRIAXONE 1 G: 1 INJECTION, SOLUTION INTRAVENOUS at 10:45

## 2025-06-23 RX ADMIN — ENOXAPARIN SODIUM 40 MG: 40 INJECTION SUBCUTANEOUS at 10:50

## 2025-06-23 RX ADMIN — DOCUSATE SODIUM 100 MG: 100 CAPSULE, LIQUID FILLED ORAL at 10:53

## 2025-06-23 ASSESSMENT — PAIN SCALES - GENERAL
PAINLEVEL_OUTOF10: 7
PAINLEVEL_OUTOF10: 7
PAINLEVEL_OUTOF10: 6
PAINLEVEL_OUTOF10: 8

## 2025-06-23 ASSESSMENT — COGNITIVE AND FUNCTIONAL STATUS - GENERAL
HELP NEEDED FOR BATHING: A LOT
STANDING UP FROM CHAIR USING ARMS: A LOT
MOBILITY SCORE: 13
STANDING UP FROM CHAIR USING ARMS: A LOT
CLIMB 3 TO 5 STEPS WITH RAILING: A LOT
DRESSING REGULAR LOWER BODY CLOTHING: A LOT
MOBILITY SCORE: 13
DAILY ACTIVITIY SCORE: 19
DRESSING REGULAR UPPER BODY CLOTHING: A LITTLE
MOVING TO AND FROM BED TO CHAIR: A LOT
DRESSING REGULAR LOWER BODY CLOTHING: A LOT
TURNING FROM BACK TO SIDE WHILE IN FLAT BAD: A LOT
WALKING IN HOSPITAL ROOM: A LOT
WALKING IN HOSPITAL ROOM: A LOT
MOVING FROM LYING ON BACK TO SITTING ON SIDE OF FLAT BED WITH BEDRAILS: A LITTLE
TURNING FROM BACK TO SIDE WHILE IN FLAT BAD: A LOT
HELP NEEDED FOR BATHING: A LOT
MOVING TO AND FROM BED TO CHAIR: A LOT
CLIMB 3 TO 5 STEPS WITH RAILING: A LOT
DAILY ACTIVITIY SCORE: 19
DRESSING REGULAR UPPER BODY CLOTHING: A LITTLE
MOVING FROM LYING ON BACK TO SITTING ON SIDE OF FLAT BED WITH BEDRAILS: A LITTLE

## 2025-06-23 ASSESSMENT — PAIN DESCRIPTION - DESCRIPTORS
DESCRIPTORS: ACHING;SORE
DESCRIPTORS: ACHING;DISCOMFORT

## 2025-06-23 ASSESSMENT — PAIN DESCRIPTION - LOCATION: LOCATION: BACK

## 2025-06-23 ASSESSMENT — PAIN - FUNCTIONAL ASSESSMENT
PAIN_FUNCTIONAL_ASSESSMENT: 0-10

## 2025-06-23 ASSESSMENT — PAIN DESCRIPTION - ORIENTATION: ORIENTATION: LOWER

## 2025-06-23 NOTE — PROGRESS NOTES
Occupational Therapy                 Therapy Communication Note    Patient Name: Devin Armendariz  MRN: [id number]  Department: [facility]  Room: [address] [place] [address]  Today's Date: 6/23/2025     Discipline: Occupational Therapy    Missed Visit: OT Missed Visit: Yes     Missed Visit Reason: Missed Visit Reason:  (Pending Ortho consult.)    Missed Time: Attempt    Comment: Attempted OT evaluation. Patient pending Ortho consult. Will re attempt as appropriate. Notified RN.

## 2025-06-23 NOTE — CONSULTS
"Reason For Consult  Acute on chronic low back pain.    History Of Present Illness  Devin Armendariz is a 60 y.o. male presenting with an acute flareup of his low back pain he has had chronic issues with his back has a history of herniated disc in his back per the patient he does see Dr. Lopez from pain management standpoint did not have any new injury multiple other medical issues..     Past Medical History  He has no past medical history on file.    Surgical History  He has a past surgical history that includes Other surgical history (09/29/2017).     Social History  He reports that he has been smoking cigarettes. He started smoking about 50 years ago. He has a 12.6 pack-year smoking history. He has been exposed to tobacco smoke. He has never used smokeless tobacco. He reports current alcohol use of about 24.0 standard drinks of alcohol per week. He reports that he does not use drugs.    Family History  Family History[1]     Allergies  Patient has no known allergies.    Review of Systems  Negative other than as noted     Physical Exam  Constitutional:       General: He is in acute distress.      Appearance: Normal appearance. He is obese.      Comments: Yelling out, increased RR, anxious, rolling back and forth in bed \"I can't stand\" no acute injury on imaging   HENT:      Head: Normocephalic and atraumatic.      Nose: Nose normal.      Mouth/Throat:      Mouth: Mucous membranes are moist.      Pharynx: Oropharynx is clear.   Eyes:      Extraocular Movements: Extraocular movements intact.      Conjunctiva/sclera: Conjunctivae normal.      Pupils: Pupils are equal, round, and reactive to light.   Cardiovascular:      Rate and Rhythm: Normal rate and regular rhythm.      Pulses: Normal pulses.      Heart sounds: Normal heart sounds.   Pulmonary:      Effort: Pulmonary effort is normal.      Breath sounds: Normal breath sounds.   Abdominal:      General: Bowel sounds are normal. There is distension.      Palpations: " "Abdomen is soft.   Genitourinary:     Comments: Not assessed  Musculoskeletal:         General: Normal range of motion.      Cervical back: Normal range of motion and neck supple.      Comments: No acute injury, no edema,   Skin:     General: Skin is warm and dry.      Capillary Refill: Capillary refill takes less than 2 seconds.   Neurological:      General: No focal deficit present.      Mental Status: He is alert and oriented to person, place, and time. Mental status is at baseline.   Psychiatric:         Mood and Affect: Mood normal.         Thought Content: Thought content normal.         Judgment: Judgment normal.                  Last Recorded Vitals  Blood pressure 98/56, pulse 82, temperature 36.4 °C (97.5 °F), resp. rate 18, height 1.753 m (5' 9\"), weight 91.2 kg (201 lb), SpO2 94%.    Relevant Results  CT and MRI lumbar spine are reviewed showing no acute findings but chronic degenerative changes with nerve compression.     Assessment/Plan     Acute on chronic low back pain.    Patient without specific neurologic deficit just increasing back pain.  He does have some urinary retention but this does not appear to be related to cauda equina.  I have recommended evaluation from pain management standpoint consult Dr. Watson.  Follow-up with Dr. Moy as an outpatient and or his spine surgeon.          Christiano Morel MD         [1] No family history on file.    "

## 2025-06-23 NOTE — PROGRESS NOTES
"Daily Progress Note    Devin Armendariz is a 60 y.o. male on day 1 of admission presenting with Lumbar back pain.    Subjective   Patient seen sitting on side of the bed voiding in the urinal.  Patient doing much better today.  Pending Ortho consult and PT recommendations.       Objective     Physical Exam    Physical Exam  Constitutional:       Appearance: Normal appearance.   HENT:      Head: Normocephalic.      Mouth/Throat:      Mouth: Mucous membranes are moist.   Eyes:      Pupils: Pupils are equal, round, and reactive to light.   Cardiovascular:      Rate and Rhythm: Normal rate and regular rhythm.      Heart sounds: Normal heart sounds, S1 normal and S2 normal.   Pulmonary:      Effort: Pulmonary effort is normal.      Breath sounds: Normal breath sounds.   Abdominal:      General: Bowel sounds are normal.      Palpations: Abdomen is soft.   Musculoskeletal:         General: Normal range of motion.      Cervical back: Neck supple.   Skin:     General: Skin is warm.   Neurological:      Mental Status: He is alert and oriented to person, place, and time.      Motor: Weakness present.   Psychiatric:         Mood and Affect: Mood normal.         Behavior: Behavior normal.         Last Recorded Vitals  Blood pressure 98/56, pulse 82, temperature 36.4 °C (97.5 °F), resp. rate 18, height 1.753 m (5' 9\"), weight 91.2 kg (201 lb), SpO2 94%.  Intake/Output last 3 Shifts:  I/O last 3 completed shifts:  In: 1440 (15.8 mL/kg) [P.O.:390; IV Piggyback:1050]  Out: 2650 (29.1 mL/kg) [Urine:2650 (0.8 mL/kg/hr)]  Weight: 91.2 kg     Medications  Scheduled medications  Scheduled Medications[1]  Continuous medications  Continuous Medications[2]  PRN medications  PRN Medications[3]    Labs  CBC:   Results from last 7 days   Lab Units 06/23/25  0707 06/22/25  0621 06/21/25  1630   WBC AUTO x10*3/uL 6.7 8.1 12.2*   RBC AUTO x10*6/uL 4.81 4.79 5.74   HEMOGLOBIN g/dL 15.0 14.9 17.5   HEMATOCRIT % 42.1 42.7 51.7   MCV fL 88 89 90   MCH " pg 31.2 31.1 30.5   MCHC g/dL 35.6 34.9 33.8   RDW % 14.3 14.4 14.2   PLATELETS AUTO x10*3/uL 126* 124* 125*     CMP:    Results from last 7 days   Lab Units 06/23/25  0707 06/22/25  0621 06/21/25  1630   SODIUM mmol/L 129* 129* 129*   POTASSIUM mmol/L 3.5 3.8 4.3   CHLORIDE mmol/L 97* 97* 96*   CO2 mmol/L 25 23 20*   BUN mg/dL 14 14 14   CREATININE mg/dL 0.62 0.57 0.57   GLUCOSE mg/dL 126* 130* 138*   PROTEIN TOTAL g/dL 6.5 6.8 7.6   CALCIUM mg/dL 8.6 8.8 9.4   BILIRUBIN TOTAL mg/dL 0.7 0.6 0.9   ALK PHOS U/L 57 60 67   AST U/L 20 21 20   ALT U/L 24 25 29     BMP:    Results from last 7 days   Lab Units 06/23/25  0707 06/22/25 0621 06/21/25  1630   SODIUM mmol/L 129* 129* 129*   POTASSIUM mmol/L 3.5 3.8 4.3   CHLORIDE mmol/L 97* 97* 96*   CO2 mmol/L 25 23 20*   BUN mg/dL 14 14 14   CREATININE mg/dL 0.62 0.57 0.57   CALCIUM mg/dL 8.6 8.8 9.4   GLUCOSE mg/dL 126* 130* 138*     Magnesium:  Results from last 7 days   Lab Units 06/23/25  0707 06/22/25 0621   MAGNESIUM mg/dL 2.11 2.03     Troponin:      BNP:     Lipid Panel:         Nutrition             Relevant Results  Results from last 7 days   Lab Units 06/23/25  1041 06/23/25  0739 06/23/25  0707 06/22/25  2005 06/22/25  1600 06/22/25  1034 06/22/25  0624 06/22/25 0621 06/21/25  2306 06/21/25  1630   POCT GLUCOSE mg/dL 165* 146*  --  179* 153* 199*   < >  --    < >  --    GLUCOSE mg/dL  --   --  126*  --   --   --   --  130*  --  138*    < > = values in this interval not displayed.     Lab Results   Component Value Date    HGBA1C 6.4 (H) 03/17/2025        Assessment/Plan    Lower back pain  Unable to ambulate  Hyponatremia  UTI  Constipation    -History of herniated lumbar disc not seen on imaging today  -UA positive for bacteria and leukocyte   -Will start on Rocephin  -Lidocaine patch, Zanaflex and Ultram as needed  -Bladder scan as needed  -Colace, senna and MiraLAX  -MRI lumbar spine no acute abnormality, multiple narrowing L3-S1  -Consult  orthopedic  -Consult urology agree with Flomax and bladder scan after voiding  -PT/OT evaluation    COPD  GERD  Diabetes  HTN/HLD  -Diabetic diet with Accu-Cheks and sliding scale  -Resume home meds      DVTp: Lovenox    PLAN: Pending PT recommendation    DIMAS Martinez-CNP    Plan of care was discussed extensively with patient.  Patient verbalized understanding through teach back method.  All question and concerns addressed upon examination.    Of note, this documentation is completed using the Dragon Dictation system (voice recognition software). There may be spelling and/or grammatical errors that were not corrected prior to final submission.             [1] aspirin, 81 mg, oral, Daily  atorvastatin, 40 mg, oral, Nightly  cefTRIAXone, 1 g, intravenous, q24h  cyclobenzaprine, 10 mg, oral, Nightly  docusate sodium, 100 mg, oral, BID  enoxaparin, 40 mg, subcutaneous, q24h  hydroCHLOROthiazide, 12.5 mg, oral, Daily  insulin lispro, 0-5 Units, subcutaneous, Before meals & nightly  lidocaine, 1 patch, transdermal, Daily  metoprolol succinate XL, 25 mg, oral, Daily  [Held by provider] oxyBUTYnin, 5 mg, oral, TID  pantoprazole, 40 mg, oral, Daily   Or  pantoprazole, 40 mg, intravenous, Daily  polyethylene glycol, 17 g, oral, Daily  pregabalin, 50 mg, oral, BID  sennosides, 1 tablet, oral, Nightly  simvastatin, 20 mg, oral, Nightly  tamsulosin, 0.4 mg, oral, Nightly  valsartan, 160 mg, oral, Daily    [2]    [3] PRN medications: acetaminophen **OR** acetaminophen **OR** acetaminophen, dextrose, dextrose, glucagon, glucagon, melatonin, ondansetron **OR** ondansetron, tiZANidine, traMADol

## 2025-06-23 NOTE — PROGRESS NOTES
6/23 at time of TCC visit this AM awaiting PT>OT evaluation an recommendations. Patient was sleeping.Will follow up post therapies visit.

## 2025-06-23 NOTE — PROGRESS NOTES
Physical Therapy                 Therapy Communication Note    Patient Name: Devin Armendariz  MRN: [id number]  Department: [facility]  Room: [address] [place], [place] [address]  Today's Date: 6/23/2025     Discipline: Physical Therapy    Comment:  13:04-  Attempted PT evaluation. Patient pending Ortho consult. Will re attempt as appropriate. Notified RN.

## 2025-06-23 NOTE — PROGRESS NOTES
Urology follow-up progress report for Monday, 6/23/2025  So far patient is having very good outputs and diuresis  Renal function normal with serum creatinine of 0.62, good low PSA at 0.39  Urine culture?  Positive for Streptococcus and patient is currently on Rocephin antibiotic prophylaxis  For now would continue checking the bladder scan occasionally to be sure patient is emptying bladder reasonably well as ordered and advise further depending on any signs of incomplete bladder emptying  Otherwise continue supportive care and Flomax    Additional medical and historical objective data reviewed and listed below      Current Facility-Administered Medications:     acetaminophen (Tylenol) tablet 650 mg, 650 mg, oral, q4h PRN, 650 mg at 06/23/25 0548 **OR** acetaminophen (Tylenol) oral liquid 650 mg, 650 mg, oral, q4h PRN **OR** acetaminophen (Tylenol) suppository 650 mg, 650 mg, rectal, q4h PRN, RILEY Soto    aspirin EC tablet 81 mg, 81 mg, oral, Daily, RILEY Martinez, 81 mg at 06/23/25 1053    atorvastatin (Lipitor) tablet 40 mg, 40 mg, oral, Nightly, RILEY Martinez    cefTRIAXone (Rocephin) 1 g in dextrose (iso) IV 50 mL, 1 g, intravenous, q24h, RILEY Martinez, Stopped at 06/23/25 1117    cyclobenzaprine (Flexeril) tablet 10 mg, 10 mg, oral, Nightly, RILEY Martinez    dextrose 50 % injection 12.5 g, 12.5 g, intravenous, q15 min PRN, RILEY Martinez    dextrose 50 % injection 25 g, 25 g, intravenous, q15 min PRN, RILEY Martinez    docusate sodium (Colace) capsule 100 mg, 100 mg, oral, BID, RILEY Martinez, 100 mg at 06/23/25 1053    enoxaparin (Lovenox) syringe 40 mg, 40 mg, subcutaneous, q24h, RILEY Soto, 40 mg at 06/23/25 1050    glucagon (Glucagen) injection 1 mg, 1 mg, intramuscular, q15 min PRN, Veronica Garcia, APRN-CNP    glucagon (Glucagen) injection 1 mg, 1 mg, intramuscular, q15 min PRN,  RILEY Martinez    hydroCHLOROthiazide (Microzide) tablet 12.5 mg, 12.5 mg, oral, Daily, RILEY Martinez, 12.5 mg at 06/23/25 1106    insulin lispro injection 0-5 Units, 0-5 Units, subcutaneous, Before meals & nightly, RILEY Martinez, 1 Units at 06/23/25 1115    lidocaine 4 % patch 1 patch, 1 patch, transdermal, Daily, RILEY Soto, 1 patch at 06/23/25 1052    melatonin tablet 3 mg, 3 mg, oral, Nightly PRN, RILEY Soto, 3 mg at 06/22/25 2311    metoprolol succinate XL (Toprol-XL) 24 hr tablet 25 mg, 25 mg, oral, Daily, RILEY Martinez, 25 mg at 06/23/25 1106    ondansetron (Zofran) tablet 4 mg, 4 mg, oral, q8h PRN **OR** ondansetron (Zofran) injection 4 mg, 4 mg, intravenous, q8h PRN, RILEY Soto    [Held by provider] oxyBUTYnin (Ditropan) tablet 5 mg, 5 mg, oral, TID, Yohannes Rivera MD, 5 mg at 06/22/25 0905    pantoprazole (ProtoNix) EC tablet 40 mg, 40 mg, oral, Daily, 40 mg at 06/23/25 0548 **OR** pantoprazole (Protonix) injection 40 mg, 40 mg, intravenous, Daily, RILEY Soto    polyethylene glycol (Glycolax, Miralax) packet 17 g, 17 g, oral, Daily, RILEY Martinez, 17 g at 06/23/25 1051    pregabalin (Lyrica) capsule 50 mg, 50 mg, oral, BID, Yohannes Rivera MD, 50 mg at 06/23/25 1053    sennosides (Senokot) tablet 8.6 mg, 1 tablet, oral, Nightly, RILEY Martinez, 8.6 mg at 06/22/25 2118    simvastatin (Zocor) tablet 20 mg, 20 mg, oral, Nightly, Yohannes Rivera MD, 20 mg at 06/22/25 2118    tamsulosin (Flomax) 24 hr capsule 0.4 mg, 0.4 mg, oral, Nightly, Yohannes Rivera MD, 0.4 mg at 06/22/25 2118    tiZANidine (Zanaflex) tablet 4 mg, 4 mg, oral, q8h PRN, Yohannes Rivera MD, 4 mg at 06/23/25 1115    traMADol (Ultram) tablet 25 mg, 25 mg, oral, q6h PRN, Veronica Garcia, APRN-CNP, 25 mg at 06/23/25 1353    valsartan (Diovan) tablet 160 mg, 160 mg, oral, Daily, Yohannes GUTIÉRREZ  MD Miguel, 160 mg at 06/23/25 1106      Results for orders placed or performed during the hospital encounter of 06/21/25 (from the past 96 hours)   CBC and Auto Differential   Result Value Ref Range    WBC 12.2 (H) 4.4 - 11.3 x10*3/uL    nRBC 0.0 0.0 - 0.0 /100 WBCs    RBC 5.74 4.50 - 5.90 x10*6/uL    Hemoglobin 17.5 13.5 - 17.5 g/dL    Hematocrit 51.7 41.0 - 52.0 %    MCV 90 80 - 100 fL    MCH 30.5 26.0 - 34.0 pg    MCHC 33.8 32.0 - 36.0 g/dL    RDW 14.2 11.5 - 14.5 %    Platelets 125 (L) 150 - 450 x10*3/uL    Neutrophils % 86.2 40.0 - 80.0 %    Immature Granulocytes %, Automated 1.1 (H) 0.0 - 0.9 %    Lymphocytes % 4.9 13.0 - 44.0 %    Monocytes % 7.6 2.0 - 10.0 %    Eosinophils % 0.0 0.0 - 6.0 %    Basophils % 0.2 0.0 - 2.0 %    Neutrophils Absolute 10.53 (H) 1.20 - 7.70 x10*3/uL    Immature Granulocytes Absolute, Automated 0.13 0.00 - 0.70 x10*3/uL    Lymphocytes Absolute 0.60 (L) 1.20 - 4.80 x10*3/uL    Monocytes Absolute 0.93 0.10 - 1.00 x10*3/uL    Eosinophils Absolute 0.00 0.00 - 0.70 x10*3/uL    Basophils Absolute 0.02 0.00 - 0.10 x10*3/uL   Comprehensive metabolic panel   Result Value Ref Range    Glucose 138 (H) 74 - 99 mg/dL    Sodium 129 (L) 136 - 145 mmol/L    Potassium 4.3 3.5 - 5.3 mmol/L    Chloride 96 (L) 98 - 107 mmol/L    Bicarbonate 20 (L) 21 - 32 mmol/L    Anion Gap 17 10 - 20 mmol/L    Urea Nitrogen 14 6 - 23 mg/dL    Creatinine 0.57 0.50 - 1.30 mg/dL    eGFR >90 >60 mL/min/1.73m*2    Calcium 9.4 8.6 - 10.3 mg/dL    Albumin 4.4 3.4 - 5.0 g/dL    Alkaline Phosphatase 67 33 - 136 U/L    Total Protein 7.6 6.4 - 8.2 g/dL    AST 20 9 - 39 U/L    Bilirubin, Total 0.9 0.0 - 1.2 mg/dL    ALT 29 10 - 52 U/L   Ethanol   Result Value Ref Range    Alcohol <10 <=10 mg/dL   Lactate   Result Value Ref Range    Lactate 2.0 0.4 - 2.0 mmol/L   ECG 12 lead   Result Value Ref Range    Ventricular Rate 93 BPM    Atrial Rate 93 BPM    OR Interval 146 ms    QRS Duration 94 ms    QT Interval 352 ms    QTC  Calculation(Bazett) 437 ms    P Axis 42 degrees    R Axis 37 degrees    T Axis 61 degrees    QRS Count 15 beats    Q Onset 213 ms    P Onset 140 ms    P Offset 186 ms    T Offset 389 ms    QTC Fredericia 407 ms   POCT GLUCOSE   Result Value Ref Range    POCT Glucose 214 (H) 74 - 99 mg/dL   Urinalysis with Reflex Culture and Microscopic   Result Value Ref Range    Color, Urine Yellow Light-Yellow, Yellow, Dark-Yellow    Appearance, Urine Clear Clear    Specific Gravity, Urine 1.025 1.005 - 1.035    pH, Urine 6.0 5.0, 5.5, 6.0, 6.5, 7.0, 7.5, 8.0    Protein, Urine 20 (TRACE) NEGATIVE, 10 (TRACE), 20 (TRACE) mg/dL    Glucose, Urine OVER (4+) (A) Normal mg/dL    Blood, Urine 0.2 (2+) (A) NEGATIVE mg/dL    Ketones, Urine 60 (2+) (A) NEGATIVE mg/dL    Bilirubin, Urine NEGATIVE NEGATIVE mg/dL    Urobilinogen, Urine Normal Normal mg/dL    Nitrite, Urine NEGATIVE NEGATIVE    Leukocyte Esterase, Urine 500 Kim/uL (A) NEGATIVE   Extra Urine Gray Tube   Result Value Ref Range    Extra Tube     Microscopic Only, Urine   Result Value Ref Range    WBC, Urine >50 (A) 1-5, NONE /HPF    RBC, Urine 6-10 (A) NONE, 1-2, 3-5 /HPF    Squamous Epithelial Cells, Urine 1-9 (SPARSE) Reference range not established. /HPF    Bacteria, Urine 4+ (A) NONE SEEN /HPF    Budding Yeast, Urine PRESENT (A) NONE /HPF   Urine Culture    Specimen: Clean Catch/Voided; Urine   Result Value Ref Range    Urine Culture (A)      >=100,000 CFU/mL Streptococcus agalactiae (Group B Streptococcus)   Comprehensive metabolic panel   Result Value Ref Range    Glucose 130 (H) 74 - 99 mg/dL    Sodium 129 (L) 136 - 145 mmol/L    Potassium 3.8 3.5 - 5.3 mmol/L    Chloride 97 (L) 98 - 107 mmol/L    Bicarbonate 23 21 - 32 mmol/L    Anion Gap 13 10 - 20 mmol/L    Urea Nitrogen 14 6 - 23 mg/dL    Creatinine 0.57 0.50 - 1.30 mg/dL    eGFR >90 >60 mL/min/1.73m*2    Calcium 8.8 8.6 - 10.3 mg/dL    Albumin 3.7 3.4 - 5.0 g/dL    Alkaline Phosphatase 60 33 - 136 U/L    Total Protein  6.8 6.4 - 8.2 g/dL    AST 21 9 - 39 U/L    Bilirubin, Total 0.6 0.0 - 1.2 mg/dL    ALT 25 10 - 52 U/L   CBC and Auto Differential   Result Value Ref Range    WBC 8.1 4.4 - 11.3 x10*3/uL    nRBC 0.0 0.0 - 0.0 /100 WBCs    RBC 4.79 4.50 - 5.90 x10*6/uL    Hemoglobin 14.9 13.5 - 17.5 g/dL    Hematocrit 42.7 41.0 - 52.0 %    MCV 89 80 - 100 fL    MCH 31.1 26.0 - 34.0 pg    MCHC 34.9 32.0 - 36.0 g/dL    RDW 14.4 11.5 - 14.5 %    Platelets 124 (L) 150 - 450 x10*3/uL    Neutrophils % 77.6 40.0 - 80.0 %    Immature Granulocytes %, Automated 1.0 (H) 0.0 - 0.9 %    Lymphocytes % 8.2 13.0 - 44.0 %    Monocytes % 13.0 2.0 - 10.0 %    Eosinophils % 0.0 0.0 - 6.0 %    Basophils % 0.2 0.0 - 2.0 %    Neutrophils Absolute 6.31 1.20 - 7.70 x10*3/uL    Immature Granulocytes Absolute, Automated 0.08 0.00 - 0.70 x10*3/uL    Lymphocytes Absolute 0.67 (L) 1.20 - 4.80 x10*3/uL    Monocytes Absolute 1.06 (H) 0.10 - 1.00 x10*3/uL    Eosinophils Absolute 0.00 0.00 - 0.70 x10*3/uL    Basophils Absolute 0.02 0.00 - 0.10 x10*3/uL   Magnesium   Result Value Ref Range    Magnesium 2.03 1.60 - 2.40 mg/dL   C-Reactive Protein   Result Value Ref Range    C-Reactive Protein 30.09 (H) <1.00 mg/dL   SST TOP   Result Value Ref Range    Extra Tube Hold for add-ons.    Prostate Specific Antigen   Result Value Ref Range    Prostate Specific AG 0.41 <=4.00 ng/mL   POCT GLUCOSE   Result Value Ref Range    POCT Glucose 154 (H) 74 - 99 mg/dL   POCT GLUCOSE   Result Value Ref Range    POCT Glucose 199 (H) 74 - 99 mg/dL   Prostate Specific Antigen, Screen   Result Value Ref Range    Prostate Specific Antigen,Screen 0.39 <=4.00 ng/mL   POCT GLUCOSE   Result Value Ref Range    POCT Glucose 153 (H) 74 - 99 mg/dL   POCT GLUCOSE   Result Value Ref Range    POCT Glucose 179 (H) 74 - 99 mg/dL   Comprehensive metabolic panel   Result Value Ref Range    Glucose 126 (H) 74 - 99 mg/dL    Sodium 129 (L) 136 - 145 mmol/L    Potassium 3.5 3.5 - 5.3 mmol/L    Chloride 97 (L) 98  - 107 mmol/L    Bicarbonate 25 21 - 32 mmol/L    Anion Gap 11 10 - 20 mmol/L    Urea Nitrogen 14 6 - 23 mg/dL    Creatinine 0.62 0.50 - 1.30 mg/dL    eGFR >90 >60 mL/min/1.73m*2    Calcium 8.6 8.6 - 10.3 mg/dL    Albumin 3.4 3.4 - 5.0 g/dL    Alkaline Phosphatase 57 33 - 136 U/L    Total Protein 6.5 6.4 - 8.2 g/dL    AST 20 9 - 39 U/L    Bilirubin, Total 0.7 0.0 - 1.2 mg/dL    ALT 24 10 - 52 U/L   CBC and Auto Differential   Result Value Ref Range    WBC 6.7 4.4 - 11.3 x10*3/uL    nRBC 0.0 0.0 - 0.0 /100 WBCs    RBC 4.81 4.50 - 5.90 x10*6/uL    Hemoglobin 15.0 13.5 - 17.5 g/dL    Hematocrit 42.1 41.0 - 52.0 %    MCV 88 80 - 100 fL    MCH 31.2 26.0 - 34.0 pg    MCHC 35.6 32.0 - 36.0 g/dL    RDW 14.3 11.5 - 14.5 %    Platelets 126 (L) 150 - 450 x10*3/uL    Neutrophils % 70.7 40.0 - 80.0 %    Immature Granulocytes %, Automated 0.7 0.0 - 0.9 %    Lymphocytes % 13.9 13.0 - 44.0 %    Monocytes % 13.2 2.0 - 10.0 %    Eosinophils % 1.2 0.0 - 6.0 %    Basophils % 0.3 0.0 - 2.0 %    Neutrophils Absolute 4.76 1.20 - 7.70 x10*3/uL    Immature Granulocytes Absolute, Automated 0.05 0.00 - 0.70 x10*3/uL    Lymphocytes Absolute 0.94 (L) 1.20 - 4.80 x10*3/uL    Monocytes Absolute 0.89 0.10 - 1.00 x10*3/uL    Eosinophils Absolute 0.08 0.00 - 0.70 x10*3/uL    Basophils Absolute 0.02 0.00 - 0.10 x10*3/uL   Magnesium   Result Value Ref Range    Magnesium 2.11 1.60 - 2.40 mg/dL   SST TOP   Result Value Ref Range    Extra Tube Hold for add-ons.    POCT GLUCOSE   Result Value Ref Range    POCT Glucose 146 (H) 74 - 99 mg/dL   POCT GLUCOSE   Result Value Ref Range    POCT Glucose 165 (H) 74 - 99 mg/dL     ECG 12 lead  Result Date: 6/23/2025  Normal sinus rhythm Cannot rule out Inferior infarct , age undetermined Possible Anterior infarct , age undetermined Abnormal ECG When compared with ECG of 19-JAN-2013 12:26, No significant change was found    MR lumbar spine wo IV contrast  Result Date: 6/22/2025  Interpreted By:  Willian Valenzuela,  STUDY: MR LUMBAR SPINE WO IV CONTRAST;  6/22/2025 12:43 pm   INDICATION: Signs/Symptoms:rule out cord compression - significant lower extremity weakness, urinary retention after fall.     COMPARISON: None.   ACCESSION NUMBER(S): [id number]   ORDERING CLINICIAN: ROBERTH CAMACHO   TECHNIQUE: Sagittal T1, T2, STIR, axial T1 and T2 weighted images of the lumbar spine were acquired.   FINDINGS: There is straightening of the normal lumbar lordosis.   The vertebral bodies demonstrate expected height. The marrow signal is within normal limits. No aggressive osseous lesion.   The lower thoracic cord appears unremarkable.   The prevertebral and posterior paraspinous soft tissues are unremarkable.   L1-L2: No posterior disc bulge. Bilateral neural foramina are patent. No significant spinal canal stenosis.   L2-L3: No posterior disc bulge. Bilateral neural foramina are patent. No significant spinal canal stenosis.   L3-L4: Shallow posterior disc bulge with superimposed 4 mm left foraminal disc protrusion. Mild bilateral facet arthropathy. Mild right and moderate left neural foramina narrowing. Moderate to severe narrowing of the left lateral recess. Mild-to-moderate spinal canal stenosis.   L4-L5: 5 mm posterior disc bulge combining combination with moderate bilateral facet arthropathy, resulting in mild left and moderate to severe right neural foramina narrowing with probable contacting/impinging right L4 exiting nerve root. Mild spinal canal stenosis.   L5-S1: Shallow posterior disc bulge with superimposed 3 mm right foraminal disc protrusion. Mild left and mild-to-moderate right neural foramina narrowing with probable contacting/impinging right L5 exiting nerve root. No significant spinal canal stenosis.       1. No acute osseous abnormality.   2. At L3-L4: Mild right and moderate left neural foramina narrowing. Moderate to severe narrowing of the left lateral recess. Mild-to-moderate spinal canal stenosis.   3. At L4-L5: Mild  left and moderate to severe right neural foramina narrowing with probable contacting/impinging right L4 exiting nerve root. Mild spinal canal stenosis.   4. At L5-S1: Mild left and mild-to-moderate right neural foramina narrowing with probable contacting/impinging right L5 exiting nerve root. No significant spinal canal stenosis.   MACRO: None   Signed by: Willian Valenzuela 6/22/2025 1:28 PM Dictation workstation:   [id number]    CT head wo IV contrast  Result Date: 6/21/2025  Interpreted By:  Pedrito Yañez, STUDY: CT HEAD WO IV CONTRAST; CT CERVICAL SPINE WO IV CONTRAST;  6/21/2025 6:00 pm   INDICATION: Signs/Symptoms:fall.   COMPARISON: CT brain 08/31/2016   ACCESSION NUMBER(S): [id number]; [id number]   ORDERING CLINICIAN: KATRIN DOW   TECHNIQUE: Noncontrast axial CT scan of the brain and cervical spine was performed.   FINDINGS: BRAIN:   Parenchyma: There is no intracranial hemorrhage. The grey-white differentiation is intact. There is no mass effect or midline shift. Right frontoparietal craniotomy.   CSF Spaces: The ventricles, sulci and basal cisterns are within normal limits for age.   Extra-Axial Fluid: There is no extraaxial fluid collection.   Calvarium: The calvarium is unremarkable.   Paranasal sinuses: Mild-moderate paranasal sinus mucosal thickening.   Mastoids: Clear.   Orbits: Normal.   Soft tissues: Unremarkable.   CERVICAL SPINE:   ALIGNMENT: Cervical lordosis is maintained. Atlantoaxial interval is within normal limits vertebral body heights and disc spaces are maintained.   VERTEBRAL BODIES AND POSTERIOR ELEMENTS: No cervical spine compression fracture.  No posterior element fracture.  No destructive bone lesion.   SPINAL CANAL: Multilevel degenerative changes of the cervical spine with up to moderate canal stenosis andsevere neural foraminal narrowing, most prominent at C5-C6. Additional superimposed findings compatible with DISH.   NECK SOFT TISSUES: Within normal limits.   LUNG  APICES: Imaged portion of the lung apices are within normal limits.   SKULL BASE: Within normal limits.       Brain: No acute intracranial hemorrhage, mass effect, or CT apparent acute infarct. Mild-moderate paranasal sinus mucosal thickening.   Cervical spine: No acute fracture or traumatic malalignment. Multilevel degenerative changes of the cervical spine, most prominent at C5-C6.     Signed by: Pedrito Yañez 6/21/2025 7:07 PM Dictation workstation:   [id number]    CT cervical spine wo IV contrast  Result Date: 6/21/2025  Interpreted By:  Pedrito Yañez, STUDY: CT HEAD WO IV CONTRAST; CT CERVICAL SPINE WO IV CONTRAST;  6/21/2025 6:00 pm   INDICATION: Signs/Symptoms:fall.   COMPARISON: CT brain 08/31/2016   ACCESSION NUMBER(S): [id number]; [id number]   ORDERING CLINICIAN: KATRIN DOW   TECHNIQUE: Noncontrast axial CT scan of the brain and cervical spine was performed.   FINDINGS: BRAIN:   Parenchyma: There is no intracranial hemorrhage. The grey-white differentiation is intact. There is no mass effect or midline shift. Right frontoparietal craniotomy.   CSF Spaces: The ventricles, sulci and basal cisterns are within normal limits for age.   Extra-Axial Fluid: There is no extraaxial fluid collection.   Calvarium: The calvarium is unremarkable.   Paranasal sinuses: Mild-moderate paranasal sinus mucosal thickening.   Mastoids: Clear.   Orbits: Normal.   Soft tissues: Unremarkable.   CERVICAL SPINE:   ALIGNMENT: Cervical lordosis is maintained. Atlantoaxial interval is within normal limits vertebral body heights and disc spaces are maintained.   VERTEBRAL BODIES AND POSTERIOR ELEMENTS: No cervical spine compression fracture.  No posterior element fracture.  No destructive bone lesion.   SPINAL CANAL: Multilevel degenerative changes of the cervical spine with up to moderate canal stenosis andsevere neural foraminal narrowing, most prominent at C5-C6. Additional superimposed findings compatible with  DISH.   NECK SOFT TISSUES: Within normal limits.   LUNG APICES: Imaged portion of the lung apices are within normal limits.   SKULL BASE: Within normal limits.       Brain: No acute intracranial hemorrhage, mass effect, or CT apparent acute infarct. Mild-moderate paranasal sinus mucosal thickening.   Cervical spine: No acute fracture or traumatic malalignment. Multilevel degenerative changes of the cervical spine, most prominent at C5-C6.     Signed by: Pedrito Yañez 6/21/2025 7:07 PM Dictation workstation:   [id number]    CT chest abdomen pelvis wo IV contrast  Result Date: 6/21/2025  STUDY: CT Chest, Abdomen, and Pelvis without IV Contrast, CT Thoracic Spine and Lumbar Spine without IV; 06/21/2025 at 6:04 PM INDICATION: Trauma evaluation status post fall. COMPARISON: None available. ACCESSION NUMBER(S): [id number], [id number], [id number] ORDERING CLINICIAN: KATRIN DOW TECHNIQUE: CT of the chest, abdomen, and pelvis was performed.  Contiguous axial images were obtained at 3 mm slice thickness through the chest, abdomen, and pelvis.  Coronal and sagittal reconstructions at 3 mm slice thickness were performed.  No intravenous contrast was administered.  Please note that spinal images were generated from the original CT chest, abdomen and pelvis imaging. FINDINGS: Please note that the evaluation of vessels, lymph nodes and organs is limited without intravenous contrast. CHEST: MEDIASTINUM: The heart is normal in size without pericardial effusion.  Coronary artery calcifications identified.  LUNGS/PLEURA: There is no pleural effusion, pleural thickening, or pneumothorax. The airways are patent. Lungs are clear without consolidation, interstitial disease, or suspicious nodules. LYMPH NODES: Thoracic lymph nodes are not enlarged. ABDOMEN:  LIVER: No hepatomegaly.  Smooth surface contour.  Normal attenuation.  BILE DUCTS: No intrahepatic or extrahepatic biliary ductal dilatation.  GALLBLADDER: The  gallbladder is unremarkable. STOMACH: No abnormalities identified.  PANCREAS: No masses or ductal dilatation.  SPLEEN: No splenomegaly or focal splenic lesion.  ADRENAL GLANDS: No thickening or nodules.  KIDNEYS AND URETERS: Kidneys are normal in size and location.  No renal or ureteral calculi.  PELVIS:  BLADDER: No abnormalities identified.  REPRODUCTIVE ORGANS: No abnormalities identified.  BOWEL: No abnormalities identified. The appendix is identified and is unremarkable.  VESSELS: No abnormalities identified.  Abdominal aorta is normal in caliber.  PERITONEUM/RETROPERITONEUM/LYMPH NODES: No free fluid.  No pneumoperitoneum. No lymphadenopathy.  ABDOMINAL WALL: No abnormalities identified. SOFT TISSUES: No abnormalities identified.  BONES: No acute fracture or aggressive osseous lesion. THORACIC SPINE: The alignment is anatomic.  There is no fracture or traumatic subluxation. The vertebral body heights are well maintained.  Disc spaces are preserved.  No significant central canal stenosis is demonstrated. The neural foramina are patent throughout. There is DISH. The paravertebral soft tissues are within normal limits.  LUMBAR SPINE: The alignment is anatomic.  There is no fracture or traumatic subluxation. The vertebral body heights are well maintained.  There is mild disc space narrowing with vacuum disc phenomenon at L4-5.  No significant central canal stenosis is demonstrated.  The neural foramina are patent throughout.  The paravertebral soft tissues are within normal limits.      1. No evidence for acute injury to the chest, abdomen, pelvis, thoracic, or lumbar spine. 2. There is DISH in the thoracic spine. Signed by Christiano Madison MD    CT thoracic spine retrospective reconstruction protocol  Result Date: 6/21/2025  STUDY: CT Chest, Abdomen, and Pelvis without IV Contrast, CT Thoracic Spine and Lumbar Spine without IV; 06/21/2025 at 6:04 PM INDICATION: Trauma evaluation status post fall. COMPARISON: None  available. ACCESSION NUMBER(S): [id number], [id number], [id number] ORDERING CLINICIAN: KATRIN DOW TECHNIQUE: CT of the chest, abdomen, and pelvis was performed.  Contiguous axial images were obtained at 3 mm slice thickness through the chest, abdomen, and pelvis.  Coronal and sagittal reconstructions at 3 mm slice thickness were performed.  No intravenous contrast was administered.  Please note that spinal images were generated from the original CT chest, abdomen and pelvis imaging. FINDINGS: Please note that the evaluation of vessels, lymph nodes and organs is limited without intravenous contrast. CHEST: MEDIASTINUM: The heart is normal in size without pericardial effusion.  Coronary artery calcifications identified.  LUNGS/PLEURA: There is no pleural effusion, pleural thickening, or pneumothorax. The airways are patent. Lungs are clear without consolidation, interstitial disease, or suspicious nodules. LYMPH NODES: Thoracic lymph nodes are not enlarged. ABDOMEN:  LIVER: No hepatomegaly.  Smooth surface contour.  Normal attenuation.  BILE DUCTS: No intrahepatic or extrahepatic biliary ductal dilatation.  GALLBLADDER: The gallbladder is unremarkable. STOMACH: No abnormalities identified.  PANCREAS: No masses or ductal dilatation.  SPLEEN: No splenomegaly or focal splenic lesion.  ADRENAL GLANDS: No thickening or nodules.  KIDNEYS AND URETERS: Kidneys are normal in size and location.  No renal or ureteral calculi.  PELVIS:  BLADDER: No abnormalities identified.  REPRODUCTIVE ORGANS: No abnormalities identified.  BOWEL: No abnormalities identified. The appendix is identified and is unremarkable.  VESSELS: No abnormalities identified.  Abdominal aorta is normal in caliber.  PERITONEUM/RETROPERITONEUM/LYMPH NODES: No free fluid.  No pneumoperitoneum. No lymphadenopathy.  ABDOMINAL WALL: No abnormalities identified. SOFT TISSUES: No abnormalities identified.  BONES: No acute fracture or aggressive osseous  lesion. THORACIC SPINE: The alignment is anatomic.  There is no fracture or traumatic subluxation. The vertebral body heights are well maintained.  Disc spaces are preserved.  No significant central canal stenosis is demonstrated. The neural foramina are patent throughout. There is DISH. The paravertebral soft tissues are within normal limits.  LUMBAR SPINE: The alignment is anatomic.  There is no fracture or traumatic subluxation. The vertebral body heights are well maintained.  There is mild disc space narrowing with vacuum disc phenomenon at L4-5.  No significant central canal stenosis is demonstrated.  The neural foramina are patent throughout.  The paravertebral soft tissues are within normal limits.      1. No evidence for acute injury to the chest, abdomen, pelvis, thoracic, or lumbar spine. 2. There is DISH in the thoracic spine. Signed by Christiano Madison MD    CT lumbar spine retrospective reconstruction protocol  Result Date: 6/21/2025  STUDY: CT Chest, Abdomen, and Pelvis without IV Contrast, CT Thoracic Spine and Lumbar Spine without IV; 06/21/2025 at 6:04 PM INDICATION: Trauma evaluation status post fall. COMPARISON: None available. ACCESSION NUMBER(S): [id number], [id number], [id number] ORDERING CLINICIAN: KATRIN DOW TECHNIQUE: CT of the chest, abdomen, and pelvis was performed.  Contiguous axial images were obtained at 3 mm slice thickness through the chest, abdomen, and pelvis.  Coronal and sagittal reconstructions at 3 mm slice thickness were performed.  No intravenous contrast was administered.  Please note that spinal images were generated from the original CT chest, abdomen and pelvis imaging. FINDINGS: Please note that the evaluation of vessels, lymph nodes and organs is limited without intravenous contrast. CHEST: MEDIASTINUM: The heart is normal in size without pericardial effusion.  Coronary artery calcifications identified.  LUNGS/PLEURA: There is no pleural effusion, pleural  thickening, or pneumothorax. The airways are patent. Lungs are clear without consolidation, interstitial disease, or suspicious nodules. LYMPH NODES: Thoracic lymph nodes are not enlarged. ABDOMEN:  LIVER: No hepatomegaly.  Smooth surface contour.  Normal attenuation.  BILE DUCTS: No intrahepatic or extrahepatic biliary ductal dilatation.  GALLBLADDER: The gallbladder is unremarkable. STOMACH: No abnormalities identified.  PANCREAS: No masses or ductal dilatation.  SPLEEN: No splenomegaly or focal splenic lesion.  ADRENAL GLANDS: No thickening or nodules.  KIDNEYS AND URETERS: Kidneys are normal in size and location.  No renal or ureteral calculi.  PELVIS:  BLADDER: No abnormalities identified.  REPRODUCTIVE ORGANS: No abnormalities identified.  BOWEL: No abnormalities identified. The appendix is identified and is unremarkable.  VESSELS: No abnormalities identified.  Abdominal aorta is normal in caliber.  PERITONEUM/RETROPERITONEUM/LYMPH NODES: No free fluid.  No pneumoperitoneum. No lymphadenopathy.  ABDOMINAL WALL: No abnormalities identified. SOFT TISSUES: No abnormalities identified.  BONES: No acute fracture or aggressive osseous lesion. THORACIC SPINE: The alignment is anatomic.  There is no fracture or traumatic subluxation. The vertebral body heights are well maintained.  Disc spaces are preserved.  No significant central canal stenosis is demonstrated. The neural foramina are patent throughout. There is DISH. The paravertebral soft tissues are within normal limits.  LUMBAR SPINE: The alignment is anatomic.  There is no fracture or traumatic subluxation. The vertebral body heights are well maintained.  There is mild disc space narrowing with vacuum disc phenomenon at L4-5.  No significant central canal stenosis is demonstrated.  The neural foramina are patent throughout.  The paravertebral soft tissues are within normal limits.      1. No evidence for acute injury to the chest, abdomen, pelvis, thoracic, or  lumbar spine. 2. There is DISH in the thoracic spine. Signed by Christiano Madison MD

## 2025-06-24 VITALS
HEIGHT: 69 IN | DIASTOLIC BLOOD PRESSURE: 68 MMHG | TEMPERATURE: 97.3 F | HEART RATE: 85 BPM | BODY MASS INDEX: 29.77 KG/M2 | OXYGEN SATURATION: 93 % | RESPIRATION RATE: 18 BRPM | SYSTOLIC BLOOD PRESSURE: 100 MMHG | WEIGHT: 201 LBS

## 2025-06-24 LAB
ALBUMIN SERPL BCP-MCNC: 3.5 G/DL (ref 3.4–5)
ALP SERPL-CCNC: 59 U/L (ref 33–136)
ALT SERPL W P-5'-P-CCNC: 29 U/L (ref 10–52)
ANION GAP SERPL CALC-SCNC: 13 MMOL/L (ref 10–20)
AST SERPL W P-5'-P-CCNC: 22 U/L (ref 9–39)
BASOPHILS # BLD AUTO: 0.03 X10*3/UL (ref 0–0.1)
BASOPHILS NFR BLD AUTO: 0.4 %
BILIRUB SERPL-MCNC: 0.7 MG/DL (ref 0–1.2)
BUN SERPL-MCNC: 13 MG/DL (ref 6–23)
CALCIUM SERPL-MCNC: 9.1 MG/DL (ref 8.6–10.3)
CHLORIDE SERPL-SCNC: 96 MMOL/L (ref 98–107)
CO2 SERPL-SCNC: 26 MMOL/L (ref 21–32)
CREAT SERPL-MCNC: 0.59 MG/DL (ref 0.5–1.3)
EGFRCR SERPLBLD CKD-EPI 2021: >90 ML/MIN/1.73M*2
EOSINOPHIL # BLD AUTO: 0.14 X10*3/UL (ref 0–0.7)
EOSINOPHIL NFR BLD AUTO: 2 %
ERYTHROCYTE [DISTWIDTH] IN BLOOD BY AUTOMATED COUNT: 14.2 % (ref 11.5–14.5)
GLUCOSE BLD MANUAL STRIP-MCNC: 143 MG/DL (ref 74–99)
GLUCOSE BLD MANUAL STRIP-MCNC: 146 MG/DL (ref 74–99)
GLUCOSE BLD MANUAL STRIP-MCNC: 184 MG/DL (ref 74–99)
GLUCOSE SERPL-MCNC: 120 MG/DL (ref 74–99)
HCT VFR BLD AUTO: 44.3 % (ref 41–52)
HGB BLD-MCNC: 15.3 G/DL (ref 13.5–17.5)
HOLD SPECIMEN: NORMAL
IMM GRANULOCYTES # BLD AUTO: 0.06 X10*3/UL (ref 0–0.7)
IMM GRANULOCYTES NFR BLD AUTO: 0.9 % (ref 0–0.9)
LYMPHOCYTES # BLD AUTO: 1.14 X10*3/UL (ref 1.2–4.8)
LYMPHOCYTES NFR BLD AUTO: 16.6 %
MAGNESIUM SERPL-MCNC: 1.96 MG/DL (ref 1.6–2.4)
MCH RBC QN AUTO: 30.4 PG (ref 26–34)
MCHC RBC AUTO-ENTMCNC: 34.5 G/DL (ref 32–36)
MCV RBC AUTO: 88 FL (ref 80–100)
MONOCYTES # BLD AUTO: 0.88 X10*3/UL (ref 0.1–1)
MONOCYTES NFR BLD AUTO: 12.8 %
NEUTROPHILS # BLD AUTO: 4.61 X10*3/UL (ref 1.2–7.7)
NEUTROPHILS NFR BLD AUTO: 67.3 %
NRBC BLD-RTO: 0 /100 WBCS (ref 0–0)
PLATELET # BLD AUTO: 147 X10*3/UL (ref 150–450)
POTASSIUM SERPL-SCNC: 3.4 MMOL/L (ref 3.5–5.3)
PROT SERPL-MCNC: 6.9 G/DL (ref 6.4–8.2)
RBC # BLD AUTO: 5.04 X10*6/UL (ref 4.5–5.9)
SODIUM SERPL-SCNC: 132 MMOL/L (ref 136–145)
WBC # BLD AUTO: 6.9 X10*3/UL (ref 4.4–11.3)

## 2025-06-24 PROCEDURE — 97165 OT EVAL LOW COMPLEX 30 MIN: CPT | Mod: GO

## 2025-06-24 PROCEDURE — 2500000001 HC RX 250 WO HCPCS SELF ADMINISTERED DRUGS (ALT 637 FOR MEDICARE OP)

## 2025-06-24 PROCEDURE — 2500000001 HC RX 250 WO HCPCS SELF ADMINISTERED DRUGS (ALT 637 FOR MEDICARE OP): Performed by: NURSE PRACTITIONER

## 2025-06-24 PROCEDURE — 36415 COLL VENOUS BLD VENIPUNCTURE: CPT | Performed by: STUDENT IN AN ORGANIZED HEALTH CARE EDUCATION/TRAINING PROGRAM

## 2025-06-24 PROCEDURE — 85025 COMPLETE CBC W/AUTO DIFF WBC: CPT | Performed by: STUDENT IN AN ORGANIZED HEALTH CARE EDUCATION/TRAINING PROGRAM

## 2025-06-24 PROCEDURE — 83735 ASSAY OF MAGNESIUM: CPT | Performed by: STUDENT IN AN ORGANIZED HEALTH CARE EDUCATION/TRAINING PROGRAM

## 2025-06-24 PROCEDURE — 2500000004 HC RX 250 GENERAL PHARMACY W/ HCPCS (ALT 636 FOR OP/ED): Performed by: ANESTHESIOLOGY

## 2025-06-24 PROCEDURE — 82947 ASSAY GLUCOSE BLOOD QUANT: CPT

## 2025-06-24 PROCEDURE — 99239 HOSP IP/OBS DSCHRG MGMT >30: CPT

## 2025-06-24 PROCEDURE — 2500000004 HC RX 250 GENERAL PHARMACY W/ HCPCS (ALT 636 FOR OP/ED)

## 2025-06-24 PROCEDURE — 2500000005 HC RX 250 GENERAL PHARMACY W/O HCPCS: Performed by: NURSE PRACTITIONER

## 2025-06-24 PROCEDURE — 97161 PT EVAL LOW COMPLEX 20 MIN: CPT | Mod: GP | Performed by: PHYSICAL THERAPIST

## 2025-06-24 PROCEDURE — 80053 COMPREHEN METABOLIC PANEL: CPT | Performed by: STUDENT IN AN ORGANIZED HEALTH CARE EDUCATION/TRAINING PROGRAM

## 2025-06-24 PROCEDURE — 2500000002 HC RX 250 W HCPCS SELF ADMINISTERED DRUGS (ALT 637 FOR MEDICARE OP, ALT 636 FOR OP/ED): Performed by: STUDENT IN AN ORGANIZED HEALTH CARE EDUCATION/TRAINING PROGRAM

## 2025-06-24 PROCEDURE — 2500000001 HC RX 250 WO HCPCS SELF ADMINISTERED DRUGS (ALT 637 FOR MEDICARE OP): Performed by: STUDENT IN AN ORGANIZED HEALTH CARE EDUCATION/TRAINING PROGRAM

## 2025-06-24 PROCEDURE — 2500000002 HC RX 250 W HCPCS SELF ADMINISTERED DRUGS (ALT 637 FOR MEDICARE OP, ALT 636 FOR OP/ED)

## 2025-06-24 PROCEDURE — 2500000004 HC RX 250 GENERAL PHARMACY W/ HCPCS (ALT 636 FOR OP/ED): Performed by: NURSE PRACTITIONER

## 2025-06-24 RX ORDER — ADHESIVE BANDAGE
30 BANDAGE TOPICAL DAILY PRN
Status: DISCONTINUED | OUTPATIENT
Start: 2025-06-24 | End: 2025-06-24 | Stop reason: HOSPADM

## 2025-06-24 RX ORDER — METHYLPREDNISOLONE 4 MG/1
24 TABLET ORAL ONCE
Status: COMPLETED | OUTPATIENT
Start: 2025-06-24 | End: 2025-06-24

## 2025-06-24 RX ORDER — METHYLPREDNISOLONE 4 MG/1
16 TABLET ORAL ONCE
Status: DISCONTINUED | OUTPATIENT
Start: 2025-06-26 | End: 2025-06-24 | Stop reason: HOSPADM

## 2025-06-24 RX ORDER — POTASSIUM CHLORIDE 20 MEQ/1
40 TABLET, EXTENDED RELEASE ORAL ONCE
Status: COMPLETED | OUTPATIENT
Start: 2025-06-24 | End: 2025-06-24

## 2025-06-24 RX ORDER — METHYLPREDNISOLONE 4 MG/1
20 TABLET ORAL ONCE
Status: DISCONTINUED | OUTPATIENT
Start: 2025-06-25 | End: 2025-06-24 | Stop reason: HOSPADM

## 2025-06-24 RX ORDER — METHYLPREDNISOLONE 4 MG/1
TABLET ORAL
Qty: 15 TABLET | Refills: 0 | Status: SHIPPED | OUTPATIENT
Start: 2025-06-25 | End: 2025-06-30

## 2025-06-24 RX ORDER — CEPHALEXIN 500 MG/1
500 CAPSULE ORAL 2 TIMES DAILY
Qty: 14 CAPSULE | Refills: 0 | Status: SHIPPED | OUTPATIENT
Start: 2025-06-24 | End: 2025-07-01

## 2025-06-24 RX ORDER — METHYLPREDNISOLONE 4 MG/1
8 TABLET ORAL ONCE
Status: DISCONTINUED | OUTPATIENT
Start: 2025-06-28 | End: 2025-06-24 | Stop reason: HOSPADM

## 2025-06-24 RX ORDER — METHYLPREDNISOLONE 4 MG/1
12 TABLET ORAL ONCE
Status: DISCONTINUED | OUTPATIENT
Start: 2025-06-27 | End: 2025-06-24 | Stop reason: HOSPADM

## 2025-06-24 RX ORDER — OXYCODONE AND ACETAMINOPHEN 5; 325 MG/1; MG/1
1 TABLET ORAL EVERY 6 HOURS PRN
Qty: 12 TABLET | Refills: 0 | Status: SHIPPED | OUTPATIENT
Start: 2025-06-24

## 2025-06-24 RX ORDER — METHYLPREDNISOLONE 4 MG/1
4 TABLET ORAL ONCE
Status: DISCONTINUED | OUTPATIENT
Start: 2025-06-29 | End: 2025-06-24 | Stop reason: HOSPADM

## 2025-06-24 RX ADMIN — HYDROCHLOROTHIAZIDE 12.5 MG: 12.5 TABLET ORAL at 09:05

## 2025-06-24 RX ADMIN — ENOXAPARIN SODIUM 40 MG: 40 INJECTION SUBCUTANEOUS at 09:04

## 2025-06-24 RX ADMIN — POTASSIUM CHLORIDE 40 MEQ: 1500 TABLET, EXTENDED RELEASE ORAL at 12:51

## 2025-06-24 RX ADMIN — METOPROLOL SUCCINATE 25 MG: 25 TABLET, EXTENDED RELEASE ORAL at 09:05

## 2025-06-24 RX ADMIN — CEFTRIAXONE 1 G: 1 INJECTION, SOLUTION INTRAVENOUS at 09:19

## 2025-06-24 RX ADMIN — PREGABALIN 50 MG: 50 CAPSULE ORAL at 09:05

## 2025-06-24 RX ADMIN — TIZANIDINE 4 MG: 4 TABLET ORAL at 06:13

## 2025-06-24 RX ADMIN — METHYLPREDNISOLONE 24 MG: 4 TABLET ORAL at 14:43

## 2025-06-24 RX ADMIN — VALSARTAN 160 MG: 160 TABLET, FILM COATED ORAL at 09:05

## 2025-06-24 RX ADMIN — INSULIN LISPRO 1 UNITS: 100 INJECTION, SOLUTION INTRAVENOUS; SUBCUTANEOUS at 11:53

## 2025-06-24 RX ADMIN — ASPIRIN 81 MG: 81 TABLET, COATED ORAL at 09:05

## 2025-06-24 RX ADMIN — PANTOPRAZOLE SODIUM 40 MG: 40 TABLET, DELAYED RELEASE ORAL at 06:13

## 2025-06-24 RX ADMIN — TRAMADOL HYDROCHLORIDE 25 MG: 50 TABLET, FILM COATED ORAL at 12:26

## 2025-06-24 RX ADMIN — LIDOCAINE 1 PATCH: 4 PATCH TOPICAL at 09:01

## 2025-06-24 RX ADMIN — TRAMADOL HYDROCHLORIDE 25 MG: 50 TABLET, FILM COATED ORAL at 06:13

## 2025-06-24 RX ADMIN — MAGNESIUM HYDROXIDE 30 ML: 400 SUSPENSION ORAL at 10:07

## 2025-06-24 RX ADMIN — DOCUSATE SODIUM 100 MG: 100 CAPSULE, LIQUID FILLED ORAL at 09:05

## 2025-06-24 RX ADMIN — POLYETHYLENE GLYCOL 3350 17 G: 17 POWDER, FOR SOLUTION ORAL at 09:04

## 2025-06-24 RX ADMIN — TIZANIDINE 4 MG: 4 TABLET ORAL at 17:02

## 2025-06-24 ASSESSMENT — COGNITIVE AND FUNCTIONAL STATUS - GENERAL
PERSONAL GROOMING: A LITTLE
DRESSING REGULAR UPPER BODY CLOTHING: A LITTLE
CLIMB 3 TO 5 STEPS WITH RAILING: TOTAL
DRESSING REGULAR LOWER BODY CLOTHING: A LOT
MOVING TO AND FROM BED TO CHAIR: A LOT
MOBILITY SCORE: 12
WALKING IN HOSPITAL ROOM: A LOT
STANDING UP FROM CHAIR USING ARMS: A LITTLE
MOVING FROM LYING ON BACK TO SITTING ON SIDE OF FLAT BED WITH BEDRAILS: A LITTLE
WALKING IN HOSPITAL ROOM: A LOT
TURNING FROM BACK TO SIDE WHILE IN FLAT BAD: A LOT
HELP NEEDED FOR BATHING: A LITTLE
EATING MEALS: A LITTLE
CLIMB 3 TO 5 STEPS WITH RAILING: TOTAL
TURNING FROM BACK TO SIDE WHILE IN FLAT BAD: A LOT
STANDING UP FROM CHAIR USING ARMS: A LOT
TOILETING: A LITTLE
DRESSING REGULAR LOWER BODY CLOTHING: A LOT
HELP NEEDED FOR BATHING: A LOT
MOBILITY SCORE: 14
DAILY ACTIVITIY SCORE: 16
DRESSING REGULAR UPPER BODY CLOTHING: A LITTLE
MOVING TO AND FROM BED TO CHAIR: A LOT
DAILY ACTIVITIY SCORE: 20

## 2025-06-24 ASSESSMENT — PAIN SCALES - GENERAL
PAINLEVEL_OUTOF10: 10 - WORST POSSIBLE PAIN
PAINLEVEL_OUTOF10: 10 - WORST POSSIBLE PAIN

## 2025-06-24 ASSESSMENT — PAIN - FUNCTIONAL ASSESSMENT
PAIN_FUNCTIONAL_ASSESSMENT: 0-10
PAIN_FUNCTIONAL_ASSESSMENT: 0-10

## 2025-06-24 ASSESSMENT — ACTIVITIES OF DAILY LIVING (ADL): BATHING_ASSISTANCE: MODERATE

## 2025-06-24 NOTE — CARE PLAN
The patient's goals for the shift include      The clinical goals for the shift include comfort/safety throughout shift

## 2025-06-24 NOTE — DISCHARGE SUMMARY
Discharge Diagnosis  Lumbar back pain  Acute on chronic back pain  Sacroiliits            Issues Requiring Follow-Up  Follow up with PCP and pain management    Discharge Meds     Medication List      PAUSE taking these medications     oxyBUTYnin 5 mg tablet; Wait to take this until your doctor or other   care provider tells you to start again.; Commonly known as: Ditropan     START taking these medications     cephalexin 500 mg capsule; Commonly known as: Keflex; Take 1 capsule   (500 mg) by mouth 2 times a day for 7 days.   methylPREDNISolone 4 mg tablet; Commonly known as: Medrol; Take 5   tablets (20 mg) by mouth once daily for 1 day, THEN 4 tablets (16 mg) once   daily for 1 day, THEN 3 tablets (12 mg) once daily for 1 day, THEN 2   tablets (8 mg) once daily for 1 day, THEN 1 tablet (4 mg) once daily for 1   day. Do not fill before June 25, 2025.; Start taking on: June 25, 2025   oxyCODONE-acetaminophen 5-325 mg tablet; Commonly known as: Percocet;   Take 1 tablet by mouth every 6 hours if needed for moderate pain (4 - 6)   or severe pain (7 - 10).     CONTINUE taking these medications     aspirin 81 mg EC tablet   atorvastatin 40 mg tablet; Commonly known as: Lipitor   cyclobenzaprine 10 mg tablet; Commonly known as: Flexeril   hydroCHLOROthiazide 12.5 mg tablet; Commonly known as: Microzide   Jardiance 25 mg tablet; Generic drug: empagliflozin   metoprolol succinate XL 25 mg 24 hr tablet; Commonly known as: Toprol-XL   omeprazole 20 mg DR capsule; Commonly known as: PriLOSEC   pregabalin 50 mg capsule; Commonly known as: Lyrica   sildenafil 100 mg tablet; Commonly known as: Viagra   tamsulosin 0.4 mg 24 hr capsule; Commonly known as: Flomax   tiZANidine 4 mg tablet; Commonly known as: Zanaflex   valsartan 160 mg tablet; Commonly known as: Diovan       Test Results Pending At Discharge  Pending Labs       No current pending labs.            Hospital Course   This is a 60 year old male with PMHx including lumbar  herniated disc, COPD, IDDM2, GERD, HLD, HTN, SDH who presented on 6/21 for a mechanical fall resulting in acute on chronic back pain.Pain is likely musculoskeletal based on exam.  Patient was treated with muscle relaxers and narcotics with minimal relief of pain.  Pain management consulted, recommending follow-up with primary pain management provider. Imaging without acute fracture or malalignment. MRI lumbar spine with L3-L4 mild to moderate foraminal narrowing and spinal canal stenosis, L4-L5 moderate to severe foraminal narrowing with probable impingement of right L4 exiting nerve root and mild spinal canal stenosis, L5-S1 mild to moderate foraminal narrowing with impingement of L5 exiting nerve root without spinal canal stenosis.PT/OT evaluated, recommending placement.  Patient is not agreeable and would prefer discharge home with [facility].  Patient also had difficulty with urination, therefore urology was consulted.Recommend continuing Flomax for now as it has helped him void well.  UA was suspicious for UTI, cultures growing strep.  Was treated with IV Rocephin and will be discharged with Keflex x 7 days.Recommend follow-up with PCP within 1 week of discharge.  He can resume all home medications other than oxybutynin which she should hold until follow-up.  The patient will be discharged home today with an external referral for [facility].  He is hemodynamically stable at time of discharge.    Plan of care was discussed extensively with patient.  Patient verbalized understanding through teach back method.  All question and concerns addressed upon examination.     Of note, this documentation is completed using the Dragon Dictation system (voice recognition software). There may be spelling and/or grammatical errors that were not corrected prior to final submission.      Pertinent Physical Exam At Time of Discharge  Physical Exam  Constitutional:       Appearance: Normal appearance. He is obese.   HENT:      Head:  Normocephalic.      Mouth/Throat:      Mouth: Mucous membranes are moist.   Eyes:      Pupils: Pupils are equal, round, and reactive to light.   Cardiovascular:      Rate and Rhythm: Normal rate and regular rhythm.      Heart sounds: Normal heart sounds, S1 normal and S2 normal.   Pulmonary:      Effort: Pulmonary effort is normal.      Breath sounds: Normal breath sounds.   Abdominal:      General: Bowel sounds are normal. There is distension.      Palpations: Abdomen is soft.      Tenderness: There is no abdominal tenderness.   Musculoskeletal:      Cervical back: Neck supple.      Right lower leg: No edema.      Left lower leg: No edema.   Skin:     General: Skin is warm.   Neurological:      Mental Status: He is alert and oriented to person, place, and time.      Motor: Weakness present.   Psychiatric:         Mood and Affect: Mood normal.         Behavior: Behavior normal.         Outpatient Follow-Up  No future appointments.      Nasima Humphrey PA-C    I spent 35 minutes on discharge. Time calculated includes bedside evaluation, counseling, and outpatient care coordination.

## 2025-06-24 NOTE — DISCHARGE INSTRUCTIONS
You will be given a prescription for percocet as needed for moderate to severe pain  Continue using Miralax daily to prevent constipation   Follow up with pain management and PCP    Thank you for choosing [facility]. It has been a pleasure taking part in your medical care. Please follow up with your primary care provider as instructed. If your symptoms should persist or worsen, please contact your primary care physician, or in the case of an emergency proceed to the nearest Emergency Room for further care. If you have any questions about the care you received, please call [facility] at [phone]. Thank you again!

## 2025-06-24 NOTE — PROGRESS NOTES
"Occupational Therapy    Evaluation    Patient Name: Devin Armendariz  MRN: [id number]  Department: [facility]  Room: [address] [place] [address]  Today's Date: 6/24/2025  Time Calculation  Start Time: 1055  Stop Time: 1108  Time Calculation (min): 13 min      Assessment:  OT Assessment: Patient is limited by balance deficits, decreased strength/endurance and pain.  Prognosis: Good  Barriers to Discharge Home: Physical needs, Caregiver assistance  Evaluation/Treatment Tolerance: Patient limited by pain  End of Session Communication: Bedside nurse  End of Session Patient Position: Up in chair, Alarm off, not on at start of session (Call light within reach.)  OT Assessment Results: Decreased ADL status, Decreased endurance, Decreased functional mobility  Prognosis: Good  Evaluation/Treatment Tolerance: Patient limited by pain  Plan:  Treatment Interventions: ADL retraining, Functional transfer training, Endurance training  OT Frequency: 2 times per week  OT Discharge Recommendations: Low intensity level of continued care, Moderate intensity level of continued care (Assist from family.)  OT Recommended Transfer Status: Assist of 2  OT - OK to Discharge: Yes (Once medically appropriate.)    Subjective                                                   OT Visit Info:  OT Received On: 06/24/25  General:  General  Reason for Referral: ADLs  Referred By: SAMANTHA Carreon CNP  Past Medical History Relevant to Rehab: COPD, DM, HTN, obesity, HLD, GERD, CAD  Co-Treatment: PT  Co-Treatment Reason: To maximize functional outcomes and patient safety.  Prior to Session Communication: Bedside nurse (Cleared for therapy evaluation by RN.)  Patient Position Received: Up in chair, Alarm off, not on at start of session  General Comment: To ED 6/21 s/p fall, missed bed trying to sit on EOB; c/o low back pain; +UA; Ortho consult- acute on chronic low back pain; Per Ortho: \"CT and MRI lumbar spine are reviewed showing no acute findings but chronic degenerative changes with " "nerve compression.\" CT brain (-) acute, CT C-spine (-) acute, deg. changes, CT T-spine (-) acute; DISH of the thoracic spine; MRI L-spine 1. No acute osseous abnormality.      2. At L3-L4: Mild right and moderate left neural foramina narrowing.  Moderate to severe narrowing of the left lateral recess.  Mild-to-moderate spinal canal stenosis.      3. At L4-L5: Mild left and moderate to severe right neural foramina  narrowing with probable contacting/impinging right L4 exiting nerve  root. Mild spinal canal stenosis.      4. At L5-S1: Mild left and mild-to-moderate right neural foramina  narrowing with probable contacting/impinging right L5 exiting nerve  root. No significant spinal canal stenosis.  Precautions:  LE Weight Bearing Status: Weight Bearing as Tolerated (B/L LEs)  Medical Precautions: Fall precautions  Post-Surgical Precautions: Spinal precautions          Pain:  Pain Assessment  Pain Assessment: 0-10  0-10 (Numeric) Pain Score: 10 - Worst possible pain  Pain Type: Acute pain  Pain Location: Back  Pain Orientation: Lower  Pain Interventions: Repositioned  Response to Interventions: No change in pain    Objective   Cognition:  Overall Cognitive Status: Within Functional Limits  Orientation Level: Oriented X4           Home Living:  Home Living Comments: Lives alone in a 1 story \"cottage\" that is handicapped accessible. No steps to enter. Owns SPC. Walk in shower, no DME.  Prior Function:  Prior Function Comments: Patient ambulates at mod I level with a cane PRN. Independent with ADLs and IADLs. Denies falls. Drives. Does not work. Significant other assists PRN.       ADL:  Eating Assistance: Independent  Grooming Assistance:  (Setup/S)  Bathing Assistance: Moderate  UE Dressing Assistance:  (Setup/S)  LE Dressing Assistance: Moderate  Toileting Assistance with Device: Minimal  Activity Tolerance:  Endurance: Decreased tolerance for upright activites  Bed Mobility/Transfers:      Transfers  Transfer: " Yes  Transfer 1  Technique 1: Sit to stand, Stand to sit  Trials/Comments 1: Patient completed sit to stand from the recliner and stand to sit to EOB; mod A x2 with HHA for safety/balance. Patient then completed sit to stand from EOB and stand to sit to the recliner with a FWW, min A level with cues for safe hand placement.     Standing Balance:  Dynamic Standing Balance  Dynamic Standing-Comments: Poor +/fair -     Sensation:  Sensation Comment: Denies paresthesia in UEs.  Strength:  Strength Comments: B/L UE MMT not formally assessed, WFL during tasks.     Extremities: RUE   RUE : Within Functional Limits and LUE   LUE: Within Functional Limits    Outcome Measures:[facility] Daily Activity  Putting on and taking off regular lower body clothing: A lot  Bathing (including washing, rinsing, drying): A lot  Putting on and taking off regular upper body clothing: A little  Toileting, which includes using toilet, bedpan or urinal: A little  Taking care of personal grooming such as brushing teeth: A little  Eating Meals: A little  Daily Activity - Total Score: 16    Education Documentation  Body Mechanics, taught by Abigail Pimentel OT at 6/24/2025  2:11 PM.  Learner: Patient  Readiness: Acceptance  Method: Explanation  Response: Needs Reinforcement  Comment: Walker management during transfers.    EDUCATION:  Education  Individual(s) Educated: Patient  Education Provided: POC discussed and agreed upon, Risk and benefits of OT discussed with patient or other, Fall precautons  Patient Response to Education: Patient/Caregiver Verbalized Understanding of Information    Goals:  Encounter Problems       Encounter Problems (Active)       OT Goals       Patient will complete functional transfers at a mod I level.  (Progressing)       Start:  06/24/25    Expected End:  07/08/25            Patient will complete functional mobility at a mod I level.  (Progressing)       Start:  06/24/25    Expected End:  07/08/25            Patient will  complete lower body dressing at a mod I level.  (Progressing)       Start:  06/24/25    Expected End:  07/08/25            Patient will demonstrate good dynamic standing balance during ADLs. (Progressing)       Start:  06/24/25    Expected End:  07/08/25            Patient will tolerate standing greater than 5 minutes during ADLs.  (Progressing)       Start:  06/24/25    Expected End:  07/08/25

## 2025-06-24 NOTE — PROGRESS NOTES
06/24/25 1340   Discharge Planning   Living Arrangements Alone   Support Systems Spouse/significant other   Assistance Needed independent PTA, states home is wheelchair and handicapped accessible.   Type of Residence Private residence   Number of Stairs to Enter Residence 0   Number of Stairs Within Residence 0   Do you have animals or pets at home? No   Who is requesting discharge planning? Provider   Home or Post Acute Services In home services   Type of Home Care Services Home nursing visits;Home OT;Home PT   Expected Discharge Disposition Home H   Does the patient need discharge transport arranged? Yes   Stroke Family Assessment   Stroke Family Assessment Needed No   Intensity of Service   Intensity of Service 0-30 min     Pt admitted with lumbar back pain. Lives alone in a 1 story handicapped accessible home. States he has the support of his girlfriend Cindy to assist him as needed. PT [facility] 13 recommending low intensity level of care upon dc. Discussed with pt and he prefers home with HHC. He goes to [facility].

## 2025-06-24 NOTE — PROGRESS NOTES
Physical Therapy    Physical Therapy Evaluation    Patient Name: Devin Armendariz  MRN: [id number]  Today's Date: 6/24/2025   Time Calculation  Start Time: 1054  Stop Time: 1108  Time Calculation (min): 14 min  923/923-A    Assessment/Plan   PT Assessment  PT Assessment Results: Decreased endurance, Impaired balance, Decreased mobility, Impaired sensation, Pain  Rehab Prognosis: Good  Barriers to Discharge Home: Caregiver assistance, Physical needs  Caregiver Assistance: Patient lives alone and/or does not have reliable caregiver assistance (Pt. reports that GF should be able to assist PRN upon d/c)  Physical Needs: Ambulating household distances limited by function/safety  End of Session Communication: Bedside nurse  Assessment Comment: Pt. performs OOB mobility with modA, however, improves with use of ww. Pt. primarily limited by severe low back pain. Pt. would benefit from continued PT to increase mobility and indep.  End of Session Patient Position: Up in chair, Alarm off, not on at start of session (BLEs elevated for comfort)  IP OR SWING BED PT PLAN  Inpatient or Swing Bed: Inpatient  PT Plan  Treatment/Interventions: Bed mobility, Transfer training, Gait training, Balance training, Endurance training, Therapeutic activity, Home exercise program  PT Plan: Ongoing PT  PT Frequency: 3 times per week  PT Discharge Recommendations: Low intensity level of continued care (with assist vs mod intensity)  Equipment Recommended upon Discharge: Wheeled walker  PT Recommended Transfer Status: Assist x1, Assistive device (ww)  PT - OK to Discharge: Yes    Subjective     Current Problem:  1. Lumbar back pain        2. Hyponatremia        3. Difficulty walking          Problem List[1]    General Visit Information:  General  Reason for Referral: Impaired mobility  Referred By: Lauri GUIDO  Past Medical History Relevant to Rehab: COPD, DM, HTN, obesity, HLD, GERD, CAD  Family/Caregiver Present: No  Co-Treatment: OT  Co-Treatment  "Reason: to maximize safety and functional mobility  Prior to Session Communication: Bedside nurse  Patient Position Received: Up in chair, Alarm off, not on at start of session  General Comment: To ED 6/21 s/p fall, missed bed trying to sit on EOB; c/o low back pain; +UA; Ortho consult- acute on chronic low back pain; Per Ortho: \"CT and MRI lumbar spine are reviewed showing no acute findings but chronic degenerative changes with nerve compression.\" CT brain (-) acute, CT C-spine (-) acute, deg. changes, CT T-spine (-) acute; DISH of the thoracic spine; MRI L-spine 1. No acute osseous abnormality.      2. At L3-L4: Mild right and moderate left neural foramina narrowing.  Moderate to severe narrowing of the left lateral recess.  Mild-to-moderate spinal canal stenosis.      3. At L4-L5: Mild left and moderate to severe right neural foramina  narrowing with probable contacting/impinging right L4 exiting nerve  root. Mild spinal canal stenosis.      4. At L5-S1: Mild left and mild-to-moderate right neural foramina  narrowing with probable contacting/impinging right L5 exiting nerve  root. No significant spinal canal stenosis.    Home Living:  Home Living  Home Living Comments: Lives alone in a 1 story \"cottage\" that is handicapped accessible. No steps to enter. Owns SPC.    Prior Level of Function:  Prior Function Per Pt/Caregiver Report  Prior Function Comments: Mod. indep. amb. with PRN use of cane. Does not work. +drives; denies any other recent falls (despite one on admit).    Precautions:  Precautions  LE Weight Bearing Status: Weight Bearing as Tolerated (BLE)  Medical Precautions: Fall precautions  Post-Surgical Precautions: Spinal precautions       Objective     Pain:  Pain Assessment  Pain Assessment: 0-10  0-10 (Numeric) Pain Score: 10 - Worst possible pain  Pain Type:  (acute on chronic)  Pain Location: Back  Pain Orientation: Lower  Pain Interventions: Repositioned (RN aware)  Response to Interventions:  (Pt. " appears more comfortable; less restless)    Cognition:  Cognition  Overall Cognitive Status: Within Functional Limits  Orientation Level: Oriented X4    General Assessments:      Activity Tolerance  Activity Tolerance Comments: limited by severe low back pain  Sensation  Sensation Comment: Pt. reports acute numbness/tingling or R thigh- anterior and lateral- RN notified  Strength  Strength Comments: B/L hip flexion at least 3+/5 (no resistance provided d/t back pain); B/L quads 4+/5; B/L ankle DF/PF 5/5; BUE WFL     Coordination  Movements are Fluid and Coordinated: Yes             Functional Assessments:     Bed Mobility  Bed Mobility: No  Transfers  Transfer:  (sit to/from stand with modAx1; frequent cues for safe hand placement; pt. reaches out for therapists to assist with transfers)  Ambulation/Gait Training  Ambulation/Gait Training Performed:  (Amb. chair to bed ~ 3 ft with HHA x2; modAx2; 2ww provided for bed to chair ~3' and ambulation improved to minAx1; heavy wt bearing thru BUEs; slow pace, cues to extend knees  in stance phase. Pt. reports that he prefers the ww.)          Extremity/Trunk Assessments:  RUE   RUE :  (AROM WFL)  LUE   LUE:  (AROM WFL)  RLE   RLE :  (AROM WFL)  LLE   LLE :  (AROM WFL)    Outcome Measures:     [facility] Basic Mobility  Turning from your back to your side while in a flat bed without using bedrails: A little  Moving from lying on your back to sitting on the side of a flat bed without using bedrails: A lot  Moving to and from bed to chair (including a wheelchair): A lot  Standing up from a chair using your arms (e.g. wheelchair or bedside chair): A lot  To walk in hospital room: A lot  Climbing 3-5 steps with railing: Total  Basic Mobility - Total Score: 12                                                             Goals:  Encounter Problems       Encounter Problems (Active)       Impaired mobility        Perform all bed mobility with indep.        Start:  06/24/25    Expected  End:  07/08/25            Perform all transfers with ww vs LRAD and mod. indep.        Start:  06/24/25    Expected End:  07/08/25            Patient will ambulate >/= 50 ft with ww vs LRAD and mod. indep.        Start:  06/24/25    Expected End:  07/08/25                   Education Documentation  Mobility Training, taught by Annalee Vincent PT at 6/24/2025 11:57 AM.  Learner: Patient  Readiness: Acceptance  Method: Explanation, Demonstration  Response: Verbalizes Understanding, Needs Reinforcement, Demonstrated Understanding  Comment: see note- transfers and ambulation    Education Comments  No comments found.                [1]   Patient Active Problem List  Diagnosis    COPD, mild (Multi)    Diabetes mellitus (Multi)    GERD (gastroesophageal reflux disease)    Hyperlipidemia    Primary hypertension    Incomplete bladder emptying    Lumbar herniated disc    SDH (subdural hematoma) (Multi)    Uncontrolled type 2 diabetes mellitus with hyperglycemia    Lumbar back pain

## 2025-06-24 NOTE — PROGRESS NOTES
Urology progress note for Tuesday, 6/24/2025  So far seems to be voiding reasonably well on his own, on Flomax 0.4 mg daily  Excellent outputs and diuresis and urine is clear  Normal renal function with serum creatinine of 0.5, hemoglobin 15.3  Urine is growing strep however patient is covered with Rocephin antibiotic prophylaxis  Continue Flomax, continue supportive care    Additional medical and historical objective data reviewed and listed below      Current Facility-Administered Medications:     acetaminophen (Tylenol) tablet 650 mg, 650 mg, oral, q4h PRN, 650 mg at 06/23/25 0548 **OR** acetaminophen (Tylenol) oral liquid 650 mg, 650 mg, oral, q4h PRN **OR** acetaminophen (Tylenol) suppository 650 mg, 650 mg, rectal, q4h PRN, RILEY Soto    aspirin EC tablet 81 mg, 81 mg, oral, Daily, RILEY Martinez, 81 mg at 06/24/25 0905    atorvastatin (Lipitor) tablet 40 mg, 40 mg, oral, Nightly, RILEY Martinez, 40 mg at 06/23/25 2111    cefTRIAXone (Rocephin) 1 g in dextrose (iso) IV 50 mL, 1 g, intravenous, q24h, RILEY Martinez, Stopped at 06/24/25 1004    cyclobenzaprine (Flexeril) tablet 10 mg, 10 mg, oral, Nightly, RILEY Martinez, 10 mg at 06/23/25 2111    dextrose 50 % injection 12.5 g, 12.5 g, intravenous, q15 min PRN, RILEY Martinez    dextrose 50 % injection 25 g, 25 g, intravenous, q15 min PRN, RILEY Martinez    docusate sodium (Colace) capsule 100 mg, 100 mg, oral, BID, RILEY Martinez, 100 mg at 06/24/25 0905    enoxaparin (Lovenox) syringe 40 mg, 40 mg, subcutaneous, q24h, RILEY Soto, 40 mg at 06/24/25 0904    glucagon (Glucagen) injection 1 mg, 1 mg, intramuscular, q15 min PRN, RILEY Martinez    glucagon (Glucagen) injection 1 mg, 1 mg, intramuscular, q15 min PRN, RILEY Martinez    hydroCHLOROthiazide (Microzide) tablet 12.5 mg, 12.5 mg, oral, Daily, Veronica Garcia,  APRN-CNP, 12.5 mg at 06/24/25 0905    insulin lispro injection 0-5 Units, 0-5 Units, subcutaneous, Before meals & nightly, RILEY Martinez, 1 Units at 06/24/25 1153    lidocaine 4 % patch 1 patch, 1 patch, transdermal, Daily, RILEY Soto, 1 patch at 06/24/25 0901    magnesium hydroxide (Milk of Magnesia) 400 mg/5 mL suspension 30 mL, 30 mL, oral, Daily PRN, Nasima Humphrey PA-C, 30 mL at 06/24/25 1007    melatonin tablet 3 mg, 3 mg, oral, Nightly PRN, RILEY Soto, 3 mg at 06/23/25 2113    metoprolol succinate XL (Toprol-XL) 24 hr tablet 25 mg, 25 mg, oral, Daily, RILEY Martinez, 25 mg at 06/24/25 0905    ondansetron (Zofran) tablet 4 mg, 4 mg, oral, q8h PRN **OR** ondansetron (Zofran) injection 4 mg, 4 mg, intravenous, q8h PRN, RILEY Soto    [Held by provider] oxyBUTYnin (Ditropan) tablet 5 mg, 5 mg, oral, TID, Yohannes Rivera MD, 5 mg at 06/22/25 0905    pantoprazole (ProtoNix) EC tablet 40 mg, 40 mg, oral, Daily, 40 mg at 06/24/25 0613 **OR** pantoprazole (Protonix) injection 40 mg, 40 mg, intravenous, Daily, RILEY Soto    polyethylene glycol (Glycolax, Miralax) packet 17 g, 17 g, oral, Daily, RILEY Martinez, 17 g at 06/24/25 0904    pregabalin (Lyrica) capsule 50 mg, 50 mg, oral, BID, Yohannes Rivera MD, 50 mg at 06/24/25 0905    sennosides (Senokot) tablet 8.6 mg, 1 tablet, oral, Nightly, RILEY Martinez, 8.6 mg at 06/23/25 2111    simvastatin (Zocor) tablet 20 mg, 20 mg, oral, Nightly, Yohannes Rivera MD, 20 mg at 06/23/25 2111    tamsulosin (Flomax) 24 hr capsule 0.4 mg, 0.4 mg, oral, Nightly, Yohannes Rivera MD, 0.4 mg at 06/23/25 2111    tiZANidine (Zanaflex) tablet 4 mg, 4 mg, oral, q8h PRN, Yohannes Rivera MD, 4 mg at 06/24/25 0613    traMADol (Ultram) tablet 25 mg, 25 mg, oral, q6h PRN, Veronica Garcia, DIMAS-CNP, 25 mg at 06/24/25 1226    valsartan (Diovan) tablet 160 mg, 160 mg,  oral, Daily, Yohannes Rivera MD, 160 mg at 06/24/25 0905      Results for orders placed or performed during the hospital encounter of 06/21/25 (from the past 96 hours)   CBC and Auto Differential   Result Value Ref Range    WBC 12.2 (H) 4.4 - 11.3 x10*3/uL    nRBC 0.0 0.0 - 0.0 /100 WBCs    RBC 5.74 4.50 - 5.90 x10*6/uL    Hemoglobin 17.5 13.5 - 17.5 g/dL    Hematocrit 51.7 41.0 - 52.0 %    MCV 90 80 - 100 fL    MCH 30.5 26.0 - 34.0 pg    MCHC 33.8 32.0 - 36.0 g/dL    RDW 14.2 11.5 - 14.5 %    Platelets 125 (L) 150 - 450 x10*3/uL    Neutrophils % 86.2 40.0 - 80.0 %    Immature Granulocytes %, Automated 1.1 (H) 0.0 - 0.9 %    Lymphocytes % 4.9 13.0 - 44.0 %    Monocytes % 7.6 2.0 - 10.0 %    Eosinophils % 0.0 0.0 - 6.0 %    Basophils % 0.2 0.0 - 2.0 %    Neutrophils Absolute 10.53 (H) 1.20 - 7.70 x10*3/uL    Immature Granulocytes Absolute, Automated 0.13 0.00 - 0.70 x10*3/uL    Lymphocytes Absolute 0.60 (L) 1.20 - 4.80 x10*3/uL    Monocytes Absolute 0.93 0.10 - 1.00 x10*3/uL    Eosinophils Absolute 0.00 0.00 - 0.70 x10*3/uL    Basophils Absolute 0.02 0.00 - 0.10 x10*3/uL   Comprehensive metabolic panel   Result Value Ref Range    Glucose 138 (H) 74 - 99 mg/dL    Sodium 129 (L) 136 - 145 mmol/L    Potassium 4.3 3.5 - 5.3 mmol/L    Chloride 96 (L) 98 - 107 mmol/L    Bicarbonate 20 (L) 21 - 32 mmol/L    Anion Gap 17 10 - 20 mmol/L    Urea Nitrogen 14 6 - 23 mg/dL    Creatinine 0.57 0.50 - 1.30 mg/dL    eGFR >90 >60 mL/min/1.73m*2    Calcium 9.4 8.6 - 10.3 mg/dL    Albumin 4.4 3.4 - 5.0 g/dL    Alkaline Phosphatase 67 33 - 136 U/L    Total Protein 7.6 6.4 - 8.2 g/dL    AST 20 9 - 39 U/L    Bilirubin, Total 0.9 0.0 - 1.2 mg/dL    ALT 29 10 - 52 U/L   Ethanol   Result Value Ref Range    Alcohol <10 <=10 mg/dL   Lactate   Result Value Ref Range    Lactate 2.0 0.4 - 2.0 mmol/L   ECG 12 lead   Result Value Ref Range    Ventricular Rate 93 BPM    Atrial Rate 93 BPM    RI Interval 146 ms    QRS Duration 94 ms    QT Interval  352 ms    QTC Calculation(Bazett) 437 ms    P Axis 42 degrees    R Axis 37 degrees    T Axis 61 degrees    QRS Count 15 beats    Q Onset 213 ms    P Onset 140 ms    P Offset 186 ms    T Offset 389 ms    QTC Fredericia 407 ms   POCT GLUCOSE   Result Value Ref Range    POCT Glucose 214 (H) 74 - 99 mg/dL   Urinalysis with Reflex Culture and Microscopic   Result Value Ref Range    Color, Urine Yellow Light-Yellow, Yellow, Dark-Yellow    Appearance, Urine Clear Clear    Specific Gravity, Urine 1.025 1.005 - 1.035    pH, Urine 6.0 5.0, 5.5, 6.0, 6.5, 7.0, 7.5, 8.0    Protein, Urine 20 (TRACE) NEGATIVE, 10 (TRACE), 20 (TRACE) mg/dL    Glucose, Urine OVER (4+) (A) Normal mg/dL    Blood, Urine 0.2 (2+) (A) NEGATIVE mg/dL    Ketones, Urine 60 (2+) (A) NEGATIVE mg/dL    Bilirubin, Urine NEGATIVE NEGATIVE mg/dL    Urobilinogen, Urine Normal Normal mg/dL    Nitrite, Urine NEGATIVE NEGATIVE    Leukocyte Esterase, Urine 500 Kim/uL (A) NEGATIVE   Extra Urine Gray Tube   Result Value Ref Range    Extra Tube     Microscopic Only, Urine   Result Value Ref Range    WBC, Urine >50 (A) 1-5, NONE /HPF    RBC, Urine 6-10 (A) NONE, 1-2, 3-5 /HPF    Squamous Epithelial Cells, Urine 1-9 (SPARSE) Reference range not established. /HPF    Bacteria, Urine 4+ (A) NONE SEEN /HPF    Budding Yeast, Urine PRESENT (A) NONE /HPF   Urine Culture    Specimen: Clean Catch/Voided; Urine   Result Value Ref Range    Urine Culture (A)      >=100,000 CFU/mL Streptococcus agalactiae (Group B Streptococcus)   Comprehensive metabolic panel   Result Value Ref Range    Glucose 130 (H) 74 - 99 mg/dL    Sodium 129 (L) 136 - 145 mmol/L    Potassium 3.8 3.5 - 5.3 mmol/L    Chloride 97 (L) 98 - 107 mmol/L    Bicarbonate 23 21 - 32 mmol/L    Anion Gap 13 10 - 20 mmol/L    Urea Nitrogen 14 6 - 23 mg/dL    Creatinine 0.57 0.50 - 1.30 mg/dL    eGFR >90 >60 mL/min/1.73m*2    Calcium 8.8 8.6 - 10.3 mg/dL    Albumin 3.7 3.4 - 5.0 g/dL    Alkaline Phosphatase 60 33 - 136 U/L     Total Protein 6.8 6.4 - 8.2 g/dL    AST 21 9 - 39 U/L    Bilirubin, Total 0.6 0.0 - 1.2 mg/dL    ALT 25 10 - 52 U/L   CBC and Auto Differential   Result Value Ref Range    WBC 8.1 4.4 - 11.3 x10*3/uL    nRBC 0.0 0.0 - 0.0 /100 WBCs    RBC 4.79 4.50 - 5.90 x10*6/uL    Hemoglobin 14.9 13.5 - 17.5 g/dL    Hematocrit 42.7 41.0 - 52.0 %    MCV 89 80 - 100 fL    MCH 31.1 26.0 - 34.0 pg    MCHC 34.9 32.0 - 36.0 g/dL    RDW 14.4 11.5 - 14.5 %    Platelets 124 (L) 150 - 450 x10*3/uL    Neutrophils % 77.6 40.0 - 80.0 %    Immature Granulocytes %, Automated 1.0 (H) 0.0 - 0.9 %    Lymphocytes % 8.2 13.0 - 44.0 %    Monocytes % 13.0 2.0 - 10.0 %    Eosinophils % 0.0 0.0 - 6.0 %    Basophils % 0.2 0.0 - 2.0 %    Neutrophils Absolute 6.31 1.20 - 7.70 x10*3/uL    Immature Granulocytes Absolute, Automated 0.08 0.00 - 0.70 x10*3/uL    Lymphocytes Absolute 0.67 (L) 1.20 - 4.80 x10*3/uL    Monocytes Absolute 1.06 (H) 0.10 - 1.00 x10*3/uL    Eosinophils Absolute 0.00 0.00 - 0.70 x10*3/uL    Basophils Absolute 0.02 0.00 - 0.10 x10*3/uL   Magnesium   Result Value Ref Range    Magnesium 2.03 1.60 - 2.40 mg/dL   C-Reactive Protein   Result Value Ref Range    C-Reactive Protein 30.09 (H) <1.00 mg/dL   SST TOP   Result Value Ref Range    Extra Tube Hold for add-ons.    Prostate Specific Antigen   Result Value Ref Range    Prostate Specific AG 0.41 <=4.00 ng/mL   POCT GLUCOSE   Result Value Ref Range    POCT Glucose 154 (H) 74 - 99 mg/dL   POCT GLUCOSE   Result Value Ref Range    POCT Glucose 199 (H) 74 - 99 mg/dL   Prostate Specific Antigen, Screen   Result Value Ref Range    Prostate Specific Antigen,Screen 0.39 <=4.00 ng/mL   POCT GLUCOSE   Result Value Ref Range    POCT Glucose 153 (H) 74 - 99 mg/dL   POCT GLUCOSE   Result Value Ref Range    POCT Glucose 179 (H) 74 - 99 mg/dL   Comprehensive metabolic panel   Result Value Ref Range    Glucose 126 (H) 74 - 99 mg/dL    Sodium 129 (L) 136 - 145 mmol/L    Potassium 3.5 3.5 - 5.3 mmol/L     Chloride 97 (L) 98 - 107 mmol/L    Bicarbonate 25 21 - 32 mmol/L    Anion Gap 11 10 - 20 mmol/L    Urea Nitrogen 14 6 - 23 mg/dL    Creatinine 0.62 0.50 - 1.30 mg/dL    eGFR >90 >60 mL/min/1.73m*2    Calcium 8.6 8.6 - 10.3 mg/dL    Albumin 3.4 3.4 - 5.0 g/dL    Alkaline Phosphatase 57 33 - 136 U/L    Total Protein 6.5 6.4 - 8.2 g/dL    AST 20 9 - 39 U/L    Bilirubin, Total 0.7 0.0 - 1.2 mg/dL    ALT 24 10 - 52 U/L   CBC and Auto Differential   Result Value Ref Range    WBC 6.7 4.4 - 11.3 x10*3/uL    nRBC 0.0 0.0 - 0.0 /100 WBCs    RBC 4.81 4.50 - 5.90 x10*6/uL    Hemoglobin 15.0 13.5 - 17.5 g/dL    Hematocrit 42.1 41.0 - 52.0 %    MCV 88 80 - 100 fL    MCH 31.2 26.0 - 34.0 pg    MCHC 35.6 32.0 - 36.0 g/dL    RDW 14.3 11.5 - 14.5 %    Platelets 126 (L) 150 - 450 x10*3/uL    Neutrophils % 70.7 40.0 - 80.0 %    Immature Granulocytes %, Automated 0.7 0.0 - 0.9 %    Lymphocytes % 13.9 13.0 - 44.0 %    Monocytes % 13.2 2.0 - 10.0 %    Eosinophils % 1.2 0.0 - 6.0 %    Basophils % 0.3 0.0 - 2.0 %    Neutrophils Absolute 4.76 1.20 - 7.70 x10*3/uL    Immature Granulocytes Absolute, Automated 0.05 0.00 - 0.70 x10*3/uL    Lymphocytes Absolute 0.94 (L) 1.20 - 4.80 x10*3/uL    Monocytes Absolute 0.89 0.10 - 1.00 x10*3/uL    Eosinophils Absolute 0.08 0.00 - 0.70 x10*3/uL    Basophils Absolute 0.02 0.00 - 0.10 x10*3/uL   Magnesium   Result Value Ref Range    Magnesium 2.11 1.60 - 2.40 mg/dL   [facility] TOP   Result Value Ref Range    Extra Tube Hold for add-ons.    POCT GLUCOSE   Result Value Ref Range    POCT Glucose 146 (H) 74 - 99 mg/dL   POCT GLUCOSE   Result Value Ref Range    POCT Glucose 165 (H) 74 - 99 mg/dL   POCT GLUCOSE   Result Value Ref Range    POCT Glucose 163 (H) 74 - 99 mg/dL   POCT GLUCOSE   Result Value Ref Range    POCT Glucose 148 (H) 74 - 99 mg/dL   CBC and Auto Differential   Result Value Ref Range    WBC 6.9 4.4 - 11.3 x10*3/uL    nRBC 0.0 0.0 - 0.0 /100 WBCs    RBC 5.04 4.50 - 5.90 x10*6/uL    Hemoglobin 15.3  13.5 - 17.5 g/dL    Hematocrit 44.3 41.0 - 52.0 %    MCV 88 80 - 100 fL    MCH 30.4 26.0 - 34.0 pg    MCHC 34.5 32.0 - 36.0 g/dL    RDW 14.2 11.5 - 14.5 %    Platelets 147 (L) 150 - 450 x10*3/uL    Neutrophils % 67.3 40.0 - 80.0 %    Immature Granulocytes %, Automated 0.9 0.0 - 0.9 %    Lymphocytes % 16.6 13.0 - 44.0 %    Monocytes % 12.8 2.0 - 10.0 %    Eosinophils % 2.0 0.0 - 6.0 %    Basophils % 0.4 0.0 - 2.0 %    Neutrophils Absolute 4.61 1.20 - 7.70 x10*3/uL    Immature Granulocytes Absolute, Automated 0.06 0.00 - 0.70 x10*3/uL    Lymphocytes Absolute 1.14 (L) 1.20 - 4.80 x10*3/uL    Monocytes Absolute 0.88 0.10 - 1.00 x10*3/uL    Eosinophils Absolute 0.14 0.00 - 0.70 x10*3/uL    Basophils Absolute 0.03 0.00 - 0.10 x10*3/uL   Comprehensive metabolic panel   Result Value Ref Range    Glucose 120 (H) 74 - 99 mg/dL    Sodium 132 (L) 136 - 145 mmol/L    Potassium 3.4 (L) 3.5 - 5.3 mmol/L    Chloride 96 (L) 98 - 107 mmol/L    Bicarbonate 26 21 - 32 mmol/L    Anion Gap 13 10 - 20 mmol/L    Urea Nitrogen 13 6 - 23 mg/dL    Creatinine 0.59 0.50 - 1.30 mg/dL    eGFR >90 >60 mL/min/1.73m*2    Calcium 9.1 8.6 - 10.3 mg/dL    Albumin 3.5 3.4 - 5.0 g/dL    Alkaline Phosphatase 59 33 - 136 U/L    Total Protein 6.9 6.4 - 8.2 g/dL    AST 22 9 - 39 U/L    Bilirubin, Total 0.7 0.0 - 1.2 mg/dL    ALT 29 10 - 52 U/L   Magnesium   Result Value Ref Range    Magnesium 1.96 1.60 - 2.40 mg/dL   SST TOP   Result Value Ref Range    Extra Tube Hold for add-ons.    POCT GLUCOSE   Result Value Ref Range    POCT Glucose 143 (H) 74 - 99 mg/dL   POCT GLUCOSE   Result Value Ref Range    POCT Glucose 184 (H) 74 - 99 mg/dL     ECG 12 lead  Result Date: 6/23/2025  Normal sinus rhythm Cannot rule out Inferior infarct , age undetermined Possible Anterior infarct , age undetermined Abnormal ECG When compared with ECG of 19-JAN-2013 12:26, No significant change was found    MR lumbar spine wo IV contrast  Result Date: 6/22/2025  Interpreted By:   Willian Valenzuela, STUDY: MR LUMBAR SPINE WO IV CONTRAST;  6/22/2025 12:43 pm   INDICATION: Signs/Symptoms:rule out cord compression - significant lower extremity weakness, urinary retention after fall.     COMPARISON: None.   ACCESSION NUMBER(S): [id number]   ORDERING CLINICIAN: ROBERTH CAMACHO   TECHNIQUE: Sagittal T1, T2, STIR, axial T1 and T2 weighted images of the lumbar spine were acquired.   FINDINGS: There is straightening of the normal lumbar lordosis.   The vertebral bodies demonstrate expected height. The marrow signal is within normal limits. No aggressive osseous lesion.   The lower thoracic cord appears unremarkable.   The prevertebral and posterior paraspinous soft tissues are unremarkable.   L1-L2: No posterior disc bulge. Bilateral neural foramina are patent. No significant spinal canal stenosis.   L2-L3: No posterior disc bulge. Bilateral neural foramina are patent. No significant spinal canal stenosis.   L3-L4: Shallow posterior disc bulge with superimposed 4 mm left foraminal disc protrusion. Mild bilateral facet arthropathy. Mild right and moderate left neural foramina narrowing. Moderate to severe narrowing of the left lateral recess. Mild-to-moderate spinal canal stenosis.   L4-L5: 5 mm posterior disc bulge combining combination with moderate bilateral facet arthropathy, resulting in mild left and moderate to severe right neural foramina narrowing with probable contacting/impinging right L4 exiting nerve root. Mild spinal canal stenosis.   L5-S1: Shallow posterior disc bulge with superimposed 3 mm right foraminal disc protrusion. Mild left and mild-to-moderate right neural foramina narrowing with probable contacting/impinging right L5 exiting nerve root. No significant spinal canal stenosis.       1. No acute osseous abnormality.   2. At L3-L4: Mild right and moderate left neural foramina narrowing. Moderate to severe narrowing of the left lateral recess. Mild-to-moderate spinal canal stenosis.    3. At L4-L5: Mild left and moderate to severe right neural foramina narrowing with probable contacting/impinging right L4 exiting nerve root. Mild spinal canal stenosis.   4. At L5-S1: Mild left and mild-to-moderate right neural foramina narrowing with probable contacting/impinging right L5 exiting nerve root. No significant spinal canal stenosis.   MACRO: None   Signed by: Willian Valenzuela 6/22/2025 1:28 PM Dictation workstation:   [id number]    CT head wo IV contrast  Result Date: 6/21/2025  Interpreted By:  Pedrito Yañez, STUDY: CT HEAD WO IV CONTRAST; CT CERVICAL SPINE WO IV CONTRAST;  6/21/2025 6:00 pm   INDICATION: Signs/Symptoms:fall.   COMPARISON: CT brain 08/31/2016   ACCESSION NUMBER(S): [id number]; [id number]   ORDERING CLINICIAN: KATRIN DOW   TECHNIQUE: Noncontrast axial CT scan of the brain and cervical spine was performed.   FINDINGS: BRAIN:   Parenchyma: There is no intracranial hemorrhage. The grey-white differentiation is intact. There is no mass effect or midline shift. Right frontoparietal craniotomy.   CSF Spaces: The ventricles, sulci and basal cisterns are within normal limits for age.   Extra-Axial Fluid: There is no extraaxial fluid collection.   Calvarium: The calvarium is unremarkable.   Paranasal sinuses: Mild-moderate paranasal sinus mucosal thickening.   Mastoids: Clear.   Orbits: Normal.   Soft tissues: Unremarkable.   CERVICAL SPINE:   ALIGNMENT: Cervical lordosis is maintained. Atlantoaxial interval is within normal limits vertebral body heights and disc spaces are maintained.   VERTEBRAL BODIES AND POSTERIOR ELEMENTS: No cervical spine compression fracture.  No posterior element fracture.  No destructive bone lesion.   SPINAL CANAL: Multilevel degenerative changes of the cervical spine with up to moderate canal stenosis andsevere neural foraminal narrowing, most prominent at C5-C6. Additional superimposed findings compatible with DISH.   NECK SOFT TISSUES: Within  normal limits.   LUNG APICES: Imaged portion of the lung apices are within normal limits.   SKULL BASE: Within normal limits.       Brain: No acute intracranial hemorrhage, mass effect, or CT apparent acute infarct. Mild-moderate paranasal sinus mucosal thickening.   Cervical spine: No acute fracture or traumatic malalignment. Multilevel degenerative changes of the cervical spine, most prominent at C5-C6.     Signed by: Pedrito Yañez 6/21/2025 7:07 PM Dictation workstation:   [id number]    CT cervical spine wo IV contrast  Result Date: 6/21/2025  Interpreted By:  Pedrito Yañez, STUDY: CT HEAD WO IV CONTRAST; CT CERVICAL SPINE WO IV CONTRAST;  6/21/2025 6:00 pm   INDICATION: Signs/Symptoms:fall.   COMPARISON: CT brain 08/31/2016   ACCESSION NUMBER(S): [id number]; [id number]   ORDERING CLINICIAN: KATRIN DOW   TECHNIQUE: Noncontrast axial CT scan of the brain and cervical spine was performed.   FINDINGS: BRAIN:   Parenchyma: There is no intracranial hemorrhage. The grey-white differentiation is intact. There is no mass effect or midline shift. Right frontoparietal craniotomy.   CSF Spaces: The ventricles, sulci and basal cisterns are within normal limits for age.   Extra-Axial Fluid: There is no extraaxial fluid collection.   Calvarium: The calvarium is unremarkable.   Paranasal sinuses: Mild-moderate paranasal sinus mucosal thickening.   Mastoids: Clear.   Orbits: Normal.   Soft tissues: Unremarkable.   CERVICAL SPINE:   ALIGNMENT: Cervical lordosis is maintained. Atlantoaxial interval is within normal limits vertebral body heights and disc spaces are maintained.   VERTEBRAL BODIES AND POSTERIOR ELEMENTS: No cervical spine compression fracture.  No posterior element fracture.  No destructive bone lesion.   SPINAL CANAL: Multilevel degenerative changes of the cervical spine with up to moderate canal stenosis andsevere neural foraminal narrowing, most prominent at C5-C6. Additional superimposed  findings compatible with DISH.   NECK SOFT TISSUES: Within normal limits.   LUNG APICES: Imaged portion of the lung apices are within normal limits.   SKULL BASE: Within normal limits.       Brain: No acute intracranial hemorrhage, mass effect, or CT apparent acute infarct. Mild-moderate paranasal sinus mucosal thickening.   Cervical spine: No acute fracture or traumatic malalignment. Multilevel degenerative changes of the cervical spine, most prominent at C5-C6.     Signed by: Pedrito Yañez 6/21/2025 7:07 PM Dictation workstation:   [id number]    CT chest abdomen pelvis wo IV contrast  Result Date: 6/21/2025  STUDY: CT Chest, Abdomen, and Pelvis without IV Contrast, CT Thoracic Spine and Lumbar Spine without IV; 06/21/2025 at 6:04 PM INDICATION: Trauma evaluation status post fall. COMPARISON: None available. ACCESSION NUMBER(S): [id number], [id number], [id number] ORDERING CLINICIAN: KATRIN DOW TECHNIQUE: CT of the chest, abdomen, and pelvis was performed.  Contiguous axial images were obtained at 3 mm slice thickness through the chest, abdomen, and pelvis.  Coronal and sagittal reconstructions at 3 mm slice thickness were performed.  No intravenous contrast was administered.  Please note that spinal images were generated from the original CT chest, abdomen and pelvis imaging. FINDINGS: Please note that the evaluation of vessels, lymph nodes and organs is limited without intravenous contrast. CHEST: MEDIASTINUM: The heart is normal in size without pericardial effusion.  Coronary artery calcifications identified.  LUNGS/PLEURA: There is no pleural effusion, pleural thickening, or pneumothorax. The airways are patent. Lungs are clear without consolidation, interstitial disease, or suspicious nodules. LYMPH NODES: Thoracic lymph nodes are not enlarged. ABDOMEN:  LIVER: No hepatomegaly.  Smooth surface contour.  Normal attenuation.  BILE DUCTS: No intrahepatic or extrahepatic biliary ductal  dilatation.  GALLBLADDER: The gallbladder is unremarkable. STOMACH: No abnormalities identified.  PANCREAS: No masses or ductal dilatation.  SPLEEN: No splenomegaly or focal splenic lesion.  ADRENAL GLANDS: No thickening or nodules.  KIDNEYS AND URETERS: Kidneys are normal in size and location.  No renal or ureteral calculi.  PELVIS:  BLADDER: No abnormalities identified.  REPRODUCTIVE ORGANS: No abnormalities identified.  BOWEL: No abnormalities identified. The appendix is identified and is unremarkable.  VESSELS: No abnormalities identified.  Abdominal aorta is normal in caliber.  PERITONEUM/RETROPERITONEUM/LYMPH NODES: No free fluid.  No pneumoperitoneum. No lymphadenopathy.  ABDOMINAL WALL: No abnormalities identified. SOFT TISSUES: No abnormalities identified.  BONES: No acute fracture or aggressive osseous lesion. THORACIC SPINE: The alignment is anatomic.  There is no fracture or traumatic subluxation. The vertebral body heights are well maintained.  Disc spaces are preserved.  No significant central canal stenosis is demonstrated. The neural foramina are patent throughout. There is DISH. The paravertebral soft tissues are within normal limits.  LUMBAR SPINE: The alignment is anatomic.  There is no fracture or traumatic subluxation. The vertebral body heights are well maintained.  There is mild disc space narrowing with vacuum disc phenomenon at L4-5.  No significant central canal stenosis is demonstrated.  The neural foramina are patent throughout.  The paravertebral soft tissues are within normal limits.      1. No evidence for acute injury to the chest, abdomen, pelvis, thoracic, or lumbar spine. 2. There is DISH in the thoracic spine. Signed by Christiano Madison MD    CT thoracic spine retrospective reconstruction protocol  Result Date: 6/21/2025  STUDY: CT Chest, Abdomen, and Pelvis without IV Contrast, CT Thoracic Spine and Lumbar Spine without IV; 06/21/2025 at 6:04 PM INDICATION: Trauma evaluation  status post fall. COMPARISON: None available. ACCESSION NUMBER(S): [id number], [id number], [id number] ORDERING CLINICIAN: KATRIN DOW TECHNIQUE: CT of the chest, abdomen, and pelvis was performed.  Contiguous axial images were obtained at 3 mm slice thickness through the chest, abdomen, and pelvis.  Coronal and sagittal reconstructions at 3 mm slice thickness were performed.  No intravenous contrast was administered.  Please note that spinal images were generated from the original CT chest, abdomen and pelvis imaging. FINDINGS: Please note that the evaluation of vessels, lymph nodes and organs is limited without intravenous contrast. CHEST: MEDIASTINUM: The heart is normal in size without pericardial effusion.  Coronary artery calcifications identified.  LUNGS/PLEURA: There is no pleural effusion, pleural thickening, or pneumothorax. The airways are patent. Lungs are clear without consolidation, interstitial disease, or suspicious nodules. LYMPH NODES: Thoracic lymph nodes are not enlarged. ABDOMEN:  LIVER: No hepatomegaly.  Smooth surface contour.  Normal attenuation.  BILE DUCTS: No intrahepatic or extrahepatic biliary ductal dilatation.  GALLBLADDER: The gallbladder is unremarkable. STOMACH: No abnormalities identified.  PANCREAS: No masses or ductal dilatation.  SPLEEN: No splenomegaly or focal splenic lesion.  ADRENAL GLANDS: No thickening or nodules.  KIDNEYS AND URETERS: Kidneys are normal in size and location.  No renal or ureteral calculi.  PELVIS:  BLADDER: No abnormalities identified.  REPRODUCTIVE ORGANS: No abnormalities identified.  BOWEL: No abnormalities identified. The appendix is identified and is unremarkable.  VESSELS: No abnormalities identified.  Abdominal aorta is normal in caliber.  PERITONEUM/RETROPERITONEUM/LYMPH NODES: No free fluid.  No pneumoperitoneum. No lymphadenopathy.  ABDOMINAL WALL: No abnormalities identified. SOFT TISSUES: No abnormalities identified.  BONES: No  acute fracture or aggressive osseous lesion. THORACIC SPINE: The alignment is anatomic.  There is no fracture or traumatic subluxation. The vertebral body heights are well maintained.  Disc spaces are preserved.  No significant central canal stenosis is demonstrated. The neural foramina are patent throughout. There is DISH. The paravertebral soft tissues are within normal limits.  LUMBAR SPINE: The alignment is anatomic.  There is no fracture or traumatic subluxation. The vertebral body heights are well maintained.  There is mild disc space narrowing with vacuum disc phenomenon at L4-5.  No significant central canal stenosis is demonstrated.  The neural foramina are patent throughout.  The paravertebral soft tissues are within normal limits.      1. No evidence for acute injury to the chest, abdomen, pelvis, thoracic, or lumbar spine. 2. There is DISH in the thoracic spine. Signed by Christiano Madison MD    CT lumbar spine retrospective reconstruction protocol  Result Date: 6/21/2025  STUDY: CT Chest, Abdomen, and Pelvis without IV Contrast, CT Thoracic Spine and Lumbar Spine without IV; 06/21/2025 at 6:04 PM INDICATION: Trauma evaluation status post fall. COMPARISON: None available. ACCESSION NUMBER(S): [id number], [id number], [id number] ORDERING CLINICIAN: KATRIN DOW TECHNIQUE: CT of the chest, abdomen, and pelvis was performed.  Contiguous axial images were obtained at 3 mm slice thickness through the chest, abdomen, and pelvis.  Coronal and sagittal reconstructions at 3 mm slice thickness were performed.  No intravenous contrast was administered.  Please note that spinal images were generated from the original CT chest, abdomen and pelvis imaging. FINDINGS: Please note that the evaluation of vessels, lymph nodes and organs is limited without intravenous contrast. CHEST: MEDIASTINUM: The heart is normal in size without pericardial effusion.  Coronary artery calcifications identified.  LUNGS/PLEURA:  There is no pleural effusion, pleural thickening, or pneumothorax. The airways are patent. Lungs are clear without consolidation, interstitial disease, or suspicious nodules. LYMPH NODES: Thoracic lymph nodes are not enlarged. ABDOMEN:  LIVER: No hepatomegaly.  Smooth surface contour.  Normal attenuation.  BILE DUCTS: No intrahepatic or extrahepatic biliary ductal dilatation.  GALLBLADDER: The gallbladder is unremarkable. STOMACH: No abnormalities identified.  PANCREAS: No masses or ductal dilatation.  SPLEEN: No splenomegaly or focal splenic lesion.  ADRENAL GLANDS: No thickening or nodules.  KIDNEYS AND URETERS: Kidneys are normal in size and location.  No renal or ureteral calculi.  PELVIS:  BLADDER: No abnormalities identified.  REPRODUCTIVE ORGANS: No abnormalities identified.  BOWEL: No abnormalities identified. The appendix is identified and is unremarkable.  VESSELS: No abnormalities identified.  Abdominal aorta is normal in caliber.  PERITONEUM/RETROPERITONEUM/LYMPH NODES: No free fluid.  No pneumoperitoneum. No lymphadenopathy.  ABDOMINAL WALL: No abnormalities identified. SOFT TISSUES: No abnormalities identified.  BONES: No acute fracture or aggressive osseous lesion. THORACIC SPINE: The alignment is anatomic.  There is no fracture or traumatic subluxation. The vertebral body heights are well maintained.  Disc spaces are preserved.  No significant central canal stenosis is demonstrated. The neural foramina are patent throughout. There is DISH. The paravertebral soft tissues are within normal limits.  LUMBAR SPINE: The alignment is anatomic.  There is no fracture or traumatic subluxation. The vertebral body heights are well maintained.  There is mild disc space narrowing with vacuum disc phenomenon at L4-5.  No significant central canal stenosis is demonstrated.  The neural foramina are patent throughout.  The paravertebral soft tissues are within normal limits.      1. No evidence for acute injury to the  chest, abdomen, pelvis, thoracic, or lumbar spine. 2. There is DISH in the thoracic spine. Signed by Christiano Madison MD

## 2025-06-24 NOTE — CONSULTS
Consults    Reason For Consult  Acute on top of chronic back pain    History Of Present Illness  Devin Armendariz is a 60 y.o. male presenting with back pain after fall.  MRI shows no fractures of the spine.  The patient is under the care of of Dr. Jackson for pain management.  The patient denied fever or chills.  The patient denied weakness of both of his lower extremities however there is tingling of the right thigh.  The patient denied GI symptoms and denied chest pain..     Past Medical History  He has no past medical history on file.    Surgical History  He has a past surgical history that includes Other surgical history (09/29/2017).     Social History  He reports that he has been smoking cigarettes. He started smoking about 50 years ago. He has a 12.6 pack-year smoking history. He has been exposed to tobacco smoke. He has never used smokeless tobacco. He reports current alcohol use of about 24.0 standard drinks of alcohol per week. He reports that he does not use drugs.    Family History  Family History[1]     Allergies  Patient has no known allergies.    Review of Systems  12 system symptoms have been inquired about and what was positive was placed in history of present illness     Physical Exam  Patient is alert, conscious and cooperative  Lungs: Bilateral breath sounds  Heart: S1-S2 no S3  Abdominal exam: Unremarkable for stenosis peritonitis  Neurological examination: Cranial nerves II through XII within normal limit motor and sensory of upper extremity within normal limit.  Sensory of the left lower extremity within normal limit however there is hypoesthesia in the distribution of the right L3.  The patient has severe tenderness over the SI joint bilaterally with positive Guicho's and Gaenslen's signs.  The patient has severe tenderness over the facet joints raising the possibility of spondylosis.       Last Recorded Vitals  /53 (BP Location: Right arm, Patient Position: Sitting)   Pulse 90   Temp  35.9 °C (96.6 °F) (Temporal)   Resp 18   Wt 91.2 kg (201 lb)   SpO2 94%     Relevant Results  Narrative & Impression   Interpreted By:  Willian Valenzuela,   STUDY:  MR LUMBAR SPINE WO IV CONTRAST;  6/22/2025 12:43 pm      INDICATION:  Signs/Symptoms:rule out cord compression - significant lower  extremity weakness, urinary retention after fall.          COMPARISON:  None.      ACCESSION NUMBER(S):  [id number]      ORDERING CLINICIAN:  ROBERTH CAMACHO      TECHNIQUE:  Sagittal T1, T2, STIR, axial T1 and T2 weighted images of the lumbar  spine were acquired.      FINDINGS:  There is straightening of the normal lumbar lordosis.      The vertebral bodies demonstrate expected height. The marrow signal  is within normal limits. No aggressive osseous lesion.      The lower thoracic cord appears unremarkable.      The prevertebral and posterior paraspinous soft tissues are  unremarkable.      L1-L2: No posterior disc bulge. Bilateral neural foramina are patent.  No significant spinal canal stenosis.      L2-L3: No posterior disc bulge. Bilateral neural foramina are patent.  No significant spinal canal stenosis.      L3-L4: Shallow posterior disc bulge with superimposed 4 mm left  foraminal disc protrusion. Mild bilateral facet arthropathy. Mild  right and moderate left neural foramina narrowing. Moderate to severe  narrowing of the left lateral recess. Mild-to-moderate spinal canal  stenosis.      L4-L5: 5 mm posterior disc bulge combining combination with moderate  bilateral facet arthropathy, resulting in mild left and moderate to  severe right neural foramina narrowing with probable  contacting/impinging right L4 exiting nerve root. Mild spinal canal  stenosis.      L5-S1: Shallow posterior disc bulge with superimposed 3 mm right  foraminal disc protrusion. Mild left and mild-to-moderate right  neural foramina narrowing with probable contacting/impinging right L5  exiting nerve root. No significant spinal canal  stenosis.      IMPRESSION:  1. No acute osseous abnormality.      2. At L3-L4: Mild right and moderate left neural foramina narrowing.  Moderate to severe narrowing of the left lateral recess.  Mild-to-moderate spinal canal stenosis.      3. At L4-L5: Mild left and moderate to severe right neural foramina  narrowing with probable contacting/impinging right L4 exiting nerve  root. Mild spinal canal stenosis.      4. At L5-S1: Mild left and mild-to-moderate right neural foramina  narrowing with probable contacting/impinging right L5 exiting nerve  root. No significant spinal canal stenosis.      MACRO:  None      Signed by: Willian Valenzuela 6/22/2025 1:28 PM  Dictation workstation:   [id number]        Assessment/Plan     60 years old gentleman with bilateral sacroiliitis, lumbar spondylosis and neuroforaminal stenosis at L3-4, L4-5 and L5-S1.  No fracture spine detected per the MRI.  Physical therapy and conservative treatment would be the first step, and he due to his sacroiliitis I will add Medrol Dosepak.  The patient may follow-up with Dr. Jackson for his pain management.    Vish Hanna MD             [1] No family history on file.

## 2025-06-30 ENCOUNTER — APPOINTMENT (OUTPATIENT)
Dept: CT IMAGING | Age: 60
End: 2025-06-30
Payer: COMMERCIAL

## 2025-06-30 ENCOUNTER — HOSPITAL ENCOUNTER (EMERGENCY)
Age: 60
Discharge: HOME | End: 2025-06-30
Payer: COMMERCIAL

## 2025-06-30 VITALS
WEIGHT: 205.4 LBS | DIASTOLIC BLOOD PRESSURE: 81 MMHG | TEMPERATURE: 97.3 F | RESPIRATION RATE: 23 BRPM | HEART RATE: 76 BPM | OXYGEN SATURATION: 92 % | BODY MASS INDEX: 32.24 KG/M2 | SYSTOLIC BLOOD PRESSURE: 114 MMHG | HEIGHT: 67 IN

## 2025-06-30 DIAGNOSIS — K59.03 DRUG-INDUCED CONSTIPATION: Primary | ICD-10-CM

## 2025-06-30 LAB
ALBUMIN SERPL-MCNC: 3.6 G/DL (ref 3.5–4.6)
ALP SERPL-CCNC: 60 U/L (ref 35–104)
ALT SERPL-CCNC: 25 U/L (ref 0–41)
ANION GAP SERPL CALCULATED.3IONS-SCNC: 12 MEQ/L (ref 9–15)
AST SERPL-CCNC: 15 U/L (ref 0–40)
BASOPHILS # BLD: 0 K/UL (ref 0–0.2)
BASOPHILS NFR BLD: 0.4 %
BILIRUB SERPL-MCNC: 0.3 MG/DL (ref 0.2–0.7)
BILIRUB UR QL STRIP: NEGATIVE
BUN SERPL-MCNC: 13 MG/DL (ref 8–23)
CALCIUM SERPL-MCNC: 8.9 MG/DL (ref 8.5–9.9)
CHLORIDE SERPL-SCNC: 99 MEQ/L (ref 95–107)
CLARITY UR: CLEAR
CO2 SERPL-SCNC: 26 MEQ/L (ref 20–31)
COLOR UR: YELLOW
CREAT SERPL-MCNC: 0.58 MG/DL (ref 0.7–1.2)
EOSINOPHIL # BLD: 0.1 K/UL (ref 0–0.7)
EOSINOPHIL NFR BLD: 0.7 %
ERYTHROCYTE [DISTWIDTH] IN BLOOD BY AUTOMATED COUNT: 14.6 % (ref 11.5–14.5)
GLOBULIN SER CALC-MCNC: 3.4 G/DL (ref 2.3–3.5)
GLUCOSE SERPL-MCNC: 151 MG/DL (ref 70–99)
GLUCOSE UR STRIP-MCNC: >=1000 MG/DL
HCT VFR BLD AUTO: 42.7 % (ref 42–52)
HGB BLD-MCNC: 14.2 G/DL (ref 14–18)
HGB UR QL STRIP: NEGATIVE
KETONES UR STRIP-MCNC: NEGATIVE MG/DL
LEUKOCYTE ESTERASE UR QL STRIP: NEGATIVE
LIPASE SERPL-CCNC: 38 U/L (ref 12–95)
LYMPHOCYTES # BLD: 2.1 K/UL (ref 1–4.8)
LYMPHOCYTES NFR BLD: 24.6 %
MCH RBC QN AUTO: 30.4 PG (ref 27–31.3)
MCHC RBC AUTO-ENTMCNC: 33.3 % (ref 33–37)
MCV RBC AUTO: 91.4 FL (ref 79–92.2)
MONOCYTES # BLD: 0.7 K/UL (ref 0.2–0.8)
MONOCYTES NFR BLD: 8.5 %
NEUTROPHILS # BLD: 5.4 K/UL (ref 1.4–6.5)
NEUTS SEG NFR BLD: 63.5 %
NITRITE UR QL STRIP: NEGATIVE
PERFORMED ON: ABNORMAL
PH UR STRIP: 7.5 [PH] (ref 5–9)
PLATELET # BLD AUTO: 282 K/UL (ref 130–400)
POC CREATININE WHOLE BLOOD: 0.7
POC CREATININE: 0.7 MG/DL (ref 0.8–1.3)
POC SAMPLE TYPE: ABNORMAL
POTASSIUM SERPL-SCNC: 4.3 MEQ/L (ref 3.4–4.9)
PROT SERPL-MCNC: 7 G/DL (ref 6.3–8)
PROT UR STRIP-MCNC: NEGATIVE MG/DL
RBC # BLD AUTO: 4.67 M/UL (ref 4.7–6.1)
SODIUM SERPL-SCNC: 137 MEQ/L (ref 135–144)
SP GR UR STRIP: 1.02 (ref 1–1.03)
URINE REFLEX TO CULTURE: ABNORMAL
UROBILINOGEN UR STRIP-ACNC: 1 E.U./DL
WBC # BLD AUTO: 8.5 K/UL (ref 4.8–10.8)

## 2025-06-30 PROCEDURE — 80053 COMPREHEN METABOLIC PANEL: CPT

## 2025-06-30 PROCEDURE — 6360000002 HC RX W HCPCS: Performed by: PERSONAL EMERGENCY RESPONSE ATTENDANT

## 2025-06-30 PROCEDURE — 83690 ASSAY OF LIPASE: CPT

## 2025-06-30 PROCEDURE — 6370000000 HC RX 637 (ALT 250 FOR IP): Performed by: PERSONAL EMERGENCY RESPONSE ATTENDANT

## 2025-06-30 PROCEDURE — 96374 THER/PROPH/DIAG INJ IV PUSH: CPT

## 2025-06-30 PROCEDURE — 36415 COLL VENOUS BLD VENIPUNCTURE: CPT

## 2025-06-30 PROCEDURE — 96372 THER/PROPH/DIAG INJ SC/IM: CPT

## 2025-06-30 PROCEDURE — 99285 EMERGENCY DEPT VISIT HI MDM: CPT

## 2025-06-30 PROCEDURE — 2580000003 HC RX 258: Performed by: PERSONAL EMERGENCY RESPONSE ATTENDANT

## 2025-06-30 PROCEDURE — 85025 COMPLETE CBC W/AUTO DIFF WBC: CPT

## 2025-06-30 PROCEDURE — 6360000004 HC RX CONTRAST MEDICATION: Performed by: PERSONAL EMERGENCY RESPONSE ATTENDANT

## 2025-06-30 PROCEDURE — 74177 CT ABD & PELVIS W/CONTRAST: CPT

## 2025-06-30 PROCEDURE — 81003 URINALYSIS AUTO W/O SCOPE: CPT

## 2025-06-30 PROCEDURE — 96375 TX/PRO/DX INJ NEW DRUG ADDON: CPT

## 2025-06-30 RX ORDER — ONDANSETRON 2 MG/ML
4 INJECTION INTRAMUSCULAR; INTRAVENOUS ONCE
Status: COMPLETED | OUTPATIENT
Start: 2025-06-30 | End: 2025-06-30

## 2025-06-30 RX ORDER — MORPHINE SULFATE 4 MG/ML
4 INJECTION, SOLUTION INTRAMUSCULAR; INTRAVENOUS ONCE
Status: COMPLETED | OUTPATIENT
Start: 2025-06-30 | End: 2025-06-30

## 2025-06-30 RX ORDER — DICYCLOMINE HCL 20 MG
20 TABLET ORAL 4 TIMES DAILY
Qty: 20 TABLET | Refills: 0 | Status: SHIPPED | OUTPATIENT
Start: 2025-06-30

## 2025-06-30 RX ORDER — 0.9 % SODIUM CHLORIDE 0.9 %
500 INTRAVENOUS SOLUTION INTRAVENOUS ONCE
Status: COMPLETED | OUTPATIENT
Start: 2025-06-30 | End: 2025-06-30

## 2025-06-30 RX ORDER — IOPAMIDOL 755 MG/ML
75 INJECTION, SOLUTION INTRAVASCULAR
Status: COMPLETED | OUTPATIENT
Start: 2025-06-30 | End: 2025-06-30

## 2025-06-30 RX ADMIN — MORPHINE SULFATE 4 MG: 4 INJECTION, SOLUTION INTRAMUSCULAR; INTRAVENOUS at 20:51

## 2025-06-30 RX ADMIN — METHYLNALTREXONE BROMIDE 12 MG: 12 INJECTION, SOLUTION SUBCUTANEOUS at 20:53

## 2025-06-30 RX ADMIN — IOPAMIDOL 75 ML: 755 INJECTION, SOLUTION INTRAVENOUS at 21:29

## 2025-06-30 RX ADMIN — POLYETHYLENE GLYCOL-3350 AND ELECTROLYTES 4000 ML: 236; 6.74; 5.86; 2.97; 22.74 POWDER, FOR SOLUTION ORAL at 22:46

## 2025-06-30 RX ADMIN — ONDANSETRON 4 MG: 2 INJECTION, SOLUTION INTRAMUSCULAR; INTRAVENOUS at 20:51

## 2025-06-30 RX ADMIN — SODIUM CHLORIDE 500 ML: 0.9 INJECTION, SOLUTION INTRAVENOUS at 20:50

## 2025-06-30 ASSESSMENT — PAIN DESCRIPTION - LOCATION: LOCATION: ABDOMEN

## 2025-06-30 ASSESSMENT — PAIN SCALES - GENERAL
PAINLEVEL_OUTOF10: 6
PAINLEVEL_OUTOF10: 10
PAINLEVEL_OUTOF10: 10

## 2025-06-30 ASSESSMENT — ENCOUNTER SYMPTOMS
SORE THROAT: 0
RHINORRHEA: 0
VOMITING: 0
DIARRHEA: 0
SHORTNESS OF BREATH: 0
COLOR CHANGE: 0
NAUSEA: 0
BLOOD IN STOOL: 0
COUGH: 0
CONSTIPATION: 1
ABDOMINAL PAIN: 1

## 2025-06-30 ASSESSMENT — PAIN - FUNCTIONAL ASSESSMENT: PAIN_FUNCTIONAL_ASSESSMENT: 0-10

## 2025-06-30 ASSESSMENT — LIFESTYLE VARIABLES
HOW MANY STANDARD DRINKS CONTAINING ALCOHOL DO YOU HAVE ON A TYPICAL DAY: PATIENT DOES NOT DRINK
HOW OFTEN DO YOU HAVE A DRINK CONTAINING ALCOHOL: NEVER

## 2025-07-01 NOTE — ED NOTES
Pt presents to ER from home with abdominal distention and pain from not being able to have a bowel movement since Saturday. Pt was previously admitted to [facility] for a fall and was discharged with opioids and is worried that there is a possible bowel obstruction. Pt denies any vomiting at this time. Pt is A&Ox4, warm and dry.

## 2025-07-01 NOTE — ED PROVIDER NOTES
[facility] EMERGENCY DEPARTMENT  eMERGENCY dEPARTMENT eNCOUnter      Pt Name: Joaquín Bolton  MRN: [id number]  Birthdate 1965  Date of evaluation: 6/30/2025  Provider: SIVAN ZHENG  8:18 PM EDT     My attending is Dr. Nava.    HISTORY OF PRESENT ILLNESS    Joaquín Bolton is a 60 y.o. male with PMHx of CAD, COPD, hyperlipidemia, hypertension, subdural hematoma, DM 2, ED presents to the emergency department with abdominal pain.  Patient admitted June 21-24 at [facility] after fall with low back pain, hyponatremia, and difficulty walking.  They recommended placement and patient wanted to be discharged home with [facility].  Urine cultures grew strep and he was treated with IV Rocephin and discharged with Keflex x 7 days, Medrol Dosepak, Percocet. His last good BM was 11 days ago with small hard pebble output today.  2 days ago started with left lower quadrant abdominal pain.  He has tried MiraLAX, Ex-Lax and milk of magnesia, suppository, mineral oil at home without relief.  Patient denies fevers, chest pain, shortness of breath, nausea, vomiting, diarrhea, urinary symptoms    HPI    Nursing Notes were reviewed.    REVIEW OF SYSTEMS       Review of Systems   Constitutional:  Negative for appetite change, chills and fever.   HENT:  Negative for congestion, rhinorrhea and sore throat.    Respiratory:  Negative for cough and shortness of breath.    Cardiovascular:  Negative for chest pain.   Gastrointestinal:  Positive for abdominal pain and constipation. Negative for blood in stool, diarrhea, nausea and vomiting.   Genitourinary:  Negative for difficulty urinating.   Musculoskeletal:  Negative for neck stiffness.   Skin:  Negative for color change and rash.   Neurological:  Negative for dizziness, syncope, weakness, light-headedness, numbness and headaches.   All other systems reviewed and are negative.            PAST MEDICAL HISTORY     Past Medical History:   Diagnosis Date    CAD (coronary artery disease)

## 2025-07-07 RX ORDER — DULAGLUTIDE 4.5 MG/.5ML
4.5 INJECTION, SOLUTION SUBCUTANEOUS WEEKLY
Qty: 2 ML | Refills: 3 | Status: SHIPPED | OUTPATIENT
Start: 2025-07-07

## 2025-07-18 LAB
ATRIAL RATE: 93 BPM
P AXIS: 42 DEGREES
P OFFSET: 186 MS
P ONSET: 140 MS
PR INTERVAL: 146 MS
Q ONSET: 213 MS
QRS COUNT: 15 BEATS
QRS DURATION: 94 MS
QT INTERVAL: 352 MS
QTC CALCULATION(BAZETT): 437 MS
QTC FREDERICIA: 407 MS
R AXIS: 37 DEGREES
T AXIS: 61 DEGREES
T OFFSET: 389 MS
VENTRICULAR RATE: 93 BPM

## 2025-08-25 RX ORDER — LORATADINE 10 MG/1
TABLET ORAL
Qty: 28 TABLET | Refills: 5 | Status: SHIPPED | OUTPATIENT
Start: 2025-08-25

## 2025-08-25 RX ORDER — PEN NEEDLE, DIABETIC
NEEDLE, DISPOSABLE MISCELLANEOUS
Qty: 30 EACH | Refills: 5 | Status: SHIPPED | OUTPATIENT
Start: 2025-08-25

## (undated) DEVICE — FORCEPS BX L240CM JAW DIA2.8MM L CAP W/ NDL MIC MESH TOOTH

## (undated) DEVICE — TRAP POLYP BALEEN

## (undated) DEVICE — GLOVE ORANGE PI 8 1/2   MSG9085

## (undated) DEVICE — SNARE ENDOSCP AD L240CM LOOP W10MM SHTH DIA2.4MM RND INSUL

## (undated) DEVICE — ENDO CARRY-ON PROCEDURE KIT: Brand: ENDO CARRY-ON PROCEDURE KIT

## (undated) DEVICE — TUBING, SUCTION, 1/4" X 10', STRAIGHT: Brand: MEDLINE

## (undated) DEVICE — BRUSH ENDO CLN L90.5IN SHTH DIA1.7MM BRIST DIA5-7MM 2-6MM

## (undated) DEVICE — Device: Brand: ENDO SMARTCAP

## (undated) DEVICE — SINGLE PORT MANIFOLD: Brand: NEPTUNE 2

## (undated) DEVICE — TUBE SET 96 MM 64 MM H2O PERISTALTIC STD AUX CHANNEL